# Patient Record
Sex: FEMALE | Race: WHITE | NOT HISPANIC OR LATINO | Employment: OTHER | URBAN - METROPOLITAN AREA
[De-identification: names, ages, dates, MRNs, and addresses within clinical notes are randomized per-mention and may not be internally consistent; named-entity substitution may affect disease eponyms.]

---

## 2017-07-14 ENCOUNTER — CONVERSION ENCOUNTER (OUTPATIENT)
Dept: MAMMOGRAPHY | Facility: HOSPITAL | Age: 52
End: 2017-07-14

## 2018-03-08 ENCOUNTER — OFFICE VISIT CONVERTED (OUTPATIENT)
Dept: FAMILY MEDICINE CLINIC | Facility: CLINIC | Age: 53
End: 2018-03-08
Attending: NURSE PRACTITIONER

## 2018-06-11 ENCOUNTER — OFFICE VISIT CONVERTED (OUTPATIENT)
Dept: FAMILY MEDICINE CLINIC | Facility: CLINIC | Age: 53
End: 2018-06-11
Attending: NURSE PRACTITIONER

## 2018-06-11 ENCOUNTER — CONVERSION ENCOUNTER (OUTPATIENT)
Dept: FAMILY MEDICINE CLINIC | Facility: CLINIC | Age: 53
End: 2018-06-11

## 2018-07-06 ENCOUNTER — OFFICE VISIT CONVERTED (OUTPATIENT)
Dept: ORTHOPEDIC SURGERY | Facility: CLINIC | Age: 53
End: 2018-07-06
Attending: ORTHOPAEDIC SURGERY

## 2018-07-27 ENCOUNTER — OFFICE VISIT CONVERTED (OUTPATIENT)
Dept: ORTHOPEDIC SURGERY | Facility: CLINIC | Age: 53
End: 2018-07-27
Attending: PHYSICIAN ASSISTANT

## 2018-08-24 ENCOUNTER — OFFICE VISIT CONVERTED (OUTPATIENT)
Dept: SURGERY | Facility: CLINIC | Age: 53
End: 2018-08-24
Attending: SURGERY

## 2018-08-28 ENCOUNTER — OFFICE VISIT CONVERTED (OUTPATIENT)
Dept: PULMONOLOGY | Facility: CLINIC | Age: 53
End: 2018-08-28
Attending: INTERNAL MEDICINE

## 2018-09-04 ENCOUNTER — CONVERSION ENCOUNTER (OUTPATIENT)
Dept: FAMILY MEDICINE CLINIC | Facility: CLINIC | Age: 53
End: 2018-09-04

## 2018-09-04 ENCOUNTER — OFFICE VISIT CONVERTED (OUTPATIENT)
Dept: FAMILY MEDICINE CLINIC | Facility: CLINIC | Age: 53
End: 2018-09-04
Attending: NURSE PRACTITIONER

## 2018-11-05 ENCOUNTER — OFFICE VISIT CONVERTED (OUTPATIENT)
Dept: FAMILY MEDICINE CLINIC | Facility: CLINIC | Age: 53
End: 2018-11-05
Attending: FAMILY MEDICINE

## 2018-11-05 ENCOUNTER — CONVERSION ENCOUNTER (OUTPATIENT)
Dept: FAMILY MEDICINE CLINIC | Facility: CLINIC | Age: 53
End: 2018-11-05

## 2018-11-27 ENCOUNTER — OFFICE VISIT CONVERTED (OUTPATIENT)
Dept: PULMONOLOGY | Facility: CLINIC | Age: 53
End: 2018-11-27
Attending: INTERNAL MEDICINE

## 2018-12-05 ENCOUNTER — OFFICE VISIT CONVERTED (OUTPATIENT)
Dept: FAMILY MEDICINE CLINIC | Facility: CLINIC | Age: 53
End: 2018-12-05
Attending: NURSE PRACTITIONER

## 2019-04-08 ENCOUNTER — OFFICE VISIT CONVERTED (OUTPATIENT)
Dept: PULMONOLOGY | Facility: CLINIC | Age: 54
End: 2019-04-08
Attending: PHYSICIAN ASSISTANT

## 2019-04-29 ENCOUNTER — HOSPITAL ENCOUNTER (OUTPATIENT)
Dept: ULTRASOUND IMAGING | Facility: HOSPITAL | Age: 54
Discharge: HOME | End: 2019-04-29
Attending: INTERNAL MEDICINE

## 2019-06-26 ENCOUNTER — OUTSIDE FACILITY SERVICE (OUTPATIENT)
Dept: SLEEP MEDICINE | Facility: HOSPITAL | Age: 54
End: 2019-06-26

## 2019-06-26 ENCOUNTER — HOSPITAL ENCOUNTER (OUTPATIENT)
Dept: SLEEP MEDICINE | Facility: HOSPITAL | Age: 54
Discharge: HOME | End: 2019-06-26
Attending: INTERNAL MEDICINE

## 2019-06-26 PROCEDURE — 99214 OFFICE O/P EST MOD 30 MIN: CPT | Performed by: INTERNAL MEDICINE

## 2019-08-30 ENCOUNTER — HOSPITAL ENCOUNTER (OUTPATIENT)
Dept: GENERAL RADIOLOGY | Facility: HOSPITAL | Age: 54
Discharge: HOME | End: 2019-08-30

## 2019-09-11 ENCOUNTER — OFFICE VISIT CONVERTED (OUTPATIENT)
Dept: PULMONOLOGY | Facility: CLINIC | Age: 54
End: 2019-09-11
Attending: PHYSICIAN ASSISTANT

## 2019-09-12 ENCOUNTER — HOSPITAL ENCOUNTER (OUTPATIENT)
Dept: LAB | Facility: HOSPITAL | Age: 54
Discharge: HOME | End: 2019-09-12
Attending: PHYSICIAN ASSISTANT

## 2019-09-14 LAB
BACTERIA SPEC AEROBE CULT: ABNORMAL
CIPROFLOXACIN SUSC ISLT: >=8
CLINDAMYCIN SUSC ISLT: 0.25
DOXYCYCLINE SUSC ISLT: <=0.5
ERYTHROMYCIN SUSC ISLT: >=8
GENTAMICIN SUSC ISLT: <=0.5
LEVOFLOXACIN SUSC ISLT: >=8
OXACILLIN SUSC ISLT: >=4
RIFAMPIN SUSC ISLT: <=0.5
TETRACYCLINE SUSC ISLT: <=1
TMP SMX SUSC ISLT: <=10
VANCOMYCIN SUSC ISLT: 1

## 2019-09-28 ENCOUNTER — HOSPITAL ENCOUNTER (OUTPATIENT)
Dept: URGENT CARE | Facility: CLINIC | Age: 54
Discharge: HOME | End: 2019-09-28

## 2019-11-18 ENCOUNTER — HOSPITAL ENCOUNTER (OUTPATIENT)
Dept: NUCLEAR MEDICINE | Facility: HOSPITAL | Age: 54
Discharge: HOME | End: 2019-11-18
Attending: INTERNAL MEDICINE

## 2019-12-05 ENCOUNTER — OFFICE VISIT CONVERTED (OUTPATIENT)
Dept: NEUROSURGERY | Facility: CLINIC | Age: 54
End: 2019-12-05
Attending: PHYSICIAN ASSISTANT

## 2019-12-16 ENCOUNTER — HOSPITAL ENCOUNTER (OUTPATIENT)
Dept: GENERAL RADIOLOGY | Facility: HOSPITAL | Age: 54
Discharge: HOME | End: 2019-12-16
Attending: PHYSICIAN ASSISTANT

## 2019-12-16 LAB
CREAT BLD-MCNC: 0.4 MG/DL (ref 0.6–1.4)
GFR SERPLBLD BASED ON 1.73 SQ M-ARVRAT: >60 ML/MIN/{1.73_M2}

## 2020-01-07 ENCOUNTER — OFFICE VISIT CONVERTED (OUTPATIENT)
Dept: NEUROSURGERY | Facility: CLINIC | Age: 55
End: 2020-01-07
Attending: PHYSICIAN ASSISTANT

## 2020-01-14 ENCOUNTER — HOSPITAL ENCOUNTER (OUTPATIENT)
Dept: GENERAL RADIOLOGY | Facility: HOSPITAL | Age: 55
Discharge: HOME | End: 2020-01-14
Attending: PHYSICIAN ASSISTANT

## 2020-03-05 ENCOUNTER — CONVERSION ENCOUNTER (OUTPATIENT)
Dept: NEUROLOGY | Facility: CLINIC | Age: 55
End: 2020-03-05

## 2020-03-05 ENCOUNTER — OFFICE VISIT CONVERTED (OUTPATIENT)
Dept: NEUROLOGY | Facility: CLINIC | Age: 55
End: 2020-03-05
Attending: PSYCHIATRY & NEUROLOGY

## 2020-03-13 ENCOUNTER — HOSPITAL ENCOUNTER (OUTPATIENT)
Dept: CARDIOLOGY | Facility: HOSPITAL | Age: 55
Discharge: HOME | End: 2020-03-13
Attending: PSYCHIATRY & NEUROLOGY

## 2020-03-19 ENCOUNTER — HOSPITAL ENCOUNTER (OUTPATIENT)
Dept: CARDIOLOGY | Facility: HOSPITAL | Age: 55
Discharge: HOME | End: 2020-03-19
Attending: NURSE PRACTITIONER

## 2020-03-19 LAB
25(OH)D3 SERPL-MCNC: 47 NG/ML (ref 30–100)
ALBUMIN SERPL-MCNC: 3.9 G/DL (ref 3.5–5)
ALBUMIN/GLOB SERPL: 1.1 {RATIO} (ref 1.4–2.6)
ALP SERPL-CCNC: 172 U/L (ref 53–141)
ALT SERPL-CCNC: 30 U/L (ref 10–40)
ANION GAP SERPL CALC-SCNC: 16 MMOL/L (ref 8–19)
AST SERPL-CCNC: 31 U/L (ref 15–50)
BASOPHILS # BLD AUTO: 0.03 10*3/UL (ref 0–0.2)
BASOPHILS NFR BLD AUTO: 0.6 % (ref 0–3)
BILIRUB SERPL-MCNC: 1.11 MG/DL (ref 0.2–1.3)
BUN SERPL-MCNC: 6 MG/DL (ref 5–25)
BUN/CREAT SERPL: 9 {RATIO} (ref 6–20)
CALCIUM SERPL-MCNC: 9.3 MG/DL (ref 8.7–10.4)
CHLORIDE SERPL-SCNC: 99 MMOL/L (ref 99–111)
CHOLEST SERPL-MCNC: 208 MG/DL (ref 107–200)
CHOLEST/HDLC SERPL: 2.6 {RATIO} (ref 3–6)
CONV ABS IMM GRAN: 0.02 10*3/UL (ref 0–0.2)
CONV CO2: 27 MMOL/L (ref 22–32)
CONV IMMATURE GRAN: 0.4 % (ref 0–1.8)
CONV TOTAL PROTEIN: 7.3 G/DL (ref 6.3–8.2)
CREAT UR-MCNC: 0.69 MG/DL (ref 0.5–0.9)
DEPRECATED RDW RBC AUTO: 54.4 FL (ref 36.4–46.3)
EOSINOPHIL # BLD AUTO: 0.17 10*3/UL (ref 0–0.7)
EOSINOPHIL # BLD AUTO: 3.5 % (ref 0–7)
ERYTHROCYTE [DISTWIDTH] IN BLOOD BY AUTOMATED COUNT: 14.9 % (ref 11.7–14.4)
EST. AVERAGE GLUCOSE BLD GHB EST-MCNC: 192 MG/DL
GFR SERPLBLD BASED ON 1.73 SQ M-ARVRAT: >60 ML/MIN/{1.73_M2}
GLOBULIN UR ELPH-MCNC: 3.4 G/DL (ref 2–3.5)
GLUCOSE SERPL-MCNC: 287 MG/DL (ref 65–99)
HBA1C MFR BLD: 8.3 % (ref 3.5–5.7)
HCT VFR BLD AUTO: 46 % (ref 37–47)
HDLC SERPL-MCNC: 79 MG/DL (ref 40–60)
HGB BLD-MCNC: 14.5 G/DL (ref 12–16)
LDLC SERPL CALC-MCNC: 111 MG/DL (ref 70–100)
LYMPHOCYTES # BLD AUTO: 1.69 10*3/UL (ref 1–5)
LYMPHOCYTES NFR BLD AUTO: 35.1 % (ref 20–45)
MCH RBC QN AUTO: 31.2 PG (ref 27–31)
MCHC RBC AUTO-ENTMCNC: 31.5 G/DL (ref 33–37)
MCV RBC AUTO: 98.9 FL (ref 81–99)
MONOCYTES # BLD AUTO: 0.39 10*3/UL (ref 0.2–1.2)
MONOCYTES NFR BLD AUTO: 8.1 % (ref 3–10)
NEUTROPHILS # BLD AUTO: 2.51 10*3/UL (ref 2–8)
NEUTROPHILS NFR BLD AUTO: 52.3 % (ref 30–85)
NRBC CBCN: 0 % (ref 0–0.7)
OSMOLALITY SERPL CALC.SUM OF ELEC: 294 MOSM/KG (ref 273–304)
PLATELET # BLD AUTO: 75 10*3/UL (ref 130–400)
PMV BLD AUTO: 12.1 FL (ref 9.4–12.3)
POTASSIUM SERPL-SCNC: 4.3 MMOL/L (ref 3.5–5.3)
RBC # BLD AUTO: 4.65 10*6/UL (ref 4.2–5.4)
SODIUM SERPL-SCNC: 138 MMOL/L (ref 135–147)
T4 FREE SERPL-MCNC: 0.8 NG/DL (ref 0.9–1.8)
TRIGL SERPL-MCNC: 91 MG/DL (ref 40–150)
TSH SERPL-ACNC: 0.61 M[IU]/L (ref 0.27–4.2)
VLDLC SERPL-MCNC: 18 MG/DL (ref 5–37)
WBC # BLD AUTO: 4.81 10*3/UL (ref 4.8–10.8)

## 2020-03-20 LAB — CONV ANTI MICROSOMAL AB: >600 IU/ML (ref 0–34)

## 2020-03-21 LAB — CONV THYROID STIMULATING IMMUNOGLOBULINS: 1.54 IU/L (ref 0–0.55)

## 2020-05-12 ENCOUNTER — OFFICE VISIT CONVERTED (OUTPATIENT)
Dept: PULMONOLOGY | Facility: CLINIC | Age: 55
End: 2020-05-12
Attending: INTERNAL MEDICINE

## 2020-08-21 ENCOUNTER — HOSPITAL ENCOUNTER (OUTPATIENT)
Dept: URGENT CARE | Facility: CLINIC | Age: 55
Discharge: HOME | End: 2020-08-21
Attending: NURSE PRACTITIONER

## 2020-08-23 LAB — SARS-COV-2 RNA SPEC QL NAA+PROBE: NOT DETECTED

## 2020-10-19 ENCOUNTER — HOSPITAL ENCOUNTER (OUTPATIENT)
Dept: URGENT CARE | Facility: CLINIC | Age: 55
Discharge: HOME | End: 2020-10-19

## 2020-11-02 ENCOUNTER — HOSPITAL ENCOUNTER (OUTPATIENT)
Dept: PREADMISSION TESTING | Facility: HOSPITAL | Age: 55
Discharge: HOME | End: 2020-11-02
Attending: OTOLARYNGOLOGY

## 2020-11-02 ENCOUNTER — HOSPITAL ENCOUNTER (OUTPATIENT)
Dept: OTHER | Facility: HOSPITAL | Age: 55
Discharge: HOME | End: 2020-11-02
Attending: OTOLARYNGOLOGY

## 2020-11-02 LAB
ANION GAP SERPL CALC-SCNC: 15 MMOL/L (ref 8–19)
APTT BLD: 24.1 S (ref 22.2–34.2)
BASOPHILS # BLD AUTO: 0.04 10*3/UL (ref 0–0.2)
BASOPHILS NFR BLD AUTO: 0.9 % (ref 0–3)
BUN SERPL-MCNC: 9 MG/DL (ref 5–25)
BUN/CREAT SERPL: 13 {RATIO} (ref 6–20)
CALCIUM SERPL-MCNC: 9.3 MG/DL (ref 8.7–10.4)
CHLORIDE SERPL-SCNC: 101 MMOL/L (ref 99–111)
CONV ABS IMM GRAN: 0.01 10*3/UL (ref 0–0.2)
CONV CO2: 25 MMOL/L (ref 22–32)
CONV IMMATURE GRAN: 0.2 % (ref 0–1.8)
CREAT UR-MCNC: 0.71 MG/DL (ref 0.5–0.9)
DEPRECATED RDW RBC AUTO: 47.2 FL (ref 36.4–46.3)
EOSINOPHIL # BLD AUTO: 0.14 10*3/UL (ref 0–0.7)
EOSINOPHIL # BLD AUTO: 3.2 % (ref 0–7)
ERYTHROCYTE [DISTWIDTH] IN BLOOD BY AUTOMATED COUNT: 13.9 % (ref 11.7–14.4)
GFR SERPLBLD BASED ON 1.73 SQ M-ARVRAT: >60 ML/MIN/{1.73_M2}
GLUCOSE SERPL-MCNC: 436 MG/DL (ref 65–99)
HCT VFR BLD AUTO: 41.8 % (ref 37–47)
HGB BLD-MCNC: 13.2 G/DL (ref 12–16)
INR PPP: 1.08 (ref 2–3)
LYMPHOCYTES # BLD AUTO: 1.11 10*3/UL (ref 1–5)
LYMPHOCYTES NFR BLD AUTO: 25.5 % (ref 20–45)
MCH RBC QN AUTO: 29.2 PG (ref 27–31)
MCHC RBC AUTO-ENTMCNC: 31.6 G/DL (ref 33–37)
MCV RBC AUTO: 92.5 FL (ref 81–99)
MONOCYTES # BLD AUTO: 0.28 10*3/UL (ref 0.2–1.2)
MONOCYTES NFR BLD AUTO: 6.4 % (ref 3–10)
NEUTROPHILS # BLD AUTO: 2.78 10*3/UL (ref 2–8)
NEUTROPHILS NFR BLD AUTO: 63.8 % (ref 30–85)
NRBC CBCN: 0 % (ref 0–0.7)
OSMOLALITY SERPL CALC.SUM OF ELEC: 301 MOSM/KG (ref 273–304)
PLATELET # BLD AUTO: 85 10*3/UL (ref 130–400)
PMV BLD AUTO: 13.7 FL (ref 9.4–12.3)
POTASSIUM SERPL-SCNC: 4.1 MMOL/L (ref 3.5–5.3)
PROTHROMBIN TIME: 11.5 S (ref 9.4–12)
PTH-INTACT SERPL-MCNC: 33.3 PG/ML (ref 11.1–79.5)
RBC # BLD AUTO: 4.52 10*6/UL (ref 4.2–5.4)
SODIUM SERPL-SCNC: 137 MMOL/L (ref 135–147)
T4 FREE SERPL-MCNC: 0.8 NG/DL (ref 0.9–1.8)
TSH SERPL-ACNC: 0.23 M[IU]/L (ref 0.27–4.2)
WBC # BLD AUTO: 4.36 10*3/UL (ref 4.8–10.8)

## 2020-11-04 ENCOUNTER — HOSPITAL ENCOUNTER (OUTPATIENT)
Dept: PREADMISSION TESTING | Facility: HOSPITAL | Age: 55
Discharge: HOME | End: 2020-11-04
Attending: OTOLARYNGOLOGY

## 2020-11-04 LAB — SARS-COV-2 RNA SPEC QL NAA+PROBE: NOT DETECTED

## 2020-11-05 ENCOUNTER — HOSPITAL ENCOUNTER (OUTPATIENT)
Dept: LAB | Facility: HOSPITAL | Age: 55
Discharge: HOME | End: 2020-11-05
Attending: OTOLARYNGOLOGY

## 2020-11-05 ENCOUNTER — OFFICE VISIT CONVERTED (OUTPATIENT)
Dept: PULMONOLOGY | Facility: CLINIC | Age: 55
End: 2020-11-05
Attending: INTERNAL MEDICINE

## 2020-11-05 ENCOUNTER — HOSPITAL ENCOUNTER (OUTPATIENT)
Dept: CT IMAGING | Facility: HOSPITAL | Age: 55
Discharge: HOME | End: 2020-11-05
Attending: INTERNAL MEDICINE

## 2020-11-05 LAB
BASE EXCESS BLD CALC-SCNC: -3.3 MMOL/L
COHGB MFR BLD: 5.2 % (ref 0–1.5)
CONV FHHB: 10.3 % (ref 0–5)
CONV FIO2: 21 % (ref 21–100)
CONV SITE: ABNORMAL
CREAT BLD-MCNC: 0.6 MG/DL (ref 0.6–1.4)
GFR SERPLBLD BASED ON 1.73 SQ M-ARVRAT: >60 ML/MIN/{1.73_M2}
GLUCOSE SERPL-MCNC: 156 MG/DL (ref 65–99)
HBA1C MFR BLD: 13.8 % (ref 11.7–14.6)
HCO3 BLDA-SCNC: 22.1 MMOL/L (ref 22–26)
LITERS PER MINUTE: 0 L/MIN
Lab: ABNORMAL
METHGB MFR BLD: 0.1 % (ref 0–1.5)
OXYHGB MFR BLD: 84.4 % (ref 94–98)
PCO2 BLD: 41.2 MM[HG] (ref 35–45)
PH UR: 7.35 [PH] (ref 7.35–7.45)
PO2 BLD: 263 MM[HG] (ref 0–500)
PO2 BLD: 55.3 MM[HG] (ref 80–100)
SAO2 % BLDCOA: 89.1 % (ref 95–99)
SPECIMEN SOURCE: ABNORMAL

## 2020-11-06 ENCOUNTER — HOSPITAL ENCOUNTER (OUTPATIENT)
Dept: PERIOP | Facility: HOSPITAL | Age: 55
Setting detail: HOSPITAL OUTPATIENT SURGERY
Discharge: HOME | End: 2020-11-07
Attending: OTOLARYNGOLOGY

## 2020-11-06 LAB
GLUCOSE BLD-MCNC: 118 MG/DL (ref 65–99)
GLUCOSE BLD-MCNC: 214 MG/DL (ref 65–99)
GLUCOSE BLD-MCNC: 214 MG/DL (ref 65–99)

## 2020-11-07 LAB
CALCIUM SERPL-MCNC: 9.9 MG/DL (ref 8.7–10.4)
GLUCOSE BLD-MCNC: 337 MG/DL (ref 65–99)
GLUCOSE BLD-MCNC: 431 MG/DL (ref 65–99)
PTH-INTACT SERPL-MCNC: 9.9 PG/ML (ref 11.1–79.5)

## 2020-11-17 ENCOUNTER — OFFICE VISIT CONVERTED (OUTPATIENT)
Dept: PULMONOLOGY | Facility: CLINIC | Age: 55
End: 2020-11-17
Attending: INTERNAL MEDICINE

## 2021-01-01 ENCOUNTER — OFFICE VISIT (OUTPATIENT)
Dept: FAMILY MEDICINE CLINIC | Facility: CLINIC | Age: 56
End: 2021-01-01

## 2021-01-01 ENCOUNTER — OFFICE VISIT (OUTPATIENT)
Dept: WOUND CARE | Facility: HOSPITAL | Age: 56
End: 2021-01-01

## 2021-01-01 ENCOUNTER — APPOINTMENT (OUTPATIENT)
Dept: CT IMAGING | Facility: HOSPITAL | Age: 56
End: 2021-01-01

## 2021-01-01 ENCOUNTER — TELEPHONE (OUTPATIENT)
Dept: SURGERY | Facility: CLINIC | Age: 56
End: 2021-01-01

## 2021-01-01 ENCOUNTER — HOSPITAL ENCOUNTER (EMERGENCY)
Facility: HOSPITAL | Age: 56
Discharge: HOME | End: 2021-11-02
Attending: EMERGENCY MEDICINE | Admitting: EMERGENCY MEDICINE

## 2021-01-01 ENCOUNTER — READMISSION MANAGEMENT (OUTPATIENT)
Dept: CALL CENTER | Facility: HOSPITAL | Age: 56
End: 2021-01-01

## 2021-01-01 ENCOUNTER — PATIENT OUTREACH (OUTPATIENT)
Dept: CASE MANAGEMENT | Facility: OTHER | Age: 56
End: 2021-01-01

## 2021-01-01 ENCOUNTER — APPOINTMENT (OUTPATIENT)
Dept: GENERAL RADIOLOGY | Facility: HOSPITAL | Age: 56
End: 2021-01-01

## 2021-01-01 ENCOUNTER — TELEPHONE (OUTPATIENT)
Dept: WOUND CARE | Facility: HOSPITAL | Age: 56
End: 2021-01-01

## 2021-01-01 ENCOUNTER — TELEPHONE (OUTPATIENT)
Dept: FAMILY MEDICINE CLINIC | Facility: CLINIC | Age: 56
End: 2021-01-01

## 2021-01-01 ENCOUNTER — TRANSITIONAL CARE MANAGEMENT TELEPHONE ENCOUNTER (OUTPATIENT)
Dept: CALL CENTER | Facility: HOSPITAL | Age: 56
End: 2021-01-01

## 2021-01-01 ENCOUNTER — HOSPITAL ENCOUNTER (OUTPATIENT)
Dept: LAB | Facility: HOSPITAL | Age: 56
Discharge: HOME | End: 2021-01-06
Attending: OTOLARYNGOLOGY

## 2021-01-01 ENCOUNTER — APPOINTMENT (OUTPATIENT)
Dept: MRI IMAGING | Facility: HOSPITAL | Age: 56
End: 2021-01-01

## 2021-01-01 ENCOUNTER — TRANSCRIBE ORDERS (OUTPATIENT)
Dept: LAB | Facility: HOSPITAL | Age: 56
End: 2021-01-01

## 2021-01-01 ENCOUNTER — OFFICE VISIT CONVERTED (OUTPATIENT)
Dept: PULMONOLOGY | Facility: CLINIC | Age: 56
End: 2021-01-01
Attending: NURSE PRACTITIONER

## 2021-01-01 ENCOUNTER — LAB (OUTPATIENT)
Dept: LAB | Facility: HOSPITAL | Age: 56
End: 2021-01-01

## 2021-01-01 ENCOUNTER — OFFICE VISIT (OUTPATIENT)
Dept: SURGERY | Facility: CLINIC | Age: 56
End: 2021-01-01

## 2021-01-01 ENCOUNTER — HOSPITAL ENCOUNTER (OUTPATIENT)
Dept: LAB | Facility: HOSPITAL | Age: 56
Discharge: HOME | End: 2021-02-25
Attending: OTOLARYNGOLOGY

## 2021-01-01 ENCOUNTER — HOSPITAL ENCOUNTER (INPATIENT)
Facility: HOSPITAL | Age: 56
LOS: 2 days | Discharge: HOME | End: 2021-10-30
Attending: EMERGENCY MEDICINE | Admitting: STUDENT IN AN ORGANIZED HEALTH CARE EDUCATION/TRAINING PROGRAM

## 2021-01-01 ENCOUNTER — APPOINTMENT (OUTPATIENT)
Dept: CARDIOLOGY | Facility: HOSPITAL | Age: 56
End: 2021-01-01

## 2021-01-01 ENCOUNTER — HOSPITAL ENCOUNTER (INPATIENT)
Facility: HOSPITAL | Age: 56
LOS: 6 days | Discharge: HOME HEALTH | End: 2021-07-07
Attending: EMERGENCY MEDICINE | Admitting: HOSPITALIST

## 2021-01-01 ENCOUNTER — REFERRAL TRIAGE (OUTPATIENT)
Dept: CASE MANAGEMENT | Facility: OTHER | Age: 56
End: 2021-01-01

## 2021-01-01 ENCOUNTER — ANESTHESIA (OUTPATIENT)
Dept: PERIOP | Facility: HOSPITAL | Age: 56
End: 2021-01-01

## 2021-01-01 ENCOUNTER — HOSPITAL ENCOUNTER (OUTPATIENT)
Dept: MRI IMAGING | Facility: HOSPITAL | Age: 56
Discharge: HOME | End: 2021-12-02
Admitting: SURGERY

## 2021-01-01 ENCOUNTER — ANESTHESIA EVENT (OUTPATIENT)
Dept: PERIOP | Facility: HOSPITAL | Age: 56
End: 2021-01-01

## 2021-01-01 ENCOUNTER — HOSPITAL ENCOUNTER (OUTPATIENT)
Dept: LAB | Facility: HOSPITAL | Age: 56
Discharge: HOME | End: 2021-04-09
Attending: OTOLARYNGOLOGY

## 2021-01-01 ENCOUNTER — HOSPITAL ENCOUNTER (INPATIENT)
Facility: HOSPITAL | Age: 56
LOS: 4 days | Discharge: HOME HEALTH | End: 2021-10-13
Attending: EMERGENCY MEDICINE | Admitting: FAMILY MEDICINE

## 2021-01-01 ENCOUNTER — TELEPHONE (OUTPATIENT)
Dept: CASE MANAGEMENT | Facility: OTHER | Age: 56
End: 2021-01-01

## 2021-01-01 ENCOUNTER — OFFICE VISIT CONVERTED (OUTPATIENT)
Dept: PULMONOLOGY | Facility: CLINIC | Age: 56
End: 2021-01-01
Attending: INTERNAL MEDICINE

## 2021-01-01 VITALS
RESPIRATION RATE: 16 BRPM | OXYGEN SATURATION: 83 % | HEART RATE: 82 BPM | BODY MASS INDEX: 30.22 KG/M2 | HEIGHT: 64 IN | WEIGHT: 177 LBS | DIASTOLIC BLOOD PRESSURE: 48 MMHG | TEMPERATURE: 98.2 F | SYSTOLIC BLOOD PRESSURE: 146 MMHG

## 2021-01-01 VITALS
HEART RATE: 81 BPM | RESPIRATION RATE: 16 BRPM | SYSTOLIC BLOOD PRESSURE: 138 MMHG | HEIGHT: 64 IN | BODY MASS INDEX: 37.13 KG/M2 | WEIGHT: 217.5 LBS | OXYGEN SATURATION: 96 % | DIASTOLIC BLOOD PRESSURE: 88 MMHG | TEMPERATURE: 98.3 F

## 2021-01-01 VITALS
DIASTOLIC BLOOD PRESSURE: 80 MMHG | HEIGHT: 64 IN | TEMPERATURE: 97.9 F | BODY MASS INDEX: 34.23 KG/M2 | OXYGEN SATURATION: 93 % | WEIGHT: 200.5 LBS | SYSTOLIC BLOOD PRESSURE: 150 MMHG | HEART RATE: 78 BPM

## 2021-01-01 VITALS
OXYGEN SATURATION: 98 % | BODY MASS INDEX: 33.12 KG/M2 | HEART RATE: 67 BPM | HEIGHT: 64 IN | SYSTOLIC BLOOD PRESSURE: 190 MMHG | TEMPERATURE: 97.1 F | RESPIRATION RATE: 17 BRPM | DIASTOLIC BLOOD PRESSURE: 88 MMHG | WEIGHT: 194 LBS

## 2021-01-01 VITALS
TEMPERATURE: 99 F | WEIGHT: 196 LBS | HEIGHT: 64 IN | HEART RATE: 74 BPM | DIASTOLIC BLOOD PRESSURE: 80 MMHG | BODY MASS INDEX: 33.46 KG/M2 | SYSTOLIC BLOOD PRESSURE: 150 MMHG | OXYGEN SATURATION: 95 %

## 2021-01-01 VITALS
TEMPERATURE: 97.3 F | OXYGEN SATURATION: 97 % | BODY MASS INDEX: 31.84 KG/M2 | HEIGHT: 64 IN | DIASTOLIC BLOOD PRESSURE: 68 MMHG | RESPIRATION RATE: 20 BRPM | SYSTOLIC BLOOD PRESSURE: 189 MMHG | WEIGHT: 186.51 LBS | HEART RATE: 87 BPM

## 2021-01-01 VITALS
WEIGHT: 194.8 LBS | OXYGEN SATURATION: 95 % | HEART RATE: 74 BPM | HEIGHT: 64 IN | BODY MASS INDEX: 33.26 KG/M2 | TEMPERATURE: 99.3 F | SYSTOLIC BLOOD PRESSURE: 160 MMHG | DIASTOLIC BLOOD PRESSURE: 97 MMHG

## 2021-01-01 VITALS
DIASTOLIC BLOOD PRESSURE: 86 MMHG | BODY MASS INDEX: 36.23 KG/M2 | WEIGHT: 212.25 LBS | HEIGHT: 64 IN | HEART RATE: 86 BPM | SYSTOLIC BLOOD PRESSURE: 166 MMHG | TEMPERATURE: 98.8 F | OXYGEN SATURATION: 94 %

## 2021-01-01 VITALS
BODY MASS INDEX: 32.28 KG/M2 | SYSTOLIC BLOOD PRESSURE: 153 MMHG | DIASTOLIC BLOOD PRESSURE: 73 MMHG | HEIGHT: 64 IN | WEIGHT: 189.06 LBS

## 2021-01-01 VITALS
OXYGEN SATURATION: 95 % | HEIGHT: 65 IN | WEIGHT: 184.08 LBS | TEMPERATURE: 97.7 F | RESPIRATION RATE: 20 BRPM | BODY MASS INDEX: 30.67 KG/M2 | SYSTOLIC BLOOD PRESSURE: 143 MMHG | DIASTOLIC BLOOD PRESSURE: 83 MMHG | HEART RATE: 79 BPM

## 2021-01-01 VITALS
BODY MASS INDEX: 31.86 KG/M2 | HEART RATE: 114 BPM | OXYGEN SATURATION: 87 % | HEIGHT: 64 IN | SYSTOLIC BLOOD PRESSURE: 127 MMHG | DIASTOLIC BLOOD PRESSURE: 98 MMHG | WEIGHT: 186.6 LBS

## 2021-01-01 VITALS
SYSTOLIC BLOOD PRESSURE: 150 MMHG | BODY MASS INDEX: 34.4 KG/M2 | OXYGEN SATURATION: 94 % | HEIGHT: 64 IN | HEART RATE: 96 BPM | TEMPERATURE: 97.4 F | RESPIRATION RATE: 20 BRPM | WEIGHT: 201.5 LBS | DIASTOLIC BLOOD PRESSURE: 86 MMHG

## 2021-01-01 VITALS
HEIGHT: 64 IN | TEMPERATURE: 98 F | SYSTOLIC BLOOD PRESSURE: 202 MMHG | DIASTOLIC BLOOD PRESSURE: 81 MMHG | WEIGHT: 189.56 LBS | BODY MASS INDEX: 34.68 KG/M2 | RESPIRATION RATE: 12 BRPM | HEIGHT: 64 IN | SYSTOLIC BLOOD PRESSURE: 186 MMHG | DIASTOLIC BLOOD PRESSURE: 81 MMHG | HEART RATE: 69 BPM | OXYGEN SATURATION: 93 % | OXYGEN SATURATION: 95 % | RESPIRATION RATE: 14 BRPM | WEIGHT: 203.12 LBS | TEMPERATURE: 98.1 F | HEART RATE: 82 BPM | BODY MASS INDEX: 32.36 KG/M2

## 2021-01-01 VITALS
TEMPERATURE: 98.3 F | SYSTOLIC BLOOD PRESSURE: 149 MMHG | OXYGEN SATURATION: 92 % | DIASTOLIC BLOOD PRESSURE: 65 MMHG | BODY MASS INDEX: 33.01 KG/M2 | OXYGEN SATURATION: 93 % | TEMPERATURE: 98.6 F | RESPIRATION RATE: 16 BRPM | HEART RATE: 71 BPM | BODY MASS INDEX: 33.29 KG/M2 | WEIGHT: 193.37 LBS | HEIGHT: 64 IN | SYSTOLIC BLOOD PRESSURE: 198 MMHG | WEIGHT: 195 LBS | HEIGHT: 64 IN | DIASTOLIC BLOOD PRESSURE: 84 MMHG | HEART RATE: 84 BPM | RESPIRATION RATE: 16 BRPM

## 2021-01-01 VITALS
WEIGHT: 196.21 LBS | TEMPERATURE: 98.7 F | RESPIRATION RATE: 18 BRPM | OXYGEN SATURATION: 91 % | HEIGHT: 64 IN | SYSTOLIC BLOOD PRESSURE: 156 MMHG | WEIGHT: 203.1 LBS | BODY MASS INDEX: 33.5 KG/M2 | TEMPERATURE: 97.5 F | HEIGHT: 65 IN | HEART RATE: 117 BPM | HEART RATE: 95 BPM | DIASTOLIC BLOOD PRESSURE: 149 MMHG | DIASTOLIC BLOOD PRESSURE: 62 MMHG | SYSTOLIC BLOOD PRESSURE: 188 MMHG | BODY MASS INDEX: 33.84 KG/M2 | OXYGEN SATURATION: 91 %

## 2021-01-01 VITALS
SYSTOLIC BLOOD PRESSURE: 118 MMHG | TEMPERATURE: 96.9 F | BODY MASS INDEX: 31.76 KG/M2 | HEIGHT: 64 IN | WEIGHT: 186 LBS | HEART RATE: 99 BPM | RESPIRATION RATE: 18 BRPM | DIASTOLIC BLOOD PRESSURE: 76 MMHG

## 2021-01-01 VITALS — HEART RATE: 102 BPM | TEMPERATURE: 98 F | OXYGEN SATURATION: 94 %

## 2021-01-01 VITALS
TEMPERATURE: 97.8 F | HEIGHT: 64 IN | BODY MASS INDEX: 34.93 KG/M2 | HEART RATE: 105 BPM | SYSTOLIC BLOOD PRESSURE: 135 MMHG | OXYGEN SATURATION: 91 % | RESPIRATION RATE: 22 BRPM | WEIGHT: 204.6 LBS | DIASTOLIC BLOOD PRESSURE: 76 MMHG

## 2021-01-01 VITALS
RESPIRATION RATE: 16 BRPM | HEART RATE: 71 BPM | SYSTOLIC BLOOD PRESSURE: 130 MMHG | DIASTOLIC BLOOD PRESSURE: 84 MMHG | TEMPERATURE: 96.6 F | OXYGEN SATURATION: 95 %

## 2021-01-01 VITALS
WEIGHT: 196.21 LBS | OXYGEN SATURATION: 93 % | HEART RATE: 68 BPM | RESPIRATION RATE: 16 BRPM | SYSTOLIC BLOOD PRESSURE: 138 MMHG | TEMPERATURE: 97.9 F | HEIGHT: 64 IN | BODY MASS INDEX: 33.5 KG/M2 | DIASTOLIC BLOOD PRESSURE: 82 MMHG

## 2021-01-01 VITALS
WEIGHT: 188 LBS | BODY MASS INDEX: 32.1 KG/M2 | SYSTOLIC BLOOD PRESSURE: 171 MMHG | HEART RATE: 77 BPM | HEIGHT: 64 IN | DIASTOLIC BLOOD PRESSURE: 82 MMHG

## 2021-01-01 VITALS
HEIGHT: 64 IN | OXYGEN SATURATION: 96 % | DIASTOLIC BLOOD PRESSURE: 88 MMHG | BODY MASS INDEX: 32.44 KG/M2 | SYSTOLIC BLOOD PRESSURE: 138 MMHG | WEIGHT: 190 LBS | RESPIRATION RATE: 18 BRPM | HEART RATE: 80 BPM | TEMPERATURE: 97.8 F

## 2021-01-01 VITALS
DIASTOLIC BLOOD PRESSURE: 90 MMHG | OXYGEN SATURATION: 91 % | BODY MASS INDEX: 33.61 KG/M2 | TEMPERATURE: 97.3 F | HEIGHT: 65 IN | HEART RATE: 82 BPM | SYSTOLIC BLOOD PRESSURE: 155 MMHG | WEIGHT: 201.7 LBS

## 2021-01-01 VITALS — OXYGEN SATURATION: 97 % | HEIGHT: 64 IN | HEART RATE: 80 BPM | BODY MASS INDEX: 36.02 KG/M2 | WEIGHT: 211 LBS

## 2021-01-01 VITALS
OXYGEN SATURATION: 89 % | HEIGHT: 64 IN | TEMPERATURE: 97.6 F | HEART RATE: 86 BPM | RESPIRATION RATE: 18 BRPM | SYSTOLIC BLOOD PRESSURE: 120 MMHG | DIASTOLIC BLOOD PRESSURE: 67 MMHG | BODY MASS INDEX: 32.44 KG/M2 | WEIGHT: 190 LBS

## 2021-01-01 VITALS
TEMPERATURE: 97 F | HEART RATE: 130 BPM | SYSTOLIC BLOOD PRESSURE: 134 MMHG | DIASTOLIC BLOOD PRESSURE: 88 MMHG | BODY MASS INDEX: 30.73 KG/M2 | RESPIRATION RATE: 20 BRPM | HEIGHT: 64 IN | WEIGHT: 180 LBS

## 2021-01-01 VITALS
WEIGHT: 211.8 LBS | DIASTOLIC BLOOD PRESSURE: 77 MMHG | SYSTOLIC BLOOD PRESSURE: 150 MMHG | TEMPERATURE: 96.9 F | OXYGEN SATURATION: 90 % | HEIGHT: 64 IN | BODY MASS INDEX: 36.16 KG/M2 | HEART RATE: 79 BPM

## 2021-01-01 VITALS
RESPIRATION RATE: 18 BRPM | TEMPERATURE: 97.3 F | HEART RATE: 87 BPM | DIASTOLIC BLOOD PRESSURE: 82 MMHG | OXYGEN SATURATION: 90 % | SYSTOLIC BLOOD PRESSURE: 120 MMHG | BODY MASS INDEX: 32.95 KG/M2 | WEIGHT: 193 LBS | HEIGHT: 64 IN

## 2021-01-01 VITALS
BODY MASS INDEX: 32.98 KG/M2 | OXYGEN SATURATION: 90 % | TEMPERATURE: 97.3 F | HEIGHT: 64 IN | DIASTOLIC BLOOD PRESSURE: 92 MMHG | WEIGHT: 193.2 LBS | HEART RATE: 111 BPM | SYSTOLIC BLOOD PRESSURE: 153 MMHG

## 2021-01-01 VITALS
TEMPERATURE: 97.8 F | HEART RATE: 72 BPM | OXYGEN SATURATION: 97 % | HEIGHT: 64 IN | WEIGHT: 195.2 LBS | BODY MASS INDEX: 33.32 KG/M2 | DIASTOLIC BLOOD PRESSURE: 85 MMHG | SYSTOLIC BLOOD PRESSURE: 169 MMHG

## 2021-01-01 VITALS — HEIGHT: 65 IN | RESPIRATION RATE: 20 BRPM | BODY MASS INDEX: 33.99 KG/M2 | WEIGHT: 204 LBS

## 2021-01-01 VITALS
RESPIRATION RATE: 16 BRPM | WEIGHT: 179.3 LBS | TEMPERATURE: 97.5 F | DIASTOLIC BLOOD PRESSURE: 90 MMHG | HEIGHT: 64 IN | BODY MASS INDEX: 30.61 KG/M2 | HEIGHT: 64 IN | OXYGEN SATURATION: 96 % | WEIGHT: 210 LBS | SYSTOLIC BLOOD PRESSURE: 134 MMHG | BODY MASS INDEX: 35.85 KG/M2 | HEART RATE: 101 BPM

## 2021-01-01 VITALS — WEIGHT: 180.8 LBS | HEIGHT: 64 IN | BODY MASS INDEX: 30.87 KG/M2

## 2021-01-01 VITALS — BODY MASS INDEX: 36.02 KG/M2 | HEIGHT: 64 IN | WEIGHT: 211 LBS

## 2021-01-01 VITALS
SYSTOLIC BLOOD PRESSURE: 185 MMHG | HEIGHT: 64 IN | DIASTOLIC BLOOD PRESSURE: 99 MMHG | BODY MASS INDEX: 32.31 KG/M2 | WEIGHT: 189.25 LBS

## 2021-01-01 VITALS — OXYGEN SATURATION: 96 % | HEART RATE: 76 BPM

## 2021-01-01 DIAGNOSIS — E03.9 MYXEDEMA HEART DISEASE: ICD-10-CM

## 2021-01-01 DIAGNOSIS — F43.21 GRIEF: ICD-10-CM

## 2021-01-01 DIAGNOSIS — J30.9 CHRONIC ALLERGIC RHINITIS: Chronic | ICD-10-CM

## 2021-01-01 DIAGNOSIS — K76.82 ENCEPHALOPATHY, HEPATIC (HCC): ICD-10-CM

## 2021-01-01 DIAGNOSIS — J43.9 PULMONARY EMPHYSEMA, UNSPECIFIED EMPHYSEMA TYPE (HCC): ICD-10-CM

## 2021-01-01 DIAGNOSIS — J32.1 CHRONIC FRONTAL SINUSITIS: Primary | ICD-10-CM

## 2021-01-01 DIAGNOSIS — A41.9 SEVERE SEPSIS (HCC): ICD-10-CM

## 2021-01-01 DIAGNOSIS — J43.9 PULMONARY EMPHYSEMA, UNSPECIFIED EMPHYSEMA TYPE (HCC): Chronic | ICD-10-CM

## 2021-01-01 DIAGNOSIS — E10.59 TYPE 1 DIABETES MELLITUS WITH OTHER CIRCULATORY COMPLICATION (HCC): Chronic | ICD-10-CM

## 2021-01-01 DIAGNOSIS — J18.9 PNEUMONIA OF BOTH LUNGS DUE TO INFECTIOUS ORGANISM, UNSPECIFIED PART OF LUNG: ICD-10-CM

## 2021-01-01 DIAGNOSIS — L89.153 PRESSURE INJURY OF SACRAL REGION, STAGE 3 (HCC): Primary | ICD-10-CM

## 2021-01-01 DIAGNOSIS — L89.104 DECUBITUS ULCER OF BACK, STAGE 4 (HCC): Primary | ICD-10-CM

## 2021-01-01 DIAGNOSIS — G93.40 SEPSIS WITH ENCEPHALOPATHY WITHOUT SEPTIC SHOCK, DUE TO UNSPECIFIED ORGANISM (HCC): ICD-10-CM

## 2021-01-01 DIAGNOSIS — E10.59 TYPE 1 DIABETES MELLITUS WITH OTHER CIRCULATORY COMPLICATION (HCC): ICD-10-CM

## 2021-01-01 DIAGNOSIS — A49.02 MRSA INFECTION: ICD-10-CM

## 2021-01-01 DIAGNOSIS — R11.0 NAUSEA: ICD-10-CM

## 2021-01-01 DIAGNOSIS — E10.622 TYPE 1 DIABETES MELLITUS WITH OTHER SKIN ULCER (HCC): ICD-10-CM

## 2021-01-01 DIAGNOSIS — F33.42 RECURRENT MAJOR DEPRESSIVE DISORDER, IN FULL REMISSION (HCC): ICD-10-CM

## 2021-01-01 DIAGNOSIS — F41.1 GAD (GENERALIZED ANXIETY DISORDER): ICD-10-CM

## 2021-01-01 DIAGNOSIS — I10 ESSENTIAL HYPERTENSION, BENIGN: Chronic | ICD-10-CM

## 2021-01-01 DIAGNOSIS — I10 ESSENTIAL HYPERTENSION, BENIGN: ICD-10-CM

## 2021-01-01 DIAGNOSIS — L03.90 WOUND CELLULITIS: Primary | ICD-10-CM

## 2021-01-01 DIAGNOSIS — R79.1 ELEVATED INR: ICD-10-CM

## 2021-01-01 DIAGNOSIS — R18.8 CIRRHOSIS OF LIVER WITH ASCITES, UNSPECIFIED HEPATIC CIRRHOSIS TYPE (HCC): Chronic | ICD-10-CM

## 2021-01-01 DIAGNOSIS — Z99.81 OXYGEN DEPENDENT: Chronic | ICD-10-CM

## 2021-01-01 DIAGNOSIS — E10.59 TYPE 1 DIABETES MELLITUS WITH OTHER CIRCULATORY COMPLICATION (HCC): Primary | ICD-10-CM

## 2021-01-01 DIAGNOSIS — E63.9 POOR NUTRITION: ICD-10-CM

## 2021-01-01 DIAGNOSIS — F32.5 MAJOR DEPRESSIVE DISORDER WITH SINGLE EPISODE, IN FULL REMISSION (HCC): Chronic | ICD-10-CM

## 2021-01-01 DIAGNOSIS — I10 ESSENTIAL HYPERTENSION, BENIGN: Primary | Chronic | ICD-10-CM

## 2021-01-01 DIAGNOSIS — L89.306 PRESSURE INJURY OF DEEP TISSUE OF BUTTOCK, UNSPECIFIED LATERALITY: Chronic | ICD-10-CM

## 2021-01-01 DIAGNOSIS — R18.8 CIRRHOSIS OF LIVER WITH ASCITES, UNSPECIFIED HEPATIC CIRRHOSIS TYPE (HCC): Primary | Chronic | ICD-10-CM

## 2021-01-01 DIAGNOSIS — M79.89 NECROTIZING SOFT TISSUE INFECTION: ICD-10-CM

## 2021-01-01 DIAGNOSIS — Z78.9 DECREASED ACTIVITIES OF DAILY LIVING (ADL): ICD-10-CM

## 2021-01-01 DIAGNOSIS — R18.8 CIRRHOSIS OF LIVER WITH ASCITES, UNSPECIFIED HEPATIC CIRRHOSIS TYPE (HCC): ICD-10-CM

## 2021-01-01 DIAGNOSIS — I63.9 CEREBROVASCULAR ACCIDENT (CVA), UNSPECIFIED MECHANISM (HCC): ICD-10-CM

## 2021-01-01 DIAGNOSIS — R13.12 OROPHARYNGEAL DYSPHAGIA: Primary | ICD-10-CM

## 2021-01-01 DIAGNOSIS — K74.60 CIRRHOSIS OF LIVER WITH ASCITES, UNSPECIFIED HEPATIC CIRRHOSIS TYPE (HCC): Primary | ICD-10-CM

## 2021-01-01 DIAGNOSIS — L98.499 TYPE 1 DIABETES MELLITUS WITH OTHER SKIN ULCER (HCC): ICD-10-CM

## 2021-01-01 DIAGNOSIS — K74.60 CIRRHOSIS OF LIVER WITH ASCITES, UNSPECIFIED HEPATIC CIRRHOSIS TYPE (HCC): ICD-10-CM

## 2021-01-01 DIAGNOSIS — R18.8 CIRRHOSIS OF LIVER WITH ASCITES, UNSPECIFIED HEPATIC CIRRHOSIS TYPE (HCC): Primary | ICD-10-CM

## 2021-01-01 DIAGNOSIS — K74.60 CIRRHOSIS OF LIVER WITH ASCITES, UNSPECIFIED HEPATIC CIRRHOSIS TYPE (HCC): Primary | Chronic | ICD-10-CM

## 2021-01-01 DIAGNOSIS — M79.89 NECROTIZING SOFT TISSUE INFECTION: Chronic | ICD-10-CM

## 2021-01-01 DIAGNOSIS — G89.4 CHRONIC PAIN SYNDROME: Chronic | ICD-10-CM

## 2021-01-01 DIAGNOSIS — M79.2 NEUROPATHIC PAIN OF FINGER, UNSPECIFIED LATERALITY: ICD-10-CM

## 2021-01-01 DIAGNOSIS — I51.9 MYXEDEMA HEART DISEASE: Primary | ICD-10-CM

## 2021-01-01 DIAGNOSIS — L89.212 PRESSURE INJURY OF RIGHT HIP, STAGE 2 (HCC): ICD-10-CM

## 2021-01-01 DIAGNOSIS — J01.90 ACUTE SINUSITIS, RECURRENCE NOT SPECIFIED, UNSPECIFIED LOCATION: ICD-10-CM

## 2021-01-01 DIAGNOSIS — F41.1 GAD (GENERALIZED ANXIETY DISORDER): Chronic | ICD-10-CM

## 2021-01-01 DIAGNOSIS — J96.01 ACUTE RESPIRATORY FAILURE WITH HYPOXIA (HCC): ICD-10-CM

## 2021-01-01 DIAGNOSIS — D69.6 THROMBOCYTOPENIA (HCC): Primary | ICD-10-CM

## 2021-01-01 DIAGNOSIS — M35.3 POLYMYALGIA (HCC): Primary | ICD-10-CM

## 2021-01-01 DIAGNOSIS — J41.1 MUCOPURULENT CHRONIC BRONCHITIS (HCC): Chronic | ICD-10-CM

## 2021-01-01 DIAGNOSIS — R65.20 SEVERE SEPSIS (HCC): Primary | ICD-10-CM

## 2021-01-01 DIAGNOSIS — L89.154 SACRAL DECUBITUS ULCER, STAGE IV (HCC): Primary | ICD-10-CM

## 2021-01-01 DIAGNOSIS — R53.1 WEAKNESS: ICD-10-CM

## 2021-01-01 DIAGNOSIS — J44.0 COPD WITH LOWER RESPIRATORY INFECTION (HCC): Primary | ICD-10-CM

## 2021-01-01 DIAGNOSIS — R30.0 DYSURIA: ICD-10-CM

## 2021-01-01 DIAGNOSIS — I63.9 ISCHEMIC STROKE (HCC): ICD-10-CM

## 2021-01-01 DIAGNOSIS — Z12.31 ENCOUNTER FOR SCREENING MAMMOGRAM FOR MALIGNANT NEOPLASM OF BREAST: ICD-10-CM

## 2021-01-01 DIAGNOSIS — K21.9 GASTROESOPHAGEAL REFLUX DISEASE WITHOUT ESOPHAGITIS: ICD-10-CM

## 2021-01-01 DIAGNOSIS — J32.1 CHRONIC FRONTAL SINUSITIS: ICD-10-CM
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
## 2021-01-01 DIAGNOSIS — A41.9 SEPSIS, DUE TO UNSPECIFIED ORGANISM, UNSPECIFIED WHETHER ACUTE ORGAN DYSFUNCTION PRESENT (HCC): Primary | ICD-10-CM

## 2021-01-01 DIAGNOSIS — J18.9 COMMUNITY ACQUIRED PNEUMONIA OF LEFT LOWER LOBE OF LUNG: ICD-10-CM

## 2021-01-01 DIAGNOSIS — K74.60 CIRRHOSIS OF LIVER WITH ASCITES, UNSPECIFIED HEPATIC CIRRHOSIS TYPE (HCC): Chronic | ICD-10-CM

## 2021-01-01 DIAGNOSIS — E03.2 IATROGENIC HYPOTHYROIDISM: Chronic | ICD-10-CM

## 2021-01-01 DIAGNOSIS — E03.9 MYXEDEMA HEART DISEASE: Primary | ICD-10-CM

## 2021-01-01 DIAGNOSIS — M21.371 FOOT DROP, RIGHT FOOT: ICD-10-CM

## 2021-01-01 DIAGNOSIS — L89.504: Chronic | ICD-10-CM

## 2021-01-01 DIAGNOSIS — A41.9 SEPSIS DUE TO PNEUMONIA (HCC): ICD-10-CM

## 2021-01-01 DIAGNOSIS — B37.9 YEAST INFECTION: Primary | ICD-10-CM

## 2021-01-01 DIAGNOSIS — D69.6 THROMBOCYTOPENIA (HCC): ICD-10-CM

## 2021-01-01 DIAGNOSIS — F51.01 PRIMARY INSOMNIA: ICD-10-CM

## 2021-01-01 DIAGNOSIS — K64.4 EXTERNAL HEMORRHOID, BLEEDING: Primary | ICD-10-CM

## 2021-01-01 DIAGNOSIS — R79.89 ELEVATED LFTS: ICD-10-CM

## 2021-01-01 DIAGNOSIS — R26.2 DIFFICULTY IN WALKING: ICD-10-CM

## 2021-01-01 DIAGNOSIS — F33.41 RECURRENT MAJOR DEPRESSIVE DISORDER, IN PARTIAL REMISSION (HCC): ICD-10-CM

## 2021-01-01 DIAGNOSIS — R30.0 DYSURIA: Primary | ICD-10-CM

## 2021-01-01 DIAGNOSIS — E03.2 IATROGENIC HYPOTHYROIDISM: ICD-10-CM

## 2021-01-01 DIAGNOSIS — R26.2 DIFFICULTY WALKING: ICD-10-CM

## 2021-01-01 DIAGNOSIS — L89.504: ICD-10-CM

## 2021-01-01 DIAGNOSIS — K74.60 CIRRHOSIS OF LIVER WITHOUT ASCITES, UNSPECIFIED HEPATIC CIRRHOSIS TYPE (HCC): Primary | Chronic | ICD-10-CM

## 2021-01-01 DIAGNOSIS — L89.104 DECUBITUS ULCER OF BACK, STAGE 4 (HCC): ICD-10-CM

## 2021-01-01 DIAGNOSIS — I51.9 MYXEDEMA HEART DISEASE: ICD-10-CM

## 2021-01-01 DIAGNOSIS — M21.371 FOOT DROP, RIGHT FOOT: Primary | ICD-10-CM

## 2021-01-01 DIAGNOSIS — M54.2 CERVICALGIA: Primary | ICD-10-CM

## 2021-01-01 DIAGNOSIS — L89.153 PRESSURE INJURY OF SACRAL REGION, STAGE 3 (HCC): ICD-10-CM

## 2021-01-01 DIAGNOSIS — J01.11 ACUTE RECURRENT FRONTAL SINUSITIS: Primary | ICD-10-CM

## 2021-01-01 DIAGNOSIS — J41.1 MUCOPURULENT CHRONIC BRONCHITIS (HCC): ICD-10-CM

## 2021-01-01 DIAGNOSIS — L22 DIAPER DERMATITIS: ICD-10-CM

## 2021-01-01 DIAGNOSIS — K43.9 VENTRAL HERNIA WITHOUT OBSTRUCTION OR GANGRENE: Primary | ICD-10-CM

## 2021-01-01 DIAGNOSIS — R65.20 SEPSIS WITH ENCEPHALOPATHY WITHOUT SEPTIC SHOCK, DUE TO UNSPECIFIED ORGANISM (HCC): ICD-10-CM

## 2021-01-01 DIAGNOSIS — M54.2 NECK PAIN: ICD-10-CM

## 2021-01-01 DIAGNOSIS — F39 MOOD DISORDER (HCC): ICD-10-CM

## 2021-01-01 DIAGNOSIS — B37.9 YEAST INFECTION: ICD-10-CM

## 2021-01-01 DIAGNOSIS — A41.9 SEPSIS WITH ENCEPHALOPATHY WITHOUT SEPTIC SHOCK, DUE TO UNSPECIFIED ORGANISM (HCC): ICD-10-CM

## 2021-01-01 DIAGNOSIS — K64.2 GRADE III HEMORRHOIDS: ICD-10-CM

## 2021-01-01 DIAGNOSIS — K64.3 GRADE IV HEMORRHOIDS: Primary | ICD-10-CM

## 2021-01-01 DIAGNOSIS — R65.20 SEVERE SEPSIS (HCC): ICD-10-CM

## 2021-01-01 DIAGNOSIS — K21.9 GASTROESOPHAGEAL REFLUX DISEASE WITHOUT ESOPHAGITIS: Chronic | ICD-10-CM

## 2021-01-01 DIAGNOSIS — A41.9 SEVERE SEPSIS (HCC): Primary | ICD-10-CM

## 2021-01-01 DIAGNOSIS — J18.9 SEPSIS DUE TO PNEUMONIA (HCC): ICD-10-CM

## 2021-01-01 LAB
ABO GROUP BLD: NORMAL
ABO GROUP BLD: NORMAL
ALBUMIN SERPL-MCNC: 1.9 G/DL (ref 3.5–5.2)
ALBUMIN SERPL-MCNC: 2.1 G/DL (ref 3.5–5.2)
ALBUMIN SERPL-MCNC: 2.5 G/DL (ref 3.5–5.2)
ALBUMIN SERPL-MCNC: 2.5 G/DL (ref 3.5–5.2)
ALBUMIN SERPL-MCNC: 2.7 G/DL (ref 3.5–5.2)
ALBUMIN SERPL-MCNC: 2.7 G/DL (ref 3.5–5.2)
ALBUMIN SERPL-MCNC: 2.8 G/DL (ref 3.5–5.2)
ALBUMIN SERPL-MCNC: 2.8 G/DL (ref 3.5–5.2)
ALBUMIN SERPL-MCNC: 2.9 G/DL (ref 3.5–5.2)
ALBUMIN SERPL-MCNC: 2.9 G/DL (ref 3.5–5.2)
ALBUMIN SERPL-MCNC: 3.5 G/DL (ref 3.5–5.2)
ALBUMIN SERPL-MCNC: 3.6 G/DL (ref 3.5–5.2)
ALBUMIN/GLOB SERPL: 0.8 G/DL
ALBUMIN/GLOB SERPL: 0.9 G/DL
ALBUMIN/GLOB SERPL: 0.9 G/DL
ALBUMIN/GLOB SERPL: 1 G/DL
ALBUMIN/GLOB SERPL: 1 G/DL
ALP SERPL-CCNC: 105 U/L (ref 39–117)
ALP SERPL-CCNC: 110 U/L (ref 39–117)
ALP SERPL-CCNC: 110 U/L (ref 39–117)
ALP SERPL-CCNC: 123 U/L (ref 39–117)
ALP SERPL-CCNC: 124 U/L (ref 39–117)
ALP SERPL-CCNC: 143 U/L (ref 39–117)
ALP SERPL-CCNC: 156 U/L (ref 39–117)
ALP SERPL-CCNC: 158 U/L (ref 39–117)
ALP SERPL-CCNC: 161 U/L (ref 39–117)
ALP SERPL-CCNC: 163 U/L (ref 39–117)
ALP SERPL-CCNC: 164 U/L (ref 39–117)
ALP SERPL-CCNC: 170 U/L (ref 39–117)
ALT SERPL W P-5'-P-CCNC: 115 U/L (ref 1–33)
ALT SERPL W P-5'-P-CCNC: 19 U/L (ref 1–33)
ALT SERPL W P-5'-P-CCNC: 192 U/L (ref 1–33)
ALT SERPL W P-5'-P-CCNC: 22 U/L (ref 1–33)
ALT SERPL W P-5'-P-CCNC: 23 U/L (ref 1–33)
ALT SERPL W P-5'-P-CCNC: 24 U/L (ref 1–33)
ALT SERPL W P-5'-P-CCNC: 26 U/L (ref 1–33)
ALT SERPL W P-5'-P-CCNC: 279 U/L (ref 1–33)
ALT SERPL W P-5'-P-CCNC: 325 U/L (ref 1–33)
ALT SERPL W P-5'-P-CCNC: 351 U/L (ref 1–33)
ALT SERPL W P-5'-P-CCNC: 398 U/L (ref 1–33)
ALT SERPL W P-5'-P-CCNC: 442 U/L (ref 1–33)
AMMONIA BLD-SCNC: 36 UMOL/L (ref 11–51)
AMMONIA BLD-SCNC: 37 UMOL/L (ref 11–51)
AMMONIA BLD-SCNC: 55 UMOL/L (ref 11–51)
AMMONIA BLD-SCNC: 72 UMOL/L (ref 11–51)
AMMONIA BLD-SCNC: 78 UMOL/L (ref 11–51)
AMMONIA BLD-SCNC: 79 UMOL/L (ref 11–51)
AMMONIA BLD-SCNC: 98 UMOL/L (ref 11–51)
ANION GAP SERPL CALCULATED.3IONS-SCNC: 10.6 MMOL/L (ref 5–15)
ANION GAP SERPL CALCULATED.3IONS-SCNC: 10.9 MMOL/L (ref 5–15)
ANION GAP SERPL CALCULATED.3IONS-SCNC: 11.1 MMOL/L (ref 5–15)
ANION GAP SERPL CALCULATED.3IONS-SCNC: 11.2 MMOL/L (ref 5–15)
ANION GAP SERPL CALCULATED.3IONS-SCNC: 11.3 MMOL/L (ref 5–15)
ANION GAP SERPL CALCULATED.3IONS-SCNC: 11.9 MMOL/L (ref 5–15)
ANION GAP SERPL CALCULATED.3IONS-SCNC: 13.2 MMOL/L (ref 5–15)
ANION GAP SERPL CALCULATED.3IONS-SCNC: 14.5 MMOL/L (ref 5–15)
ANION GAP SERPL CALCULATED.3IONS-SCNC: 15.1 MMOL/L (ref 5–15)
ANION GAP SERPL CALCULATED.3IONS-SCNC: 17.5 MMOL/L (ref 5–15)
ANION GAP SERPL CALCULATED.3IONS-SCNC: 6.9 MMOL/L (ref 5–15)
ANION GAP SERPL CALCULATED.3IONS-SCNC: 7.1 MMOL/L (ref 5–15)
ANION GAP SERPL CALCULATED.3IONS-SCNC: 7.4 MMOL/L (ref 5–15)
ANION GAP SERPL CALCULATED.3IONS-SCNC: 8.2 MMOL/L (ref 5–15)
ANION GAP SERPL CALCULATED.3IONS-SCNC: 8.3 MMOL/L (ref 5–15)
ANION GAP SERPL CALCULATED.3IONS-SCNC: 8.6 MMOL/L (ref 5–15)
ANION GAP SERPL CALCULATED.3IONS-SCNC: 8.7 MMOL/L (ref 5–15)
ANION GAP SERPL CALCULATED.3IONS-SCNC: 8.9 MMOL/L (ref 5–15)
ANISOCYTOSIS BLD QL: NORMAL
ANISOCYTOSIS BLD QL: NORMAL
APTT PPP: 24.1 SECONDS (ref 22.2–34.2)
ARTERIAL PATENCY WRIST A: POSITIVE
AST SERPL-CCNC: 1286 U/L (ref 1–32)
AST SERPL-CCNC: 161 U/L (ref 1–32)
AST SERPL-CCNC: 245 U/L (ref 1–32)
AST SERPL-CCNC: 27 U/L (ref 1–32)
AST SERPL-CCNC: 30 U/L (ref 1–32)
AST SERPL-CCNC: 30 U/L (ref 1–32)
AST SERPL-CCNC: 31 U/L (ref 1–32)
AST SERPL-CCNC: 370 U/L (ref 1–32)
AST SERPL-CCNC: 38 U/L (ref 1–32)
AST SERPL-CCNC: 396 U/L (ref 1–32)
AST SERPL-CCNC: 594 U/L (ref 1–32)
AST SERPL-CCNC: 595 U/L (ref 1–32)
BACTERIA SPEC AEROBE CULT: ABNORMAL
BACTERIA SPEC AEROBE CULT: NORMAL
BACTERIA SPEC ANAEROBE CULT: NORMAL
BACTERIA UR CULT: NORMAL
BACTERIA UR CULT: NORMAL
BACTERIA UR QL AUTO: ABNORMAL /HPF
BACTERIA UR QL AUTO: ABNORMAL /HPF
BASE EXCESS BLDA CALC-SCNC: -2.7 MMOL/L (ref -2–2)
BASE EXCESS BLDA CALC-SCNC: -3.1 MMOL/L (ref -2–2)
BASE EXCESS BLDA CALC-SCNC: 0.9 MMOL/L (ref -2–2)
BASOPHILS # BLD AUTO: 0.03 10*3/MM3 (ref 0–0.2)
BASOPHILS # BLD AUTO: 0.03 10*3/MM3 (ref 0–0.2)
BASOPHILS # BLD AUTO: 0.04 10*3/MM3 (ref 0–0.2)
BASOPHILS # BLD AUTO: 0.04 10*3/MM3 (ref 0–0.2)
BASOPHILS # BLD AUTO: 0.05 10*3/MM3 (ref 0–0.2)
BASOPHILS # BLD AUTO: 0.06 10*3/MM3 (ref 0–0.2)
BASOPHILS # BLD AUTO: 0.06 10*3/MM3 (ref 0–0.2)
BASOPHILS # BLD AUTO: 0.07 10*3/MM3 (ref 0–0.2)
BASOPHILS # BLD AUTO: 0.07 10*3/MM3 (ref 0–0.2)
BASOPHILS NFR BLD AUTO: 0.2 % (ref 0–1.5)
BASOPHILS NFR BLD AUTO: 0.4 % (ref 0–1.5)
BASOPHILS NFR BLD AUTO: 0.5 % (ref 0–1.5)
BASOPHILS NFR BLD AUTO: 0.7 % (ref 0–1.5)
BASOPHILS NFR BLD AUTO: 0.8 % (ref 0–1.5)
BASOPHILS NFR BLD AUTO: 0.9 % (ref 0–1.5)
BASOPHILS NFR BLD AUTO: 1.1 % (ref 0–1.5)
BASOPHILS NFR BLD AUTO: 1.2 % (ref 0–1.5)
BASOPHILS NFR BLD AUTO: 1.2 % (ref 0–1.5)
BDY SITE: ABNORMAL
BH BB BLOOD EXPIRATION DATE: NORMAL
BH BB BLOOD EXPIRATION DATE: NORMAL
BH BB BLOOD TYPE BARCODE: 6200
BH BB BLOOD TYPE BARCODE: 6200
BH BB DISPENSE STATUS: NORMAL
BH BB DISPENSE STATUS: NORMAL
BH BB PRODUCT CODE: NORMAL
BH BB PRODUCT CODE: NORMAL
BH BB UNIT NUMBER: NORMAL
BH BB UNIT NUMBER: NORMAL
BH CV ECHO MEAS - AO ROOT DIAM: 2.1 CM
BH CV ECHO MEAS - EDV(MOD-SP2): 25 ML
BH CV ECHO MEAS - EDV(MOD-SP4): 51 ML
BH CV ECHO MEAS - EF(MOD-BP): 67 %
BH CV ECHO MEAS - ESV(MOD-SP2): 8 ML
BH CV ECHO MEAS - ESV(MOD-SP4): 20 ML
BH CV ECHO MEAS - IVSD: 0.9 CM
BH CV ECHO MEAS - LA DIMENSION(2D): 3.4 CM
BH CV ECHO MEAS - LAT PEAK E' VEL: 10 CM/SEC
BH CV ECHO MEAS - LVIDD: 4.3 CM
BH CV ECHO MEAS - LVIDS: 2.6 CM
BH CV ECHO MEAS - LVPWD: 1 CM
BH CV ECHO MEAS - MED PEAK E' VEL: 7 CM/SEC
BH CV ECHO MEAS - MV A MAX VEL: 70 CM/SEC
BH CV ECHO MEAS - MV DEC TIME: 118 MSEC
BH CV ECHO MEAS - MV E MAX VEL: 36 CM/SEC
BH CV ECHO MEAS - MV E/A: 0.5
BH CV ECHO MEASUREMENTS AVERAGE E/E' RATIO: 4.24
BH CV XLRA MEAS LEFT CAROTID BULB EDV: 12.4 CM/SEC
BH CV XLRA MEAS LEFT CAROTID BULB PSV: 35.5 CM/SEC
BH CV XLRA MEAS LEFT DIST CCA EDV: 14.3 CM/SEC
BH CV XLRA MEAS LEFT DIST CCA PSV: 55.3 CM/SEC
BH CV XLRA MEAS LEFT DIST ICA EDV: 20.5 CM/SEC
BH CV XLRA MEAS LEFT DIST ICA PSV: 62.7 CM/SEC
BH CV XLRA MEAS LEFT MID ICA EDV: 16.8 CM/SEC
BH CV XLRA MEAS LEFT MID ICA PSV: 57.2 CM/SEC
BH CV XLRA MEAS LEFT PROX CCA EDV: 16.8 CM/SEC
BH CV XLRA MEAS LEFT PROX CCA PSV: 66.5 CM/SEC
BH CV XLRA MEAS LEFT PROX ECA EDV: 17.4 CM/SEC
BH CV XLRA MEAS LEFT PROX ECA PSV: 71.4 CM/SEC
BH CV XLRA MEAS LEFT PROX ICA EDV: 15.5 CM/SEC
BH CV XLRA MEAS LEFT PROX ICA PSV: 52.8 CM/SEC
BH CV XLRA MEAS LEFT VERTEBRAL A EDV: 13 CM/SEC
BH CV XLRA MEAS LEFT VERTEBRAL A PSV: 44.7 CM/SEC
BH CV XLRA MEAS RIGHT CAROTID BULB EDV: 14.9 CM/SEC
BH CV XLRA MEAS RIGHT CAROTID BULB PSV: 52.2 CM/SEC
BH CV XLRA MEAS RIGHT DIST CCA EDV: 17.4 CM/SEC
BH CV XLRA MEAS RIGHT DIST CCA PSV: 52.8 CM/SEC
BH CV XLRA MEAS RIGHT DIST ICA EDV: 18.6 CM/SEC
BH CV XLRA MEAS RIGHT DIST ICA PSV: 54 CM/SEC
BH CV XLRA MEAS RIGHT MID ICA EDV: 16.2 CM/SEC
BH CV XLRA MEAS RIGHT MID ICA PSV: 46 CM/SEC
BH CV XLRA MEAS RIGHT PROX CCA EDV: 15.5 CM/SEC
BH CV XLRA MEAS RIGHT PROX CCA PSV: 67.7 CM/SEC
BH CV XLRA MEAS RIGHT PROX ECA EDV: 21.1 CM/SEC
BH CV XLRA MEAS RIGHT PROX ECA PSV: 83.2 CM/SEC
BH CV XLRA MEAS RIGHT PROX ICA EDV: 22.4 CM/SEC
BH CV XLRA MEAS RIGHT PROX ICA PSV: 70.2 CM/SEC
BH CV XLRA MEAS RIGHT VERTEBRAL A EDV: 17.4 CM/SEC
BH CV XLRA MEAS RIGHT VERTEBRAL A PSV: 50.9 CM/SEC
BILIRUB CONJ SERPL-MCNC: 0.2 MG/DL (ref 0–0.3)
BILIRUB CONJ SERPL-MCNC: 0.2 MG/DL (ref 0–0.3)
BILIRUB INDIRECT SERPL-MCNC: 0.3 MG/DL
BILIRUB INDIRECT SERPL-MCNC: 0.4 MG/DL
BILIRUB SERPL-MCNC: 0.4 MG/DL (ref 0–1.2)
BILIRUB SERPL-MCNC: 0.5 MG/DL (ref 0–1.2)
BILIRUB SERPL-MCNC: 0.5 MG/DL (ref 0–1.2)
BILIRUB SERPL-MCNC: 0.6 MG/DL (ref 0–1.2)
BILIRUB SERPL-MCNC: 0.7 MG/DL (ref 0–1.2)
BILIRUB SERPL-MCNC: 0.8 MG/DL (ref 0–1.2)
BILIRUB SERPL-MCNC: 0.8 MG/DL (ref 0–1.2)
BILIRUB SERPL-MCNC: 0.9 MG/DL (ref 0–1.2)
BILIRUB SERPL-MCNC: 1.1 MG/DL (ref 0–1.2)
BILIRUB SERPL-MCNC: 1.6 MG/DL (ref 0–1.2)
BILIRUB UR QL STRIP: ABNORMAL
BILIRUB UR QL STRIP: ABNORMAL
BLD GP AB SCN SERPL QL: NEGATIVE
BUN SERPL-MCNC: 10 MG/DL (ref 6–20)
BUN SERPL-MCNC: 21 MG/DL (ref 6–20)
BUN SERPL-MCNC: 21 MG/DL (ref 6–20)
BUN SERPL-MCNC: 24 MG/DL (ref 6–20)
BUN SERPL-MCNC: 26 MG/DL (ref 6–20)
BUN SERPL-MCNC: 32 MG/DL (ref 6–20)
BUN SERPL-MCNC: 33 MG/DL (ref 6–20)
BUN SERPL-MCNC: 36 MG/DL (ref 6–20)
BUN SERPL-MCNC: 36 MG/DL (ref 6–20)
BUN SERPL-MCNC: 37 MG/DL (ref 6–20)
BUN SERPL-MCNC: 38 MG/DL (ref 6–20)
BUN SERPL-MCNC: 38 MG/DL (ref 6–20)
BUN SERPL-MCNC: 45 MG/DL (ref 6–20)
BUN SERPL-MCNC: 48 MG/DL (ref 6–20)
BUN SERPL-MCNC: 7 MG/DL (ref 6–20)
BUN SERPL-MCNC: 7 MG/DL (ref 6–20)
BUN SERPL-MCNC: 9 MG/DL (ref 6–20)
BUN SERPL-MCNC: 9 MG/DL (ref 6–20)
BUN/CREAT SERPL: 10.3 (ref 7–25)
BUN/CREAT SERPL: 11 (ref 7–25)
BUN/CREAT SERPL: 11.7 (ref 7–25)
BUN/CREAT SERPL: 13 (ref 7–25)
BUN/CREAT SERPL: 13.5 (ref 7–25)
BUN/CREAT SERPL: 17.9 (ref 7–25)
BUN/CREAT SERPL: 20.9 (ref 7–25)
BUN/CREAT SERPL: 23.9 (ref 7–25)
BUN/CREAT SERPL: 24.4 (ref 7–25)
BUN/CREAT SERPL: 24.7 (ref 7–25)
BUN/CREAT SERPL: 28.9 (ref 7–25)
BUN/CREAT SERPL: 32.5 (ref 7–25)
BUN/CREAT SERPL: 34.7 (ref 7–25)
BUN/CREAT SERPL: 37.9 (ref 7–25)
BUN/CREAT SERPL: 38.7 (ref 7–25)
BUN/CREAT SERPL: 40.5 (ref 7–25)
BUN/CREAT SERPL: 42.5 (ref 7–25)
BUN/CREAT SERPL: 9.3 (ref 7–25)
CA-I BLDA-SCNC: 1.11 MMOL/L (ref 1.13–1.32)
CALCIUM SERPL-MCNC: 9.1 MG/DL (ref 8.7–10.4)
CALCIUM SPEC-SCNC: 6.5 MG/DL (ref 8.6–10.5)
CALCIUM SPEC-SCNC: 6.8 MG/DL (ref 8.6–10.5)
CALCIUM SPEC-SCNC: 7.3 MG/DL (ref 8.6–10.5)
CALCIUM SPEC-SCNC: 7.7 MG/DL (ref 8.6–10.5)
CALCIUM SPEC-SCNC: 7.8 MG/DL (ref 8.6–10.5)
CALCIUM SPEC-SCNC: 7.9 MG/DL (ref 8.6–10.5)
CALCIUM SPEC-SCNC: 8.1 MG/DL (ref 8.6–10.5)
CALCIUM SPEC-SCNC: 8.2 MG/DL (ref 8.6–10.5)
CALCIUM SPEC-SCNC: 8.2 MG/DL (ref 8.6–10.5)
CALCIUM SPEC-SCNC: 8.4 MG/DL (ref 8.6–10.5)
CALCIUM SPEC-SCNC: 8.5 MG/DL (ref 8.6–10.5)
CALCIUM SPEC-SCNC: 8.5 MG/DL (ref 8.6–10.5)
CALCIUM SPEC-SCNC: 8.6 MG/DL (ref 8.6–10.5)
CALCIUM SPEC-SCNC: 8.8 MG/DL (ref 8.6–10.5)
CALCIUM SPEC-SCNC: 8.9 MG/DL (ref 8.6–10.5)
CALCIUM SPEC-SCNC: 8.9 MG/DL (ref 8.6–10.5)
CHLAMYDIA IGG SER-ACNC: 2.71 RATIO (ref 0–0.9)
CHLORIDE BLDA-SCNC: 108 MMOL/L (ref 98–106)
CHLORIDE SERPL-SCNC: 101 MMOL/L (ref 98–107)
CHLORIDE SERPL-SCNC: 102 MMOL/L (ref 98–107)
CHLORIDE SERPL-SCNC: 102 MMOL/L (ref 98–107)
CHLORIDE SERPL-SCNC: 104 MMOL/L (ref 98–107)
CHLORIDE SERPL-SCNC: 105 MMOL/L (ref 98–107)
CHLORIDE SERPL-SCNC: 105 MMOL/L (ref 98–107)
CHLORIDE SERPL-SCNC: 106 MMOL/L (ref 98–107)
CHLORIDE SERPL-SCNC: 107 MMOL/L (ref 98–107)
CHLORIDE SERPL-SCNC: 108 MMOL/L (ref 98–107)
CHLORIDE SERPL-SCNC: 108 MMOL/L (ref 98–107)
CHLORIDE SERPL-SCNC: 109 MMOL/L (ref 98–107)
CHLORIDE SERPL-SCNC: 111 MMOL/L (ref 98–107)
CHOLEST SERPL-MCNC: 161 MG/DL (ref 0–200)
CHOLEST SERPL-MCNC: 172 MG/DL (ref 0–200)
CLARITY UR: ABNORMAL
CLARITY UR: CLEAR
CO2 SERPL-SCNC: 16.5 MMOL/L (ref 22–29)
CO2 SERPL-SCNC: 17.5 MMOL/L (ref 22–29)
CO2 SERPL-SCNC: 18.8 MMOL/L (ref 22–29)
CO2 SERPL-SCNC: 19.8 MMOL/L (ref 22–29)
CO2 SERPL-SCNC: 20.9 MMOL/L (ref 22–29)
CO2 SERPL-SCNC: 21.4 MMOL/L (ref 22–29)
CO2 SERPL-SCNC: 21.8 MMOL/L (ref 22–29)
CO2 SERPL-SCNC: 24.9 MMOL/L (ref 22–29)
CO2 SERPL-SCNC: 24.9 MMOL/L (ref 22–29)
CO2 SERPL-SCNC: 25.1 MMOL/L (ref 22–29)
CO2 SERPL-SCNC: 25.7 MMOL/L (ref 22–29)
CO2 SERPL-SCNC: 25.7 MMOL/L (ref 22–29)
CO2 SERPL-SCNC: 26.3 MMOL/L (ref 22–29)
CO2 SERPL-SCNC: 26.6 MMOL/L (ref 22–29)
CO2 SERPL-SCNC: 27.4 MMOL/L (ref 22–29)
COHGB MFR BLD: 0.3 % (ref 0–1.5)
COHGB MFR BLD: 0.5 % (ref 0–1.5)
COHGB MFR BLD: 1.4 % (ref 0–1.5)
COLOR UR: ABNORMAL
COLOR UR: YELLOW
CREAT BLDA-MCNC: 0.8 MG/DL
CREAT SERPL-MCNC: 0.6 MG/DL (ref 0.57–1)
CREAT SERPL-MCNC: 0.69 MG/DL (ref 0.57–1)
CREAT SERPL-MCNC: 0.74 MG/DL (ref 0.57–1)
CREAT SERPL-MCNC: 0.75 MG/DL (ref 0.57–1)
CREAT SERPL-MCNC: 0.75 MG/DL (ref 0.57–1)
CREAT SERPL-MCNC: 0.82 MG/DL (ref 0.57–1)
CREAT SERPL-MCNC: 0.83 MG/DL (ref 0.57–1)
CREAT SERPL-MCNC: 0.86 MG/DL (ref 0.57–1)
CREAT SERPL-MCNC: 0.87 MG/DL (ref 0.57–1)
CREAT SERPL-MCNC: 0.88 MG/DL (ref 0.57–1)
CREAT SERPL-MCNC: 0.93 MG/DL (ref 0.57–1)
CREAT SERPL-MCNC: 1.11 MG/DL (ref 0.57–1)
CREAT SERPL-MCNC: 1.13 MG/DL (ref 0.57–1)
CREAT SERPL-MCNC: 1.17 MG/DL (ref 0.57–1)
CREAT SERPL-MCNC: 1.54 MG/DL (ref 0.57–1)
CREAT SERPL-MCNC: 1.72 MG/DL (ref 0.57–1)
CREAT SERPL-MCNC: 2.07 MG/DL (ref 0.57–1)
CREAT SERPL-MCNC: 3.1 MG/DL (ref 0.57–1)
CROSSMATCH INTERPRETATION: NORMAL
CROSSMATCH INTERPRETATION: NORMAL
CRP SERPL-MCNC: 6.37 MG/DL (ref 0–0.5)
CYTO UR: NORMAL
D-LACTATE SERPL-SCNC: 1.9 MMOL/L (ref 0.5–2)
D-LACTATE SERPL-SCNC: 2 MMOL/L (ref 0.5–2)
D-LACTATE SERPL-SCNC: 2 MMOL/L (ref 0.5–2)
D-LACTATE SERPL-SCNC: 2.4 MMOL/L (ref 0.5–2)
D-LACTATE SERPL-SCNC: 3 MMOL/L (ref 0.5–2)
D-LACTATE SERPL-SCNC: 3.6 MMOL/L (ref 0.5–2)
D-LACTATE SERPL-SCNC: 4.6 MMOL/L (ref 0.5–2)
D-LACTATE SERPL-SCNC: 4.7 MMOL/L (ref 0.5–2)
DEPRECATED RDW RBC AUTO: 51.4 FL (ref 37–54)
DEPRECATED RDW RBC AUTO: 51.8 FL (ref 37–54)
DEPRECATED RDW RBC AUTO: 51.8 FL (ref 37–54)
DEPRECATED RDW RBC AUTO: 52.3 FL (ref 37–54)
DEPRECATED RDW RBC AUTO: 54.9 FL (ref 37–54)
DEPRECATED RDW RBC AUTO: 56 FL (ref 37–54)
DEPRECATED RDW RBC AUTO: 56.3 FL (ref 37–54)
DEPRECATED RDW RBC AUTO: 56.4 FL (ref 37–54)
DEPRECATED RDW RBC AUTO: 57.6 FL (ref 37–54)
DEPRECATED RDW RBC AUTO: 58.1 FL (ref 37–54)
DEPRECATED RDW RBC AUTO: 58.4 FL (ref 37–54)
DEPRECATED RDW RBC AUTO: 58.5 FL (ref 37–54)
DEPRECATED RDW RBC AUTO: 58.7 FL (ref 37–54)
DEPRECATED RDW RBC AUTO: 59.2 FL (ref 37–54)
DEPRECATED RDW RBC AUTO: 61.1 FL (ref 37–54)
EOSINOPHIL # BLD AUTO: 0.03 10*3/MM3 (ref 0–0.4)
EOSINOPHIL # BLD AUTO: 0.04 10*3/MM3 (ref 0–0.4)
EOSINOPHIL # BLD AUTO: 0.13 10*3/MM3 (ref 0–0.4)
EOSINOPHIL # BLD AUTO: 0.16 10*3/MM3 (ref 0–0.4)
EOSINOPHIL # BLD AUTO: 0.16 10*3/MM3 (ref 0–0.4)
EOSINOPHIL # BLD AUTO: 0.18 10*3/MM3 (ref 0–0.4)
EOSINOPHIL # BLD AUTO: 0.19 10*3/MM3 (ref 0–0.4)
EOSINOPHIL # BLD AUTO: 0.19 10*3/MM3 (ref 0–0.4)
EOSINOPHIL # BLD AUTO: 0.22 10*3/MM3 (ref 0–0.4)
EOSINOPHIL # BLD AUTO: 0.24 10*3/MM3 (ref 0–0.4)
EOSINOPHIL # BLD AUTO: 0.36 10*3/MM3 (ref 0–0.4)
EOSINOPHIL NFR BLD AUTO: 0.2 % (ref 0.3–6.2)
EOSINOPHIL NFR BLD AUTO: 0.3 % (ref 0.3–6.2)
EOSINOPHIL NFR BLD AUTO: 1.8 % (ref 0.3–6.2)
EOSINOPHIL NFR BLD AUTO: 2 % (ref 0.3–6.2)
EOSINOPHIL NFR BLD AUTO: 2.9 % (ref 0.3–6.2)
EOSINOPHIL NFR BLD AUTO: 3.4 % (ref 0.3–6.2)
EOSINOPHIL NFR BLD AUTO: 3.5 % (ref 0.3–6.2)
EOSINOPHIL NFR BLD AUTO: 3.6 % (ref 0.3–6.2)
EOSINOPHIL NFR BLD AUTO: 3.8 % (ref 0.3–6.2)
EOSINOPHIL NFR BLD AUTO: 5.7 % (ref 0.3–6.2)
EOSINOPHIL NFR BLD AUTO: 8.4 % (ref 0.3–6.2)
ERYTHROCYTE [DISTWIDTH] IN BLOOD BY AUTOMATED COUNT: 14.9 % (ref 12.3–15.4)
ERYTHROCYTE [DISTWIDTH] IN BLOOD BY AUTOMATED COUNT: 16 % (ref 12.3–15.4)
ERYTHROCYTE [DISTWIDTH] IN BLOOD BY AUTOMATED COUNT: 16.1 % (ref 12.3–15.4)
ERYTHROCYTE [DISTWIDTH] IN BLOOD BY AUTOMATED COUNT: 16.1 % (ref 12.3–15.4)
ERYTHROCYTE [DISTWIDTH] IN BLOOD BY AUTOMATED COUNT: 16.2 % (ref 12.3–15.4)
ERYTHROCYTE [DISTWIDTH] IN BLOOD BY AUTOMATED COUNT: 16.5 % (ref 12.3–15.4)
ERYTHROCYTE [DISTWIDTH] IN BLOOD BY AUTOMATED COUNT: 16.5 % (ref 12.3–15.4)
ERYTHROCYTE [DISTWIDTH] IN BLOOD BY AUTOMATED COUNT: 17 % (ref 12.3–15.4)
ERYTHROCYTE [DISTWIDTH] IN BLOOD BY AUTOMATED COUNT: 17.1 % (ref 12.3–15.4)
ERYTHROCYTE [DISTWIDTH] IN BLOOD BY AUTOMATED COUNT: 17.2 % (ref 12.3–15.4)
ERYTHROCYTE [DISTWIDTH] IN BLOOD BY AUTOMATED COUNT: 17.3 % (ref 12.3–15.4)
ERYTHROCYTE [DISTWIDTH] IN BLOOD BY AUTOMATED COUNT: 17.4 % (ref 12.3–15.4)
ERYTHROCYTE [DISTWIDTH] IN BLOOD BY AUTOMATED COUNT: 17.7 % (ref 12.3–15.4)
FHHB: 4.5 % (ref 0–5)
FHHB: 4.9 % (ref 0–5)
FHHB: 7.8 % (ref 0–5)
GAS FLOW AIRWAY: 2 LPM
GAS FLOW AIRWAY: 3 LPM
GAS FLOW AIRWAY: 5 LPM
GFR SERPL CREATININE-BSD FRML MDRD: 103 ML/MIN/1.73
GFR SERPL CREATININE-BSD FRML MDRD: 16 ML/MIN/1.73
GFR SERPL CREATININE-BSD FRML MDRD: 25 ML/MIN/1.73
GFR SERPL CREATININE-BSD FRML MDRD: 31 ML/MIN/1.73
GFR SERPL CREATININE-BSD FRML MDRD: 35 ML/MIN/1.73
GFR SERPL CREATININE-BSD FRML MDRD: 48 ML/MIN/1.73
GFR SERPL CREATININE-BSD FRML MDRD: 50 ML/MIN/1.73
GFR SERPL CREATININE-BSD FRML MDRD: 51 ML/MIN/1.73
GFR SERPL CREATININE-BSD FRML MDRD: 62 ML/MIN/1.73
GFR SERPL CREATININE-BSD FRML MDRD: 66 ML/MIN/1.73
GFR SERPL CREATININE-BSD FRML MDRD: 67 ML/MIN/1.73
GFR SERPL CREATININE-BSD FRML MDRD: 68 ML/MIN/1.73
GFR SERPL CREATININE-BSD FRML MDRD: 71 ML/MIN/1.73
GFR SERPL CREATININE-BSD FRML MDRD: 72 ML/MIN/1.73
GFR SERPL CREATININE-BSD FRML MDRD: 80 ML/MIN/1.73
GFR SERPL CREATININE-BSD FRML MDRD: 80 ML/MIN/1.73
GFR SERPL CREATININE-BSD FRML MDRD: 81 ML/MIN/1.73
GFR SERPL CREATININE-BSD FRML MDRD: 88 ML/MIN/1.73
GGT SERPL-CCNC: 93 U/L (ref 5–36)
GIANT PLATELETS: NORMAL
GLOBULIN UR ELPH-MCNC: 3.1 GM/DL
GLOBULIN UR ELPH-MCNC: 3.2 GM/DL
GLOBULIN UR ELPH-MCNC: 3.2 GM/DL
GLOBULIN UR ELPH-MCNC: 3.3 GM/DL
GLOBULIN UR ELPH-MCNC: 3.3 GM/DL
GLOBULIN UR ELPH-MCNC: 3.4 GM/DL
GLOBULIN UR ELPH-MCNC: 3.5 GM/DL
GLOBULIN UR ELPH-MCNC: 3.6 GM/DL
GLOBULIN UR ELPH-MCNC: 3.6 GM/DL
GLOBULIN UR ELPH-MCNC: 3.7 GM/DL
GLUCOSE BLDA-MCNC: 343 MMOL/L (ref 65–99)
GLUCOSE BLDC GLUCOMTR-MCNC: 105 MG/DL (ref 70–130)
GLUCOSE BLDC GLUCOMTR-MCNC: 114 MG/DL (ref 70–130)
GLUCOSE BLDC GLUCOMTR-MCNC: 119 MG/DL (ref 70–130)
GLUCOSE BLDC GLUCOMTR-MCNC: 126 MG/DL (ref 70–130)
GLUCOSE BLDC GLUCOMTR-MCNC: 130 MG/DL (ref 70–130)
GLUCOSE BLDC GLUCOMTR-MCNC: 134 MG/DL (ref 70–99)
GLUCOSE BLDC GLUCOMTR-MCNC: 139 MG/DL (ref 70–130)
GLUCOSE BLDC GLUCOMTR-MCNC: 140 MG/DL (ref 70–130)
GLUCOSE BLDC GLUCOMTR-MCNC: 148 MG/DL (ref 70–130)
GLUCOSE BLDC GLUCOMTR-MCNC: 148 MG/DL (ref 70–99)
GLUCOSE BLDC GLUCOMTR-MCNC: 149 MG/DL (ref 70–130)
GLUCOSE BLDC GLUCOMTR-MCNC: 151 MG/DL (ref 70–130)
GLUCOSE BLDC GLUCOMTR-MCNC: 152 MG/DL (ref 70–99)
GLUCOSE BLDC GLUCOMTR-MCNC: 153 MG/DL (ref 70–130)
GLUCOSE BLDC GLUCOMTR-MCNC: 154 MG/DL (ref 70–99)
GLUCOSE BLDC GLUCOMTR-MCNC: 155 MG/DL (ref 70–130)
GLUCOSE BLDC GLUCOMTR-MCNC: 156 MG/DL (ref 70–130)
GLUCOSE BLDC GLUCOMTR-MCNC: 156 MG/DL (ref 70–130)
GLUCOSE BLDC GLUCOMTR-MCNC: 161 MG/DL (ref 70–99)
GLUCOSE BLDC GLUCOMTR-MCNC: 164 MG/DL (ref 70–130)
GLUCOSE BLDC GLUCOMTR-MCNC: 167 MG/DL (ref 70–99)
GLUCOSE BLDC GLUCOMTR-MCNC: 170 MG/DL (ref 70–130)
GLUCOSE BLDC GLUCOMTR-MCNC: 170 MG/DL (ref 70–130)
GLUCOSE BLDC GLUCOMTR-MCNC: 175 MG/DL (ref 70–130)
GLUCOSE BLDC GLUCOMTR-MCNC: 176 MG/DL (ref 70–99)
GLUCOSE BLDC GLUCOMTR-MCNC: 177 MG/DL (ref 70–130)
GLUCOSE BLDC GLUCOMTR-MCNC: 178 MG/DL (ref 70–130)
GLUCOSE BLDC GLUCOMTR-MCNC: 181 MG/DL (ref 70–99)
GLUCOSE BLDC GLUCOMTR-MCNC: 182 MG/DL (ref 70–99)
GLUCOSE BLDC GLUCOMTR-MCNC: 183 MG/DL (ref 70–130)
GLUCOSE BLDC GLUCOMTR-MCNC: 185 MG/DL (ref 70–99)
GLUCOSE BLDC GLUCOMTR-MCNC: 186 MG/DL (ref 70–130)
GLUCOSE BLDC GLUCOMTR-MCNC: 190 MG/DL (ref 70–130)
GLUCOSE BLDC GLUCOMTR-MCNC: 190 MG/DL (ref 70–99)
GLUCOSE BLDC GLUCOMTR-MCNC: 193 MG/DL (ref 70–130)
GLUCOSE BLDC GLUCOMTR-MCNC: 194 MG/DL (ref 70–99)
GLUCOSE BLDC GLUCOMTR-MCNC: 195 MG/DL (ref 70–130)
GLUCOSE BLDC GLUCOMTR-MCNC: 195 MG/DL (ref 70–99)
GLUCOSE BLDC GLUCOMTR-MCNC: 196 MG/DL (ref 70–130)
GLUCOSE BLDC GLUCOMTR-MCNC: 197 MG/DL (ref 70–99)
GLUCOSE BLDC GLUCOMTR-MCNC: 205 MG/DL (ref 70–99)
GLUCOSE BLDC GLUCOMTR-MCNC: 206 MG/DL (ref 70–130)
GLUCOSE BLDC GLUCOMTR-MCNC: 209 MG/DL (ref 70–99)
GLUCOSE BLDC GLUCOMTR-MCNC: 210 MG/DL (ref 70–130)
GLUCOSE BLDC GLUCOMTR-MCNC: 210 MG/DL (ref 70–130)
GLUCOSE BLDC GLUCOMTR-MCNC: 214 MG/DL (ref 70–99)
GLUCOSE BLDC GLUCOMTR-MCNC: 227 MG/DL (ref 70–130)
GLUCOSE BLDC GLUCOMTR-MCNC: 233 MG/DL (ref 70–130)
GLUCOSE BLDC GLUCOMTR-MCNC: 233 MG/DL (ref 70–99)
GLUCOSE BLDC GLUCOMTR-MCNC: 234 MG/DL (ref 70–130)
GLUCOSE BLDC GLUCOMTR-MCNC: 234 MG/DL (ref 70–99)
GLUCOSE BLDC GLUCOMTR-MCNC: 243 MG/DL (ref 70–130)
GLUCOSE BLDC GLUCOMTR-MCNC: 245 MG/DL (ref 70–130)
GLUCOSE BLDC GLUCOMTR-MCNC: 245 MG/DL (ref 70–99)
GLUCOSE BLDC GLUCOMTR-MCNC: 257 MG/DL (ref 70–130)
GLUCOSE BLDC GLUCOMTR-MCNC: 258 MG/DL (ref 70–130)
GLUCOSE BLDC GLUCOMTR-MCNC: 272 MG/DL (ref 70–130)
GLUCOSE BLDC GLUCOMTR-MCNC: 273 MG/DL (ref 70–99)
GLUCOSE BLDC GLUCOMTR-MCNC: 285 MG/DL (ref 70–130)
GLUCOSE BLDC GLUCOMTR-MCNC: 291 MG/DL (ref 70–130)
GLUCOSE BLDC GLUCOMTR-MCNC: 293 MG/DL (ref 70–130)
GLUCOSE BLDC GLUCOMTR-MCNC: 294 MG/DL (ref 70–130)
GLUCOSE BLDC GLUCOMTR-MCNC: 295 MG/DL (ref 70–130)
GLUCOSE BLDC GLUCOMTR-MCNC: 321 MG/DL (ref 70–130)
GLUCOSE BLDC GLUCOMTR-MCNC: 332 MG/DL (ref 70–130)
GLUCOSE BLDC GLUCOMTR-MCNC: 335 MG/DL (ref 70–130)
GLUCOSE BLDC GLUCOMTR-MCNC: 354 MG/DL (ref 70–130)
GLUCOSE BLDC GLUCOMTR-MCNC: 80 MG/DL (ref 70–130)
GLUCOSE BLDC GLUCOMTR-MCNC: 92 MG/DL (ref 70–130)
GLUCOSE SERPL-MCNC: 137 MG/DL (ref 65–99)
GLUCOSE SERPL-MCNC: 139 MG/DL (ref 65–99)
GLUCOSE SERPL-MCNC: 152 MG/DL (ref 65–99)
GLUCOSE SERPL-MCNC: 166 MG/DL (ref 65–99)
GLUCOSE SERPL-MCNC: 166 MG/DL (ref 65–99)
GLUCOSE SERPL-MCNC: 190 MG/DL (ref 65–99)
GLUCOSE SERPL-MCNC: 195 MG/DL (ref 65–99)
GLUCOSE SERPL-MCNC: 197 MG/DL (ref 65–99)
GLUCOSE SERPL-MCNC: 209 MG/DL (ref 65–99)
GLUCOSE SERPL-MCNC: 210 MG/DL (ref 65–99)
GLUCOSE SERPL-MCNC: 220 MG/DL (ref 65–99)
GLUCOSE SERPL-MCNC: 222 MG/DL (ref 65–99)
GLUCOSE SERPL-MCNC: 222 MG/DL (ref 65–99)
GLUCOSE SERPL-MCNC: 266 MG/DL (ref 65–99)
GLUCOSE SERPL-MCNC: 338 MG/DL (ref 65–99)
GLUCOSE SERPL-MCNC: 351 MG/DL (ref 65–99)
GLUCOSE SERPL-MCNC: 357 MG/DL (ref 65–99)
GLUCOSE SERPL-MCNC: 91 MG/DL (ref 65–99)
GLUCOSE UR STRIP-MCNC: ABNORMAL MG/DL
GLUCOSE UR STRIP-MCNC: ABNORMAL MG/DL
GRAM STN SPEC: ABNORMAL
GRAM STN SPEC: ABNORMAL
GRAM STN SPEC: NORMAL
GRAM STN SPEC: NORMAL
HADV AB TITR SER CF: ABNORMAL {TITER}
HAV IGM SERPL QL IA: NORMAL
HBA1C MFR BLD: 10.92 % (ref 4.8–5.6)
HBA1C MFR BLD: 9 % (ref 4.8–5.6)
HBV CORE IGM SERPL QL IA: NORMAL
HBV SURFACE AG SERPL QL IA: NORMAL
HCO3 BLDA-SCNC: 19.1 MMOL/L (ref 22–26)
HCO3 BLDA-SCNC: 20.7 MMOL/L (ref 22–26)
HCO3 BLDA-SCNC: 24.8 MMOL/L (ref 22–26)
HCT VFR BLD AUTO: 21.8 % (ref 34–46.6)
HCT VFR BLD AUTO: 23.7 % (ref 34–46.6)
HCT VFR BLD AUTO: 26.9 % (ref 34–46.6)
HCT VFR BLD AUTO: 28.5 % (ref 34–46.6)
HCT VFR BLD AUTO: 29.4 % (ref 34–46.6)
HCT VFR BLD AUTO: 30 % (ref 34–46.6)
HCT VFR BLD AUTO: 30.7 % (ref 34–46.6)
HCT VFR BLD AUTO: 31.1 % (ref 34–46.6)
HCT VFR BLD AUTO: 33.1 % (ref 34–46.6)
HCT VFR BLD AUTO: 34.7 % (ref 34–46.6)
HCT VFR BLD AUTO: 35.1 % (ref 34–46.6)
HCT VFR BLD AUTO: 36.3 % (ref 34–46.6)
HCT VFR BLD AUTO: 37.3 % (ref 34–46.6)
HCT VFR BLD AUTO: 37.4 % (ref 34–46.6)
HCT VFR BLD AUTO: 39.7 % (ref 34–46.6)
HCT VFR BLD AUTO: 46.4 % (ref 34–46.6)
HCV AB SER DONR QL: NORMAL
HDLC SERPL-MCNC: 32 MG/DL (ref 40–60)
HDLC SERPL-MCNC: 36 MG/DL (ref 40–60)
HGB BLD-MCNC: 10.3 G/DL (ref 12–15.9)
HGB BLD-MCNC: 10.5 G/DL (ref 12–15.9)
HGB BLD-MCNC: 10.6 G/DL (ref 12–15.9)
HGB BLD-MCNC: 11.1 G/DL (ref 12–15.9)
HGB BLD-MCNC: 11.4 G/DL (ref 12–15.9)
HGB BLD-MCNC: 11.4 G/DL (ref 12–15.9)
HGB BLD-MCNC: 11.5 G/DL (ref 12–15.9)
HGB BLD-MCNC: 15.3 G/DL (ref 12–15.9)
HGB BLD-MCNC: 6.5 G/DL (ref 12–15.9)
HGB BLD-MCNC: 7.3 G/DL (ref 12–15.9)
HGB BLD-MCNC: 8.3 G/DL (ref 12–15.9)
HGB BLD-MCNC: 8.8 G/DL (ref 12–15.9)
HGB BLD-MCNC: 8.9 G/DL (ref 12–15.9)
HGB BLD-MCNC: 9.4 G/DL (ref 12–15.9)
HGB BLD-MCNC: 9.4 G/DL (ref 12–15.9)
HGB BLD-MCNC: 9.9 G/DL (ref 12–15.9)
HGB BLDA-MCNC: 15.6 G/DL (ref 11.7–14.6)
HGB BLDA-MCNC: 7.1 G/DL (ref 11.7–14.6)
HGB BLDA-MCNC: 8.9 G/DL (ref 11.7–14.6)
HGB UR QL STRIP.AUTO: ABNORMAL
HGB UR QL STRIP.AUTO: ABNORMAL
HOLD SPECIMEN: NORMAL
HYALINE CASTS UR QL AUTO: ABNORMAL /LPF
HYALINE CASTS UR QL AUTO: ABNORMAL /LPF
HYPOCHROMIA BLD QL: NORMAL
IMM GRANULOCYTES # BLD AUTO: 0 10*3/MM3 (ref 0–0.05)
IMM GRANULOCYTES # BLD AUTO: 0.01 10*3/MM3 (ref 0–0.05)
IMM GRANULOCYTES # BLD AUTO: 0.02 10*3/MM3 (ref 0–0.05)
IMM GRANULOCYTES # BLD AUTO: 0.04 10*3/MM3 (ref 0–0.05)
IMM GRANULOCYTES # BLD AUTO: 0.06 10*3/MM3 (ref 0–0.05)
IMM GRANULOCYTES # BLD AUTO: 0.09 10*3/MM3 (ref 0–0.05)
IMM GRANULOCYTES # BLD AUTO: 0.13 10*3/MM3 (ref 0–0.05)
IMM GRANULOCYTES # BLD AUTO: 0.14 10*3/MM3 (ref 0–0.05)
IMM GRANULOCYTES NFR BLD AUTO: 0 % (ref 0–0.5)
IMM GRANULOCYTES NFR BLD AUTO: 0.2 % (ref 0–0.5)
IMM GRANULOCYTES NFR BLD AUTO: 0.3 % (ref 0–0.5)
IMM GRANULOCYTES NFR BLD AUTO: 0.3 % (ref 0–0.5)
IMM GRANULOCYTES NFR BLD AUTO: 0.4 % (ref 0–0.5)
IMM GRANULOCYTES NFR BLD AUTO: 0.5 % (ref 0–0.5)
IMM GRANULOCYTES NFR BLD AUTO: 0.8 % (ref 0–0.5)
IMM GRANULOCYTES NFR BLD AUTO: 1.1 % (ref 0–0.5)
IMM GRANULOCYTES NFR BLD AUTO: 1.3 % (ref 0–0.5)
IMM GRANULOCYTES NFR BLD AUTO: 2 % (ref 0–0.5)
INHALED O2 CONCENTRATION: 28 %
INHALED O2 CONCENTRATION: 32 %
INR PPP: 1.07 (ref 2–3)
INR PPP: 1.18 (ref 2–3)
INR PPP: 1.2 (ref 2–3)
INR PPP: 1.35 (ref 2–3)
INR PPP: 1.76 (ref 2–3)
INR PPP: 2.5 (ref 2–3)
IVRT: 69 MSEC
KETONES UR QL STRIP: ABNORMAL
KETONES UR QL STRIP: NEGATIVE
L PNEUMO AB SER IA-ACNC: <0.91 OD RATIO (ref 0–0.9)
LAB AP CASE REPORT: NORMAL
LAB AP CLINICAL INFORMATION: NORMAL
LACTATE BLDA-SCNC: 3.32 MMOL/L (ref 0.5–2)
LARGE PLATELETS: NORMAL
LARGE PLATELETS: NORMAL
LDLC SERPL CALC-MCNC: 101 MG/DL (ref 0–100)
LDLC SERPL CALC-MCNC: 109 MG/DL (ref 0–100)
LDLC/HDLC SERPL: 2.94 {RATIO}
LDLC/HDLC SERPL: 3.04 {RATIO}
LEFT ATRIUM VOLUME INDEX: 28 ML/M2
LEUKOCYTE ESTERASE UR QL STRIP.AUTO: ABNORMAL
LEUKOCYTE ESTERASE UR QL STRIP.AUTO: NEGATIVE
LYMPHOCYTES # BLD AUTO: 0.69 10*3/MM3 (ref 0.7–3.1)
LYMPHOCYTES # BLD AUTO: 0.86 10*3/MM3 (ref 0.7–3.1)
LYMPHOCYTES # BLD AUTO: 0.89 10*3/MM3 (ref 0.7–3.1)
LYMPHOCYTES # BLD AUTO: 1.1 10*3/MM3 (ref 0.7–3.1)
LYMPHOCYTES # BLD AUTO: 1.12 10*3/MM3 (ref 0.7–3.1)
LYMPHOCYTES # BLD AUTO: 1.19 10*3/MM3 (ref 0.7–3.1)
LYMPHOCYTES # BLD AUTO: 1.22 10*3/MM3 (ref 0.7–3.1)
LYMPHOCYTES # BLD AUTO: 1.24 10*3/MM3 (ref 0.7–3.1)
LYMPHOCYTES # BLD AUTO: 1.3 10*3/MM3 (ref 0.7–3.1)
LYMPHOCYTES # BLD AUTO: 1.37 10*3/MM3 (ref 0.7–3.1)
LYMPHOCYTES # BLD AUTO: 1.96 10*3/MM3 (ref 0.7–3.1)
LYMPHOCYTES # BLD MANUAL: 1.37 10*3/MM3 (ref 0.7–3.1)
LYMPHOCYTES NFR BLD AUTO: 11 % (ref 19.6–45.3)
LYMPHOCYTES NFR BLD AUTO: 13.5 % (ref 19.6–45.3)
LYMPHOCYTES NFR BLD AUTO: 15.1 % (ref 19.6–45.3)
LYMPHOCYTES NFR BLD AUTO: 15.5 % (ref 19.6–45.3)
LYMPHOCYTES NFR BLD AUTO: 15.7 % (ref 19.6–45.3)
LYMPHOCYTES NFR BLD AUTO: 18.4 % (ref 19.6–45.3)
LYMPHOCYTES NFR BLD AUTO: 20 % (ref 19.6–45.3)
LYMPHOCYTES NFR BLD AUTO: 20.9 % (ref 19.6–45.3)
LYMPHOCYTES NFR BLD AUTO: 21.3 % (ref 19.6–45.3)
LYMPHOCYTES NFR BLD AUTO: 23.4 % (ref 19.6–45.3)
LYMPHOCYTES NFR BLD AUTO: 26.4 % (ref 19.6–45.3)
LYMPHOCYTES NFR BLD MANUAL: 6 % (ref 5–12)
LYMPHOCYTES NFR BLD MANUAL: 8 % (ref 19.6–45.3)
M PNEUMO IGG SER IA-ACNC: 2853 U/ML (ref 0–99)
M PNEUMO IGM SER IA-ACNC: <770 U/ML (ref 0–769)
MACROCYTES BLD QL SMEAR: ABNORMAL
MAGNESIUM SERPL-MCNC: 1.6 MG/DL (ref 1.6–2.6)
MAGNESIUM SERPL-MCNC: 1.6 MG/DL (ref 1.6–2.6)
MAGNESIUM SERPL-MCNC: 1.7 MG/DL (ref 1.6–2.6)
MAGNESIUM SERPL-MCNC: 1.7 MG/DL (ref 1.6–2.6)
MAGNESIUM SERPL-MCNC: 1.8 MG/DL (ref 1.6–2.6)
MAGNESIUM SERPL-MCNC: 1.9 MG/DL (ref 1.6–2.6)
MAGNESIUM SERPL-MCNC: 2 MG/DL (ref 1.6–2.6)
MAGNESIUM SERPL-MCNC: 2.3 MG/DL (ref 1.6–2.6)
MAXIMAL PREDICTED HEART RATE: 164 BPM
MAXIMAL PREDICTED HEART RATE: 164 BPM
MCH RBC QN AUTO: 26.6 PG (ref 26.6–33)
MCH RBC QN AUTO: 27.1 PG (ref 26.6–33)
MCH RBC QN AUTO: 27.4 PG (ref 26.6–33)
MCH RBC QN AUTO: 28 PG (ref 26.6–33)
MCH RBC QN AUTO: 28.3 PG (ref 26.6–33)
MCH RBC QN AUTO: 28.3 PG (ref 26.6–33)
MCH RBC QN AUTO: 28.4 PG (ref 26.6–33)
MCH RBC QN AUTO: 29.1 PG (ref 26.6–33)
MCH RBC QN AUTO: 29.2 PG (ref 26.6–33)
MCH RBC QN AUTO: 29.2 PG (ref 26.6–33)
MCH RBC QN AUTO: 29.4 PG (ref 26.6–33)
MCH RBC QN AUTO: 29.4 PG (ref 26.6–33)
MCH RBC QN AUTO: 29.7 PG (ref 26.6–33)
MCHC RBC AUTO-ENTMCNC: 28.7 G/DL (ref 31.5–35.7)
MCHC RBC AUTO-ENTMCNC: 29.7 G/DL (ref 31.5–35.7)
MCHC RBC AUTO-ENTMCNC: 29.8 G/DL (ref 31.5–35.7)
MCHC RBC AUTO-ENTMCNC: 30.2 G/DL (ref 31.5–35.7)
MCHC RBC AUTO-ENTMCNC: 30.3 G/DL (ref 31.5–35.7)
MCHC RBC AUTO-ENTMCNC: 30.6 G/DL (ref 31.5–35.7)
MCHC RBC AUTO-ENTMCNC: 30.7 G/DL (ref 31.5–35.7)
MCHC RBC AUTO-ENTMCNC: 30.8 G/DL (ref 31.5–35.7)
MCHC RBC AUTO-ENTMCNC: 30.9 G/DL (ref 31.5–35.7)
MCHC RBC AUTO-ENTMCNC: 30.9 G/DL (ref 31.5–35.7)
MCHC RBC AUTO-ENTMCNC: 31.1 G/DL (ref 31.5–35.7)
MCHC RBC AUTO-ENTMCNC: 32 G/DL (ref 31.5–35.7)
MCHC RBC AUTO-ENTMCNC: 33 G/DL (ref 31.5–35.7)
MCV RBC AUTO: 87.9 FL (ref 79–97)
MCV RBC AUTO: 89.1 FL (ref 79–97)
MCV RBC AUTO: 90.1 FL (ref 79–97)
MCV RBC AUTO: 90.7 FL (ref 79–97)
MCV RBC AUTO: 90.9 FL (ref 79–97)
MCV RBC AUTO: 91 FL (ref 79–97)
MCV RBC AUTO: 91.9 FL (ref 79–97)
MCV RBC AUTO: 92 FL (ref 79–97)
MCV RBC AUTO: 92.5 FL (ref 79–97)
MCV RBC AUTO: 92.8 FL (ref 79–97)
MCV RBC AUTO: 94.3 FL (ref 79–97)
MCV RBC AUTO: 94.9 FL (ref 79–97)
MCV RBC AUTO: 96.1 FL (ref 79–97)
MCV RBC AUTO: 96.3 FL (ref 79–97)
MCV RBC AUTO: 96.4 FL (ref 79–97)
METHGB BLD QL: 0.2 % (ref 0–1.5)
METHGB BLD QL: 0.3 % (ref 0–1.5)
METHGB BLD QL: 0.4 % (ref 0–1.5)
MODALITY: ABNORMAL
MONOCYTES # BLD AUTO: 0.36 10*3/MM3 (ref 0.1–0.9)
MONOCYTES # BLD AUTO: 0.45 10*3/MM3 (ref 0.1–0.9)
MONOCYTES # BLD AUTO: 0.48 10*3/MM3 (ref 0.1–0.9)
MONOCYTES # BLD AUTO: 0.49 10*3/MM3 (ref 0.1–0.9)
MONOCYTES # BLD AUTO: 0.61 10*3/MM3 (ref 0.1–0.9)
MONOCYTES # BLD AUTO: 0.63 10*3/MM3 (ref 0.1–0.9)
MONOCYTES # BLD AUTO: 0.83 10*3/MM3 (ref 0.1–0.9)
MONOCYTES # BLD AUTO: 1.03 10*3/MM3 (ref 0.1–0.9)
MONOCYTES # BLD AUTO: 1.04 10*3/MM3 (ref 0.1–0.9)
MONOCYTES # BLD AUTO: 1.47 10*3/MM3 (ref 0.1–0.9)
MONOCYTES NFR BLD AUTO: 10.5 % (ref 5–12)
MONOCYTES NFR BLD AUTO: 10.5 % (ref 5–12)
MONOCYTES NFR BLD AUTO: 10.7 % (ref 5–12)
MONOCYTES NFR BLD AUTO: 11.6 % (ref 5–12)
MONOCYTES NFR BLD AUTO: 11.6 % (ref 5–12)
MONOCYTES NFR BLD AUTO: 11.9 % (ref 5–12)
MONOCYTES NFR BLD AUTO: 6.8 % (ref 5–12)
MONOCYTES NFR BLD AUTO: 8.8 % (ref 5–12)
MONOCYTES NFR BLD AUTO: 9.5 % (ref 5–12)
MONOCYTES NFR BLD AUTO: 9.6 % (ref 5–12)
MONOCYTES NFR BLD AUTO: 9.8 % (ref 5–12)
MRSA DNA SPEC QL NAA+PROBE: ABNORMAL
MRSA DNA SPEC QL NAA+PROBE: ABNORMAL
MYCOBACTERIUM SPEC CULT: NORMAL
NEUTROPHILS # BLD AUTO: 14.72 10*3/MM3 (ref 1.7–7)
NEUTROPHILS NFR BLD AUTO: 2.35 10*3/MM3 (ref 1.7–7)
NEUTROPHILS NFR BLD AUTO: 2.57 10*3/MM3 (ref 1.7–7)
NEUTROPHILS NFR BLD AUTO: 3.16 10*3/MM3 (ref 1.7–7)
NEUTROPHILS NFR BLD AUTO: 3.18 10*3/MM3 (ref 1.7–7)
NEUTROPHILS NFR BLD AUTO: 3.55 10*3/MM3 (ref 1.7–7)
NEUTROPHILS NFR BLD AUTO: 3.58 10*3/MM3 (ref 1.7–7)
NEUTROPHILS NFR BLD AUTO: 4.41 10*3/MM3 (ref 1.7–7)
NEUTROPHILS NFR BLD AUTO: 4.7 10*3/MM3 (ref 1.7–7)
NEUTROPHILS NFR BLD AUTO: 55.4 % (ref 42.7–76)
NEUTROPHILS NFR BLD AUTO: 59.7 % (ref 42.7–76)
NEUTROPHILS NFR BLD AUTO: 59.8 % (ref 42.7–76)
NEUTROPHILS NFR BLD AUTO: 6.3 10*3/MM3 (ref 1.7–7)
NEUTROPHILS NFR BLD AUTO: 62.7 % (ref 42.7–76)
NEUTROPHILS NFR BLD AUTO: 67.4 % (ref 42.7–76)
NEUTROPHILS NFR BLD AUTO: 68.4 % (ref 42.7–76)
NEUTROPHILS NFR BLD AUTO: 68.9 % (ref 42.7–76)
NEUTROPHILS NFR BLD AUTO: 71.2 % (ref 42.7–76)
NEUTROPHILS NFR BLD AUTO: 71.4 % (ref 42.7–76)
NEUTROPHILS NFR BLD AUTO: 72 % (ref 42.7–76)
NEUTROPHILS NFR BLD AUTO: 78.7 % (ref 42.7–76)
NEUTROPHILS NFR BLD AUTO: 9.02 10*3/MM3 (ref 1.7–7)
NEUTROPHILS NFR BLD AUTO: 9.25 10*3/MM3 (ref 1.7–7)
NEUTROPHILS NFR BLD MANUAL: 72 % (ref 42.7–76)
NEUTS BAND NFR BLD MANUAL: 14 % (ref 0–5)
NIGHT BLUE STAIN TISS: NORMAL
NITRITE UR QL STRIP: NEGATIVE
NITRITE UR QL STRIP: NEGATIVE
NOTE: ABNORMAL
NRBC BLD AUTO-RTO: 0 /100 WBC (ref 0–0.2)
NRBC BLD AUTO-RTO: 0.4 /100 WBC (ref 0–0.2)
NRBC BLD AUTO-RTO: 0.5 /100 WBC (ref 0–0.2)
NT-PROBNP SERPL-MCNC: 298.2 PG/ML (ref 0–900)
NT-PROBNP SERPL-MCNC: 354.1 PG/ML (ref 0–900)
OXYHGB MFR BLDV: 91.5 % (ref 94–99)
OXYHGB MFR BLDV: 93.7 % (ref 94–99)
OXYHGB MFR BLDV: 94.5 % (ref 94–99)
PATH REPORT.FINAL DX SPEC: NORMAL
PATH REPORT.GROSS SPEC: NORMAL
PCO2 BLDA: 26.6 MM HG (ref 35–45)
PCO2 BLDA: 32.3 MM HG (ref 35–45)
PCO2 BLDA: 35.8 MM HG (ref 35–45)
PH BLDA: 7.42 PH UNITS (ref 7.35–7.45)
PH BLDA: 7.46 PH UNITS (ref 7.35–7.45)
PH BLDA: 7.47 PH UNITS (ref 7.35–7.45)
PH UR STRIP.AUTO: <=5 [PH] (ref 5–8)
PH UR STRIP.AUTO: <=5 [PH] (ref 5–8)
PHOSPHATE SERPL-MCNC: 2.3 MG/DL (ref 2.5–4.5)
PHOSPHATE SERPL-MCNC: 2.3 MG/DL (ref 2.5–4.5)
PHOSPHATE SERPL-MCNC: 2.4 MG/DL (ref 2.5–4.5)
PHOSPHATE SERPL-MCNC: 2.6 MG/DL (ref 2.5–4.5)
PHOSPHATE SERPL-MCNC: 3 MG/DL (ref 2.5–4.5)
PHOSPHATE SERPL-MCNC: 3.9 MG/DL (ref 2.5–4.5)
PHOSPHATE SERPL-MCNC: 7.3 MG/DL (ref 2.5–4.5)
PLATELET # BLD AUTO: 104 10*3/MM3 (ref 140–450)
PLATELET # BLD AUTO: 108 10*3/MM3 (ref 140–450)
PLATELET # BLD AUTO: 108 10*3/MM3 (ref 140–450)
PLATELET # BLD AUTO: 117 10*3/MM3 (ref 140–450)
PLATELET # BLD AUTO: 119 10*3/MM3 (ref 140–450)
PLATELET # BLD AUTO: 135 10*3/MM3 (ref 140–450)
PLATELET # BLD AUTO: 189 10*3/MM3 (ref 140–450)
PLATELET # BLD AUTO: 61 10*3/MM3 (ref 140–450)
PLATELET # BLD AUTO: 75 10*3/MM3 (ref 140–450)
PLATELET # BLD AUTO: 77 10*3/MM3 (ref 140–450)
PLATELET # BLD AUTO: 79 10*3/MM3 (ref 140–450)
PLATELET # BLD AUTO: 88 10*3/MM3 (ref 140–450)
PLATELET # BLD AUTO: 94 10*3/MM3 (ref 140–450)
PLATELET # BLD AUTO: 96 10*3/MM3 (ref 140–450)
PLATELET # BLD AUTO: 96 10*3/MM3 (ref 140–450)
PMV BLD AUTO: 12.1 FL (ref 6–12)
PMV BLD AUTO: 12.3 FL (ref 6–12)
PMV BLD AUTO: 12.3 FL (ref 6–12)
PMV BLD AUTO: 12.4 FL (ref 6–12)
PMV BLD AUTO: 12.6 FL (ref 6–12)
PMV BLD AUTO: 12.7 FL (ref 6–12)
PMV BLD AUTO: 12.8 FL (ref 6–12)
PMV BLD AUTO: 12.8 FL (ref 6–12)
PMV BLD AUTO: 13.4 FL (ref 6–12)
PMV BLD AUTO: 13.4 FL (ref 6–12)
PMV BLD AUTO: 14.3 FL (ref 6–12)
PMV BLD AUTO: ABNORMAL FL
PO2 BLD: 246 MM[HG] (ref 0–500)
PO2 BLD: 254 MM[HG] (ref 0–500)
PO2 BLDA: 71 MM HG (ref 80–100)
PO2 BLDA: 78.8 MM HG (ref 80–100)
PO2 BLDA: 87.4 MM HG (ref 80–100)
POTASSIUM BLDA-SCNC: 3.43 MMOL/L (ref 3.5–5)
POTASSIUM SERPL-SCNC: 3.1 MMOL/L (ref 3.5–5.2)
POTASSIUM SERPL-SCNC: 3.2 MMOL/L (ref 3.5–5.2)
POTASSIUM SERPL-SCNC: 3.2 MMOL/L (ref 3.5–5.2)
POTASSIUM SERPL-SCNC: 3.3 MMOL/L (ref 3.5–5.2)
POTASSIUM SERPL-SCNC: 3.3 MMOL/L (ref 3.5–5.2)
POTASSIUM SERPL-SCNC: 3.4 MMOL/L (ref 3.5–5.2)
POTASSIUM SERPL-SCNC: 3.5 MMOL/L (ref 3.5–5.2)
POTASSIUM SERPL-SCNC: 3.6 MMOL/L (ref 3.5–5.2)
POTASSIUM SERPL-SCNC: 3.7 MMOL/L (ref 3.5–5.2)
POTASSIUM SERPL-SCNC: 3.7 MMOL/L (ref 3.5–5.2)
POTASSIUM SERPL-SCNC: 3.8 MMOL/L (ref 3.5–5.2)
POTASSIUM SERPL-SCNC: 3.9 MMOL/L (ref 3.5–5.2)
POTASSIUM SERPL-SCNC: 4 MMOL/L (ref 3.5–5.2)
POTASSIUM SERPL-SCNC: 4.1 MMOL/L (ref 3.5–5.2)
POTASSIUM SERPL-SCNC: 4.2 MMOL/L (ref 3.5–5.2)
POTASSIUM SERPL-SCNC: 4.8 MMOL/L (ref 3.5–5.2)
PROCALCITONIN SERPL-MCNC: 0.36 NG/ML (ref 0–0.25)
PROCALCITONIN SERPL-MCNC: 2.48 NG/ML (ref 0–0.25)
PROT SERPL-MCNC: 4.8 G/DL (ref 6–8.5)
PROT SERPL-MCNC: 5 G/DL (ref 6–8.5)
PROT SERPL-MCNC: 5.7 G/DL (ref 6–8.5)
PROT SERPL-MCNC: 5.8 G/DL (ref 6–8.5)
PROT SERPL-MCNC: 5.9 G/DL (ref 6–8.5)
PROT SERPL-MCNC: 5.9 G/DL (ref 6–8.5)
PROT SERPL-MCNC: 6 G/DL (ref 6–8.5)
PROT SERPL-MCNC: 6.3 G/DL (ref 6–8.5)
PROT SERPL-MCNC: 6.4 G/DL (ref 6–8.5)
PROT SERPL-MCNC: 6.6 G/DL (ref 6–8.5)
PROT SERPL-MCNC: 7.1 G/DL (ref 6–8.5)
PROT SERPL-MCNC: 7.1 G/DL (ref 6–8.5)
PROT UR QL STRIP: ABNORMAL
PROT UR QL STRIP: ABNORMAL
PROTHROMBIN TIME: 11.6 SECONDS (ref 9.4–12)
PROTHROMBIN TIME: 12.4 S (ref 9.4–12)
PROTHROMBIN TIME: 12.8 SECONDS (ref 9.4–12)
PROTHROMBIN TIME: 14.3 SECONDS (ref 9.4–12)
PROTHROMBIN TIME: 18.2 SECONDS (ref 9.4–12)
PROTHROMBIN TIME: 25.1 SECONDS (ref 9.4–12)
PTH-INTACT SERPL-MCNC: 39.9 PG/ML (ref 11.1–79.5)
QT INTERVAL: 300 MS
QT INTERVAL: 325 MS
QT INTERVAL: 328 MS
QT INTERVAL: 385 MS
RBC # BLD AUTO: 2.37 10*6/MM3 (ref 3.77–5.28)
RBC # BLD AUTO: 2.66 10*6/MM3 (ref 3.77–5.28)
RBC # BLD AUTO: 3.06 10*6/MM3 (ref 3.77–5.28)
RBC # BLD AUTO: 3.1 10*6/MM3 (ref 3.77–5.28)
RBC # BLD AUTO: 3.18 10*6/MM3 (ref 3.77–5.28)
RBC # BLD AUTO: 3.23 10*6/MM3 (ref 3.77–5.28)
RBC # BLD AUTO: 3.32 10*6/MM3 (ref 3.77–5.28)
RBC # BLD AUTO: 3.6 10*6/MM3 (ref 3.77–5.28)
RBC # BLD AUTO: 3.64 10*6/MM3 (ref 3.77–5.28)
RBC # BLD AUTO: 3.77 10*6/MM3 (ref 3.77–5.28)
RBC # BLD AUTO: 3.87 10*6/MM3 (ref 3.77–5.28)
RBC # BLD AUTO: 3.88 10*6/MM3 (ref 3.77–5.28)
RBC # BLD AUTO: 3.94 10*6/MM3 (ref 3.77–5.28)
RBC # BLD AUTO: 4.28 10*6/MM3 (ref 3.77–5.28)
RBC # BLD AUTO: 5.15 10*6/MM3 (ref 3.77–5.28)
RBC # UR: ABNORMAL /HPF
RBC # UR: ABNORMAL /HPF
RBC MORPH BLD: NORMAL
REF LAB TEST METHOD: ABNORMAL
REF LAB TEST METHOD: ABNORMAL
RH BLD: POSITIVE
RH BLD: POSITIVE
SAO2 % BLDCOA: 92.1 % (ref 95–99)
SAO2 % BLDCOA: 95.1 % (ref 95–99)
SAO2 % BLDCOA: 95.4 % (ref 95–99)
SARS-COV-2 N GENE RESP QL NAA+PROBE: NOT DETECTED
SARS-COV-2 RNA RESP QL NAA+PROBE: NOT DETECTED
SCAN SLIDE: NORMAL
SMALL PLATELETS BLD QL SMEAR: ABNORMAL
SMALL PLATELETS BLD QL SMEAR: ADEQUATE
SMALL PLATELETS BLD QL SMEAR: ADEQUATE
SMALL PLATELETS BLD QL SMEAR: NORMAL
SMUDGE CELLS BLD QL SMEAR: ABNORMAL
SODIUM BLDA-SCNC: 136.8 MMOL/L (ref 136–146)
SODIUM SERPL-SCNC: 137 MMOL/L (ref 136–145)
SODIUM SERPL-SCNC: 138 MMOL/L (ref 136–145)
SODIUM SERPL-SCNC: 138 MMOL/L (ref 136–145)
SODIUM SERPL-SCNC: 139 MMOL/L (ref 136–145)
SODIUM SERPL-SCNC: 140 MMOL/L (ref 136–145)
SODIUM SERPL-SCNC: 141 MMOL/L (ref 136–145)
SODIUM SERPL-SCNC: 142 MMOL/L (ref 136–145)
SODIUM SERPL-SCNC: 142 MMOL/L (ref 136–145)
SODIUM SERPL-SCNC: 143 MMOL/L (ref 136–145)
SP GR UR STRIP: 1.02 (ref 1–1.03)
SP GR UR STRIP: >=1.03 (ref 1–1.03)
SQUAMOUS #/AREA URNS HPF: ABNORMAL /HPF
SQUAMOUS #/AREA URNS HPF: ABNORMAL /HPF
STOMATOCYTES BLD QL SMEAR: NORMAL
STRESS TARGET HR: 139 BPM
STRESS TARGET HR: 139 BPM
T&S EXPIRATION DATE: NORMAL
T4 FREE SERPL-MCNC: 0.1 NG/DL (ref 0.9–1.8)
T4 FREE SERPL-MCNC: 0.67 NG/DL (ref 0.93–1.7)
T4 FREE SERPL-MCNC: 0.9 NG/DL (ref 0.9–1.8)
T4 FREE SERPL-MCNC: 1.13 NG/DL (ref 0.93–1.7)
T4 FREE SERPL-MCNC: 1.4 NG/DL (ref 0.9–1.8)
TRIGL SERPL-MCNC: 151 MG/DL (ref 0–150)
TRIGL SERPL-MCNC: 158 MG/DL (ref 0–150)
TROPONIN T SERPL-MCNC: 0.05 NG/ML (ref 0–0.03)
TROPONIN T SERPL-MCNC: 0.06 NG/ML (ref 0–0.03)
TROPONIN T SERPL-MCNC: 0.07 NG/ML (ref 0–0.03)
TROPONIN T SERPL-MCNC: 0.19 NG/ML (ref 0–0.03)
TSH SERPL DL<=0.05 MIU/L-ACNC: 12.5 UIU/ML (ref 0.27–4.2)
TSH SERPL DL<=0.05 MIU/L-ACNC: 5.7 UIU/ML (ref 0.27–4.2)
TSH SERPL-ACNC: 149.2 M[IU]/L (ref 0.27–4.2)
TSH SERPL-ACNC: 38.2 M[IU]/L (ref 0.27–4.2)
TSH SERPL-ACNC: 9.62 M[IU]/L (ref 0.27–4.2)
UNIT  ABO: NORMAL
UNIT  ABO: NORMAL
UNIT  RH: NORMAL
UNIT  RH: NORMAL
UROBILINOGEN UR QL STRIP: ABNORMAL
UROBILINOGEN UR QL STRIP: ABNORMAL
VANCOMYCIN SERPL-MCNC: 12.5 MCG/ML (ref 5–40)
VANCOMYCIN SERPL-MCNC: 19 MCG/ML (ref 5–40)
VANCOMYCIN SERPL-MCNC: 22.1 MCG/ML (ref 5–40)
VANCOMYCIN SERPL-MCNC: 24.3 MCG/ML (ref 5–40)
VANCOMYCIN SERPL-MCNC: 25.95 MCG/ML (ref 5–40)
VANCOMYCIN TROUGH SERPL-MCNC: 7.73 MCG/ML (ref 5–20)
VLDLC SERPL-MCNC: 27 MG/DL (ref 5–40)
VLDLC SERPL-MCNC: 28 MG/DL (ref 5–40)
WBC # BLD AUTO: 11.77 10*3/MM3 (ref 3.4–10.8)
WBC # BLD AUTO: 12.65 10*3/MM3 (ref 3.4–10.8)
WBC # BLD AUTO: 17.12 10*3/MM3 (ref 3.4–10.8)
WBC # BLD AUTO: 4.24 10*3/MM3 (ref 3.4–10.8)
WBC # BLD AUTO: 4.3 10*3/MM3 (ref 3.4–10.8)
WBC # BLD AUTO: 4.58 10*3/MM3 (ref 3.4–10.8)
WBC # BLD AUTO: 5.27 10*3/MM3 (ref 3.4–10.8)
WBC # BLD AUTO: 5.31 10*3/MM3 (ref 3.4–10.8)
WBC # BLD AUTO: 5.56 10*3/MM3 (ref 3.4–10.8)
WBC # BLD AUTO: 5.72 10*3/MM3 (ref 3.4–10.8)
WBC # BLD AUTO: 6.45 10*3/MM3 (ref 3.4–10.8)
WBC # BLD AUTO: 6.59 10*3/MM3 (ref 3.4–10.8)
WBC # BLD AUTO: 7.5 10*3/MM3 (ref 3.4–10.8)
WBC # BLD AUTO: 8.27 10*3/MM3 (ref 3.4–10.8)
WBC # BLD AUTO: 8.74 10*3/MM3 (ref 3.4–10.8)
WBC MORPH BLD: NORMAL
WBC UR QL AUTO: ABNORMAL /HPF
WBC UR QL AUTO: ABNORMAL /HPF
WHOLE BLOOD HOLD SPECIMEN: NORMAL
YEAST URNS QL MICRO: ABNORMAL /HPF

## 2021-01-01 PROCEDURE — 74018 RADEX ABDOMEN 1 VIEW: CPT

## 2021-01-01 PROCEDURE — 25010000002 VANCOMYCIN 5 G RECONSTITUTED SOLUTION: Performed by: INTERNAL MEDICINE

## 2021-01-01 PROCEDURE — 63710000001 INSULIN LISPRO (HUMAN) PER 5 UNITS: Performed by: STUDENT IN AN ORGANIZED HEALTH CARE EDUCATION/TRAINING PROGRAM

## 2021-01-01 PROCEDURE — 25010000002 MEROPENEM PER 100 MG: Performed by: EMERGENCY MEDICINE

## 2021-01-01 PROCEDURE — 99233 SBSQ HOSP IP/OBS HIGH 50: CPT | Performed by: INTERNAL MEDICINE

## 2021-01-01 PROCEDURE — 36600 WITHDRAWAL OF ARTERIAL BLOOD: CPT | Performed by: FAMILY MEDICINE

## 2021-01-01 PROCEDURE — 85025 COMPLETE CBC W/AUTO DIFF WBC: CPT | Performed by: PHYSICIAN ASSISTANT

## 2021-01-01 PROCEDURE — 25010000002 MEROPENEM PER 100 MG

## 2021-01-01 PROCEDURE — 86713 LEGIONELLA ANTIBODY: CPT | Performed by: HOSPITALIST

## 2021-01-01 PROCEDURE — 70551 MRI BRAIN STEM W/O DYE: CPT

## 2021-01-01 PROCEDURE — 82962 GLUCOSE BLOOD TEST: CPT

## 2021-01-01 PROCEDURE — 93306 TTE W/DOPPLER COMPLETE: CPT

## 2021-01-01 PROCEDURE — 63710000001 INSULIN DETEMIR PER 5 UNITS: Performed by: INTERNAL MEDICINE

## 2021-01-01 PROCEDURE — 85025 COMPLETE CBC W/AUTO DIFF WBC: CPT | Performed by: EMERGENCY MEDICINE

## 2021-01-01 PROCEDURE — 25010000002 MEROPENEM PER 100 MG: Performed by: PHYSICIAN ASSISTANT

## 2021-01-01 PROCEDURE — 0JB90ZZ EXCISION OF BUTTOCK SUBCUTANEOUS TISSUE AND FASCIA, OPEN APPROACH: ICD-10-PCS | Performed by: SURGERY

## 2021-01-01 PROCEDURE — 80048 BASIC METABOLIC PNL TOTAL CA: CPT | Performed by: INTERNAL MEDICINE

## 2021-01-01 PROCEDURE — 83605 ASSAY OF LACTIC ACID: CPT | Performed by: EMERGENCY MEDICINE

## 2021-01-01 PROCEDURE — 25010000002 HEPARIN (PORCINE) PER 1000 UNITS: Performed by: INTERNAL MEDICINE

## 2021-01-01 PROCEDURE — 97605 NEG PRS WND THER DME<=50SQCM: CPT | Performed by: EMERGENCY MEDICINE

## 2021-01-01 PROCEDURE — 25010000002 THIAMINE PER 100 MG: Performed by: HOSPITALIST

## 2021-01-01 PROCEDURE — 86901 BLOOD TYPING SEROLOGIC RH(D): CPT | Performed by: FAMILY MEDICINE

## 2021-01-01 PROCEDURE — 84484 ASSAY OF TROPONIN QUANT: CPT | Performed by: EMERGENCY MEDICINE

## 2021-01-01 PROCEDURE — 99214 OFFICE O/P EST MOD 30 MIN: CPT | Performed by: FAMILY MEDICINE

## 2021-01-01 PROCEDURE — 83036 HEMOGLOBIN GLYCOSYLATED A1C: CPT | Performed by: INTERNAL MEDICINE

## 2021-01-01 PROCEDURE — 82140 ASSAY OF AMMONIA: CPT | Performed by: PHYSICIAN ASSISTANT

## 2021-01-01 PROCEDURE — 25010000002 MORPHINE PER 10 MG: Performed by: EMERGENCY MEDICINE

## 2021-01-01 PROCEDURE — 80053 COMPREHEN METABOLIC PANEL: CPT | Performed by: INTERNAL MEDICINE

## 2021-01-01 PROCEDURE — 99204 OFFICE O/P NEW MOD 45 MIN: CPT | Performed by: FAMILY MEDICINE

## 2021-01-01 PROCEDURE — 87205 SMEAR GRAM STAIN: CPT | Performed by: EMERGENCY MEDICINE

## 2021-01-01 PROCEDURE — 63710000001 INSULIN REGULAR HUMAN PER 5 UNITS: Performed by: INTERNAL MEDICINE

## 2021-01-01 PROCEDURE — 87186 SC STD MICRODIL/AGAR DIL: CPT | Performed by: SURGERY

## 2021-01-01 PROCEDURE — 99223 1ST HOSP IP/OBS HIGH 75: CPT | Performed by: FAMILY MEDICINE

## 2021-01-01 PROCEDURE — 85025 COMPLETE CBC W/AUTO DIFF WBC: CPT | Performed by: FAMILY MEDICINE

## 2021-01-01 PROCEDURE — 85610 PROTHROMBIN TIME: CPT | Performed by: FAMILY MEDICINE

## 2021-01-01 PROCEDURE — 86900 BLOOD TYPING SEROLOGIC ABO: CPT

## 2021-01-01 PROCEDURE — 93005 ELECTROCARDIOGRAM TRACING: CPT | Performed by: EMERGENCY MEDICINE

## 2021-01-01 PROCEDURE — 84439 ASSAY OF FREE THYROXINE: CPT

## 2021-01-01 PROCEDURE — 85014 HEMATOCRIT: CPT | Performed by: FAMILY MEDICINE

## 2021-01-01 PROCEDURE — 80048 BASIC METABOLIC PNL TOTAL CA: CPT | Performed by: PHYSICIAN ASSISTANT

## 2021-01-01 PROCEDURE — 82565 ASSAY OF CREATININE: CPT

## 2021-01-01 PROCEDURE — 80053 COMPREHEN METABOLIC PANEL: CPT

## 2021-01-01 PROCEDURE — 99223 1ST HOSP IP/OBS HIGH 75: CPT | Performed by: INTERNAL MEDICINE

## 2021-01-01 PROCEDURE — 83605 ASSAY OF LACTIC ACID: CPT | Performed by: PHYSICIAN ASSISTANT

## 2021-01-01 PROCEDURE — 87116 MYCOBACTERIA CULTURE: CPT | Performed by: SURGERY

## 2021-01-01 PROCEDURE — 86922 COMPATIBILITY TEST ANTIGLOB: CPT

## 2021-01-01 PROCEDURE — 83605 ASSAY OF LACTIC ACID: CPT | Performed by: INTERNAL MEDICINE

## 2021-01-01 PROCEDURE — 25010000002 PROPOFOL 10 MG/ML EMULSION: Performed by: NURSE ANESTHETIST, CERTIFIED REGISTERED

## 2021-01-01 PROCEDURE — 63710000001 PROMETHAZINE PER 25 MG: Performed by: FAMILY MEDICINE

## 2021-01-01 PROCEDURE — 84145 PROCALCITONIN (PCT): CPT | Performed by: PHYSICIAN ASSISTANT

## 2021-01-01 PROCEDURE — 85610 PROTHROMBIN TIME: CPT | Performed by: EMERGENCY MEDICINE

## 2021-01-01 PROCEDURE — 87070 CULTURE OTHR SPECIMN AEROBIC: CPT | Performed by: EMERGENCY MEDICINE

## 2021-01-01 PROCEDURE — 25010000002 CEFTRIAXONE PER 250 MG: Performed by: INTERNAL MEDICINE

## 2021-01-01 PROCEDURE — 25010000002 MEROPENEM PER 100 MG: Performed by: FAMILY MEDICINE

## 2021-01-01 PROCEDURE — 99285 EMERGENCY DEPT VISIT HI MDM: CPT

## 2021-01-01 PROCEDURE — 85025 COMPLETE CBC W/AUTO DIFF WBC: CPT | Performed by: STUDENT IN AN ORGANIZED HEALTH CARE EDUCATION/TRAINING PROGRAM

## 2021-01-01 PROCEDURE — 83735 ASSAY OF MAGNESIUM: CPT | Performed by: INTERNAL MEDICINE

## 2021-01-01 PROCEDURE — 84100 ASSAY OF PHOSPHORUS: CPT | Performed by: FAMILY MEDICINE

## 2021-01-01 PROCEDURE — 96376 TX/PRO/DX INJ SAME DRUG ADON: CPT

## 2021-01-01 PROCEDURE — 99291 CRITICAL CARE FIRST HOUR: CPT | Performed by: INTERNAL MEDICINE

## 2021-01-01 PROCEDURE — 63710000001 INSULIN LISPRO (HUMAN) PER 5 UNITS: Performed by: FAMILY MEDICINE

## 2021-01-01 PROCEDURE — 25010000002 ZIPRASIDONE MESYLATE PER 10 MG: Performed by: EMERGENCY MEDICINE

## 2021-01-01 PROCEDURE — 87077 CULTURE AEROBIC IDENTIFY: CPT | Performed by: SURGERY

## 2021-01-01 PROCEDURE — 87075 CULTR BACTERIA EXCEPT BLOOD: CPT | Performed by: SURGERY

## 2021-01-01 PROCEDURE — 81001 URINALYSIS AUTO W/SCOPE: CPT | Performed by: EMERGENCY MEDICINE

## 2021-01-01 PROCEDURE — 36415 COLL VENOUS BLD VENIPUNCTURE: CPT | Performed by: EMERGENCY MEDICINE

## 2021-01-01 PROCEDURE — 80202 ASSAY OF VANCOMYCIN: CPT | Performed by: PHYSICIAN ASSISTANT

## 2021-01-01 PROCEDURE — 80048 BASIC METABOLIC PNL TOTAL CA: CPT | Performed by: SURGERY

## 2021-01-01 PROCEDURE — 93005 ELECTROCARDIOGRAM TRACING: CPT

## 2021-01-01 PROCEDURE — 36600 WITHDRAWAL OF ARTERIAL BLOOD: CPT | Performed by: EMERGENCY MEDICINE

## 2021-01-01 PROCEDURE — 86140 C-REACTIVE PROTEIN: CPT | Performed by: EMERGENCY MEDICINE

## 2021-01-01 PROCEDURE — 71045 X-RAY EXAM CHEST 1 VIEW: CPT

## 2021-01-01 PROCEDURE — 87086 URINE CULTURE/COLONY COUNT: CPT

## 2021-01-01 PROCEDURE — 92526 ORAL FUNCTION THERAPY: CPT

## 2021-01-01 PROCEDURE — 84439 ASSAY OF FREE THYROXINE: CPT | Performed by: EMERGENCY MEDICINE

## 2021-01-01 PROCEDURE — 25010000002 ONDANSETRON PER 1 MG: Performed by: PHYSICIAN ASSISTANT

## 2021-01-01 PROCEDURE — 99233 SBSQ HOSP IP/OBS HIGH 50: CPT | Performed by: HOSPITALIST

## 2021-01-01 PROCEDURE — 87205 SMEAR GRAM STAIN: CPT | Performed by: SURGERY

## 2021-01-01 PROCEDURE — 94618 PULMONARY STRESS TESTING: CPT

## 2021-01-01 PROCEDURE — 94799 UNLISTED PULMONARY SVC/PX: CPT

## 2021-01-01 PROCEDURE — 84145 PROCALCITONIN (PCT): CPT | Performed by: INTERNAL MEDICINE

## 2021-01-01 PROCEDURE — 84443 ASSAY THYROID STIM HORMONE: CPT

## 2021-01-01 PROCEDURE — 25010000002 VANCOMYCIN: Performed by: SURGERY

## 2021-01-01 PROCEDURE — 97116 GAIT TRAINING THERAPY: CPT

## 2021-01-01 PROCEDURE — U0003 INFECTIOUS AGENT DETECTION BY NUCLEIC ACID (DNA OR RNA); SEVERE ACUTE RESPIRATORY SYNDROME CORONAVIRUS 2 (SARS-COV-2) (CORONAVIRUS DISEASE [COVID-19]), AMPLIFIED PROBE TECHNIQUE, MAKING USE OF HIGH THROUGHPUT TECHNOLOGIES AS DESCRIBED BY CMS-2020-01-R: HCPCS | Performed by: EMERGENCY MEDICINE

## 2021-01-01 PROCEDURE — 10061 I&D ABSCESS COMP/MULTIPLE: CPT | Performed by: SURGERY

## 2021-01-01 PROCEDURE — 82375 ASSAY CARBOXYHB QUANT: CPT | Performed by: EMERGENCY MEDICINE

## 2021-01-01 PROCEDURE — 25010000002 VANCOMYCIN 5 G RECONSTITUTED SOLUTION: Performed by: EMERGENCY MEDICINE

## 2021-01-01 PROCEDURE — 99239 HOSP IP/OBS DSCHRG MGMT >30: CPT | Performed by: INTERNAL MEDICINE

## 2021-01-01 PROCEDURE — 74176 CT ABD & PELVIS W/O CONTRAST: CPT

## 2021-01-01 PROCEDURE — 25010000002 THIAMINE PER 100 MG: Performed by: INTERNAL MEDICINE

## 2021-01-01 PROCEDURE — 83880 ASSAY OF NATRIURETIC PEPTIDE: CPT | Performed by: EMERGENCY MEDICINE

## 2021-01-01 PROCEDURE — 99221 1ST HOSP IP/OBS SF/LOW 40: CPT | Performed by: SURGERY

## 2021-01-01 PROCEDURE — 97597 DBRDMT OPN WND 1ST 20 CM/<: CPT | Performed by: SURGERY

## 2021-01-01 PROCEDURE — 82140 ASSAY OF AMMONIA: CPT | Performed by: INTERNAL MEDICINE

## 2021-01-01 PROCEDURE — 80061 LIPID PANEL: CPT | Performed by: INTERNAL MEDICINE

## 2021-01-01 PROCEDURE — U0005 INFEC AGEN DETEC AMPLI PROBE: HCPCS | Performed by: EMERGENCY MEDICINE

## 2021-01-01 PROCEDURE — 80053 COMPREHEN METABOLIC PANEL: CPT | Performed by: EMERGENCY MEDICINE

## 2021-01-01 PROCEDURE — 72197 MRI PELVIS W/O & W/DYE: CPT

## 2021-01-01 PROCEDURE — 99213 OFFICE O/P EST LOW 20 MIN: CPT | Performed by: SURGERY

## 2021-01-01 PROCEDURE — 97530 THERAPEUTIC ACTIVITIES: CPT

## 2021-01-01 PROCEDURE — 94618 PULMONARY STRESS TESTING: CPT | Performed by: FAMILY MEDICINE

## 2021-01-01 PROCEDURE — 25010000002 MEROPENEM PER 100 MG: Performed by: INTERNAL MEDICINE

## 2021-01-01 PROCEDURE — 82375 ASSAY CARBOXYHB QUANT: CPT | Performed by: FAMILY MEDICINE

## 2021-01-01 PROCEDURE — 85007 BL SMEAR W/DIFF WBC COUNT: CPT | Performed by: EMERGENCY MEDICINE

## 2021-01-01 PROCEDURE — 80053 COMPREHEN METABOLIC PANEL: CPT | Performed by: STUDENT IN AN ORGANIZED HEALTH CARE EDUCATION/TRAINING PROGRAM

## 2021-01-01 PROCEDURE — 80202 ASSAY OF VANCOMYCIN: CPT | Performed by: INTERNAL MEDICINE

## 2021-01-01 PROCEDURE — 25010000002 MEROPENEM PER 100 MG: Performed by: HOSPITALIST

## 2021-01-01 PROCEDURE — 93880 EXTRACRANIAL BILAT STUDY: CPT

## 2021-01-01 PROCEDURE — 25010000002 KETOROLAC TROMETHAMINE PER 15 MG: Performed by: INTERNAL MEDICINE

## 2021-01-01 PROCEDURE — 99232 SBSQ HOSP IP/OBS MODERATE 35: CPT | Performed by: SURGERY

## 2021-01-01 PROCEDURE — 85610 PROTHROMBIN TIME: CPT

## 2021-01-01 PROCEDURE — 99215 OFFICE O/P EST HI 40 MIN: CPT | Performed by: FAMILY MEDICINE

## 2021-01-01 PROCEDURE — 82977 ASSAY OF GGT: CPT | Performed by: INTERNAL MEDICINE

## 2021-01-01 PROCEDURE — 87040 BLOOD CULTURE FOR BACTERIA: CPT | Performed by: PHYSICIAN ASSISTANT

## 2021-01-01 PROCEDURE — 25010000002 ENOXAPARIN PER 10 MG: Performed by: HOSPITALIST

## 2021-01-01 PROCEDURE — 71250 CT THORAX DX C-: CPT

## 2021-01-01 PROCEDURE — P9016 RBC LEUKOCYTES REDUCED: HCPCS

## 2021-01-01 PROCEDURE — 97161 PT EVAL LOW COMPLEX 20 MIN: CPT

## 2021-01-01 PROCEDURE — 80202 ASSAY OF VANCOMYCIN: CPT | Performed by: SURGERY

## 2021-01-01 PROCEDURE — 87040 BLOOD CULTURE FOR BACTERIA: CPT | Performed by: EMERGENCY MEDICINE

## 2021-01-01 PROCEDURE — 99204 OFFICE O/P NEW MOD 45 MIN: CPT | Performed by: SURGERY

## 2021-01-01 PROCEDURE — 94760 N-INVAS EAR/PLS OXIMETRY 1: CPT

## 2021-01-01 PROCEDURE — 84443 ASSAY THYROID STIM HORMONE: CPT | Performed by: EMERGENCY MEDICINE

## 2021-01-01 PROCEDURE — 85025 COMPLETE CBC W/AUTO DIFF WBC: CPT | Performed by: HOSPITALIST

## 2021-01-01 PROCEDURE — 99232 SBSQ HOSP IP/OBS MODERATE 35: CPT | Performed by: INTERNAL MEDICINE

## 2021-01-01 PROCEDURE — 97165 OT EVAL LOW COMPLEX 30 MIN: CPT

## 2021-01-01 PROCEDURE — 99223 1ST HOSP IP/OBS HIGH 75: CPT | Performed by: HOSPITALIST

## 2021-01-01 PROCEDURE — 87147 CULTURE TYPE IMMUNOLOGIC: CPT | Performed by: SURGERY

## 2021-01-01 PROCEDURE — 25010000002 LORAZEPAM PER 2 MG: Performed by: EMERGENCY MEDICINE

## 2021-01-01 PROCEDURE — 87635 SARS-COV-2 COVID-19 AMP PRB: CPT | Performed by: PHYSICIAN ASSISTANT

## 2021-01-01 PROCEDURE — 85025 COMPLETE CBC W/AUTO DIFF WBC: CPT | Performed by: INTERNAL MEDICINE

## 2021-01-01 PROCEDURE — 99024 POSTOP FOLLOW-UP VISIT: CPT | Performed by: SURGERY

## 2021-01-01 PROCEDURE — 84100 ASSAY OF PHOSPHORUS: CPT | Performed by: HOSPITALIST

## 2021-01-01 PROCEDURE — 92610 EVALUATE SWALLOWING FUNCTION: CPT

## 2021-01-01 PROCEDURE — 25010000002 MEROPENEM: Performed by: EMERGENCY MEDICINE

## 2021-01-01 PROCEDURE — 83735 ASSAY OF MAGNESIUM: CPT | Performed by: FAMILY MEDICINE

## 2021-01-01 PROCEDURE — 85027 COMPLETE CBC AUTOMATED: CPT | Performed by: INTERNAL MEDICINE

## 2021-01-01 PROCEDURE — 86900 BLOOD TYPING SEROLOGIC ABO: CPT | Performed by: FAMILY MEDICINE

## 2021-01-01 PROCEDURE — 36410 VNPNXR 3YR/> PHY/QHP DX/THER: CPT

## 2021-01-01 PROCEDURE — 93880 EXTRACRANIAL BILAT STUDY: CPT | Performed by: SURGERY

## 2021-01-01 PROCEDURE — 83050 HGB METHEMOGLOBIN QUAN: CPT | Performed by: EMERGENCY MEDICINE

## 2021-01-01 PROCEDURE — 83735 ASSAY OF MAGNESIUM: CPT | Performed by: EMERGENCY MEDICINE

## 2021-01-01 PROCEDURE — 70450 CT HEAD/BRAIN W/O DYE: CPT

## 2021-01-01 PROCEDURE — 25010000002 MORPHINE PER 10 MG: Performed by: HOSPITALIST

## 2021-01-01 PROCEDURE — 97166 OT EVAL MOD COMPLEX 45 MIN: CPT

## 2021-01-01 PROCEDURE — 25010000002 MORPHINE PER 10 MG: Performed by: FAMILY MEDICINE

## 2021-01-01 PROCEDURE — 86603 ADENOVIRUS ANTIBODY: CPT | Performed by: HOSPITALIST

## 2021-01-01 PROCEDURE — G0463 HOSPITAL OUTPT CLINIC VISIT: HCPCS | Performed by: SURGERY

## 2021-01-01 PROCEDURE — 25010000003 MAGNESIUM SULFATE 4 GM/100ML SOLUTION: Performed by: INTERNAL MEDICINE

## 2021-01-01 PROCEDURE — 99239 HOSP IP/OBS DSCHRG MGMT >30: CPT | Performed by: STUDENT IN AN ORGANIZED HEALTH CARE EDUCATION/TRAINING PROGRAM

## 2021-01-01 PROCEDURE — 96374 THER/PROPH/DIAG INJ IV PUSH: CPT

## 2021-01-01 PROCEDURE — 82805 BLOOD GASES W/O2 SATURATION: CPT | Performed by: EMERGENCY MEDICINE

## 2021-01-01 PROCEDURE — 87206 SMEAR FLUORESCENT/ACID STAI: CPT | Performed by: SURGERY

## 2021-01-01 PROCEDURE — 85730 THROMBOPLASTIN TIME PARTIAL: CPT | Performed by: EMERGENCY MEDICINE

## 2021-01-01 PROCEDURE — 83036 HEMOGLOBIN GLYCOSYLATED A1C: CPT

## 2021-01-01 PROCEDURE — 85027 COMPLETE CBC AUTOMATED: CPT | Performed by: HOSPITALIST

## 2021-01-01 PROCEDURE — 87635 SARS-COV-2 COVID-19 AMP PRB: CPT | Performed by: FAMILY MEDICINE

## 2021-01-01 PROCEDURE — 80076 HEPATIC FUNCTION PANEL: CPT | Performed by: FAMILY MEDICINE

## 2021-01-01 PROCEDURE — 05HD33Z INSERTION OF INFUSION DEVICE INTO RIGHT CEPHALIC VEIN, PERCUTANEOUS APPROACH: ICD-10-PCS | Performed by: INTERNAL MEDICINE

## 2021-01-01 PROCEDURE — 80048 BASIC METABOLIC PNL TOTAL CA: CPT | Performed by: FAMILY MEDICINE

## 2021-01-01 PROCEDURE — 25010000002 VANCOMYCIN 5 G RECONSTITUTED SOLUTION: Performed by: SURGERY

## 2021-01-01 PROCEDURE — 85018 HEMOGLOBIN: CPT | Performed by: FAMILY MEDICINE

## 2021-01-01 PROCEDURE — 25010000002 MEROPENEM: Performed by: PHYSICIAN ASSISTANT

## 2021-01-01 PROCEDURE — 82140 ASSAY OF AMMONIA: CPT | Performed by: FAMILY MEDICINE

## 2021-01-01 PROCEDURE — 84100 ASSAY OF PHOSPHORUS: CPT | Performed by: EMERGENCY MEDICINE

## 2021-01-01 PROCEDURE — 86850 RBC ANTIBODY SCREEN: CPT | Performed by: FAMILY MEDICINE

## 2021-01-01 PROCEDURE — 25010000002 MEROPENEM: Performed by: SURGERY

## 2021-01-01 PROCEDURE — 25010000002 HYDROMORPHONE 1 MG/ML SOLUTION: Performed by: EMERGENCY MEDICINE

## 2021-01-01 PROCEDURE — 86901 BLOOD TYPING SEROLOGIC RH(D): CPT

## 2021-01-01 PROCEDURE — 87635 SARS-COV-2 COVID-19 AMP PRB: CPT | Performed by: EMERGENCY MEDICINE

## 2021-01-01 PROCEDURE — 83735 ASSAY OF MAGNESIUM: CPT | Performed by: HOSPITALIST

## 2021-01-01 PROCEDURE — 25010000002 THIAMINE PER 100 MG: Performed by: PSYCHIATRY & NEUROLOGY

## 2021-01-01 PROCEDURE — 84100 ASSAY OF PHOSPHORUS: CPT | Performed by: STUDENT IN AN ORGANIZED HEALTH CARE EDUCATION/TRAINING PROGRAM

## 2021-01-01 PROCEDURE — 36415 COLL VENOUS BLD VENIPUNCTURE: CPT | Performed by: PHYSICIAN ASSISTANT

## 2021-01-01 PROCEDURE — 83880 ASSAY OF NATRIURETIC PEPTIDE: CPT

## 2021-01-01 PROCEDURE — 94640 AIRWAY INHALATION TREATMENT: CPT

## 2021-01-01 PROCEDURE — 82140 ASSAY OF AMMONIA: CPT | Performed by: EMERGENCY MEDICINE

## 2021-01-01 PROCEDURE — P9612 CATHETERIZE FOR URINE SPEC: HCPCS

## 2021-01-01 PROCEDURE — 80053 COMPREHEN METABOLIC PANEL: CPT | Performed by: HOSPITALIST

## 2021-01-01 PROCEDURE — 84484 ASSAY OF TROPONIN QUANT: CPT | Performed by: HOSPITALIST

## 2021-01-01 PROCEDURE — 93010 ELECTROCARDIOGRAM REPORT: CPT | Performed by: INTERNAL MEDICINE

## 2021-01-01 PROCEDURE — G2212 PROLONG OUTPT/OFFICE VIS: HCPCS | Performed by: EMERGENCY MEDICINE

## 2021-01-01 PROCEDURE — 25010000002 VANCOMYCIN 5 G RECONSTITUTED SOLUTION: Performed by: HOSPITALIST

## 2021-01-01 PROCEDURE — 99233 SBSQ HOSP IP/OBS HIGH 50: CPT | Performed by: STUDENT IN AN ORGANIZED HEALTH CARE EDUCATION/TRAINING PROGRAM

## 2021-01-01 PROCEDURE — 99284 EMERGENCY DEPT VISIT MOD MDM: CPT

## 2021-01-01 PROCEDURE — 25010000002 ONDANSETRON PER 1 MG: Performed by: SURGERY

## 2021-01-01 PROCEDURE — 82140 ASSAY OF AMMONIA: CPT | Performed by: HOSPITALIST

## 2021-01-01 PROCEDURE — 87070 CULTURE OTHR SPECIMN AEROBIC: CPT | Performed by: SURGERY

## 2021-01-01 PROCEDURE — 99215 OFFICE O/P EST HI 40 MIN: CPT | Performed by: EMERGENCY MEDICINE

## 2021-01-01 PROCEDURE — 25010000002 FUROSEMIDE PER 20 MG: Performed by: FAMILY MEDICINE

## 2021-01-01 PROCEDURE — 85025 COMPLETE CBC W/AUTO DIFF WBC: CPT

## 2021-01-01 PROCEDURE — 86920 COMPATIBILITY TEST SPIN: CPT

## 2021-01-01 PROCEDURE — 83735 ASSAY OF MAGNESIUM: CPT | Performed by: STUDENT IN AN ORGANIZED HEALTH CARE EDUCATION/TRAINING PROGRAM

## 2021-01-01 PROCEDURE — 36415 COLL VENOUS BLD VENIPUNCTURE: CPT | Performed by: STUDENT IN AN ORGANIZED HEALTH CARE EDUCATION/TRAINING PROGRAM

## 2021-01-01 PROCEDURE — 85007 BL SMEAR W/DIFF WBC COUNT: CPT | Performed by: FAMILY MEDICINE

## 2021-01-01 PROCEDURE — C1751 CATH, INF, PER/CENT/MIDLINE: HCPCS

## 2021-01-01 PROCEDURE — 25010000002 MEROPENEM PER 100 MG: Performed by: SURGERY

## 2021-01-01 PROCEDURE — 0 GADOBENATE DIMEGLUMINE 529 MG/ML SOLUTION: Performed by: SURGERY

## 2021-01-01 PROCEDURE — 80074 ACUTE HEPATITIS PANEL: CPT | Performed by: PHYSICIAN ASSISTANT

## 2021-01-01 PROCEDURE — 82140 ASSAY OF AMMONIA: CPT | Performed by: STUDENT IN AN ORGANIZED HEALTH CARE EDUCATION/TRAINING PROGRAM

## 2021-01-01 PROCEDURE — 87641 MR-STAPH DNA AMP PROBE: CPT | Performed by: PHYSICIAN ASSISTANT

## 2021-01-01 PROCEDURE — 86631 CHLAMYDIA ANTIBODY: CPT | Performed by: HOSPITALIST

## 2021-01-01 PROCEDURE — 83735 ASSAY OF MAGNESIUM: CPT | Performed by: PHYSICIAN ASSISTANT

## 2021-01-01 PROCEDURE — 99212 OFFICE O/P EST SF 10 MIN: CPT | Performed by: SURGERY

## 2021-01-01 PROCEDURE — 93306 TTE W/DOPPLER COMPLETE: CPT | Performed by: SPECIALIST

## 2021-01-01 PROCEDURE — 11042 DBRDMT SUBQ TIS 1ST 20SQCM/<: CPT | Performed by: SURGERY

## 2021-01-01 PROCEDURE — 88304 TISSUE EXAM BY PATHOLOGIST: CPT | Performed by: SURGERY

## 2021-01-01 PROCEDURE — A9577 INJ MULTIHANCE: HCPCS | Performed by: SURGERY

## 2021-01-01 PROCEDURE — 36415 COLL VENOUS BLD VENIPUNCTURE: CPT

## 2021-01-01 PROCEDURE — 99239 HOSP IP/OBS DSCHRG MGMT >30: CPT | Performed by: HOSPITALIST

## 2021-01-01 PROCEDURE — 83050 HGB METHEMOGLOBIN QUAN: CPT | Performed by: FAMILY MEDICINE

## 2021-01-01 PROCEDURE — 11043 DBRDMT MUSC&/FSCA 1ST 20/<: CPT | Performed by: SURGERY

## 2021-01-01 PROCEDURE — 85610 PROTHROMBIN TIME: CPT | Performed by: STUDENT IN AN ORGANIZED HEALTH CARE EDUCATION/TRAINING PROGRAM

## 2021-01-01 PROCEDURE — 82805 BLOOD GASES W/O2 SATURATION: CPT | Performed by: FAMILY MEDICINE

## 2021-01-01 PROCEDURE — 99233 SBSQ HOSP IP/OBS HIGH 50: CPT | Performed by: FAMILY MEDICINE

## 2021-01-01 PROCEDURE — 85007 BL SMEAR W/DIFF WBC COUNT: CPT | Performed by: INTERNAL MEDICINE

## 2021-01-01 PROCEDURE — 84484 ASSAY OF TROPONIN QUANT: CPT

## 2021-01-01 PROCEDURE — 87040 BLOOD CULTURE FOR BACTERIA: CPT | Performed by: INTERNAL MEDICINE

## 2021-01-01 PROCEDURE — 25010000002 CALCIUM GLUCONATE-NACL 1-0.675 GM/50ML-% SOLUTION: Performed by: FAMILY MEDICINE

## 2021-01-01 PROCEDURE — 25010000002 FUROSEMIDE PER 20 MG: Performed by: INTERNAL MEDICINE

## 2021-01-01 PROCEDURE — 25010000002 VANCOMYCIN 5 G RECONSTITUTED SOLUTION: Performed by: PHYSICIAN ASSISTANT

## 2021-01-01 PROCEDURE — 84100 ASSAY OF PHOSPHORUS: CPT | Performed by: INTERNAL MEDICINE

## 2021-01-01 PROCEDURE — 86738 MYCOPLASMA ANTIBODY: CPT | Performed by: HOSPITALIST

## 2021-01-01 PROCEDURE — 87641 MR-STAPH DNA AMP PROBE: CPT | Performed by: STUDENT IN AN ORGANIZED HEALTH CARE EDUCATION/TRAINING PROGRAM

## 2021-01-01 PROCEDURE — 25010000002 MAGNESIUM SULFATE 2 GM/50ML SOLUTION: Performed by: HOSPITALIST

## 2021-01-01 PROCEDURE — 11046 DBRDMT MUSC&/FSCA EA ADDL: CPT | Performed by: SURGERY

## 2021-01-01 PROCEDURE — 36430 TRANSFUSION BLD/BLD COMPNT: CPT

## 2021-01-01 RX ORDER — CALCIUM ACETATE MONOHYDRATE AND ALUMINUM SULFATE TETRADECAHYDRATE 952; 1347 MG/2299MG; MG/2299MG
1 POWDER, FOR SOLUTION TOPICAL EVERY 8 HOURS SCHEDULED
Qty: 1 EACH | Refills: 12 | Status: SHIPPED | OUTPATIENT
Start: 2021-01-01

## 2021-01-01 RX ORDER — BISACODYL 10 MG
10 SUPPOSITORY, RECTAL RECTAL DAILY PRN
Status: DISCONTINUED | OUTPATIENT
Start: 2021-01-01 | End: 2021-01-01 | Stop reason: HOSPADM

## 2021-01-01 RX ORDER — LEVOTHYROXINE SODIUM 0.2 MG/1
200 TABLET ORAL DAILY
COMMUNITY
Start: 2021-01-01

## 2021-01-01 RX ORDER — LEVOTHYROXINE SODIUM 175 UG/1
175 TABLET ORAL
Status: DISCONTINUED | OUTPATIENT
Start: 2021-01-01 | End: 2021-01-01

## 2021-01-01 RX ORDER — NITROGLYCERIN 0.4 MG/1
0.4 TABLET SUBLINGUAL
Status: DISCONTINUED | OUTPATIENT
Start: 2021-01-01 | End: 2021-01-01 | Stop reason: HOSPADM

## 2021-01-01 RX ORDER — SPIRONOLACTONE 25 MG/1
50 TABLET ORAL DAILY
Status: DISCONTINUED | OUTPATIENT
Start: 2021-01-01 | End: 2021-01-01 | Stop reason: HOSPADM

## 2021-01-01 RX ORDER — CETIRIZINE HYDROCHLORIDE 10 MG/1
10 TABLET ORAL DAILY
Status: DISCONTINUED | OUTPATIENT
Start: 2021-01-01 | End: 2021-01-01 | Stop reason: HOSPADM

## 2021-01-01 RX ORDER — ACETAMINOPHEN 325 MG/1
650 TABLET ORAL EVERY 12 HOURS PRN
Status: ACTIVE | OUTPATIENT
Start: 2021-01-01 | End: 2021-01-01

## 2021-01-01 RX ORDER — LISINOPRIL 20 MG/1
20 TABLET ORAL DAILY
Status: ON HOLD | COMMUNITY
End: 2021-01-01

## 2021-01-01 RX ORDER — CETIRIZINE HYDROCHLORIDE 10 MG/1
10 TABLET ORAL DAILY
Qty: 90 TABLET | Refills: 3 | Status: SHIPPED | OUTPATIENT
Start: 2021-01-01

## 2021-01-01 RX ORDER — LACTULOSE 10 G/15ML
30 SOLUTION ORAL 3 TIMES DAILY
Status: DISCONTINUED | OUTPATIENT
Start: 2021-01-01 | End: 2021-01-01

## 2021-01-01 RX ORDER — PROMETHAZINE HYDROCHLORIDE 25 MG/1
25 SUPPOSITORY RECTAL ONCE AS NEEDED
Status: DISCONTINUED | OUTPATIENT
Start: 2021-01-01 | End: 2021-01-01

## 2021-01-01 RX ORDER — CYCLOBENZAPRINE HCL 10 MG
10 TABLET ORAL 3 TIMES DAILY
COMMUNITY
End: 2021-01-01 | Stop reason: HOSPADM

## 2021-01-01 RX ORDER — SODIUM CHLORIDE 0.9 % (FLUSH) 0.9 %
10 SYRINGE (ML) INJECTION AS NEEDED
Status: DISCONTINUED | OUTPATIENT
Start: 2021-01-01 | End: 2021-01-01

## 2021-01-01 RX ORDER — METOPROLOL SUCCINATE 50 MG/1
100 TABLET, EXTENDED RELEASE ORAL DAILY
Status: DISCONTINUED | OUTPATIENT
Start: 2021-01-01 | End: 2021-01-01 | Stop reason: HOSPADM

## 2021-01-01 RX ORDER — METOPROLOL SUCCINATE 100 MG/1
100 TABLET, EXTENDED RELEASE ORAL 2 TIMES DAILY
Status: ON HOLD | COMMUNITY
End: 2021-01-01

## 2021-01-01 RX ORDER — MONTELUKAST SODIUM 10 MG/1
10 TABLET ORAL NIGHTLY
Qty: 90 TABLET | Refills: 1 | Status: SHIPPED | OUTPATIENT
Start: 2021-01-01

## 2021-01-01 RX ORDER — ASPIRIN 300 MG/1
300 SUPPOSITORY RECTAL DAILY
Status: DISCONTINUED | OUTPATIENT
Start: 2021-01-01 | End: 2021-01-01

## 2021-01-01 RX ORDER — DIAZEPAM 5 MG/1
5 TABLET ORAL 3 TIMES DAILY PRN
Qty: 30 TABLET | Refills: 1 | Status: SHIPPED | OUTPATIENT
Start: 2021-01-01 | End: 2021-01-01 | Stop reason: SDUPTHER

## 2021-01-01 RX ORDER — NICOTINE POLACRILEX 4 MG
15 LOZENGE BUCCAL
Status: DISCONTINUED | OUTPATIENT
Start: 2021-01-01 | End: 2021-01-01 | Stop reason: HOSPADM

## 2021-01-01 RX ORDER — SODIUM CHLORIDE, SODIUM LACTATE, POTASSIUM CHLORIDE, CALCIUM CHLORIDE 600; 310; 30; 20 MG/100ML; MG/100ML; MG/100ML; MG/100ML
125 INJECTION, SOLUTION INTRAVENOUS CONTINUOUS
Status: DISCONTINUED | OUTPATIENT
Start: 2021-01-01 | End: 2021-01-01

## 2021-01-01 RX ORDER — BISACODYL 5 MG/1
5 TABLET, DELAYED RELEASE ORAL DAILY PRN
Status: DISCONTINUED | OUTPATIENT
Start: 2021-01-01 | End: 2021-01-01 | Stop reason: HOSPADM

## 2021-01-01 RX ORDER — FUROSEMIDE 20 MG/1
20 TABLET ORAL DAILY
Qty: 90 TABLET | Refills: 3 | Status: SHIPPED | OUTPATIENT
Start: 2021-01-01 | End: 2021-01-01 | Stop reason: DRUGHIGH

## 2021-01-01 RX ORDER — FAMOTIDINE 20 MG/1
20 TABLET, FILM COATED ORAL
Status: DISCONTINUED | OUTPATIENT
Start: 2021-01-01 | End: 2021-01-01

## 2021-01-01 RX ORDER — ONDANSETRON 2 MG/ML
4 INJECTION INTRAMUSCULAR; INTRAVENOUS EVERY 6 HOURS PRN
Status: DISCONTINUED | OUTPATIENT
Start: 2021-01-01 | End: 2021-01-01 | Stop reason: HOSPADM

## 2021-01-01 RX ORDER — ISOPROPYL ALCOHOL 0.7 ML/ML
SWAB TOPICAL
COMMUNITY
Start: 2021-01-01

## 2021-01-01 RX ORDER — SODIUM CHLORIDE, SODIUM LACTATE, POTASSIUM CHLORIDE, CALCIUM CHLORIDE 600; 310; 30; 20 MG/100ML; MG/100ML; MG/100ML; MG/100ML
9 INJECTION, SOLUTION INTRAVENOUS CONTINUOUS PRN
Status: DISCONTINUED | OUTPATIENT
Start: 2021-01-01 | End: 2021-01-01 | Stop reason: HOSPADM

## 2021-01-01 RX ORDER — HYDROCODONE BITARTRATE AND ACETAMINOPHEN 10; 325 MG/1; MG/1
1 TABLET ORAL EVERY 6 HOURS PRN
Status: DISCONTINUED | OUTPATIENT
Start: 2021-01-01 | End: 2021-01-01

## 2021-01-01 RX ORDER — ASPIRIN 81 MG/1
81 TABLET ORAL DAILY
Qty: 30 TABLET | Refills: 0 | Status: SHIPPED | OUTPATIENT
Start: 2021-01-01

## 2021-01-01 RX ORDER — FUROSEMIDE 20 MG/1
20 TABLET ORAL DAILY PRN
COMMUNITY
End: 2021-01-01 | Stop reason: SDUPTHER

## 2021-01-01 RX ORDER — MULTIPLE VITAMINS W/ MINERALS TAB 9MG-400MCG
1 TAB ORAL DAILY
Status: DISCONTINUED | OUTPATIENT
Start: 2021-01-01 | End: 2021-01-01 | Stop reason: HOSPADM

## 2021-01-01 RX ORDER — DIAZEPAM 5 MG/1
5 TABLET ORAL EVERY 8 HOURS PRN
Qty: 9 TABLET | Refills: 2 | Status: SHIPPED | OUTPATIENT
Start: 2021-01-01 | End: 2021-01-01 | Stop reason: SDUPTHER

## 2021-01-01 RX ORDER — LACTULOSE 10 G/15ML
SOLUTION ORAL; RECTAL
COMMUNITY
Start: 2021-01-01

## 2021-01-01 RX ORDER — LEVOFLOXACIN 500 MG/1
500 TABLET, FILM COATED ORAL DAILY
Qty: 7 TABLET | Refills: 0 | Status: SHIPPED | OUTPATIENT
Start: 2021-01-01 | End: 2021-01-01 | Stop reason: HOSPADM

## 2021-01-01 RX ORDER — FUROSEMIDE 40 MG/1
40 TABLET ORAL 2 TIMES DAILY PRN
Qty: 60 TABLET | Refills: 5 | Status: SHIPPED | OUTPATIENT
Start: 2021-01-01

## 2021-01-01 RX ORDER — SODIUM CHLORIDE, SODIUM LACTATE, POTASSIUM CHLORIDE, CALCIUM CHLORIDE 600; 310; 30; 20 MG/100ML; MG/100ML; MG/100ML; MG/100ML
1000 INJECTION, SOLUTION INTRAVENOUS ONCE
Status: COMPLETED | OUTPATIENT
Start: 2021-01-01 | End: 2021-01-01

## 2021-01-01 RX ORDER — MAGNESIUM SULFATE HEPTAHYDRATE 40 MG/ML
4 INJECTION, SOLUTION INTRAVENOUS ONCE
Status: COMPLETED | OUTPATIENT
Start: 2021-01-01 | End: 2021-01-01

## 2021-01-01 RX ORDER — LISINOPRIL 20 MG/1
20 TABLET ORAL DAILY
Qty: 90 TABLET | Refills: 3 | Status: SHIPPED | OUTPATIENT
Start: 2021-01-01 | End: 2022-01-01

## 2021-01-01 RX ORDER — IPRATROPIUM BROMIDE AND ALBUTEROL SULFATE 2.5; .5 MG/3ML; MG/3ML
3 SOLUTION RESPIRATORY (INHALATION)
Status: DISCONTINUED | OUTPATIENT
Start: 2021-01-01 | End: 2021-01-01

## 2021-01-01 RX ORDER — DIAZEPAM 5 MG/1
5 TABLET ORAL NIGHTLY PRN
Qty: 15 TABLET | Refills: 1 | Status: CANCELLED | OUTPATIENT
Start: 2021-01-01

## 2021-01-01 RX ORDER — SPIRONOLACTONE 25 MG/1
25 TABLET ORAL DAILY
Status: ON HOLD | COMMUNITY
End: 2021-01-01

## 2021-01-01 RX ORDER — FLUCONAZOLE 150 MG/1
150 TABLET ORAL ONCE
Qty: 1 TABLET | Refills: 0 | Status: SHIPPED | OUTPATIENT
Start: 2021-01-01 | End: 2021-01-01

## 2021-01-01 RX ORDER — DEXMEDETOMIDINE HYDROCHLORIDE 4 UG/ML
.2-1.5 INJECTION, SOLUTION INTRAVENOUS
Status: DISCONTINUED | OUTPATIENT
Start: 2021-01-01 | End: 2021-01-01

## 2021-01-01 RX ORDER — ATORVASTATIN CALCIUM 80 MG/1
80 TABLET, FILM COATED ORAL NIGHTLY
Qty: 30 TABLET | Refills: 0 | Status: ON HOLD | OUTPATIENT
Start: 2021-01-01 | End: 2021-01-01

## 2021-01-01 RX ORDER — POLYETHYLENE GLYCOL 3350 17 G/17G
17 POWDER, FOR SOLUTION ORAL DAILY PRN
Status: DISCONTINUED | OUTPATIENT
Start: 2021-01-01 | End: 2021-01-01 | Stop reason: HOSPADM

## 2021-01-01 RX ORDER — CETIRIZINE HYDROCHLORIDE 10 MG/1
10 TABLET ORAL DAILY
Status: ON HOLD | COMMUNITY
End: 2021-01-01

## 2021-01-01 RX ORDER — SODIUM CHLORIDE 0.9 % (FLUSH) 0.9 %
10 SYRINGE (ML) INJECTION AS NEEDED
Status: DISCONTINUED | OUTPATIENT
Start: 2021-01-01 | End: 2021-01-01 | Stop reason: SDUPTHER

## 2021-01-01 RX ORDER — DIAZEPAM 5 MG/1
5 TABLET ORAL 3 TIMES DAILY PRN
Qty: 9 TABLET | Refills: 0 | Status: SHIPPED | OUTPATIENT
Start: 2021-01-01 | End: 2021-01-01

## 2021-01-01 RX ORDER — AZITHROMYCIN 250 MG/1
TABLET, FILM COATED ORAL
Qty: 6 TABLET | Refills: 1 | Status: SHIPPED | OUTPATIENT
Start: 2021-01-01 | End: 2021-01-01

## 2021-01-01 RX ORDER — SODIUM CHLORIDE 0.9 % (FLUSH) 0.9 %
10 SYRINGE (ML) INJECTION EVERY 12 HOURS SCHEDULED
Status: DISCONTINUED | OUTPATIENT
Start: 2021-01-01 | End: 2021-01-01 | Stop reason: HOSPADM

## 2021-01-01 RX ORDER — FENTANYL/ROPIVACAINE/NS/PF 2-625MCG/1
15 PLASTIC BAG, INJECTION (ML) EPIDURAL ONCE
Status: COMPLETED | OUTPATIENT
Start: 2021-01-01 | End: 2021-01-01

## 2021-01-01 RX ORDER — LACTULOSE 10 G/15ML
10 SOLUTION ORAL DAILY
Status: DISCONTINUED | OUTPATIENT
Start: 2021-01-01 | End: 2021-01-01 | Stop reason: HOSPADM

## 2021-01-01 RX ORDER — GABAPENTIN 300 MG/1
600 CAPSULE ORAL EVERY 8 HOURS SCHEDULED
Status: DISCONTINUED | OUTPATIENT
Start: 2021-01-01 | End: 2021-01-01

## 2021-01-01 RX ORDER — LIDOCAINE HYDROCHLORIDE 20 MG/ML
INJECTION, SOLUTION INFILTRATION; PERINEURAL AS NEEDED
Status: DISCONTINUED | OUTPATIENT
Start: 2021-01-01 | End: 2021-01-01 | Stop reason: SURG

## 2021-01-01 RX ORDER — LEVOFLOXACIN 500 MG/1
500 TABLET, FILM COATED ORAL DAILY
Qty: 7 TABLET | Refills: 0 | Status: SHIPPED | OUTPATIENT
Start: 2021-01-01 | End: 2021-01-01

## 2021-01-01 RX ORDER — ONDANSETRON 4 MG/1
4 TABLET, FILM COATED ORAL EVERY 6 HOURS PRN
Status: DISCONTINUED | OUTPATIENT
Start: 2021-01-01 | End: 2021-01-01 | Stop reason: HOSPADM

## 2021-01-01 RX ORDER — CHOLECALCIFEROL (VITAMIN D3) 125 MCG
5 CAPSULE ORAL NIGHTLY PRN
Status: DISCONTINUED | OUTPATIENT
Start: 2021-01-01 | End: 2021-01-01 | Stop reason: HOSPADM

## 2021-01-01 RX ORDER — SPIRONOLACTONE 25 MG/1
25 TABLET ORAL DAILY
Status: DISCONTINUED | OUTPATIENT
Start: 2021-01-01 | End: 2021-01-01

## 2021-01-01 RX ORDER — CEFTRIAXONE SODIUM 1 G/50ML
1 INJECTION, SOLUTION INTRAVENOUS EVERY 24 HOURS
Qty: 650 ML | Refills: 0
Start: 2021-01-01 | End: 2021-01-01

## 2021-01-01 RX ORDER — LEVOTHYROXINE SODIUM 175 UG/1
175 TABLET ORAL DAILY
Qty: 30 TABLET | Refills: 0 | Status: ON HOLD | OUTPATIENT
Start: 2021-01-01 | End: 2021-01-01

## 2021-01-01 RX ORDER — MIDODRINE HYDROCHLORIDE 10 MG/1
10 TABLET ORAL
Status: DISCONTINUED | OUTPATIENT
Start: 2021-01-01 | End: 2021-01-01

## 2021-01-01 RX ORDER — BUDESONIDE 0.5 MG/2ML
0.5 INHALANT ORAL
Status: DISCONTINUED | OUTPATIENT
Start: 2021-01-01 | End: 2021-01-01 | Stop reason: HOSPADM

## 2021-01-01 RX ORDER — SODIUM CHLORIDE, SODIUM LACTATE, POTASSIUM CHLORIDE, CALCIUM CHLORIDE 600; 310; 30; 20 MG/100ML; MG/100ML; MG/100ML; MG/100ML
100 INJECTION, SOLUTION INTRAVENOUS CONTINUOUS
Status: DISCONTINUED | OUTPATIENT
Start: 2021-01-01 | End: 2021-01-01

## 2021-01-01 RX ORDER — ASPIRIN 81 MG/1
81 TABLET, CHEWABLE ORAL DAILY
Status: DISCONTINUED | OUTPATIENT
Start: 2021-01-01 | End: 2021-01-01

## 2021-01-01 RX ORDER — BLOOD SUGAR DIAGNOSTIC
STRIP MISCELLANEOUS 4 TIMES DAILY
COMMUNITY
Start: 2021-01-01

## 2021-01-01 RX ORDER — BUSPIRONE HYDROCHLORIDE 10 MG/1
10 TABLET ORAL DAILY
Status: DISCONTINUED | OUTPATIENT
Start: 2021-01-01 | End: 2021-01-01

## 2021-01-01 RX ORDER — NITROFURANTOIN 25; 75 MG/1; MG/1
100 CAPSULE ORAL 2 TIMES DAILY
Qty: 10 CAPSULE | Refills: 0 | Status: SHIPPED | OUTPATIENT
Start: 2021-01-01

## 2021-01-01 RX ORDER — ACETAMINOPHEN 325 MG/1
650 TABLET ORAL EVERY 4 HOURS PRN
Status: DISCONTINUED | OUTPATIENT
Start: 2021-01-01 | End: 2021-01-01 | Stop reason: HOSPADM

## 2021-01-01 RX ORDER — MAGNESIUM HYDROXIDE 1200 MG/15ML
LIQUID ORAL AS NEEDED
Status: DISCONTINUED | OUTPATIENT
Start: 2021-01-01 | End: 2021-01-01 | Stop reason: HOSPADM

## 2021-01-01 RX ORDER — SULFAMETHOXAZOLE AND TRIMETHOPRIM 800; 160 MG/1; MG/1
1 TABLET ORAL 2 TIMES DAILY
Qty: 20 TABLET | Refills: 0 | Status: SHIPPED | OUTPATIENT
Start: 2021-01-01 | End: 2021-01-01

## 2021-01-01 RX ORDER — FLUCONAZOLE 150 MG/1
150 TABLET ORAL DAILY
Qty: 3 TABLET | Refills: 2 | Status: SHIPPED | OUTPATIENT
Start: 2021-01-01

## 2021-01-01 RX ORDER — ALBUTEROL SULFATE 1.25 MG/3ML
1 SOLUTION RESPIRATORY (INHALATION) EVERY 6 HOURS PRN
Qty: 360 ML | Refills: 11 | Status: SHIPPED | OUTPATIENT
Start: 2021-01-01 | End: 2022-07-28

## 2021-01-01 RX ORDER — SULFAMETHOXAZOLE AND TRIMETHOPRIM 800; 160 MG/1; MG/1
1 TABLET ORAL 2 TIMES DAILY
Qty: 20 TABLET | Refills: 0 | Status: SHIPPED | OUTPATIENT
Start: 2021-01-01 | End: 2021-01-01 | Stop reason: SDUPTHER

## 2021-01-01 RX ORDER — SODIUM CHLORIDE 9 MG/ML
100 INJECTION, SOLUTION INTRAVENOUS CONTINUOUS
Status: DISCONTINUED | OUTPATIENT
Start: 2021-01-01 | End: 2021-01-01

## 2021-01-01 RX ORDER — HEPARIN SODIUM 5000 [USP'U]/ML
5000 INJECTION, SOLUTION INTRAVENOUS; SUBCUTANEOUS EVERY 12 HOURS SCHEDULED
Status: DISCONTINUED | OUTPATIENT
Start: 2021-01-01 | End: 2021-01-01

## 2021-01-01 RX ORDER — OXYCODONE HYDROCHLORIDE 5 MG/1
15 TABLET ORAL EVERY 8 HOURS PRN
Status: DISCONTINUED | OUTPATIENT
Start: 2021-01-01 | End: 2021-01-01 | Stop reason: HOSPADM

## 2021-01-01 RX ORDER — DIAPER,BRIEF,INFANT-TODD,DISP
EACH MISCELLANEOUS 2 TIMES DAILY PRN
Qty: 120 G | Refills: 5 | Status: SHIPPED | OUTPATIENT
Start: 2021-01-01 | End: 2021-01-01

## 2021-01-01 RX ORDER — POTASSIUM CHLORIDE 750 MG/1
40 CAPSULE, EXTENDED RELEASE ORAL 2 TIMES DAILY WITH MEALS
Status: COMPLETED | OUTPATIENT
Start: 2021-01-01 | End: 2021-01-01

## 2021-01-01 RX ORDER — ATORVASTATIN CALCIUM 40 MG/1
80 TABLET, FILM COATED ORAL NIGHTLY
Status: DISCONTINUED | OUTPATIENT
Start: 2021-01-01 | End: 2021-01-01 | Stop reason: HOSPADM

## 2021-01-01 RX ORDER — NALOXONE HYDROCHLORIDE 4 MG/.1ML
1 SPRAY NASAL AS NEEDED
COMMUNITY
Start: 2021-01-01

## 2021-01-01 RX ORDER — CALCIUM ACETATE MONOHYDRATE AND ALUMINUM SULFATE TETRADECAHYDRATE 952; 1347 MG/2299MG; MG/2299MG
1 POWDER, FOR SOLUTION TOPICAL EVERY 8 HOURS SCHEDULED
Status: DISCONTINUED | OUTPATIENT
Start: 2021-01-01 | End: 2021-01-01 | Stop reason: HOSPADM

## 2021-01-01 RX ORDER — FUROSEMIDE 20 MG/1
20 TABLET ORAL DAILY
Status: DISCONTINUED | OUTPATIENT
Start: 2021-01-01 | End: 2021-01-01 | Stop reason: HOSPADM

## 2021-01-01 RX ORDER — NOREPINEPHRINE BIT/0.9 % NACL 8 MG/250ML
.02-.3 INFUSION BOTTLE (ML) INTRAVENOUS
Status: DISCONTINUED | OUTPATIENT
Start: 2021-01-01 | End: 2021-01-01

## 2021-01-01 RX ORDER — DIPHENHYDRAMINE HCL 25 MG
25 TABLET ORAL 2 TIMES DAILY
Qty: 30 TABLET | Refills: 0 | Status: SHIPPED | OUTPATIENT
Start: 2021-01-01

## 2021-01-01 RX ORDER — HYDROCODONE BITARTRATE AND ACETAMINOPHEN 10; 325 MG/1; MG/1
1 TABLET ORAL EVERY 6 HOURS PRN
Status: DISCONTINUED | OUTPATIENT
Start: 2021-01-01 | End: 2021-01-01 | Stop reason: SDUPTHER

## 2021-01-01 RX ORDER — AZITHROMYCIN 250 MG/1
TABLET, FILM COATED ORAL
Qty: 6 TABLET | Refills: 0 | Status: SHIPPED | OUTPATIENT
Start: 2021-01-01 | End: 2021-01-01

## 2021-01-01 RX ORDER — ATORVASTATIN CALCIUM 40 MG/1
80 TABLET, FILM COATED ORAL NIGHTLY
Status: DISCONTINUED | OUTPATIENT
Start: 2021-01-01 | End: 2021-01-01

## 2021-01-01 RX ORDER — CYCLOBENZAPRINE HCL 10 MG
10 TABLET ORAL 3 TIMES DAILY PRN
Status: DISCONTINUED | OUTPATIENT
Start: 2021-01-01 | End: 2021-01-01 | Stop reason: HOSPADM

## 2021-01-01 RX ORDER — SODIUM CHLORIDE 0.9 % (FLUSH) 0.9 %
10 SYRINGE (ML) INJECTION AS NEEDED
Status: DISCONTINUED | OUTPATIENT
Start: 2021-01-01 | End: 2021-01-01 | Stop reason: HOSPADM

## 2021-01-01 RX ORDER — ASPIRIN 81 MG/1
81 TABLET ORAL DAILY
Status: DISCONTINUED | OUTPATIENT
Start: 2021-01-01 | End: 2021-01-01 | Stop reason: HOSPADM

## 2021-01-01 RX ORDER — TRAMADOL HYDROCHLORIDE 50 MG/1
25 TABLET ORAL EVERY 6 HOURS PRN
Status: DISCONTINUED | OUTPATIENT
Start: 2021-01-01 | End: 2021-01-01

## 2021-01-01 RX ORDER — LANCETS
1 EACH MISCELLANEOUS 4 TIMES DAILY
Status: ON HOLD | COMMUNITY
Start: 2021-01-01 | End: 2021-01-01

## 2021-01-01 RX ORDER — OXYCODONE HYDROCHLORIDE 5 MG/1
5 TABLET ORAL EVERY 6 HOURS PRN
Status: DISCONTINUED | OUTPATIENT
Start: 2021-01-01 | End: 2021-01-01 | Stop reason: HOSPADM

## 2021-01-01 RX ORDER — OXYCODONE HCL 5 MG/5 ML
5 SOLUTION, ORAL ORAL EVERY 8 HOURS
Status: DISCONTINUED | OUTPATIENT
Start: 2021-01-01 | End: 2021-01-01

## 2021-01-01 RX ORDER — OXYCODONE HYDROCHLORIDE 5 MG/1
5 TABLET ORAL 2 TIMES DAILY PRN
Qty: 30 TABLET | Refills: 0 | Status: SHIPPED | OUTPATIENT
Start: 2021-01-01 | End: 2021-01-01 | Stop reason: DRUGHIGH

## 2021-01-01 RX ORDER — OXYCODONE HYDROCHLORIDE 15 MG/1
15 TABLET ORAL EVERY 8 HOURS PRN
Qty: 3 TABLET | Refills: 0
Start: 2021-01-01

## 2021-01-01 RX ORDER — FUROSEMIDE 10 MG/ML
80 INJECTION INTRAMUSCULAR; INTRAVENOUS ONCE
Status: COMPLETED | OUTPATIENT
Start: 2021-01-01 | End: 2021-01-01

## 2021-01-01 RX ORDER — NICOTINE POLACRILEX 4 MG
15 LOZENGE BUCCAL
Status: DISCONTINUED | OUTPATIENT
Start: 2021-01-01 | End: 2021-01-01

## 2021-01-01 RX ORDER — LISINOPRIL 20 MG/1
20 TABLET ORAL DAILY
Status: DISCONTINUED | OUTPATIENT
Start: 2021-01-01 | End: 2021-01-01 | Stop reason: HOSPADM

## 2021-01-01 RX ORDER — PROMETHAZINE HYDROCHLORIDE 25 MG/1
25 TABLET ORAL DAILY
Qty: 30 TABLET | Refills: 5 | Status: SHIPPED | OUTPATIENT
Start: 2021-01-01

## 2021-01-01 RX ORDER — DEXTROSE MONOHYDRATE 25 G/50ML
10-50 INJECTION, SOLUTION INTRAVENOUS
Status: DISCONTINUED | OUTPATIENT
Start: 2021-01-01 | End: 2021-01-01 | Stop reason: HOSPADM

## 2021-01-01 RX ORDER — LACTULOSE 10 G/15ML
15 SOLUTION ORAL 2 TIMES DAILY
Qty: 236 ML | Refills: 2 | Status: SHIPPED | OUTPATIENT
Start: 2021-01-01

## 2021-01-01 RX ORDER — OXYCODONE AND ACETAMINOPHEN 7.5; 325 MG/1; MG/1
1 TABLET ORAL EVERY 8 HOURS PRN
Qty: 45 TABLET | Refills: 0 | Status: SHIPPED | OUTPATIENT
Start: 2021-01-01

## 2021-01-01 RX ORDER — METHION/INOS/CHOL BT/B COM/LIV 110MG-86MG
100 CAPSULE ORAL DAILY
Qty: 30 TABLET | Refills: 0 | Status: SHIPPED | OUTPATIENT
Start: 2021-01-01

## 2021-01-01 RX ORDER — FAMOTIDINE 20 MG/1
20 TABLET, FILM COATED ORAL
Status: DISCONTINUED | OUTPATIENT
Start: 2021-01-01 | End: 2021-01-01 | Stop reason: HOSPADM

## 2021-01-01 RX ORDER — FUROSEMIDE 10 MG/ML
20 INJECTION INTRAMUSCULAR; INTRAVENOUS ONCE
Status: COMPLETED | OUTPATIENT
Start: 2021-01-01 | End: 2021-01-01

## 2021-01-01 RX ORDER — HYDROCODONE BITARTRATE AND ACETAMINOPHEN 10; 325 MG/1; MG/1
1 TABLET ORAL 2 TIMES DAILY PRN
Qty: 30 TABLET | Refills: 0 | Status: SHIPPED | OUTPATIENT
Start: 2021-01-01 | End: 2021-01-01

## 2021-01-01 RX ORDER — SODIUM CHLORIDE 9 MG/ML
INJECTION, SOLUTION INTRAVENOUS
COMMUNITY
Start: 2021-01-01

## 2021-01-01 RX ORDER — SPIRONOLACTONE 50 MG/1
50 TABLET, FILM COATED ORAL DAILY
Qty: 30 TABLET | Refills: 0 | Status: SHIPPED | OUTPATIENT
Start: 2021-01-01

## 2021-01-01 RX ORDER — DEXTROSE MONOHYDRATE 100 MG/ML
50-250 INJECTION, SOLUTION INTRAVENOUS
Status: DISCONTINUED | OUTPATIENT
Start: 2021-01-01 | End: 2021-01-01 | Stop reason: HOSPADM

## 2021-01-01 RX ORDER — CHOLECALCIFEROL (VITAMIN D3) 50 MCG
2000 TABLET ORAL DAILY
COMMUNITY

## 2021-01-01 RX ORDER — INSULIN PUMP CONTROLLER
EACH MISCELLANEOUS
Status: ON HOLD | COMMUNITY
Start: 2021-01-01 | End: 2021-01-01

## 2021-01-01 RX ORDER — PROMETHAZINE HYDROCHLORIDE 12.5 MG/1
25 TABLET ORAL ONCE AS NEEDED
Status: DISCONTINUED | OUTPATIENT
Start: 2021-01-01 | End: 2021-01-01

## 2021-01-01 RX ORDER — DIAZEPAM 5 MG/1
5 TABLET ORAL NIGHTLY PRN
Qty: 15 TABLET | Refills: 1 | Status: SHIPPED | OUTPATIENT
Start: 2021-01-01

## 2021-01-01 RX ORDER — METOPROLOL TARTRATE 50 MG/1
50 TABLET, FILM COATED ORAL EVERY 12 HOURS SCHEDULED
Status: DISCONTINUED | OUTPATIENT
Start: 2021-01-01 | End: 2021-01-01

## 2021-01-01 RX ORDER — LEVOFLOXACIN 750 MG/1
750 TABLET ORAL DAILY
Qty: 10 TABLET | Refills: 0 | Status: SHIPPED | OUTPATIENT
Start: 2021-01-01

## 2021-01-01 RX ORDER — POTASSIUM CHLORIDE 750 MG/1
40 CAPSULE, EXTENDED RELEASE ORAL ONCE
Status: COMPLETED | OUTPATIENT
Start: 2021-01-01 | End: 2021-01-01

## 2021-01-01 RX ORDER — NALOXONE HCL 0.4 MG/ML
0.4 VIAL (ML) INJECTION
Status: DISCONTINUED | OUTPATIENT
Start: 2021-01-01 | End: 2021-01-01 | Stop reason: HOSPADM

## 2021-01-01 RX ORDER — FUROSEMIDE 20 MG/1
20 TABLET ORAL DAILY
Status: CANCELLED | OUTPATIENT
Start: 2021-01-01

## 2021-01-01 RX ORDER — ZINC SULFATE 50(220)MG
220 CAPSULE ORAL DAILY
Status: DISCONTINUED | OUTPATIENT
Start: 2021-01-01 | End: 2021-01-01 | Stop reason: HOSPADM

## 2021-01-01 RX ORDER — LACTULOSE 10 G/15ML
20 SOLUTION ORAL 2 TIMES DAILY PRN
COMMUNITY
End: 2021-01-01 | Stop reason: HOSPADM

## 2021-01-01 RX ORDER — CLINDAMYCIN HYDROCHLORIDE 300 MG/1
300 CAPSULE ORAL 3 TIMES DAILY
Qty: 3 CAPSULE | Refills: 0 | Status: SHIPPED | OUTPATIENT
Start: 2021-01-01 | End: 2021-01-01

## 2021-01-01 RX ORDER — THIAMINE HYDROCHLORIDE 100 MG/ML
100 INJECTION, SOLUTION INTRAMUSCULAR; INTRAVENOUS EVERY 8 HOURS SCHEDULED
Status: DISCONTINUED | OUTPATIENT
Start: 2021-01-01 | End: 2021-01-01 | Stop reason: HOSPADM

## 2021-01-01 RX ORDER — CYCLOBENZAPRINE HCL 10 MG
10 TABLET ORAL 3 TIMES DAILY PRN
COMMUNITY
Start: 2021-01-01

## 2021-01-01 RX ORDER — OXYCODONE HYDROCHLORIDE 5 MG/1
15 TABLET ORAL 3 TIMES DAILY
Status: DISCONTINUED | OUTPATIENT
Start: 2021-01-01 | End: 2021-01-01

## 2021-01-01 RX ORDER — ZIPRASIDONE MESYLATE 20 MG/ML
10 INJECTION, POWDER, LYOPHILIZED, FOR SOLUTION INTRAMUSCULAR ONCE
Status: COMPLETED | OUTPATIENT
Start: 2021-01-01 | End: 2021-01-01

## 2021-01-01 RX ORDER — OXYCODONE HYDROCHLORIDE 5 MG/1
5 TABLET ORAL
Status: DISCONTINUED | OUTPATIENT
Start: 2021-01-01 | End: 2021-01-01

## 2021-01-01 RX ORDER — LORAZEPAM 2 MG/ML
1 INJECTION INTRAMUSCULAR ONCE
Status: COMPLETED | OUTPATIENT
Start: 2021-01-01 | End: 2021-01-01

## 2021-01-01 RX ORDER — LEVOTHYROXINE SODIUM 175 UG/1
175 TABLET ORAL
Status: DISCONTINUED | OUTPATIENT
Start: 2021-01-01 | End: 2021-01-01 | Stop reason: HOSPADM

## 2021-01-01 RX ORDER — SODIUM CHLORIDE 9 MG/ML
INJECTION, SOLUTION INTRAVENOUS
Status: COMPLETED
Start: 2021-01-01 | End: 2021-01-01

## 2021-01-01 RX ORDER — ONDANSETRON 2 MG/ML
4 INJECTION INTRAMUSCULAR; INTRAVENOUS ONCE AS NEEDED
Status: DISCONTINUED | OUTPATIENT
Start: 2021-01-01 | End: 2021-01-01

## 2021-01-01 RX ORDER — IPRATROPIUM BROMIDE AND ALBUTEROL SULFATE 2.5; .5 MG/3ML; MG/3ML
3 SOLUTION RESPIRATORY (INHALATION) EVERY 4 HOURS PRN
Status: DISCONTINUED | OUTPATIENT
Start: 2021-01-01 | End: 2021-01-01 | Stop reason: HOSPADM

## 2021-01-01 RX ORDER — MONTELUKAST SODIUM 10 MG/1
10 TABLET ORAL NIGHTLY
Status: DISCONTINUED | OUTPATIENT
Start: 2021-01-01 | End: 2021-01-01

## 2021-01-01 RX ORDER — CLINDAMYCIN PHOSPHATE 900 MG/50ML
900 INJECTION INTRAVENOUS EVERY 8 HOURS
Status: DISCONTINUED | OUTPATIENT
Start: 2021-01-01 | End: 2021-01-01

## 2021-01-01 RX ORDER — CALCIUM GLUCONATE 20 MG/ML
1 INJECTION, SOLUTION INTRAVENOUS ONCE
Status: COMPLETED | OUTPATIENT
Start: 2021-01-01 | End: 2021-01-01

## 2021-01-01 RX ORDER — DIAZEPAM 5 MG/1
5 TABLET ORAL 3 TIMES DAILY PRN
Status: DISCONTINUED | OUTPATIENT
Start: 2021-01-01 | End: 2021-01-01

## 2021-01-01 RX ORDER — EPINEPHRINE 0.3 MG/.3ML
0.3 INJECTION SUBCUTANEOUS ONCE
COMMUNITY

## 2021-01-01 RX ORDER — KETOROLAC TROMETHAMINE 30 MG/ML
30 INJECTION, SOLUTION INTRAMUSCULAR; INTRAVENOUS EVERY 12 HOURS PRN
Status: DISPENSED | OUTPATIENT
Start: 2021-01-01 | End: 2021-01-01

## 2021-01-01 RX ORDER — OXYCODONE HYDROCHLORIDE 15 MG/1
15 TABLET ORAL 3 TIMES DAILY
Status: ON HOLD | COMMUNITY
End: 2021-01-01 | Stop reason: SDUPTHER

## 2021-01-01 RX ORDER — MEPERIDINE HYDROCHLORIDE 25 MG/ML
12.5 INJECTION INTRAMUSCULAR; INTRAVENOUS; SUBCUTANEOUS
Status: DISCONTINUED | OUTPATIENT
Start: 2021-01-01 | End: 2021-01-01

## 2021-01-01 RX ORDER — DEXTROSE MONOHYDRATE 100 MG/ML
25 INJECTION, SOLUTION INTRAVENOUS
Status: DISCONTINUED | OUTPATIENT
Start: 2021-01-01 | End: 2021-01-01 | Stop reason: HOSPADM

## 2021-01-01 RX ORDER — MONTELUKAST SODIUM 10 MG/1
10 TABLET ORAL NIGHTLY
Status: DISCONTINUED | OUTPATIENT
Start: 2021-01-01 | End: 2021-01-01 | Stop reason: HOSPADM

## 2021-01-01 RX ORDER — CHOLECALCIFEROL (VITAMIN D3) 125 MCG
5 CAPSULE ORAL NIGHTLY PRN
Status: DISCONTINUED | OUTPATIENT
Start: 2021-01-01 | End: 2021-01-01

## 2021-01-01 RX ORDER — METOPROLOL SUCCINATE 50 MG/1
100 TABLET, EXTENDED RELEASE ORAL DAILY
Status: CANCELLED | OUTPATIENT
Start: 2021-01-01

## 2021-01-01 RX ORDER — BUSPIRONE HYDROCHLORIDE 10 MG/1
10 TABLET ORAL 2 TIMES DAILY
Status: DISCONTINUED | OUTPATIENT
Start: 2021-01-01 | End: 2021-01-01 | Stop reason: HOSPADM

## 2021-01-01 RX ORDER — CLINDAMYCIN PHOSPHATE 900 MG/50ML
900 INJECTION INTRAVENOUS ONCE
Status: COMPLETED | OUTPATIENT
Start: 2021-01-01 | End: 2021-01-01

## 2021-01-01 RX ORDER — METOPROLOL SUCCINATE 100 MG/1
100 TABLET, EXTENDED RELEASE ORAL
Status: DISCONTINUED | OUTPATIENT
Start: 2021-01-01 | End: 2021-01-01 | Stop reason: HOSPADM

## 2021-01-01 RX ORDER — LACTULOSE 10 G/15ML
10 SOLUTION ORAL 3 TIMES DAILY
Status: DISCONTINUED | OUTPATIENT
Start: 2021-01-01 | End: 2021-01-01 | Stop reason: HOSPADM

## 2021-01-01 RX ORDER — ACETAMINOPHEN 325 MG/1
650 TABLET ORAL EVERY 12 HOURS PRN
Status: DISCONTINUED | OUTPATIENT
Start: 2021-01-01 | End: 2021-01-01

## 2021-01-01 RX ORDER — MORPHINE SULFATE 2 MG/ML
2 INJECTION, SOLUTION INTRAMUSCULAR; INTRAVENOUS ONCE
Status: COMPLETED | OUTPATIENT
Start: 2021-01-01 | End: 2021-01-01

## 2021-01-01 RX ORDER — METOPROLOL TARTRATE 50 MG/1
50 TABLET, FILM COATED ORAL EVERY 12 HOURS SCHEDULED
Status: DISCONTINUED | OUTPATIENT
Start: 2021-01-01 | End: 2021-01-01 | Stop reason: HOSPADM

## 2021-01-01 RX ORDER — LISINOPRIL 20 MG/1
20 TABLET ORAL DAILY
Qty: 90 TABLET | Refills: 1 | OUTPATIENT
Start: 2021-01-01 | End: 2021-01-01 | Stop reason: HOSPADM

## 2021-01-01 RX ORDER — BUSPIRONE HYDROCHLORIDE 10 MG/1
10 TABLET ORAL DAILY
Status: ON HOLD | COMMUNITY
End: 2021-01-01

## 2021-01-01 RX ORDER — AMOXICILLIN 250 MG
2 CAPSULE ORAL 2 TIMES DAILY
Status: DISCONTINUED | OUTPATIENT
Start: 2021-01-01 | End: 2021-01-01 | Stop reason: HOSPADM

## 2021-01-01 RX ORDER — DIAZEPAM 5 MG/1
5 TABLET ORAL EVERY 8 HOURS PRN
Status: DISCONTINUED | OUTPATIENT
Start: 2021-01-01 | End: 2021-01-01 | Stop reason: HOSPADM

## 2021-01-01 RX ORDER — HYDROCODONE BITARTRATE AND ACETAMINOPHEN 5; 325 MG/1; MG/1
1 TABLET ORAL EVERY 6 HOURS PRN
Status: DISCONTINUED | OUTPATIENT
Start: 2021-01-01 | End: 2021-01-01

## 2021-01-01 RX ORDER — LACTULOSE 10 G/15ML
10 SOLUTION ORAL 3 TIMES DAILY
Qty: 1892 ML | Refills: 0 | Status: SHIPPED | OUTPATIENT
Start: 2021-01-01 | End: 2021-01-01

## 2021-01-01 RX ORDER — GABAPENTIN 300 MG/1
300 CAPSULE ORAL EVERY 8 HOURS SCHEDULED
Status: DISCONTINUED | OUTPATIENT
Start: 2021-01-01 | End: 2021-01-01

## 2021-01-01 RX ORDER — FAMOTIDINE 20 MG/1
20 TABLET, FILM COATED ORAL
Qty: 30 TABLET | Refills: 0 | Status: ON HOLD | OUTPATIENT
Start: 2021-01-01 | End: 2021-01-01

## 2021-01-01 RX ORDER — OXYCODONE HYDROCHLORIDE 5 MG/1
5 TABLET ORAL ONCE
Status: COMPLETED | OUTPATIENT
Start: 2021-01-01 | End: 2021-01-01

## 2021-01-01 RX ORDER — DIAZEPAM 5 MG/1
5 TABLET ORAL EVERY 8 HOURS PRN
Qty: 9 TABLET | Refills: 1 | Status: SHIPPED | OUTPATIENT
Start: 2021-01-01 | End: 2021-01-01

## 2021-01-01 RX ORDER — DEXTROSE MONOHYDRATE 100 MG/ML
25 INJECTION, SOLUTION INTRAVENOUS
Status: DISCONTINUED | OUTPATIENT
Start: 2021-01-01 | End: 2021-01-01

## 2021-01-01 RX ORDER — FLUCONAZOLE 150 MG/1
150 TABLET ORAL DAILY
Qty: 3 TABLET | Refills: 2 | Status: SHIPPED | OUTPATIENT
Start: 2021-01-01 | End: 2021-01-01 | Stop reason: SDUPTHER

## 2021-01-01 RX ORDER — CEFTRIAXONE SODIUM 1 G/50ML
1 INJECTION, SOLUTION INTRAVENOUS EVERY 24 HOURS
Status: DISCONTINUED | OUTPATIENT
Start: 2021-01-01 | End: 2021-01-01 | Stop reason: HOSPADM

## 2021-01-01 RX ORDER — INSULIN PUMP CONTROLLER
EACH MISCELLANEOUS
COMMUNITY
Start: 2021-01-01

## 2021-01-01 RX ORDER — MORPHINE SULFATE 2 MG/ML
2 INJECTION, SOLUTION INTRAMUSCULAR; INTRAVENOUS EVERY 4 HOURS PRN
Status: DISCONTINUED | OUTPATIENT
Start: 2021-01-01 | End: 2021-01-01

## 2021-01-01 RX ORDER — PROPOFOL 10 MG/ML
VIAL (ML) INTRAVENOUS AS NEEDED
Status: DISCONTINUED | OUTPATIENT
Start: 2021-01-01 | End: 2021-01-01 | Stop reason: SURG

## 2021-01-01 RX ORDER — ZINC SULFATE 50(220)MG
220 CAPSULE ORAL DAILY
Qty: 30 CAPSULE | Refills: 0 | Status: ON HOLD | OUTPATIENT
Start: 2021-01-01 | End: 2021-01-01

## 2021-01-01 RX ORDER — MORPHINE SULFATE 2 MG/ML
2 INJECTION, SOLUTION INTRAMUSCULAR; INTRAVENOUS EVERY 4 HOURS PRN
Status: DISCONTINUED | OUTPATIENT
Start: 2021-01-01 | End: 2021-01-01 | Stop reason: HOSPADM

## 2021-01-01 RX ORDER — HYDROCODONE BITARTRATE AND ACETAMINOPHEN 5; 325 MG/1; MG/1
1 TABLET ORAL EVERY 6 HOURS PRN
Status: DISCONTINUED | OUTPATIENT
Start: 2021-01-01 | End: 2021-01-01 | Stop reason: SDUPTHER

## 2021-01-01 RX ORDER — ACETAMINOPHEN 160 MG/5ML
650 SOLUTION ORAL EVERY 4 HOURS PRN
Status: DISCONTINUED | OUTPATIENT
Start: 2021-01-01 | End: 2021-01-01 | Stop reason: HOSPADM

## 2021-01-01 RX ORDER — DIAZEPAM 5 MG/1
5 TABLET ORAL 3 TIMES DAILY PRN
Qty: 30 TABLET | Refills: 1 | Status: SHIPPED | OUTPATIENT
Start: 2021-01-01 | End: 2021-01-01 | Stop reason: HOSPADM

## 2021-01-01 RX ORDER — GABAPENTIN 400 MG/1
800 CAPSULE ORAL EVERY 8 HOURS SCHEDULED
Status: DISCONTINUED | OUTPATIENT
Start: 2021-01-01 | End: 2021-01-01

## 2021-01-01 RX ORDER — METOPROLOL TARTRATE 50 MG/1
50 TABLET, FILM COATED ORAL EVERY 12 HOURS SCHEDULED
Qty: 30 TABLET | Refills: 0 | Status: SHIPPED | OUTPATIENT
Start: 2021-01-01 | End: 2021-01-01

## 2021-01-01 RX ORDER — LISINOPRIL 10 MG/1
20 TABLET ORAL
Status: DISCONTINUED | OUTPATIENT
Start: 2021-01-01 | End: 2021-01-01 | Stop reason: HOSPADM

## 2021-01-01 RX ORDER — ROCURONIUM BROMIDE 10 MG/ML
INJECTION, SOLUTION INTRAVENOUS AS NEEDED
Status: DISCONTINUED | OUTPATIENT
Start: 2021-01-01 | End: 2021-01-01 | Stop reason: SURG

## 2021-01-01 RX ORDER — HYDROCORTISONE 25 MG/ML
LOTION TOPICAL
Qty: 60 ML | Refills: 3 | Status: SHIPPED | OUTPATIENT
Start: 2021-01-01 | End: 2022-10-18

## 2021-01-01 RX ORDER — HYDROCODONE BITARTRATE AND ACETAMINOPHEN 10; 325 MG/1; MG/1
TABLET ORAL
Qty: 30 TABLET | Refills: 0 | OUTPATIENT
Start: 2021-01-01

## 2021-01-01 RX ORDER — OXYCODONE AND ACETAMINOPHEN 7.5; 325 MG/1; MG/1
TABLET ORAL
Qty: 90 TABLET | Refills: 0 | OUTPATIENT
Start: 2021-01-01

## 2021-01-01 RX ORDER — MAGNESIUM SULFATE HEPTAHYDRATE 40 MG/ML
2 INJECTION, SOLUTION INTRAVENOUS ONCE
Status: COMPLETED | OUTPATIENT
Start: 2021-01-01 | End: 2021-01-01

## 2021-01-01 RX ORDER — PHENYLEPHRINE HCL IN 0.9% NACL 1 MG/10 ML
SYRINGE (ML) INTRAVENOUS AS NEEDED
Status: DISCONTINUED | OUTPATIENT
Start: 2021-01-01 | End: 2021-01-01 | Stop reason: SURG

## 2021-01-01 RX ORDER — GABAPENTIN 800 MG/1
800 TABLET ORAL 4 TIMES DAILY
COMMUNITY
End: 2021-01-01 | Stop reason: HOSPADM

## 2021-01-01 RX ORDER — METOPROLOL SUCCINATE 100 MG/1
100 TABLET, EXTENDED RELEASE ORAL DAILY
Qty: 90 TABLET | Refills: 3 | Status: SHIPPED | OUTPATIENT
Start: 2021-01-01

## 2021-01-01 RX ORDER — OXYCODONE HYDROCHLORIDE 5 MG/1
15 TABLET ORAL EVERY 8 HOURS PRN
Status: DISCONTINUED | OUTPATIENT
Start: 2021-01-01 | End: 2021-01-01

## 2021-01-01 RX ORDER — LACTULOSE 10 G/15ML
60 SOLUTION ORAL 3 TIMES DAILY
Status: DISCONTINUED | OUTPATIENT
Start: 2021-01-01 | End: 2021-01-01

## 2021-01-01 RX ORDER — METOPROLOL TARTRATE 100 MG/1
100 TABLET ORAL 2 TIMES DAILY
Qty: 90 TABLET | Refills: 1 | OUTPATIENT
Start: 2021-01-01 | End: 2021-01-01 | Stop reason: HOSPADM

## 2021-01-01 RX ORDER — POTASSIUM CHLORIDE 750 MG/1
40 CAPSULE, EXTENDED RELEASE ORAL 2 TIMES DAILY WITH MEALS
Status: DISCONTINUED | OUTPATIENT
Start: 2021-01-01 | End: 2021-01-01 | Stop reason: HOSPADM

## 2021-01-01 RX ORDER — LEVOTHYROXINE SODIUM 0.2 MG/1
200 TABLET ORAL
Status: DISCONTINUED | OUTPATIENT
Start: 2021-01-01 | End: 2021-01-01 | Stop reason: HOSPADM

## 2021-01-01 RX ORDER — ACETAMINOPHEN 650 MG/1
650 SUPPOSITORY RECTAL EVERY 4 HOURS PRN
Status: DISCONTINUED | OUTPATIENT
Start: 2021-01-01 | End: 2021-01-01 | Stop reason: HOSPADM

## 2021-01-01 RX ORDER — ARFORMOTEROL TARTRATE 15 UG/2ML
15 SOLUTION RESPIRATORY (INHALATION)
Status: DISCONTINUED | OUTPATIENT
Start: 2021-01-01 | End: 2021-01-01 | Stop reason: HOSPADM

## 2021-01-01 RX ORDER — ALUMINA, MAGNESIA, AND SIMETHICONE 2400; 2400; 240 MG/30ML; MG/30ML; MG/30ML
15 SUSPENSION ORAL EVERY 6 HOURS PRN
Status: DISCONTINUED | OUTPATIENT
Start: 2021-01-01 | End: 2021-01-01 | Stop reason: HOSPADM

## 2021-01-01 RX ORDER — COLLAGENASE SANTYL 250 [ARB'U]/G
1 OINTMENT TOPICAL DAILY
Qty: 1 EACH | Refills: 2 | Status: SHIPPED | OUTPATIENT
Start: 2021-01-01

## 2021-01-01 RX ORDER — OXYCODONE HYDROCHLORIDE 5 MG/1
10 TABLET ORAL EVERY 8 HOURS PRN
Status: DISCONTINUED | OUTPATIENT
Start: 2021-01-01 | End: 2021-01-01 | Stop reason: HOSPADM

## 2021-01-01 RX ORDER — LEVOTHYROXINE SODIUM 175 UG/1
175 TABLET ORAL DAILY
COMMUNITY
End: 2021-01-01 | Stop reason: HOSPADM

## 2021-01-01 RX ORDER — DOXYCYCLINE HYCLATE 100 MG/1
100 CAPSULE ORAL 2 TIMES DAILY
Qty: 28 CAPSULE | Refills: 0 | Status: SHIPPED | OUTPATIENT
Start: 2021-01-01 | End: 2022-01-10

## 2021-01-01 RX ORDER — GABAPENTIN 800 MG/1
800 TABLET ORAL 4 TIMES DAILY
COMMUNITY
Start: 2021-01-01

## 2021-01-01 RX ORDER — PROMETHAZINE HYDROCHLORIDE 25 MG/1
25 TABLET ORAL EVERY 6 HOURS PRN
Status: DISCONTINUED | OUTPATIENT
Start: 2021-01-01 | End: 2021-01-01 | Stop reason: HOSPADM

## 2021-01-01 RX ORDER — FUROSEMIDE 40 MG/1
40 TABLET ORAL 2 TIMES DAILY PRN
Status: DISCONTINUED | OUTPATIENT
Start: 2021-01-01 | End: 2021-01-01 | Stop reason: HOSPADM

## 2021-01-01 RX ORDER — LEVOTHYROXINE SODIUM 175 UG/1
175 TABLET ORAL EVERY MORNING
Status: DISCONTINUED | OUTPATIENT
Start: 2021-01-01 | End: 2021-01-01

## 2021-01-01 RX ORDER — SULFAMETHOXAZOLE AND TRIMETHOPRIM 800; 160 MG/1; MG/1
1 TABLET ORAL 2 TIMES DAILY
Qty: 20 TABLET | Refills: 0 | Status: CANCELLED | OUTPATIENT
Start: 2021-01-01

## 2021-01-01 RX ORDER — GABAPENTIN 300 MG/1
300 CAPSULE ORAL EVERY 8 HOURS SCHEDULED
Status: DISCONTINUED | OUTPATIENT
Start: 2021-01-01 | End: 2021-01-01 | Stop reason: HOSPADM

## 2021-01-01 RX ORDER — LACTULOSE 10 G/15ML
30 SOLUTION ORAL DAILY
Status: DISCONTINUED | OUTPATIENT
Start: 2021-01-01 | End: 2021-01-01 | Stop reason: HOSPADM

## 2021-01-01 RX ORDER — SODIUM HYPOCHLORITE 2.5 MG/ML
SOLUTION TOPICAL ONCE
Qty: 473 ML | Refills: 2 | Status: SHIPPED | OUTPATIENT
Start: 2021-01-01 | End: 2021-01-01

## 2021-01-01 RX ORDER — MONTELUKAST SODIUM 10 MG/1
10 TABLET ORAL NIGHTLY
Status: ON HOLD | COMMUNITY
End: 2021-01-01

## 2021-01-01 RX ORDER — LACTULOSE 10 G/15ML
10 SOLUTION ORAL 3 TIMES DAILY
Status: DISCONTINUED | OUTPATIENT
Start: 2021-01-01 | End: 2021-01-01

## 2021-01-01 RX ORDER — PROMETHAZINE HYDROCHLORIDE 25 MG/1
25 TABLET ORAL DAILY
Status: DISCONTINUED | OUTPATIENT
Start: 2021-01-01 | End: 2021-01-01

## 2021-01-01 RX ORDER — LEVOTHYROXINE SODIUM 0.03 MG/1
TABLET ORAL
COMMUNITY
Start: 2021-01-01 | End: 2021-01-01

## 2021-01-01 RX ORDER — SODIUM CHLORIDE 0.9 % (FLUSH) 0.9 %
10 SYRINGE (ML) INJECTION EVERY 12 HOURS SCHEDULED
Status: DISCONTINUED | OUTPATIENT
Start: 2021-01-01 | End: 2021-01-01 | Stop reason: SDUPTHER

## 2021-01-01 RX ORDER — METOPROLOL TARTRATE 100 MG/1
TABLET ORAL
COMMUNITY
Start: 2021-01-01

## 2021-01-01 RX ORDER — CYCLOBENZAPRINE HCL 10 MG
10 TABLET ORAL 3 TIMES DAILY PRN
Status: DISCONTINUED | OUTPATIENT
Start: 2021-01-01 | End: 2021-01-01

## 2021-01-01 RX ORDER — SODIUM CHLORIDE, SODIUM LACTATE, POTASSIUM CHLORIDE, CALCIUM CHLORIDE 600; 310; 30; 20 MG/100ML; MG/100ML; MG/100ML; MG/100ML
500 INJECTION, SOLUTION INTRAVENOUS CONTINUOUS
Status: DISCONTINUED | OUTPATIENT
Start: 2021-01-01 | End: 2021-01-01

## 2021-01-01 RX ORDER — LEVOTHYROXINE SODIUM 0.2 MG/1
200 TABLET ORAL DAILY
Status: DISCONTINUED | OUTPATIENT
Start: 2021-01-01 | End: 2021-01-01 | Stop reason: HOSPADM

## 2021-01-01 RX ADMIN — METOPROLOL TARTRATE 50 MG: 50 TABLET, FILM COATED ORAL at 21:23

## 2021-01-01 RX ADMIN — SODIUM CHLORIDE 1000 ML: 9 INJECTION, SOLUTION INTRAVENOUS at 14:05

## 2021-01-01 RX ADMIN — METOPROLOL TARTRATE 50 MG: 50 TABLET, FILM COATED ORAL at 21:41

## 2021-01-01 RX ADMIN — METOPROLOL SUCCINATE 100 MG: 50 TABLET, EXTENDED RELEASE ORAL at 10:02

## 2021-01-01 RX ADMIN — ASPIRIN 81 MG: 81 TABLET, COATED ORAL at 09:02

## 2021-01-01 RX ADMIN — GABAPENTIN 800 MG: 400 CAPSULE ORAL at 05:43

## 2021-01-01 RX ADMIN — SODIUM CHLORIDE 1000 ML: 9 INJECTION, SOLUTION INTRAVENOUS at 19:40

## 2021-01-01 RX ADMIN — ASPIRIN 81 MG: 81 TABLET, COATED ORAL at 08:16

## 2021-01-01 RX ADMIN — Medication 200 MCG: at 08:34

## 2021-01-01 RX ADMIN — ASPIRIN 81 MG: 81 TABLET, CHEWABLE ORAL at 08:44

## 2021-01-01 RX ADMIN — INSULIN HUMAN 3 UNITS: 100 INJECTION, SOLUTION PARENTERAL at 12:57

## 2021-01-01 RX ADMIN — GABAPENTIN 300 MG: 300 CAPSULE ORAL at 13:32

## 2021-01-01 RX ADMIN — RIFAXIMIN 550 MG: 550 TABLET ORAL at 21:30

## 2021-01-01 RX ADMIN — MEROPENEM 1 G: 1 INJECTION, POWDER, FOR SOLUTION INTRAVENOUS at 18:19

## 2021-01-01 RX ADMIN — LEVOTHYROXINE SODIUM 200 MCG: 200 TABLET ORAL at 05:42

## 2021-01-01 RX ADMIN — INSULIN LISPRO 4 UNITS: 100 INJECTION, SOLUTION INTRAVENOUS; SUBCUTANEOUS at 09:02

## 2021-01-01 RX ADMIN — INSULIN LISPRO 4 UNITS: 100 INJECTION, SOLUTION INTRAVENOUS; SUBCUTANEOUS at 17:06

## 2021-01-01 RX ADMIN — INSULIN HUMAN 3 UNITS: 100 INJECTION, SOLUTION PARENTERAL at 01:01

## 2021-01-01 RX ADMIN — MIDODRINE HYDROCHLORIDE 10 MG: 10 TABLET ORAL at 11:26

## 2021-01-01 RX ADMIN — MEROPENEM 1 G: 1 INJECTION, POWDER, FOR SOLUTION INTRAVENOUS at 10:03

## 2021-01-01 RX ADMIN — INSULIN DETEMIR 30 UNITS: 100 INJECTION, SOLUTION SUBCUTANEOUS at 23:52

## 2021-01-01 RX ADMIN — VANCOMYCIN HYDROCHLORIDE 1250 MG: 5 INJECTION, POWDER, LYOPHILIZED, FOR SOLUTION INTRAVENOUS at 17:54

## 2021-01-01 RX ADMIN — BUSPIRONE HYDROCHLORIDE 10 MG: 10 TABLET ORAL at 08:39

## 2021-01-01 RX ADMIN — INSULIN HUMAN 3 UNITS: 100 INJECTION, SOLUTION PARENTERAL at 05:50

## 2021-01-01 RX ADMIN — MONTELUKAST 10 MG: 10 TABLET, FILM COATED ORAL at 21:30

## 2021-01-01 RX ADMIN — Medication 100 MG: at 09:06

## 2021-01-01 RX ADMIN — CALCIUM GLUCONATE 1 G: 20 INJECTION, SOLUTION INTRAVENOUS at 17:24

## 2021-01-01 RX ADMIN — GABAPENTIN 600 MG: 300 CAPSULE ORAL at 13:31

## 2021-01-01 RX ADMIN — MEROPENEM 1 G: 1 INJECTION, POWDER, FOR SOLUTION INTRAVENOUS at 01:11

## 2021-01-01 RX ADMIN — Medication 100 MG: at 08:16

## 2021-01-01 RX ADMIN — INSULIN HUMAN 3 UNITS: 100 INJECTION, SOLUTION PARENTERAL at 09:05

## 2021-01-01 RX ADMIN — OXYCODONE HYDROCHLORIDE 10 MG: 5 TABLET ORAL at 18:43

## 2021-01-01 RX ADMIN — LEVOTHYROXINE SODIUM 175 MCG: 175 TABLET ORAL at 05:51

## 2021-01-01 RX ADMIN — BUSPIRONE HYDROCHLORIDE 10 MG: 10 TABLET ORAL at 09:01

## 2021-01-01 RX ADMIN — MONTELUKAST 10 MG: 10 TABLET, FILM COATED ORAL at 20:24

## 2021-01-01 RX ADMIN — MULTIPLE VITAMINS W/ MINERALS TAB 1 TABLET: TAB at 08:39

## 2021-01-01 RX ADMIN — RIFAXIMIN 550 MG: 550 TABLET ORAL at 21:23

## 2021-01-01 RX ADMIN — Medication 100 MG: at 08:39

## 2021-01-01 RX ADMIN — INSULIN LISPRO 4 UNITS: 100 INJECTION, SOLUTION INTRAVENOUS; SUBCUTANEOUS at 11:57

## 2021-01-01 RX ADMIN — VANCOMYCIN HYDROCHLORIDE 1750 MG: 5 INJECTION, POWDER, LYOPHILIZED, FOR SOLUTION INTRAVENOUS at 20:06

## 2021-01-01 RX ADMIN — FUROSEMIDE 20 MG: 20 TABLET ORAL at 09:06

## 2021-01-01 RX ADMIN — BUDESONIDE 0.5 MG: 0.5 INHALANT RESPIRATORY (INHALATION) at 07:51

## 2021-01-01 RX ADMIN — MONTELUKAST 10 MG: 10 TABLET, FILM COATED ORAL at 21:03

## 2021-01-01 RX ADMIN — VANCOMYCIN HYDROCHLORIDE 1750 MG: 5 INJECTION, POWDER, LYOPHILIZED, FOR SOLUTION INTRAVENOUS at 15:20

## 2021-01-01 RX ADMIN — MULTIPLE VITAMINS W/ MINERALS TAB 1 TABLET: TAB at 08:16

## 2021-01-01 RX ADMIN — MEROPENEM 1 G: 1 INJECTION, POWDER, FOR SOLUTION INTRAVENOUS at 09:06

## 2021-01-01 RX ADMIN — ATORVASTATIN CALCIUM 80 MG: 40 TABLET, FILM COATED ORAL at 01:38

## 2021-01-01 RX ADMIN — CLINDAMYCIN PHOSPHATE 900 MG: 900 INJECTION, SOLUTION INTRAVENOUS at 02:26

## 2021-01-01 RX ADMIN — GABAPENTIN 300 MG: 300 CAPSULE ORAL at 21:14

## 2021-01-01 RX ADMIN — INSULIN LISPRO 4 UNITS: 100 INJECTION, SOLUTION INTRAVENOUS; SUBCUTANEOUS at 08:17

## 2021-01-01 RX ADMIN — ENOXAPARIN SODIUM 40 MG: 40 INJECTION SUBCUTANEOUS at 20:48

## 2021-01-01 RX ADMIN — CLINDAMYCIN PHOSPHATE 900 MG: 900 INJECTION, SOLUTION INTRAVENOUS at 17:48

## 2021-01-01 RX ADMIN — OXYCODONE HYDROCHLORIDE 10 MG: 5 TABLET ORAL at 02:47

## 2021-01-01 RX ADMIN — INSULIN LISPRO 4 UNITS: 100 INJECTION, SOLUTION INTRAVENOUS; SUBCUTANEOUS at 17:48

## 2021-01-01 RX ADMIN — MEROPENEM 500 MG: 500 INJECTION INTRAVENOUS at 09:05

## 2021-01-01 RX ADMIN — MORPHINE SULFATE 2 MG: 2 INJECTION, SOLUTION INTRAMUSCULAR; INTRAVENOUS at 17:57

## 2021-01-01 RX ADMIN — THIAMINE HYDROCHLORIDE 100 MG: 100 INJECTION, SOLUTION INTRAMUSCULAR; INTRAVENOUS at 15:16

## 2021-01-01 RX ADMIN — CLINDAMYCIN IN 5 PERCENT DEXTROSE 900 MG: 18 INJECTION, SOLUTION INTRAVENOUS at 04:23

## 2021-01-01 RX ADMIN — INSULIN HUMAN 3 UNITS: 100 INJECTION, SOLUTION PARENTERAL at 17:40

## 2021-01-01 RX ADMIN — POTASSIUM CHLORIDE 40 MEQ: 10 CAPSULE, COATED, EXTENDED RELEASE ORAL at 17:10

## 2021-01-01 RX ADMIN — SODIUM CHLORIDE, POTASSIUM CHLORIDE, SODIUM LACTATE AND CALCIUM CHLORIDE 150 ML/HR: 600; 310; 30; 20 INJECTION, SOLUTION INTRAVENOUS at 20:55

## 2021-01-01 RX ADMIN — MIDODRINE HYDROCHLORIDE 10 MG: 10 TABLET ORAL at 07:54

## 2021-01-01 RX ADMIN — LISINOPRIL 20 MG: 20 TABLET ORAL at 10:02

## 2021-01-01 RX ADMIN — SODIUM CHLORIDE, POTASSIUM CHLORIDE, SODIUM LACTATE AND CALCIUM CHLORIDE: 600; 310; 30; 20 INJECTION, SOLUTION INTRAVENOUS at 09:20

## 2021-01-01 RX ADMIN — THIAMINE HYDROCHLORIDE 100 MG: 100 INJECTION, SOLUTION INTRAMUSCULAR; INTRAVENOUS at 21:24

## 2021-01-01 RX ADMIN — VANCOMYCIN HYDROCHLORIDE 1000 MG: 5 INJECTION, POWDER, LYOPHILIZED, FOR SOLUTION INTRAVENOUS at 14:24

## 2021-01-01 RX ADMIN — GABAPENTIN 300 MG: 300 CAPSULE ORAL at 06:15

## 2021-01-01 RX ADMIN — SODIUM CHLORIDE, PRESERVATIVE FREE 10 ML: 5 INJECTION INTRAVENOUS at 11:37

## 2021-01-01 RX ADMIN — SODIUM CHLORIDE, PRESERVATIVE FREE 10 ML: 5 INJECTION INTRAVENOUS at 09:07

## 2021-01-01 RX ADMIN — LISINOPRIL 20 MG: 10 TABLET ORAL at 16:08

## 2021-01-01 RX ADMIN — RIFAXIMIN 550 MG: 550 TABLET ORAL at 20:40

## 2021-01-01 RX ADMIN — GABAPENTIN 300 MG: 300 CAPSULE ORAL at 20:40

## 2021-01-01 RX ADMIN — CEFTRIAXONE SODIUM 1 G: 1 INJECTION, SOLUTION INTRAVENOUS at 15:12

## 2021-01-01 RX ADMIN — SODIUM CHLORIDE, PRESERVATIVE FREE 10 ML: 5 INJECTION INTRAVENOUS at 09:29

## 2021-01-01 RX ADMIN — POTASSIUM PHOSPHATE, MONOBASIC AND POTASSIUM PHOSPHATE, DIBASIC 15 MMOL: 224; 236 INJECTION, SOLUTION, CONCENTRATE INTRAVENOUS at 05:33

## 2021-01-01 RX ADMIN — MONTELUKAST 10 MG: 10 TABLET, FILM COATED ORAL at 01:39

## 2021-01-01 RX ADMIN — LEVOTHYROXINE SODIUM 175 MCG: 175 TABLET ORAL at 06:00

## 2021-01-01 RX ADMIN — FAMOTIDINE 20 MG: 20 TABLET ORAL at 18:19

## 2021-01-01 RX ADMIN — MEROPENEM 1 G: 1 INJECTION, POWDER, FOR SOLUTION INTRAVENOUS at 02:24

## 2021-01-01 RX ADMIN — LACTULOSE 10 G: 20 SOLUTION ORAL at 08:16

## 2021-01-01 RX ADMIN — FAMOTIDINE 20 MG: 20 TABLET ORAL at 08:43

## 2021-01-01 RX ADMIN — ASPIRIN 81 MG CHEWABLE TABLET 81 MG: 81 TABLET CHEWABLE at 08:33

## 2021-01-01 RX ADMIN — SODIUM CHLORIDE, PRESERVATIVE FREE 10 ML: 5 INJECTION INTRAVENOUS at 08:34

## 2021-01-01 RX ADMIN — VANCOMYCIN HYDROCHLORIDE 1500 MG: 5 INJECTION, POWDER, LYOPHILIZED, FOR SOLUTION INTRAVENOUS at 13:31

## 2021-01-01 RX ADMIN — INSULIN HUMAN 3 UNITS: 100 INJECTION, SOLUTION PARENTERAL at 06:00

## 2021-01-01 RX ADMIN — SODIUM HYPOCHLORITE: 1.25 SOLUTION TOPICAL at 12:11

## 2021-01-01 RX ADMIN — OXYCODONE HYDROCHLORIDE 15 MG: 5 TABLET ORAL at 15:12

## 2021-01-01 RX ADMIN — VANCOMYCIN HYDROCHLORIDE 1250 MG: 5 INJECTION, POWDER, LYOPHILIZED, FOR SOLUTION INTRAVENOUS at 05:37

## 2021-01-01 RX ADMIN — DOCUSATE SODIUM 50MG AND SENNOSIDES 8.6MG 2 TABLET: 8.6; 5 TABLET, FILM COATED ORAL at 09:05

## 2021-01-01 RX ADMIN — RIFAXIMIN 550 MG: 550 TABLET ORAL at 21:15

## 2021-01-01 RX ADMIN — OXYCODONE HYDROCHLORIDE 5 MG: 5 SOLUTION ORAL at 06:01

## 2021-01-01 RX ADMIN — ZINC SULFATE 220 MG (50 MG) CAPSULE 220 MG: CAPSULE at 09:50

## 2021-01-01 RX ADMIN — INSULIN HUMAN 4.9 UNITS/HR: 1 INJECTION, SOLUTION INTRAVENOUS at 01:52

## 2021-01-01 RX ADMIN — SPIRONOLACTONE 50 MG: 25 TABLET ORAL at 09:01

## 2021-01-01 RX ADMIN — FAMOTIDINE 20 MG: 20 TABLET ORAL at 17:04

## 2021-01-01 RX ADMIN — SODIUM CHLORIDE, PRESERVATIVE FREE 10 ML: 5 INJECTION INTRAVENOUS at 21:13

## 2021-01-01 RX ADMIN — SODIUM CHLORIDE, PRESERVATIVE FREE 10 ML: 5 INJECTION INTRAVENOUS at 20:38

## 2021-01-01 RX ADMIN — INSULIN DETEMIR 30 UNITS: 100 INJECTION, SOLUTION SUBCUTANEOUS at 21:42

## 2021-01-01 RX ADMIN — POTASSIUM & SODIUM PHOSPHATES POWDER PACK 280-160-250 MG 1 PACKET: 280-160-250 PACK at 09:01

## 2021-01-01 RX ADMIN — LEVOTHYROXINE SODIUM 175 MCG: 175 TABLET ORAL at 06:38

## 2021-01-01 RX ADMIN — ARFORMOTEROL TARTRATE 15 MCG: 15 SOLUTION RESPIRATORY (INHALATION) at 20:30

## 2021-01-01 RX ADMIN — GABAPENTIN 600 MG: 300 CAPSULE ORAL at 04:14

## 2021-01-01 RX ADMIN — GABAPENTIN 300 MG: 300 CAPSULE ORAL at 05:51

## 2021-01-01 RX ADMIN — THIAMINE HYDROCHLORIDE 100 MG: 100 INJECTION, SOLUTION INTRAMUSCULAR; INTRAVENOUS at 21:16

## 2021-01-01 RX ADMIN — FAMOTIDINE 20 MG: 20 TABLET ORAL at 07:35

## 2021-01-01 RX ADMIN — DEXMEDETOMIDINE HYDROCHLORIDE 0.6 MCG/KG/HR: 400 INJECTION, SOLUTION INTRAVENOUS at 00:49

## 2021-01-01 RX ADMIN — THIAMINE HYDROCHLORIDE 100 MG: 100 INJECTION, SOLUTION INTRAMUSCULAR; INTRAVENOUS at 13:07

## 2021-01-01 RX ADMIN — INSULIN HUMAN 3 UNITS: 100 INJECTION, SOLUTION PARENTERAL at 18:24

## 2021-01-01 RX ADMIN — SPIRONOLACTONE 25 MG: 25 TABLET ORAL at 09:26

## 2021-01-01 RX ADMIN — GABAPENTIN 300 MG: 300 CAPSULE ORAL at 05:37

## 2021-01-01 RX ADMIN — OXYCODONE HYDROCHLORIDE 15 MG: 5 TABLET ORAL at 10:54

## 2021-01-01 RX ADMIN — METOPROLOL TARTRATE 50 MG: 50 TABLET, FILM COATED ORAL at 08:11

## 2021-01-01 RX ADMIN — COLLAGENASE SANTYL 1 APPLICATION: 250 OINTMENT TOPICAL at 12:17

## 2021-01-01 RX ADMIN — Medication 100 MG: at 10:02

## 2021-01-01 RX ADMIN — GABAPENTIN 300 MG: 300 CAPSULE ORAL at 06:00

## 2021-01-01 RX ADMIN — INSULIN HUMAN 5 UNITS: 100 INJECTION, SOLUTION PARENTERAL at 12:39

## 2021-01-01 RX ADMIN — LACTULOSE 30 G: 20 SOLUTION ORAL at 21:15

## 2021-01-01 RX ADMIN — ZINC SULFATE 220 MG (50 MG) CAPSULE 220 MG: CAPSULE at 12:39

## 2021-01-01 RX ADMIN — OXYCODONE HYDROCHLORIDE 10 MG: 5 TABLET ORAL at 12:57

## 2021-01-01 RX ADMIN — SODIUM CHLORIDE, POTASSIUM CHLORIDE, SODIUM LACTATE AND CALCIUM CHLORIDE 1000 ML: 600; 310; 30; 20 INJECTION, SOLUTION INTRAVENOUS at 04:11

## 2021-01-01 RX ADMIN — THIAMINE HYDROCHLORIDE 100 MG: 100 INJECTION, SOLUTION INTRAMUSCULAR; INTRAVENOUS at 14:38

## 2021-01-01 RX ADMIN — INSULIN HUMAN 13 UNITS/HR: 1 INJECTION, SOLUTION INTRAVENOUS at 11:26

## 2021-01-01 RX ADMIN — HEPARIN SODIUM 5000 UNITS: 5000 INJECTION, SOLUTION INTRAVENOUS; SUBCUTANEOUS at 20:35

## 2021-01-01 RX ADMIN — SODIUM CHLORIDE, POTASSIUM CHLORIDE, SODIUM LACTATE AND CALCIUM CHLORIDE 100 ML/HR: 600; 310; 30; 20 INJECTION, SOLUTION INTRAVENOUS at 14:53

## 2021-01-01 RX ADMIN — HEPARIN SODIUM 5000 UNITS: 5000 INJECTION, SOLUTION INTRAVENOUS; SUBCUTANEOUS at 08:43

## 2021-01-01 RX ADMIN — PROMETHAZINE HYDROCHLORIDE 25 MG: 25 TABLET ORAL at 08:39

## 2021-01-01 RX ADMIN — ACETAMINOPHEN 650 MG: 325 TABLET ORAL at 01:43

## 2021-01-01 RX ADMIN — HEPARIN SODIUM 5000 UNITS: 5000 INJECTION, SOLUTION INTRAVENOUS; SUBCUTANEOUS at 09:25

## 2021-01-01 RX ADMIN — Medication 0.02 MCG/KG/MIN: at 01:42

## 2021-01-01 RX ADMIN — THIAMINE HYDROCHLORIDE 100 MG: 100 INJECTION, SOLUTION INTRAMUSCULAR; INTRAVENOUS at 05:37

## 2021-01-01 RX ADMIN — LACTULOSE 30 G: 20 SOLUTION ORAL at 08:11

## 2021-01-01 RX ADMIN — ATORVASTATIN CALCIUM 80 MG: 40 TABLET, FILM COATED ORAL at 20:40

## 2021-01-01 RX ADMIN — INSULIN HUMAN 3 UNITS: 100 INJECTION, SOLUTION PARENTERAL at 00:54

## 2021-01-01 RX ADMIN — INSULIN LISPRO 2 UNITS: 100 INJECTION, SOLUTION INTRAVENOUS; SUBCUTANEOUS at 17:18

## 2021-01-01 RX ADMIN — Medication 5 MG: at 23:34

## 2021-01-01 RX ADMIN — SODIUM CHLORIDE, PRESERVATIVE FREE 10 ML: 5 INJECTION INTRAVENOUS at 08:12

## 2021-01-01 RX ADMIN — LORAZEPAM 1 MG: 2 INJECTION INTRAMUSCULAR; INTRAVENOUS at 14:52

## 2021-01-01 RX ADMIN — RIFAXIMIN 550 MG: 550 TABLET ORAL at 21:41

## 2021-01-01 RX ADMIN — Medication 5 MG: at 23:40

## 2021-01-01 RX ADMIN — BUDESONIDE 0.5 MG: 0.5 INHALANT RESPIRATORY (INHALATION) at 07:31

## 2021-01-01 RX ADMIN — SPIRONOLACTONE 25 MG: 25 TABLET ORAL at 08:11

## 2021-01-01 RX ADMIN — SPIRONOLACTONE 50 MG: 25 TABLET ORAL at 16:05

## 2021-01-01 RX ADMIN — SPIRONOLACTONE 25 MG: 25 TABLET ORAL at 08:34

## 2021-01-01 RX ADMIN — MONTELUKAST 10 MG: 10 TABLET, FILM COATED ORAL at 20:48

## 2021-01-01 RX ADMIN — THIAMINE HYDROCHLORIDE 100 MG: 100 INJECTION, SOLUTION INTRAMUSCULAR; INTRAVENOUS at 01:27

## 2021-01-01 RX ADMIN — SODIUM CHLORIDE, PRESERVATIVE FREE 10 ML: 5 INJECTION INTRAVENOUS at 21:08

## 2021-01-01 RX ADMIN — FAMOTIDINE 20 MG: 20 TABLET, FILM COATED ORAL at 17:40

## 2021-01-01 RX ADMIN — POTASSIUM CHLORIDE 40 MEQ: 10 CAPSULE, COATED, EXTENDED RELEASE ORAL at 07:50

## 2021-01-01 RX ADMIN — ENOXAPARIN SODIUM 40 MG: 40 INJECTION SUBCUTANEOUS at 01:42

## 2021-01-01 RX ADMIN — THIAMINE HYDROCHLORIDE 100 MG: 100 INJECTION, SOLUTION INTRAMUSCULAR; INTRAVENOUS at 05:25

## 2021-01-01 RX ADMIN — THIAMINE HYDROCHLORIDE 100 MG: 100 INJECTION, SOLUTION INTRAMUSCULAR; INTRAVENOUS at 06:17

## 2021-01-01 RX ADMIN — BUSPIRONE HYDROCHLORIDE 10 MG: 10 TABLET ORAL at 09:05

## 2021-01-01 RX ADMIN — MORPHINE SULFATE 2 MG: 2 INJECTION, SOLUTION INTRAMUSCULAR; INTRAVENOUS at 02:25

## 2021-01-01 RX ADMIN — LACTULOSE 60 G: 20 SOLUTION ORAL at 15:06

## 2021-01-01 RX ADMIN — LEVOTHYROXINE SODIUM 175 MCG: 175 TABLET ORAL at 06:15

## 2021-01-01 RX ADMIN — SPIRONOLACTONE 50 MG: 25 TABLET ORAL at 09:05

## 2021-01-01 RX ADMIN — HEPARIN SODIUM 5000 UNITS: 5000 INJECTION, SOLUTION INTRAVENOUS; SUBCUTANEOUS at 08:34

## 2021-01-01 RX ADMIN — POTASSIUM CHLORIDE 40 MEQ: 10 CAPSULE, COATED, EXTENDED RELEASE ORAL at 09:49

## 2021-01-01 RX ADMIN — HYDROMORPHONE HYDROCHLORIDE 0.5 MG: 1 INJECTION, SOLUTION INTRAMUSCULAR; INTRAVENOUS; SUBCUTANEOUS at 15:30

## 2021-01-01 RX ADMIN — MORPHINE SULFATE 4 MG: 4 INJECTION INTRAVENOUS at 22:06

## 2021-01-01 RX ADMIN — MAGNESIUM SULFATE 2 G: 2 INJECTION INTRAVENOUS at 05:33

## 2021-01-01 RX ADMIN — THIAMINE HYDROCHLORIDE 100 MG: 100 INJECTION, SOLUTION INTRAMUSCULAR; INTRAVENOUS at 15:09

## 2021-01-01 RX ADMIN — METOPROLOL TARTRATE 50 MG: 50 TABLET, FILM COATED ORAL at 09:50

## 2021-01-01 RX ADMIN — SODIUM CHLORIDE, PRESERVATIVE FREE 10 ML: 5 INJECTION INTRAVENOUS at 00:54

## 2021-01-01 RX ADMIN — ATORVASTATIN CALCIUM 80 MG: 40 TABLET, FILM COATED ORAL at 21:23

## 2021-01-01 RX ADMIN — SPIRONOLACTONE 50 MG: 25 TABLET ORAL at 09:06

## 2021-01-01 RX ADMIN — LEVOTHYROXINE SODIUM 200 MCG: 200 TABLET ORAL at 05:57

## 2021-01-01 RX ADMIN — THIAMINE HYDROCHLORIDE 100 MG: 100 INJECTION, SOLUTION INTRAMUSCULAR; INTRAVENOUS at 20:37

## 2021-01-01 RX ADMIN — LISINOPRIL 20 MG: 20 TABLET ORAL at 09:02

## 2021-01-01 RX ADMIN — INSULIN DETEMIR 30 UNITS: 100 INJECTION, SOLUTION SUBCUTANEOUS at 21:24

## 2021-01-01 RX ADMIN — BUDESONIDE 0.5 MG: 0.5 INHALANT RESPIRATORY (INHALATION) at 20:30

## 2021-01-01 RX ADMIN — SPIRONOLACTONE 25 MG: 25 TABLET ORAL at 08:45

## 2021-01-01 RX ADMIN — LACTULOSE 30 G: 20 SOLUTION ORAL at 17:17

## 2021-01-01 RX ADMIN — OXYCODONE HYDROCHLORIDE 5 MG: 5 TABLET ORAL at 16:05

## 2021-01-01 RX ADMIN — INSULIN HUMAN 10 UNITS: 100 INJECTION, SOLUTION PARENTERAL at 01:37

## 2021-01-01 RX ADMIN — ASPIRIN 81 MG CHEWABLE TABLET 81 MG: 81 TABLET CHEWABLE at 08:42

## 2021-01-01 RX ADMIN — ATORVASTATIN CALCIUM 80 MG: 40 TABLET, FILM COATED ORAL at 21:40

## 2021-01-01 RX ADMIN — LACTULOSE 300 ML: 10 SOLUTION ORAL at 21:14

## 2021-01-01 RX ADMIN — LIDOCAINE HYDROCHLORIDE 80 MG: 20 INJECTION, SOLUTION INFILTRATION; PERINEURAL at 08:31

## 2021-01-01 RX ADMIN — INSULIN HUMAN 3 UNITS: 100 INJECTION, SOLUTION PARENTERAL at 13:07

## 2021-01-01 RX ADMIN — MONTELUKAST 10 MG: 10 TABLET, FILM COATED ORAL at 21:41

## 2021-01-01 RX ADMIN — VANCOMYCIN HYDROCHLORIDE 1250 MG: 5 INJECTION, POWDER, LYOPHILIZED, FOR SOLUTION INTRAVENOUS at 05:25

## 2021-01-01 RX ADMIN — RIFAXIMIN 550 MG: 550 TABLET ORAL at 08:12

## 2021-01-01 RX ADMIN — MORPHINE SULFATE 4 MG: 4 INJECTION, SOLUTION INTRAMUSCULAR; INTRAVENOUS at 01:42

## 2021-01-01 RX ADMIN — Medication 200 MCG: at 08:43

## 2021-01-01 RX ADMIN — RIFAXIMIN 550 MG: 550 TABLET ORAL at 09:26

## 2021-01-01 RX ADMIN — HYDROMORPHONE HYDROCHLORIDE 0.5 MG: 1 INJECTION, SOLUTION INTRAMUSCULAR; INTRAVENOUS; SUBCUTANEOUS at 14:03

## 2021-01-01 RX ADMIN — OXYCODONE HYDROCHLORIDE 5 MG: 5 SOLUTION ORAL at 23:43

## 2021-01-01 RX ADMIN — MEROPENEM 500 MG: 500 INJECTION INTRAVENOUS at 02:26

## 2021-01-01 RX ADMIN — OXYCODONE HYDROCHLORIDE 5 MG: 5 SOLUTION ORAL at 22:43

## 2021-01-01 RX ADMIN — OXYCODONE HYDROCHLORIDE 5 MG: 5 SOLUTION ORAL at 08:01

## 2021-01-01 RX ADMIN — MONTELUKAST 10 MG: 10 TABLET, FILM COATED ORAL at 21:15

## 2021-01-01 RX ADMIN — ARFORMOTEROL TARTRATE 15 MCG: 15 SOLUTION RESPIRATORY (INHALATION) at 07:31

## 2021-01-01 RX ADMIN — SODIUM CHLORIDE, PRESERVATIVE FREE 10 ML: 5 INJECTION INTRAVENOUS at 21:32

## 2021-01-01 RX ADMIN — SODIUM CHLORIDE, PRESERVATIVE FREE 10 ML: 5 INJECTION INTRAVENOUS at 09:50

## 2021-01-01 RX ADMIN — MEROPENEM 1 G: 1 INJECTION, POWDER, FOR SOLUTION INTRAVENOUS at 00:46

## 2021-01-01 RX ADMIN — IPRATROPIUM BROMIDE AND ALBUTEROL SULFATE 3 ML: 2.5; .5 SOLUTION RESPIRATORY (INHALATION) at 07:31

## 2021-01-01 RX ADMIN — INSULIN HUMAN 5 UNITS: 100 INJECTION, SOLUTION PARENTERAL at 12:38

## 2021-01-01 RX ADMIN — FAMOTIDINE 20 MG: 20 TABLET ORAL at 08:33

## 2021-01-01 RX ADMIN — RIFAXIMIN 550 MG: 550 TABLET ORAL at 21:42

## 2021-01-01 RX ADMIN — CLINDAMYCIN PHOSPHATE 900 MG: 900 INJECTION, SOLUTION INTRAVENOUS at 12:03

## 2021-01-01 RX ADMIN — PROPOFOL 50 MG: 10 INJECTION, EMULSION INTRAVENOUS at 08:31

## 2021-01-01 RX ADMIN — INSULIN LISPRO 4 UNITS: 100 INJECTION, SOLUTION INTRAVENOUS; SUBCUTANEOUS at 12:57

## 2021-01-01 RX ADMIN — MEROPENEM 1 G: 1 INJECTION, POWDER, FOR SOLUTION INTRAVENOUS at 10:05

## 2021-01-01 RX ADMIN — OXYCODONE HYDROCHLORIDE 5 MG: 5 SOLUTION ORAL at 15:20

## 2021-01-01 RX ADMIN — CETIRIZINE HYDROCHLORIDE 10 MG: 10 TABLET, FILM COATED ORAL at 09:05

## 2021-01-01 RX ADMIN — INSULIN DETEMIR 30 UNITS: 100 INJECTION, SOLUTION SUBCUTANEOUS at 08:44

## 2021-01-01 RX ADMIN — METOPROLOL TARTRATE 50 MG: 50 TABLET, FILM COATED ORAL at 21:30

## 2021-01-01 RX ADMIN — FUROSEMIDE 20 MG: 20 TABLET ORAL at 09:50

## 2021-01-01 RX ADMIN — POTASSIUM CHLORIDE 40 MEQ: 10 CAPSULE, COATED, EXTENDED RELEASE ORAL at 11:02

## 2021-01-01 RX ADMIN — SODIUM CHLORIDE 1000 ML: 9 INJECTION, SOLUTION INTRAVENOUS at 13:03

## 2021-01-01 RX ADMIN — LACTULOSE 30 G: 20 SOLUTION ORAL at 08:44

## 2021-01-01 RX ADMIN — INSULIN HUMAN 0 UNITS/HR: 1 INJECTION, SOLUTION INTRAVENOUS at 00:47

## 2021-01-01 RX ADMIN — BUSPIRONE HYDROCHLORIDE 10 MG: 10 TABLET ORAL at 10:02

## 2021-01-01 RX ADMIN — ASPIRIN 81 MG CHEWABLE TABLET 81 MG: 81 TABLET CHEWABLE at 08:11

## 2021-01-01 RX ADMIN — FAMOTIDINE 20 MG: 20 TABLET, FILM COATED ORAL at 08:44

## 2021-01-01 RX ADMIN — OXYCODONE HYDROCHLORIDE 5 MG: 5 SOLUTION ORAL at 06:15

## 2021-01-01 RX ADMIN — BUSPIRONE HYDROCHLORIDE 10 MG: 10 TABLET ORAL at 20:23

## 2021-01-01 RX ADMIN — HEPARIN SODIUM 5000 UNITS: 5000 INJECTION, SOLUTION INTRAVENOUS; SUBCUTANEOUS at 21:31

## 2021-01-01 RX ADMIN — THIAMINE HYDROCHLORIDE 100 MG: 100 INJECTION, SOLUTION INTRAMUSCULAR; INTRAVENOUS at 05:51

## 2021-01-01 RX ADMIN — FAMOTIDINE 20 MG: 20 TABLET, FILM COATED ORAL at 17:11

## 2021-01-01 RX ADMIN — LACTULOSE 30 G: 20 SOLUTION ORAL at 08:43

## 2021-01-01 RX ADMIN — MEROPENEM 1 G: 1 INJECTION, POWDER, FOR SOLUTION INTRAVENOUS at 17:07

## 2021-01-01 RX ADMIN — LACTULOSE 10 G: 20 SOLUTION ORAL at 09:49

## 2021-01-01 RX ADMIN — OXYCODONE HYDROCHLORIDE 5 MG: 5 SOLUTION ORAL at 23:13

## 2021-01-01 RX ADMIN — INSULIN DETEMIR 30 UNITS: 100 INJECTION, SOLUTION SUBCUTANEOUS at 09:05

## 2021-01-01 RX ADMIN — LISINOPRIL 20 MG: 20 TABLET ORAL at 08:16

## 2021-01-01 RX ADMIN — KETOROLAC TROMETHAMINE 30 MG: 30 INJECTION, SOLUTION INTRAMUSCULAR; INTRAVENOUS at 20:41

## 2021-01-01 RX ADMIN — TRAMADOL HYDROCHLORIDE 25 MG: 50 TABLET, FILM COATED ORAL at 01:43

## 2021-01-01 RX ADMIN — LACTULOSE 30 G: 20 SOLUTION ORAL at 09:05

## 2021-01-01 RX ADMIN — ASPIRIN 81 MG: 81 TABLET, COATED ORAL at 09:50

## 2021-01-01 RX ADMIN — METOPROLOL SUCCINATE 100 MG: 100 TABLET, EXTENDED RELEASE ORAL at 16:07

## 2021-01-01 RX ADMIN — GADOBENATE DIMEGLUMINE 18 ML: 529 INJECTION, SOLUTION INTRAVENOUS at 16:43

## 2021-01-01 RX ADMIN — GABAPENTIN 600 MG: 300 CAPSULE ORAL at 08:38

## 2021-01-01 RX ADMIN — GABAPENTIN 300 MG: 300 CAPSULE ORAL at 14:38

## 2021-01-01 RX ADMIN — MORPHINE SULFATE 2 MG: 2 INJECTION, SOLUTION INTRAMUSCULAR; INTRAVENOUS at 09:47

## 2021-01-01 RX ADMIN — VANCOMYCIN HYDROCHLORIDE 1250 MG: 5 INJECTION, POWDER, LYOPHILIZED, FOR SOLUTION INTRAVENOUS at 17:11

## 2021-01-01 RX ADMIN — HEPARIN SODIUM 5000 UNITS: 5000 INJECTION, SOLUTION INTRAVENOUS; SUBCUTANEOUS at 21:41

## 2021-01-01 RX ADMIN — GABAPENTIN 600 MG: 300 CAPSULE ORAL at 15:12

## 2021-01-01 RX ADMIN — GABAPENTIN 300 MG: 300 CAPSULE ORAL at 06:38

## 2021-01-01 RX ADMIN — SPIRONOLACTONE 25 MG: 25 TABLET ORAL at 08:43

## 2021-01-01 RX ADMIN — FUROSEMIDE 80 MG: 10 INJECTION, SOLUTION INTRAMUSCULAR; INTRAVENOUS at 09:05

## 2021-01-01 RX ADMIN — SODIUM CHLORIDE, POTASSIUM CHLORIDE, SODIUM LACTATE AND CALCIUM CHLORIDE 1641 ML: 600; 310; 30; 20 INJECTION, SOLUTION INTRAVENOUS at 22:03

## 2021-01-01 RX ADMIN — MEROPENEM 1 G: 1 INJECTION, POWDER, FOR SOLUTION INTRAVENOUS at 14:41

## 2021-01-01 RX ADMIN — INSULIN LISPRO 2 UNITS: 100 INJECTION, SOLUTION INTRAVENOUS; SUBCUTANEOUS at 09:05

## 2021-01-01 RX ADMIN — SODIUM CHLORIDE, PRESERVATIVE FREE 10 ML: 5 INJECTION INTRAVENOUS at 21:42

## 2021-01-01 RX ADMIN — ZIPRASIDONE MESYLATE 10 MG: 20 INJECTION, POWDER, LYOPHILIZED, FOR SOLUTION INTRAMUSCULAR at 14:54

## 2021-01-01 RX ADMIN — ASPIRIN 81 MG: 81 TABLET, COATED ORAL at 09:05

## 2021-01-01 RX ADMIN — BUSPIRONE HYDROCHLORIDE 10 MG: 10 TABLET ORAL at 20:48

## 2021-01-01 RX ADMIN — LEVOTHYROXINE SODIUM 175 MCG: 175 TABLET ORAL at 07:54

## 2021-01-01 RX ADMIN — LEVOTHYROXINE SODIUM 200 MCG: 200 TABLET ORAL at 05:43

## 2021-01-01 RX ADMIN — INSULIN LISPRO 4 UNITS: 100 INJECTION, SOLUTION INTRAVENOUS; SUBCUTANEOUS at 12:13

## 2021-01-01 RX ADMIN — PROMETHAZINE HYDROCHLORIDE 25 MG: 25 TABLET ORAL at 10:02

## 2021-01-01 RX ADMIN — LACTULOSE 10 G: 20 SOLUTION ORAL at 16:01

## 2021-01-01 RX ADMIN — OXYCODONE HYDROCHLORIDE 5 MG: 5 SOLUTION ORAL at 15:06

## 2021-01-01 RX ADMIN — ONDANSETRON 4 MG: 2 INJECTION INTRAMUSCULAR; INTRAVENOUS at 02:25

## 2021-01-01 RX ADMIN — CEFTRIAXONE SODIUM 1 G: 1 INJECTION, SOLUTION INTRAVENOUS at 13:55

## 2021-01-01 RX ADMIN — LACTULOSE 30 G: 20 SOLUTION ORAL at 20:38

## 2021-01-01 RX ADMIN — Medication 500 MG: at 11:09

## 2021-01-01 RX ADMIN — INSULIN HUMAN 4.9 UNITS/HR: 1 INJECTION, SOLUTION INTRAVENOUS at 03:18

## 2021-01-01 RX ADMIN — LACTULOSE 30 G: 20 SOLUTION ORAL at 15:11

## 2021-01-01 RX ADMIN — KETOROLAC TROMETHAMINE 30 MG: 30 INJECTION, SOLUTION INTRAMUSCULAR; INTRAVENOUS at 13:11

## 2021-01-01 RX ADMIN — SODIUM CHLORIDE, PRESERVATIVE FREE 10 ML: 5 INJECTION INTRAVENOUS at 21:16

## 2021-01-01 RX ADMIN — LACTULOSE 10 G: 20 SOLUTION ORAL at 21:03

## 2021-01-01 RX ADMIN — LEVOTHYROXINE SODIUM 200 MCG: 200 TABLET ORAL at 05:13

## 2021-01-01 RX ADMIN — ONDANSETRON 4 MG: 2 INJECTION INTRAMUSCULAR; INTRAVENOUS at 12:16

## 2021-01-01 RX ADMIN — ASPIRIN 81 MG: 81 TABLET, COATED ORAL at 08:38

## 2021-01-01 RX ADMIN — SPIRONOLACTONE 50 MG: 25 TABLET ORAL at 08:16

## 2021-01-01 RX ADMIN — FUROSEMIDE 20 MG: 20 TABLET ORAL at 10:02

## 2021-01-01 RX ADMIN — BUSPIRONE HYDROCHLORIDE 10 MG: 10 TABLET ORAL at 08:23

## 2021-01-01 RX ADMIN — SODIUM CHLORIDE, POTASSIUM CHLORIDE, SODIUM LACTATE AND CALCIUM CHLORIDE 150 ML/HR: 600; 310; 30; 20 INJECTION, SOLUTION INTRAVENOUS at 03:20

## 2021-01-01 RX ADMIN — HEPARIN SODIUM 5000 UNITS: 5000 INJECTION, SOLUTION INTRAVENOUS; SUBCUTANEOUS at 21:14

## 2021-01-01 RX ADMIN — CALCIUM ACETATE MONOHYDRATE AND ALUMINUM SULFATE TETRADECAHYDRATE 1 PACKET: 952; 1347 POWDER, FOR SOLUTION TOPICAL at 14:38

## 2021-01-01 RX ADMIN — IPRATROPIUM BROMIDE AND ALBUTEROL SULFATE 3 ML: 2.5; .5 SOLUTION RESPIRATORY (INHALATION) at 07:51

## 2021-01-01 RX ADMIN — MEROPENEM 500 MG: 500 INJECTION INTRAVENOUS at 18:35

## 2021-01-01 RX ADMIN — BUSPIRONE HYDROCHLORIDE 10 MG: 10 TABLET ORAL at 19:40

## 2021-01-01 RX ADMIN — MONTELUKAST 10 MG: 10 TABLET, FILM COATED ORAL at 19:40

## 2021-01-01 RX ADMIN — INSULIN LISPRO 6 UNITS: 100 INJECTION, SOLUTION INTRAVENOUS; SUBCUTANEOUS at 12:16

## 2021-01-01 RX ADMIN — GABAPENTIN 300 MG: 300 CAPSULE ORAL at 13:07

## 2021-01-01 RX ADMIN — INSULIN HUMAN 5 UNITS: 100 INJECTION, SOLUTION PARENTERAL at 06:15

## 2021-01-01 RX ADMIN — ROCURONIUM BROMIDE 40 MG: 10 INJECTION INTRAVENOUS at 08:31

## 2021-01-01 RX ADMIN — BUSPIRONE HYDROCHLORIDE 10 MG: 10 TABLET ORAL at 08:45

## 2021-01-01 RX ADMIN — THIAMINE HYDROCHLORIDE 100 MG: 100 INJECTION, SOLUTION INTRAMUSCULAR; INTRAVENOUS at 21:40

## 2021-01-01 RX ADMIN — MONTELUKAST 10 MG: 10 TABLET, FILM COATED ORAL at 21:23

## 2021-01-01 RX ADMIN — OXYCODONE HYDROCHLORIDE 5 MG: 5 SOLUTION ORAL at 02:18

## 2021-01-01 RX ADMIN — INSULIN LISPRO 2 UNITS: 100 INJECTION, SOLUTION INTRAVENOUS; SUBCUTANEOUS at 12:25

## 2021-01-01 RX ADMIN — IPRATROPIUM BROMIDE AND ALBUTEROL SULFATE 3 ML: 2.5; .5 SOLUTION RESPIRATORY (INHALATION) at 12:13

## 2021-01-01 RX ADMIN — ATORVASTATIN CALCIUM 80 MG: 40 TABLET, FILM COATED ORAL at 21:30

## 2021-01-01 RX ADMIN — OXYCODONE HYDROCHLORIDE 5 MG: 5 SOLUTION ORAL at 14:25

## 2021-01-01 RX ADMIN — SODIUM CHLORIDE, PRESERVATIVE FREE 10 ML: 5 INJECTION INTRAVENOUS at 20:24

## 2021-01-01 RX ADMIN — METOPROLOL SUCCINATE 100 MG: 50 TABLET, EXTENDED RELEASE ORAL at 09:02

## 2021-01-01 RX ADMIN — COLLAGENASE SANTYL 1 APPLICATION: 250 OINTMENT TOPICAL at 09:48

## 2021-01-01 RX ADMIN — SODIUM CHLORIDE, POTASSIUM CHLORIDE, SODIUM LACTATE AND CALCIUM CHLORIDE 150 ML/HR: 600; 310; 30; 20 INJECTION, SOLUTION INTRAVENOUS at 12:56

## 2021-01-01 RX ADMIN — LISINOPRIL 20 MG: 20 TABLET ORAL at 09:06

## 2021-01-01 RX ADMIN — ASPIRIN 81 MG: 81 TABLET, COATED ORAL at 15:17

## 2021-01-01 RX ADMIN — METOPROLOL TARTRATE 50 MG: 50 TABLET, FILM COATED ORAL at 08:33

## 2021-01-01 RX ADMIN — SODIUM CHLORIDE, POTASSIUM CHLORIDE, SODIUM LACTATE AND CALCIUM CHLORIDE 100 ML/HR: 600; 310; 30; 20 INJECTION, SOLUTION INTRAVENOUS at 01:05

## 2021-01-01 RX ADMIN — VANCOMYCIN HYDROCHLORIDE 1750 MG: 5 INJECTION, POWDER, LYOPHILIZED, FOR SOLUTION INTRAVENOUS at 02:11

## 2021-01-01 RX ADMIN — LACTULOSE 10 G: 20 SOLUTION ORAL at 21:23

## 2021-01-01 RX ADMIN — PROMETHAZINE HYDROCHLORIDE 25 MG: 25 TABLET ORAL at 09:06

## 2021-01-01 RX ADMIN — RIFAXIMIN 550 MG: 550 TABLET ORAL at 08:45

## 2021-01-01 RX ADMIN — INSULIN HUMAN 5.6 UNITS/HR: 1 INJECTION, SOLUTION INTRAVENOUS at 06:37

## 2021-01-01 RX ADMIN — VANCOMYCIN HYDROCHLORIDE 1000 MG: 5 INJECTION, POWDER, LYOPHILIZED, FOR SOLUTION INTRAVENOUS at 15:12

## 2021-01-01 RX ADMIN — FAMOTIDINE 20 MG: 20 TABLET ORAL at 06:38

## 2021-01-01 RX ADMIN — METOPROLOL SUCCINATE 100 MG: 50 TABLET, EXTENDED RELEASE ORAL at 09:05

## 2021-01-01 RX ADMIN — LACTULOSE 30 G: 20 SOLUTION ORAL at 23:38

## 2021-01-01 RX ADMIN — METOPROLOL TARTRATE 50 MG: 25 TABLET, FILM COATED ORAL at 08:44

## 2021-01-01 RX ADMIN — SODIUM CHLORIDE, PRESERVATIVE FREE 10 ML: 5 INJECTION INTRAVENOUS at 09:06

## 2021-01-01 RX ADMIN — LACTULOSE 30 G: 20 SOLUTION ORAL at 09:26

## 2021-01-01 RX ADMIN — SODIUM CHLORIDE, PRESERVATIVE FREE 10 ML: 5 INJECTION INTRAVENOUS at 20:56

## 2021-01-01 RX ADMIN — ASPIRIN 81 MG: 81 TABLET, COATED ORAL at 09:09

## 2021-01-01 RX ADMIN — OXYCODONE HYDROCHLORIDE 5 MG: 5 SOLUTION ORAL at 15:14

## 2021-01-01 RX ADMIN — FUROSEMIDE 20 MG: 20 TABLET ORAL at 15:17

## 2021-01-01 RX ADMIN — POTASSIUM CHLORIDE 40 MEQ: 10 CAPSULE, COATED, EXTENDED RELEASE ORAL at 17:40

## 2021-01-01 RX ADMIN — SODIUM CHLORIDE, PRESERVATIVE FREE 10 ML: 5 INJECTION INTRAVENOUS at 21:24

## 2021-01-01 RX ADMIN — MEROPENEM 1 G: 1 INJECTION, POWDER, FOR SOLUTION INTRAVENOUS at 22:10

## 2021-01-01 RX ADMIN — LACTULOSE 10 G: 20 SOLUTION ORAL at 15:22

## 2021-01-01 RX ADMIN — RIFAXIMIN 550 MG: 550 TABLET ORAL at 08:44

## 2021-01-01 RX ADMIN — OXYCODONE HYDROCHLORIDE 15 MG: 5 TABLET ORAL at 10:38

## 2021-01-01 RX ADMIN — ATORVASTATIN CALCIUM 80 MG: 40 TABLET, FILM COATED ORAL at 21:14

## 2021-01-01 RX ADMIN — HEPARIN SODIUM 5000 UNITS: 5000 INJECTION, SOLUTION INTRAVENOUS; SUBCUTANEOUS at 08:11

## 2021-01-01 RX ADMIN — INSULIN DETEMIR 30 UNITS: 100 INJECTION, SOLUTION SUBCUTANEOUS at 09:49

## 2021-01-01 RX ADMIN — METOPROLOL TARTRATE 50 MG: 50 TABLET, FILM COATED ORAL at 09:26

## 2021-01-01 RX ADMIN — INSULIN HUMAN 8 UNITS: 100 INJECTION, SOLUTION PARENTERAL at 17:37

## 2021-01-01 RX ADMIN — FAMOTIDINE 20 MG: 20 TABLET ORAL at 17:34

## 2021-01-01 RX ADMIN — VANCOMYCIN HYDROCHLORIDE 1000 MG: 5 INJECTION, POWDER, LYOPHILIZED, FOR SOLUTION INTRAVENOUS at 01:09

## 2021-01-01 RX ADMIN — LACTULOSE 30 G: 20 SOLUTION ORAL at 21:42

## 2021-01-01 RX ADMIN — OXYCODONE HYDROCHLORIDE 15 MG: 5 TABLET ORAL at 10:02

## 2021-01-01 RX ADMIN — METOPROLOL TARTRATE 50 MG: 50 TABLET, FILM COATED ORAL at 20:39

## 2021-01-01 RX ADMIN — MULTIPLE VITAMINS W/ MINERALS TAB 1 TABLET: TAB at 10:02

## 2021-01-01 RX ADMIN — SODIUM CHLORIDE, PRESERVATIVE FREE 10 ML: 5 INJECTION INTRAVENOUS at 20:48

## 2021-01-01 RX ADMIN — MULTIPLE VITAMINS W/ MINERALS TAB 1 TABLET: TAB at 09:06

## 2021-01-01 RX ADMIN — SODIUM CHLORIDE, PRESERVATIVE FREE 10 ML: 5 INJECTION INTRAVENOUS at 09:05

## 2021-01-01 RX ADMIN — CLINDAMYCIN PHOSPHATE 900 MG: 900 INJECTION, SOLUTION INTRAVENOUS at 11:53

## 2021-01-01 RX ADMIN — Medication 100 MG: at 09:01

## 2021-01-01 RX ADMIN — SPIRONOLACTONE 50 MG: 25 TABLET ORAL at 10:02

## 2021-01-01 RX ADMIN — MEROPENEM 1 G: 1 INJECTION, POWDER, FOR SOLUTION INTRAVENOUS at 13:31

## 2021-01-01 RX ADMIN — SODIUM CHLORIDE, POTASSIUM CHLORIDE, SODIUM LACTATE AND CALCIUM CHLORIDE 100 ML/HR: 600; 310; 30; 20 INJECTION, SOLUTION INTRAVENOUS at 10:13

## 2021-01-01 RX ADMIN — OXYCODONE HYDROCHLORIDE 5 MG: 5 SOLUTION ORAL at 14:07

## 2021-01-01 RX ADMIN — THIAMINE HYDROCHLORIDE 100 MG: 100 INJECTION, SOLUTION INTRAMUSCULAR; INTRAVENOUS at 06:38

## 2021-01-01 RX ADMIN — ASPIRIN 81 MG CHEWABLE TABLET 81 MG: 81 TABLET CHEWABLE at 09:26

## 2021-01-01 RX ADMIN — OXYCODONE HYDROCHLORIDE 5 MG: 5 SOLUTION ORAL at 23:38

## 2021-01-01 RX ADMIN — VANCOMYCIN HYDROCHLORIDE 1250 MG: 5 INJECTION, POWDER, LYOPHILIZED, FOR SOLUTION INTRAVENOUS at 08:35

## 2021-01-01 RX ADMIN — DEXMEDETOMIDINE HYDROCHLORIDE 0.4 MCG/KG/HR: 400 INJECTION, SOLUTION INTRAVENOUS at 19:50

## 2021-01-01 RX ADMIN — SODIUM CHLORIDE, PRESERVATIVE FREE 10 ML: 5 INJECTION INTRAVENOUS at 08:46

## 2021-01-01 RX ADMIN — GABAPENTIN 300 MG: 300 CAPSULE ORAL at 21:23

## 2021-01-01 RX ADMIN — INSULIN LISPRO 4 UNITS: 100 INJECTION, SOLUTION INTRAVENOUS; SUBCUTANEOUS at 09:05

## 2021-01-01 RX ADMIN — MONTELUKAST 10 MG: 10 TABLET, FILM COATED ORAL at 20:40

## 2021-01-01 RX ADMIN — POTASSIUM CHLORIDE 40 MEQ: 750 CAPSULE, EXTENDED RELEASE ORAL at 05:42

## 2021-01-01 RX ADMIN — GABAPENTIN 600 MG: 300 CAPSULE ORAL at 19:40

## 2021-01-01 RX ADMIN — ASPIRIN 81 MG: 81 TABLET, COATED ORAL at 10:02

## 2021-01-01 RX ADMIN — FUROSEMIDE 20 MG: 10 INJECTION, SOLUTION INTRAMUSCULAR; INTRAVENOUS at 16:24

## 2021-01-01 RX ADMIN — METOPROLOL TARTRATE 50 MG: 50 TABLET, FILM COATED ORAL at 08:43

## 2021-01-01 RX ADMIN — SODIUM CHLORIDE, PRESERVATIVE FREE 10 ML: 5 INJECTION INTRAVENOUS at 08:17

## 2021-01-01 RX ADMIN — RIFAXIMIN 550 MG: 550 TABLET ORAL at 09:50

## 2021-01-01 RX ADMIN — MEROPENEM 1 G: 1 INJECTION INTRAVENOUS at 20:06

## 2021-01-01 RX ADMIN — LEVOTHYROXINE SODIUM 175 MCG: 175 TABLET ORAL at 21:42

## 2021-01-01 RX ADMIN — FUROSEMIDE 20 MG: 20 TABLET ORAL at 08:16

## 2021-01-01 RX ADMIN — OXYCODONE HYDROCHLORIDE 5 MG: 5 TABLET ORAL at 23:04

## 2021-01-01 RX ADMIN — MEROPENEM 1 G: 1 INJECTION, POWDER, FOR SOLUTION INTRAVENOUS at 03:05

## 2021-01-01 RX ADMIN — MORPHINE SULFATE 2 MG: 2 INJECTION, SOLUTION INTRAMUSCULAR; INTRAVENOUS at 00:18

## 2021-01-01 RX ADMIN — COLLAGENASE SANTYL 1 APPLICATION: 250 OINTMENT TOPICAL at 09:13

## 2021-01-01 RX ADMIN — FAMOTIDINE 20 MG: 20 TABLET, FILM COATED ORAL at 07:50

## 2021-01-01 RX ADMIN — VANCOMYCIN HYDROCHLORIDE 1250 MG: 5 INJECTION, POWDER, LYOPHILIZED, FOR SOLUTION INTRAVENOUS at 12:07

## 2021-01-01 RX ADMIN — VANCOMYCIN HYDROCHLORIDE 1250 MG: 5 INJECTION, POWDER, LYOPHILIZED, FOR SOLUTION INTRAVENOUS at 08:12

## 2021-01-01 RX ADMIN — THIAMINE HYDROCHLORIDE 100 MG: 100 INJECTION, SOLUTION INTRAMUSCULAR; INTRAVENOUS at 14:25

## 2021-01-01 RX ADMIN — METOPROLOL SUCCINATE 100 MG: 50 TABLET, EXTENDED RELEASE ORAL at 09:06

## 2021-01-01 RX ADMIN — OXYCODONE HYDROCHLORIDE 15 MG: 5 TABLET ORAL at 07:06

## 2021-01-01 RX ADMIN — INSULIN HUMAN 4 UNITS/HR: 1 INJECTION, SOLUTION INTRAVENOUS at 21:39

## 2021-01-01 RX ADMIN — METOPROLOL SUCCINATE 100 MG: 50 TABLET, EXTENDED RELEASE ORAL at 08:16

## 2021-01-01 RX ADMIN — SODIUM CHLORIDE, PRESERVATIVE FREE 10 ML: 5 INJECTION INTRAVENOUS at 01:43

## 2021-01-01 RX ADMIN — LEVOTHYROXINE SODIUM 175 MCG: 175 TABLET ORAL at 05:37

## 2021-01-01 RX ADMIN — INSULIN DETEMIR 30 UNITS: 100 INJECTION, SOLUTION SUBCUTANEOUS at 22:43

## 2021-01-01 RX ADMIN — GABAPENTIN 300 MG: 300 CAPSULE ORAL at 21:41

## 2021-01-01 RX ADMIN — ARFORMOTEROL TARTRATE 15 MCG: 15 SOLUTION RESPIRATORY (INHALATION) at 07:51

## 2021-01-01 RX ADMIN — ONDANSETRON 4 MG: 2 INJECTION INTRAMUSCULAR; INTRAVENOUS at 08:31

## 2021-01-01 RX ADMIN — OXYCODONE HYDROCHLORIDE 15 MG: 5 TABLET ORAL at 15:06

## 2021-01-01 RX ADMIN — CETIRIZINE HYDROCHLORIDE 10 MG: 10 TABLET, FILM COATED ORAL at 09:01

## 2021-01-01 RX ADMIN — MAGNESIUM SULFATE 4 G: 4 INJECTION INTRAVENOUS at 10:03

## 2021-01-01 RX ADMIN — SODIUM CHLORIDE, PRESERVATIVE FREE 10 ML: 5 INJECTION INTRAVENOUS at 12:16

## 2021-01-01 RX ADMIN — GABAPENTIN 300 MG: 300 CAPSULE ORAL at 07:54

## 2021-01-01 RX ADMIN — MIDODRINE HYDROCHLORIDE 10 MG: 10 TABLET ORAL at 01:52

## 2021-01-01 RX ADMIN — METOPROLOL TARTRATE 50 MG: 50 TABLET, FILM COATED ORAL at 21:14

## 2021-01-01 RX ADMIN — IPRATROPIUM BROMIDE AND ALBUTEROL SULFATE 3 ML: 2.5; .5 SOLUTION RESPIRATORY (INHALATION) at 20:30

## 2021-01-01 RX ADMIN — BUSPIRONE HYDROCHLORIDE 10 MG: 10 TABLET ORAL at 21:03

## 2021-01-01 RX ADMIN — KETOROLAC TROMETHAMINE 30 MG: 30 INJECTION, SOLUTION INTRAMUSCULAR; INTRAVENOUS at 09:44

## 2021-01-01 RX ADMIN — SODIUM CHLORIDE, POTASSIUM CHLORIDE, SODIUM LACTATE AND CALCIUM CHLORIDE 125 ML/HR: 600; 310; 30; 20 INJECTION, SOLUTION INTRAVENOUS at 00:49

## 2021-01-01 RX ADMIN — OXYCODONE HYDROCHLORIDE 5 MG: 5 SOLUTION ORAL at 06:00

## 2021-01-01 RX ADMIN — VANCOMYCIN HYDROCHLORIDE 1250 MG: 5 INJECTION, POWDER, LYOPHILIZED, FOR SOLUTION INTRAVENOUS at 10:45

## 2021-01-01 RX ADMIN — Medication 200 MCG: at 08:52

## 2021-01-01 RX ADMIN — GABAPENTIN 300 MG: 300 CAPSULE ORAL at 21:31

## 2021-01-01 RX ADMIN — HYDROCODONE BITARTRATE AND ACETAMINOPHEN 1 TABLET: 5; 325 TABLET ORAL at 17:01

## 2021-01-01 RX ADMIN — SODIUM CHLORIDE 500 ML: 9 INJECTION, SOLUTION INTRAVENOUS at 00:16

## 2021-01-01 RX ADMIN — INSULIN DETEMIR 30 UNITS: 100 INJECTION, SOLUTION SUBCUTANEOUS at 09:24

## 2021-01-01 RX ADMIN — GABAPENTIN 300 MG: 300 CAPSULE ORAL at 14:08

## 2021-01-01 RX ADMIN — HEPARIN SODIUM 5000 UNITS: 5000 INJECTION, SOLUTION INTRAVENOUS; SUBCUTANEOUS at 08:44

## 2021-01-01 RX ADMIN — DEXMEDETOMIDINE HYDROCHLORIDE 0.2 MCG/KG/HR: 400 INJECTION, SOLUTION INTRAVENOUS at 09:59

## 2021-01-01 RX ADMIN — GABAPENTIN 300 MG: 300 CAPSULE ORAL at 13:05

## 2021-01-01 RX ADMIN — RIFAXIMIN 550 MG: 550 TABLET ORAL at 08:42

## 2021-01-01 RX ADMIN — OXYCODONE HYDROCHLORIDE 5 MG: 5 SOLUTION ORAL at 06:38

## 2021-01-01 RX ADMIN — LACTULOSE 30 G: 20 SOLUTION ORAL at 08:32

## 2021-01-01 RX ADMIN — BUSPIRONE HYDROCHLORIDE 10 MG: 10 TABLET ORAL at 09:06

## 2021-01-01 RX ADMIN — SODIUM CHLORIDE, POTASSIUM CHLORIDE, SODIUM LACTATE AND CALCIUM CHLORIDE: 600; 310; 30; 20 INJECTION, SOLUTION INTRAVENOUS at 08:23

## 2021-01-01 RX ADMIN — GABAPENTIN 300 MG: 300 CAPSULE ORAL at 14:25

## 2021-01-01 RX ADMIN — HYDROCODONE BITARTRATE AND ACETAMINOPHEN 1 TABLET: 10; 325 TABLET ORAL at 06:26

## 2021-01-01 RX ADMIN — FAMOTIDINE 20 MG: 20 TABLET ORAL at 17:17

## 2021-01-01 RX ADMIN — LEVOTHYROXINE SODIUM 200 MCG: 200 TABLET ORAL at 04:55

## 2021-01-01 RX ADMIN — LACTULOSE 30 G: 20 SOLUTION ORAL at 21:31

## 2021-01-01 RX ADMIN — HYDROCODONE BITARTRATE AND ACETAMINOPHEN 1 TABLET: 5; 325 TABLET ORAL at 23:40

## 2021-01-01 RX ADMIN — COLLAGENASE SANTYL 1 APPLICATION: 250 OINTMENT TOPICAL at 08:23

## 2021-01-01 RX ADMIN — VANCOMYCIN HYDROCHLORIDE 1750 MG: 5 INJECTION, POWDER, LYOPHILIZED, FOR SOLUTION INTRAVENOUS at 23:01

## 2021-01-01 RX ADMIN — SUGAMMADEX 200 MG: 100 INJECTION, SOLUTION INTRAVENOUS at 09:20

## 2021-01-01 RX ADMIN — GABAPENTIN 600 MG: 300 CAPSULE ORAL at 05:13

## 2021-01-01 RX ADMIN — INSULIN HUMAN 5.8 UNITS/HR: 1 INJECTION, SOLUTION INTRAVENOUS at 23:42

## 2021-05-28 NOTE — PROGRESS NOTES
Patient: HUSAM SHEIKH     Acct: [id number]     Report: #[id number]  UNIT #: [id number]     : 1965    Encounter Date:2019  PRIMARY CARE: GERMANIA CORADO  ***Signed***  --------------------------------------------------------------------------------------------------------------------  Chief Complaint      Encounter Date      Sep 11, 2019            Primary Care Provider      GERMANIA CORADO            Referring Provider      GERMANIA CORADO            Patient Complaint      Patient is complaining of      Pt here for SOB/COPD            VITALS      Height 5 ft 4 in / 162.56 cm      Weight 195 lbs 0 oz / 88.160973 kg      BSA 1.94 m2      BMI 33.5 kg/m2      Temperature 98.3 F / 36.83 C - Oral      Pulse 71      Respirations 16      Blood Pressure 149/65 Sitting, Right Arm      Pulse Oximetry 93%, room air      Initial Exhaled Nitrous Oxide      Exhaled Nitrous Oxide Results:  5            HPI      The patient is a very pleasant 53 year old white female patient of Dr. Dailey's    also known to me from a previous office visit. I last saw the patient in 2019 and at this time she was having uncontrolled hypertension. She had     adjustments to her blood pressure medication since then and is doing much better    from that standpoint. For the past week or so, she has been complaining of      increased dyspnea, coughing or wheezing and is having a hard time mobilizing     sputum.  When she does cough things up it is thick, yellow and green sputum. She    is complaining of chest tightness and feels like it is hard to take a deep     breath. She is overall feeling badly.  She feels like she felt in December when     she had MRSA infection.  She continues to smoke cigarettes, half a pack per day     or less and is trying to quit. She is still on Symbicort 160 and uses albuterol     nebulizer as needed. She is complaining of some mild lower extremity edema and     recently started on Lasix  20 mg daily by her primary care provider. She feels     like it is helping a little bit.            I reviewed her Review of Systems, medical, surgical and family history and agree    with those as entered.      Copies To:   CHARAN ABREU      Constitutional:  Complains of: Fatigue; Denies: Fever, Weight gain, Weight loss,    Chills, Insomnia, Other      Respiratory/Breathing:  Complains of: Shortness of air, Cough; Denies: Wheezing,    Hemoptysis, Pleuritic pain, Other      Endocrine:  Complains of: Diabetes; Denies: Polydipsia, Polyuria, Heat/cold     intolerance, Abnorml menstrual pattern, Other      Eyes:  Complains of: Other (Blood behind left eye); Denies: Blurred vision,     Vision Changes      Ears, nose, mouth, throat:  Denies: Congestion, Dysphagia, Hearing Changes, Nose    Bleeding, Nasal Discharge, Throat pain, Tinnitus, Other      Cardiovascular:  Denies: Chest Pain, Exertional dyspnea, Peripheral Edema,     Palpitations, Syncope, Wake up Gasping for air, Orthopnea, Tachycardia, Other      Gastrointestinal:  Denies: Abdominal pain/cramping, Bloody stools, Constipation,    Diarrhea, Melena, Nausea, Vomiting, Other      Genitourinary:  Denies: Dysuria, Urinary frequency, Incontinence, Hematuria,     Urgency, Other      Musculoskeletal:  Denies: Joint Pain, Joint Stiffness, Joint Swelling, Myalgias,    Other      Hematologic/lymphatic:  COMPLAINS OF: Bruising (low platelets); DENIES:     Lymphadenopathy, Bleeding tendencies, Other      Neurologic:  Denies: Headache, Numbness, Weakness, Seizures, Other      Psychiatric:  Complains of: Anxiety; Denies: Appropriate Effect, Depression,     Other      Sleep:  Yes: Excessive daytime sleep, Snoring, Other (Pt has sleep study     scheduled); No: Morning Headache?, Insomnia?, Stop breathing at sleep?      Integumentary:  Denies: Rash, Dry skin, Skin Warm to Touch, Other            FAMILY/SOCIAL/MEDICAL HX      Surgical History:  Yes: Abdominal  Surgery (HIATAL HERNIA REPAIR, ING HERNIA     REPAIR), Bowel Surgery (HEMORROID SURGERY INTERNA AND EXTERNAL),     Cholecystectomy, Hernia Surgery, Oral Surgery (TONSILLECTOMY), Orthopedic     Surgery ([facility]; LEFT ARM ORIF), Tonsils      Stroke - Family Hx:  Grandparent      Heart - Family Hx:  Mother, Grandparent      Diabetes - Family Hx:  Brother, Sister, Cousin      Is Father Still Living?:  Yes      Is Mother Still Living?:  No       Family History:  Yes      Social History:  Tobacco Use; No Alcohol Use, No Recreational Drug use      Smoking status:  Current every day smoker (.5 ppd x 20 y)       Section:  Yes (2 years ago)      Tubal Ligation:  Yes (2 years ago)      Hysterectomy:  Yes      Anticoagulation Therapy:  No      Antibiotic Prophylaxis:  No      Medical History:  Yes: Arthritis (HANDS, KNEES), Blood Disease (CIRRHOSIS STAGE     4, ANEMIA), Chronic Bronchitis/COPD (NO INHALERS), Anxiety (HYPERVENTILATES), Di    abetes (IDDM BS -130), Hemorrhoids/Rectal Prob (HIATAL HERNIA;     CONSTIPATION, HX HEMORRHOIDS), Hiatal Hernia (LARGE), High Blood Pressure (ON     MEDS), Reflux Disease, Shortness Of Breath, Thyroid Problem, Miscellaneous     Medical/oth (CIRRHOSIS STAGE 4); No: Asthma, Chemotherapy/Cancer, Colon Trouble,    Congestive Heart Failu, Deafness or Ringing Ears, Seizures, Heart Attack (PANIC     ATTACK FROM NOT TAKING ANTIANXIETY MEDS), Sinus Trouble      Psychiatric History      Anxiety            PREVENTION      Hx Influenza Vaccination:  No      Influenza Vaccine Declined:  Yes      2 or More Falls Past Year?:  No      Fall Past Year with Injury?:  No      Hx Pneumococcal Vaccination:  Yes      Encouraged to follow-up with:  PCP regarding preventative exams.      Chart initiated by      Cydney Lucio MA            ALLERGIES/MEDICATIONS      Allergies:        Coded Allergies:             DOXYCYCLINE (Verified  Allergy, Unknown, NAUSEA, ITCHING, 19)           INFLUENZA VIRUS  VACCINES (Verified  Allergy, Unknown, 9/11/19)           FLUTICASONE (Verified  Adverse Reaction, Unknown, NOSE BLEEDS, 9/11/19)           NSAIDS (NON-STEROIDAL ANTI-INFLAMMA (Verified  Adverse Reaction, Unknown,     DOES NOT TAKE, 9/11/19)           PENICILLINS (Verified  Adverse Reaction, Unknown, NAUSEA, 9/11/19)      Medications    Last Reconciled on 9/11/19 10:24 by FELA DUPREE      Levothyroxine (Levothyroxine) 0.025 Mg Tablet      0.025 MG PO QDAY@07, #30 TAB 0 Refills         Reported         9/11/19       Budesonide/Formoterol Fumarate (Symbicort 160/4.5 Mcg) 10.2 Gm Inh      2 PUFF INH BID, #1 INH 6 Refills         Prov: Yuridia Miguel PA-C         4/9/19       MDI-Albuterol (Proair HFA) 8.5 Gm Hfa.aer.ad      2 PUFFS INH Q4-6H PRN for SHORTNESS OF BREATH, #1 MDI 6 Refills         Prov: Yuridia Miguel PA-C         4/8/19       Cetirizine Hcl (CETIRIZINE HCL) 10 Mg Tablet      10 MG PO QDAY, #30 TAB 11 Refills         Prov: Yuridia Miguel PA-C         4/8/19       Montelukast Sodium (Montelukast*) 10 Mg Tablet      10 MG PO HS, #10 TAB 11 Refills         Prov: Yuridia Miguel PA-C         4/8/19       Linezoid (Zyvox) 600 Mg Tab      600 MG PO BID for 14 Days, #28 TAB         Prov: CHARAN ABREU         12/3/18       Ondansetron Hcl (ONDANSETRON HCL) 4 Mg Tablet      4 MG PO Q8HR, #20 TAB 0 Refills         Prov: CHARAN ABREU         11/29/18       Furosemide* (Lasix*) 20 Mg Tablet      20 MG PO QDAY, #30 TAB 0 Refills         Reported         10/29/18       Promethazine Hcl (Phenergan*) 25 Mg Tablet      25 MG PO Q6H PRN for NAUSEA, TAB 0 Refills         Reported         10/29/18       Spironolactone (Spironolactone*) 25 Mg Tablet      50 MG PO QDAY, #60 TAB 0 Refills         Reported         8/28/18       Metoprolol Tartrate (Metoprolol Tartrate) 100 Mg Tablet      100 MG PO QDAY, TAB         Reported         8/18/18       Insulin Glargine (Lantus SOLOSTAR) Unknown Strength Insuln.pen       MDD PT ON SLIDING SCALE PRN, #1 BOX 0 Refills         Reported         8/18/18       Lisinopril* (Lisinopril*) 10 Mg Tablet      10 MG PO QDAY, #30 TAB 0 Refills         Reported         8/18/18       Meclizine Hcl (Meclizine*) 25 Mg Tablet      25 MG PO TID, #90 TAB 0 Refills         Reported         8/18/18       Lactobacillus Rhamnosus (Culturelle*) 1 Each Capsule      1 CAP PO QDAY, CAP         Reported         8/18/18       Cholecalciferol (Vitamin D3*) 2,000 U Tablet      2000 UNITS PO QDAY, #30 TAB 0 Refills         Reported         8/18/18       oxyCODONE HCl (Roxicodone*) 15 Mg Tablet      15 MG PO Q6H PRN for PAIN, TAB         Reported         8/18/18       morphine Sulfate (Morphine Sulfate ER*) 15 Mg Tablet.er      15 MG PO Q12HR, #60 TAB         Reported         8/18/18       Cyclobenzaprine Hcl (Cyclobenzaprine*) 10 Mg Tablet      10 MG PO TID PRN for MUSCLE SPASMS, TAB 0 Refills         Reported         8/18/18       Gabapentin (Gabapentin) 300 Mg Capsule      300 MG PO TID, #90 CAP 0 Refills         Reported         8/18/18       Vitamin E (Vitamin E*) 400 Units Capsule      400 UNITS PO QDAY, CAP         Reported         8/18/18       metFORMIN HCl/sitaGLIPtin Phos (Janumet 50/1000 MG) 1 Each Tablet      1 TAB PO BID, TAB         Reported         8/18/18      Current Medications      Current Medications Reviewed 9/11/19            EXAM      Vital Signs Reviewed      Gen: WDWN, Alert, NAD.        HEENT:  PERRL, EOMI.  OP, nares clear, no sinus tenderness.      Neck:  Supple, no JVD, no thyromegaly.      Lymph: No axillary, cervical, supraclavicular lymphadenopathy noted bilaterally.      Chest: Mildly decreased breath sounds throughout, no wheezes, rhonchi or     crackles, normal work of breathing noted.        CV:  RRR, no MGR, pulses 2+, equal.      Abd:  Soft, NT, ND, + BS, no HSM.      EXT:  No clubbing, no cyanosis, no edema, no joint tenderness.       Neuro:  A  Skin: No rashes or lesions.       Vitals      Vitals:             Height 5 ft 4 in / 162.56 cm           Weight 195 lbs 0 oz / 88.923565 kg           BSA 1.94 m2           BMI 33.5 kg/m2           Temperature 98.3 F / 36.83 C - Oral           Pulse 71           Respirations 16           Blood Pressure 149/65 Sitting, Right Arm           Pulse Oximetry 93%, room air            REVIEW      Results Reviewed      PCCS Results Reviewed?:  Yes Prev Lab Results, Yes Prev Radiology Results, Yes     Previous [facility] Records            Assessment      COPD exacerbation - J44.1            Notes      New Medications      * predniSONE* 20 MG TABLET: 40 MG PO QDAY 7 Days #14      * Linezoid (Zyvox) 600 MG TAB: 600 MG PO BID 14 Days #28      * NEB-Albuterol Sulf (Albuterol) 2.5 MG/3 ML VIAL.NEB: 2.5 MG INH Q6H PRN       SHORTNESS OF BREATH #120         Instructions: DIAGNOSIS CODE REQUIRED PRIOR TO PRESCRIBING.      * busPIRone HCl (Buspar) 5 MG TABLET: 10 MG PO BID #120      * CYCLOBENZAPRINE HCL (Cyclobenzaprine*) 10 MG TABLET: 10 MG PO TID #90      Changed Medications      * Levothyroxine 0.025 MG TABLET: 0.025 MG PO QDAY@07 #30         Replaced 0.112 MG TABLET: 0.112 MG PO QDAY@07 #30      Discontinued Medications      * Linezoid (Zyvox) 600 MG TAB: 600 MG PO BID 14 Days #28      * predniSONE* 20 MG TABLET: 40 MG PO QDAY 7 Days #14      New Diagnostics      * Sputum Culture W/Gram Stain, As Soon As Possible         Dx: COPD exacerbation - J44.1      New Office Procedures      * Solu-Medrol 62.5 MG, As Soon As Possible         METHYLPRED SOD SUCC (Solu-Medrol) 125 MG VIAL: 62.5 MILLIGRAM INTRAMUSC Qty 1       VIAL         Dx: COPD exacerbation - J44.1      ASSESSMENT:       1. Chronic obstructive pulmonary disease with acute exacerbation.       2. Dyspnea.       3. Cough.       4. Wheezing.      5.  Mild lower extremity edema.        6. Ongoing tobacco abuse of cigarettes trying to quit.      7. Seasonal allergies.      8. Obesity with BMI 33.5              PLAN:      1. I will treat the patient's Chronic Obstructive Pulmonary Disease exacerbation    with a short prednisone burst and Zyvox given her previous MRSA growth in her     sputum from earlier this year. Unfortunately she is unable to take doxycycline     secondary to rash.       2. Continue Symbicort 160 2 puffs twice daily and use albuterol as needed. I     have sent in albuterol nebulizer today.       3.  I will try to induce a sputum culture so antibiotic therapy can be tailored     as appropriate. I will give her an intramuscular shot of Solu Medrol 62.5 mg in     the office today at her request. She feels like that has helped her a lot in the    past.       4. Continue current diuretic dose. Continue 2 gram sodium restriction and 2     liter fluid restriction and follow up with her primary care provider if her     lower extremity edema is not improving on current dose of Lasix.        5. Continue Singulair and Zyrtec.        6. Smoking cessation was discussed for 3 minutes today and offered nicotine     replacement therapy or medication therapy however she declines.       7. Follow up in 1-2 months with Dr. Dailey, sooner if needed.            Patient Education      Patient Education Provided:  COPD, How to use an Inhaler, How to use a Nebulizer      Time Spent:  > 50% /[facility]                 Disclaimer: Converted document may not contain table formatting or lab diagrams. Please see [facility] for the authenticated document.

## 2021-05-28 NOTE — PROGRESS NOTES
Patient: HUSAM SHEIKH     Acct: [id number]     Report: #[id number]  UNIT #: [id number]     : 1965    Encounter Date:2020  PRIMARY CARE: GERMANIA CORADO  ***Signed***  --------------------------------------------------------------------------------------------------------------------  TELEHEALTH NOTE      History of Present Illness            Chief Complaint: f/u copd            Husam Sheikh is presenting for evaluation via Telehealth visit. Verbal consent     obtained before beginning visit.            Provider spent 22 minutes with the patient during telehealth visit.            The following staff were present during the visit: louise/ benedicto llamas md            The patient is a 54 year old  female with a history of MRSA pneumonia     and chronic bronchitis, active cigarette smoker here for follow up.  Last week     she reports increasing fluid building up in her ears, ear pressure and nasal     congestion. She does not take a nasal spray, but takes allergy medicine. She     denies any recent itchy-scratchy throat, watery eyes or nasal congestion. She     reports only increasing shortness of breath, cough with increasing sputum     production.  Her cough has increased and is productive of thick yellow sputum.      She denies any wheezing, chest pain or hemoptysis. She is still smoking about 5-    6 cigarettes a day. She is using nebulizers four times a day.  Denies any     nausea, vomiting, fevers, chills, headaches, sick contacts, chest pain or     hemoptysis.  She is able to perform her ADLs without difficulty.  Denies any     swollen glands or lymph nodes of the head and neck.            I have personally reviewed the review of systems, past family, social, surgical     and medical histories and I agree with the findings.              There is no exam performed as this is a TeleHealth telephone visit.              I reviewed my last office note and Nae Miguel  "PAs last office.  I reviewed     labs from 03/2020 showing no lymphopenia and no leukocytosis as well as 170     peripheral eosinophils.  Renal panel showed no evidence of chronic hypercapnic     respiratory failure.  I personally reviewed a chest x-ray from 03/2020.  This     showed mild patchy infiltrates in the lung bases.                           Overview of Symptoms      pt states \" I have mrsa so I am soa coughing and wheezing\"            Allergies/Medications      Allergies:        Coded Allergies:             DOXYCYCLINE (Verified  Allergy, Unknown, NAUSEA, ITCHING, 5/12/20)           INFLUENZA VIRUS VACCINES (Verified  Allergy, Unknown, 5/12/20)           FLUTICASONE (Verified  Adverse Reaction, Unknown, NOSE BLEEDS, 5/12/20)           NSAIDS (NON-STEROIDAL ANTI-INFLAMMA (Verified  Adverse Reaction, Unknown,     DOES NOT TAKE, 5/12/20)           PENICILLINS (Verified  Adverse Reaction, Unknown, NAUSEA, 5/12/20)      Medications    Last Reconciled on 5/12/20 14:00 by CHARAN ABREU MD      predniSONE (Deltasone) 10 Mg Tablet      10 MG PO ASDIR, #45 TAB 0 Refills         Prov: CHARAN ABREU         5/12/20       Valdemar-Fluticasone (Fluticasone 50 mcg) 16 Gm Spray.susp      1 PUFFS NARE EACH BID, #1 BOTTLE 3 Refills         Prov: CHARAN ABREU         5/12/20       Azithromycin Z-Philippe (Zithromax Z-Philippe) 250 Mg Tablet      250 MG PO ASDIR, #1 PACKET         Prov: CHARAN ABREU         5/12/20       Cetirizine Hcl (CETIRIZINE HCL) 10 Mg Tablet      10 MG PO QDAY, #30 TAB 11 Refills         Prov: CHARAN ABREU         4/28/20       Montelukast Sodium (Montelukast*) 10 Mg Tablet      10 MG PO HS for 90 Days, #90 TAB 1 Refill         Prov: CHARAN ABREU         2/19/20       Phenazopyridine HCl (Pyridium*) 100 Mg Tablet      100 MG PO TID, #6 TAB         Prov: Satish Howard cfr         9/28/19       Ciprofloxacin HCl (Cipro) 250 Mg Tablet      250 MG PO BID, #10 TAB 0 Refills         Prov: Satish Howard cfr         " 9/28/19       Cyclobenzaprine Hcl (Cyclobenzaprine*) 10 Mg Tablet      10 MG PO TID, #90 TAB 0 Refills         Reported         9/11/19       NEB-Albuterol Sulf (Albuterol) 2.5 Mg/3 Ml Vial.neb      2.5 MG INH Q6H PRN for SHORTNESS OF BREATH, #120 NEB 5 Refills         Prov: Yuridia Miguel PA-C         9/11/19       Budesonide/Formoterol Fumarate (Symbicort 160/4.5 Mcg) 10.2 Gm Inh      2 PUFF INH BID, #1 INH 6 Refills         Prov: Yuridia Miguel PA-C         4/9/19       MDI-Albuterol (Proair HFA) 8.5 Gm Hfa.aer.ad      2 PUFFS INH Q4-6H PRN for SHORTNESS OF BREATH, #1 MDI 6 Refills         Prov: Yuridia Miguel PA-C         4/8/19       Furosemide* (Lasix*) 20 Mg Tablet      20 MG PO QDAY, #30 TAB 0 Refills         Reported         10/29/18       Spironolactone (Spironolactone*) 25 Mg Tablet      50 MG PO QDAY, #60 TAB 0 Refills         Reported         8/28/18       Metoprolol Tartrate (Metoprolol Tartrate) 100 Mg Tablet      100 MG PO QDAY, TAB         Reported         8/18/18       Insulin Glargine (Lantus SOLOSTAR) Unknown Strength Insuln.pen      MDD PT ON SLIDING SCALE PRN, #1 BOX 0 Refills         Reported         8/18/18       Lisinopril* (Lisinopril*) 10 Mg Tablet      10 MG PO QDAY, #30 TAB 0 Refills         Reported         8/18/18       Lactobacillus Rhamnosus (Culturelle*) 1 Each Capsule      1 CAP PO QDAY, CAP         Reported         8/18/18       Cholecalciferol (Vitamin D3*) 2,000 U Tablet      2000 UNITS PO QDAY, #30 TAB 0 Refills         Reported         8/18/18       oxyCODONE HCl (Roxicodone) 15 Mg Tablet      15 MG PO Q6H PRN for PAIN, TAB         Reported         8/18/18       Morphine Sulfate ER (Morphine Sulfate ER) 15 Mg Tablet.er      15 MG PO Q12HR, #60 TAB         Reported         8/18/18       Gabapentin (Gabapentin) 300 Mg Capsule      300 MG PO TID, #90 CAP 0 Refills         Reported         8/18/18       Vitamin E (Vitamin E*) 400 Units Capsule      400 UNITS PO QDAY, CAP          Reported         8/18/18       metFORMIN HCl/SITagliptin Phos  (Janumet  MG) 1 Each Tablet      1 TAB PO BID, TAB         Reported         8/18/18            Plan/Instructions      Ambulatory Assessment/Plan:        Notes      New Medications      * Azithromycin Z-Philippe (Zithromax Z-Philippe) 250 MG TABLET: 250 MG PO ASDIR #1         Instructions: Take 500 mg (two tablets) by mouth the first day, then 250 mg        (one tablet) daily until all taken.      * Valdemar-Fluticasone (Fluticasone 50 mcg) 16 GM SPRAY.SUSP: 1 PUFFS NARE EACH BID       #1      * predniSONE (Deltasone) 10 MG TABLET: 10 MG PO ASDIR #45         Instructions: 24noa7m,35nne5p,81csv8b,37mjh0o,23bgr0g      Plan/Instructions            * Plan Of Care: ()            * Chronic conditions reviewed and taken into consideration for today's treatment       plan.      * Patient instructed to seek medical attention urgently for new or worsening       symptoms.      * Patient was educated/instructed on their diagnosis, treatment and medications       prior to discharge from the clinic today.            IMPRESSION:      1.  Acute exacerbation of COPD.      2. Community acquired pneumonia from unspecified organism.      3. Acute on chronic dyspnea.      4. Acute on chronic cough.      5.  Wheezing.      6.  Tobacco abuse with cigarettes.               PLAN:      1.  Given multiple drug allergies, I will give her a Z-Philippe and a course of     prednisone.      2.  Continue Singulair, Zyrtec and add Flonase.      3. I offered patient COVID19 test, but she declined.      4. Continue current inhaler nebulizer regimen.      5.  Smoking cessation counseling provided.  I spent 4 minutes today counseling     the patient on risks of smoking, including throat cancer, lung cancer, COPD,     heart disease and death. I also discussed the benefits of quitting.        6.  Up-to-date with flu, Prevnar and Pneumovax.      7. Since patient declines COVID19 testing, at this  time I recommend that she at     least stay at home and quarantine for the next 14 days. She states that should     be a problem as she has not left the house in months.      8. We will have patient follow up in a couple of weeks and if she is not     improved, might need to give a trial of Zyvox as she has previously grown MRSA     in the past.  The patient will probably also need a noncontrast chest CT at that     time, COVID19 testing if she is amendable to having that done prior to as well     as a sputum culture.      9.  I spent 22 minutes of time today in a TeleHealth visit with this patient,     discussing the case with the patient over the phone and personally reviewing all     pertinent labs, imaging and provider notes.      Codes:  Phone Eval [id number] mi [id number]            Electronically signed by CHARAN ABREU  05/29/2020 15:29       Disclaimer: Converted document may not contain table formatting or lab diagrams. Please see [facility] for the authenticated document.

## 2021-05-28 NOTE — PROGRESS NOTES
Patient: HUSAM SHEIKH     Acct: [id number]     Report: #[id number]  UNIT #: [id number]     : 1965    Encounter Date:2020  PRIMARY CARE: GERMANIA CORADO  ***Signed***  --------------------------------------------------------------------------------------------------------------------  Chief Complaint      Encounter Date      2020            Primary Care Provider      GERMANIA CORADO            Referring Provider      GERMANIA CORADO            Patient Complaint      Patient is complaining of      soa            VITALS      Height 5 ft 4 in / 162.56 cm      Weight 177 lbs 0 oz / 80.302439 kg      BSA 1.86 m2      BMI 30.4 kg/m2      Temperature 98.2 F / 36.78 C - Temporal      Pulse 82      Respirations 16      Blood Pressure 146/48 Sitting, Right Arm      Pulse Oximetry 83%, room air      Comment: o2 went up to 90% with 2 liters of o2            HPI      The patient is a 54 year old female who sees Dr. Dailey on a regular basis. She    has a history of COPD, chronic smoking and obesity. She recentliy had a thyroid     nodule workup and it was planned for surgery tomorrow.  As a part of     preoperative workup she went to the hospital yesterday and was found to be     hypoxic in the 80s and was very drowsy. Because of that, the patient was     referred to us. The patient does take Oxycodone 15 mg three times a day and     gabapentin 600 mg every six hours for pain because of surgery in the past.  She     said she is using Tudorza and does not have significant problems.  She continues    to smoke, smoking one half to one pack per day.  Once she came to our clinic she    was hypoxic when she walked on the hallway down to the low 80s, but her oxygen     recovered as she rested. She gets short of breath with activities that is at her    baseline. She does not have any chest pain, chest tightness, headache,     dizziness, leg swelling, nausea, vomiting and no significant changes in her      symptoms.  She does want to go for surgery tomorrow even if she is at high risk     for the procedure at this time. She does have wheezing and cough at times and     short of breath with activities, but she says this is her baseline.            ROS      Constitutional:  Complains of: Fatigue; Denies: Fever, Weight gain, Weight loss,    Chills, Insomnia, Other      Respiratory/Breathing:  Complains of: Shortness of air, Wheezing; Denies: Cough,    Hemoptysis, Pleuritic pain, Other      Endocrine:  Denies: Polydipsia, Polyuria, Heat/cold intolerance, Abnorml     menstrual pattern, Diabetes, Other      Eyes:  Denies: Blurred vision, Vision Changes, Other      Ears, nose, mouth, throat:  Denies: Mouth lesions, Thrush, Throat pain,     Hoarseness, Allergies/Hay Fever, Post Nasal Drip, Headaches, Recent Head Injury,    Nose Bleeding, Neck Stiffness, Thyroid Mass, Hearing Loss, Ear Fullness, Dry     Mouth, Nasal or Sinus Pain, Dry Lips, Nasal discharge, Nasal congestion, Other      Cardiovascular:  Denies: Palpitations, Syncope, Claudication, Chest Pain, Wake     up Gasping for air, Leg Swelling, Irregular Heart Rate, Cyanosis, Dyspnea on     Exertion, Other      Gastrointestinal:  Denies: Nausea, Constipation, Diarrhea, Abdominal pain,     Vomiting, Difficulty Swallowing, Reflux/Heartburn, Dysphagia, Jaundice,     Bloating, Melena, Bloody stools, Other      Genitourinary:  Denies: Urinary frequency, Incontinence, Hematuria, Urgency,     Nocturia, Dysuria, Testicular problems, Other      Musculoskeletal:  Denies: Joint Pain, Joint Stiffness, Joint Swelling, Myalgias,    Other      Hematologic/lymphatic:  DENIES: Lymphadenopathy, Bruising, Bleeding tendencies,     Other      Neurological:  Denies: Headache, Numbness, Weakness, Seizures, Other      Psychiatric:  Denies: Anxiety, Appropriate Effect, Depression, Other      Sleep:  No: Excessive daytime sleep, Morning Headache?, Snoring, Insomnia?, Stop    breathing at  sleep?, Other      Integumentary:  Denies: Rash, Dry skin, Skin Warm to Touch, Other      Immunologic/Allergic:  Denies: Latex allergy, Seasonal allergies, Asthma,     Urticaria, Eczema, Other      Immunization status:  No: Up to date            FAMILY/SOCIAL/MEDICAL HX      Surgical History:  Yes: Abdominal Surgery (HIATAL HERNIA REPAIR, ING HERNIA     REPAIR), Bowel Surgery (HEMORROID SURGERY INTERNA AND EXTERNAL),     Cholecystectomy, Hernia Surgery, Oral Surgery (TONSILLECTOMY), Orthopedic     Surgery (LT CTR; LEFT ARM ORIF), Tonsils; No: AAA Repair, Adenoids, Angioplasty,    Appendectomy, Back Surgery, Bladder Surgery, Breast Surgery, CABG, Carotid     Stenosis, Ear Surgery, Eye Surgery, Head Surgery, Kidney Surgery, Nose Surgery,     Prostatectomy, Rectal Surgery, Spinal Surgery, Testicular Surgery, Throat     Surgery, Valve Replacement, Vascular Surgery, Other Surgeries      Stroke - Family Hx:  Grandparent      Heart - Family Hx:  Mother, Grandparent      Diabetes - Family Hx:  Brother, Sister, Cousin      Is Father Still Living?:  No      Is Mother Still Living?:  No       Family History:  Yes      Social History:  Tobacco Use; No Alcohol Use, No Recreational Drug use      Smoking status:  Current every day smoker (started at age 19 about 1/2 ppd)       Section:  Yes (2 years ago)      Tubal Ligation:  Yes (2 years ago)      Hysterectomy:  Yes      Anticoagulation Therapy:  No      Medical History:  Yes: Arthritis (HANDS, KNEES), Blood Disease (CIRRHOSIS STAGE     4, ANEMIA), Chronic Bronchitis/COPD (NO INHALERS), Anxiety (HYPERVENTILATES),     Diabetes (IDDM BS -130), Hemorrhoids/Rectal Prob (HIATAL HERNIA;     CONSTIPATION, HX HEMORRHOIDS), Hiatal Hernia (LARGE), High Blood Pressure (ON     MEDS), Reflux Disease, Shortness Of Breath, Thyroid Problem, Miscellaneous     Medical/oth (CIRRHOSIS STAGE 4); No: Alcoholism, Allergies, Anemia, Asthma,     Atrial Fibrillation, Broken Bones, Cataracts,  Chemical Dependency,     Chemotherapy/Cancer, Emphysema, Chronic Liver Disease, Colon Trouble, Colitis,     Diverticulitis, Congestive Heart Failu, Deafness or Ringing Ears, Convulsions,     Depression, Bipolar Disorder, PTSD, Epilepsy, Seizures, Forgetfullness,     Glaucoma, Gall Stones, Gout, Head Injury, Heart Attack (PANIC ATTACK FROM NOT     TAKING ANTIANXIETY MEDS), Heart Murmur, GERD, Hepatitis, High Cholesterol, HIV     (Do not ask - volu, Jaundice, Kidney or Bladder Disease, Kidney Stones, Migrane     Headaches, Mitral Valve Prolapse, Night sweats, Phlebitis, Psychiatric Care,     Rheumatic Fever, Sexually Transmitted Dis, Sinus Trouble, Skin     Disease/Psoriais/Ecz, Stroke, Tuberculosis or Pos TB Te      Psychiatric History      none            PREVENTION      Hx Influenza Vaccination:  No      Influenza Vaccine Declined:  Yes      2 or More Falls in Past Year?:  No      Fall Past Year with Injury?:  No      Hx Pneumococcal Vaccination:  Yes      Encouraged to follow-up with:  PCP regarding preventative exams.      Chart initiated by      gilbert le ma            ALLERGIES/MEDICATIONS      Allergies:        Coded Allergies:             AMOXICILLIN (Verified  Allergy, Unknown, RASH. ITCHING, 11/5/20)           DOXYCYCLINE (Verified  Allergy, Unknown, NAUSEA, ITCHING, 11/5/20)           INFLUENZA VIRUS VACCINES (Verified  Allergy, Unknown, SOB, ITCHING, 11/5/2    0)           PAPER TAPE (Verified  Allergy, Unknown, RASH, ITCHING, 11/5/20)           FLUTICASONE (Verified  Adverse Reaction, Unknown, NOSE BLEEDS, 11/5/20)           NSAIDS (NON-STEROIDAL ANTI-INFLAMMA (Verified  Adverse Reaction, Unknown,     DOES NOT TAKE, 11/5/20)           PENICILLINS (Verified  Adverse Reaction, Unknown, NAUSEA, 11/5/20)      Medications    Last Reconciled on 11/5/20 09:56 by LAURYN QUINONES MD      Lactulose (Enulose*) 10 Gm/15 Ml Solution      30 ML PO QID PRN for CONSTIPATION, ML         Reported         11/5/20        Lactulose (Lactulose*) 10 Gm/15 Ml Solution      30 ML PO QID, #1800 ML 0 Refills         Reported         11/4/20       Albuterol (Proair HFA) 8.5 Gm Inh      2 PUFFS INH RTQ4H PRN for SHORTNESS OF BREATH, #1 INH 0 Refills         Reported         11/4/20       Insulin Human Aspart (novoLOG VIAL) 100 Unit/1 Ml Vial      SUBQ, #1 VIAL 0 Refills         Reported         11/4/20       Cetirizine Hcl (CETIRIZINE HCL) 10 Mg Tablet      10 MG PO QDAY, #30 TAB 0 Refills         Reported         11/4/20       Spironolactone (Spironolactone*) 25 Mg Tablet      25 MG PO QDAY, #30 TAB 0 Refills         Reported         11/4/20       busPIRone HCl (busPIRone HCl) 10 Mg Tablet      10 MG PO BID, #60 TAB         Reported         11/4/20       Lisinopril* (Lisinopril*) 20 Mg Tablet      20 MG PO QDAY, #30 TAB 0 Refills         Reported         11/4/20       Gabapentin (Gabapentin) 600 Mg Tablet      600 MG PO QID, #90 TAB 0 Refills         Reported         11/4/20       Montelukast Sodium (Montelukast*) 10 Mg Tablet      10 MG PO HS for 90 Days, #90 TAB 1 Refill         Prov: CHARAN ABREU         8/5/20       Cyclobenzaprine Hcl (Cyclobenzaprine*) 10 Mg Tablet      10 MG PO TID, #90 TAB 0 Refills         Reported         9/11/19       NEB-Albuterol Sulf (Albuterol) 2.5 Mg/3 Ml Vial.neb      2.5 MG INH Q6H PRN for SHORTNESS OF BREATH, #120 NEB 5 Refills         Prov: KIRK MCMILLAN PA-C         9/11/19       Metoprolol Tartrate (Metoprolol Tartrate) 100 Mg Tablet      100 MG PO BID, TAB         Reported         8/18/18       Cholecalciferol (Vitamin D3) (Vitamin D3) 2,000 U Tablet      2000 UNITS PO QDAY, #30 TAB 0 Refills         Reported         8/18/18       oxyCODONE HCl (Roxicodone) 15 Mg Tablet      15 MG PO TID, TAB         Reported         8/18/18      Current Medications      Current Medications Reviewed 11/5/20            EXAM      CONSTITUTIONAL: Anxious, somewhat drowsy female in no acute distress.        EYES :  Pink conjunctive, no ptosis, PERRL.       ENMT : Nose and ears appear normal, normal dentition, mild posterior pharyngeal     wall erythema, no sinus tenderness. Mallampati classification 2.  She had some     redness around the back of her throat. She said she was recently on antibiotics.           Neck: Nontender, no masses, no thyromegaly, no nodules.      Resp : Bilateral diminished breath sounds, no crackles or rhonchi.  Had     scattered wheezing and expiratory wheezing, resonant to percussion bilaterally.           CVS  : No carotid bruits, s1s2 nl, RRR, no murmur, rubs or gallop, no pedal     edema, nontender to palpation.        Chest wall: Normal rise with inspiration, nontender on palpation.      GI   : Abdomen soft, with no masses, no hepatosplenomegaly, no hernias, BS+      MSK  : Normal gait and station, no digital cyanosis or clubbing       Skin : No rashes, ulcerations or lesions, normal turgor and temperature      Neuro: CN II - XII intact, no sensory deficits, DTRs intact and symmetrical, no     motor weakness      Psych: Appropriate affect, A   Vtials      Vitals:             Height 5 ft 4 in / 162.56 cm           Weight 177 lbs 0 oz / 80.503492 kg           BSA 1.86 m2           BMI 30.4 kg/m2           Temperature 98.2 F / 36.78 C - Temporal           Pulse 82           Respirations 16           Blood Pressure 146/48 Sitting, Right Arm           Pulse Oximetry 83%, room air           Comment: o2 went up to 90% with 2 liters of o2            REVIEW      Results Reviewed      PCCS Results Reviewed?:  Yes Prev Lab Results, Yes Prev Radiology Results, Yes P    revious Mecial Records      Radiographic Results               [facility]                PACS RADIOLOGY REPORT            Patient: HUSAM SHEIKH   Acct: #[id number]   Report: #[id number]            UNIT #: [id number]    DOS: 11/05/20 1042      INSURANCE:MEDICARE PART A   LOCATION:CT      : 1965            PROVIDERS      ADMITTING:     ATTENDING: Juan Barbour      FAMILY:  GERMANIA CORADO   ORDERING:  Juan Barbour         OTHER:    DICTATING:  Matt Vargas MD            REQ #:[id number]   EXAM:CHW - CT CHEST with CONTRAST      REASON FOR EXAM:  DYSPNEA/COUGH      REASON FOR VISIT:  DYSPNEA/COUGH            *******Signed******         PROCEDURE:   CT CHEST W/ CONTRAST             COMPARISON:   [facility], CR, CHEST PA/AP   13:01.  [facility], CT, CHEST W/O CONTRAST, 2018, 14:42.             INDICATIONS:   DYSPNEA/COUGH PRE-OP THYROID             TECHNIQUE:   CT images were obtained with non-ionic intravenous contrast     material.               PROTOCOL:     Standard imaging protocol performed                RADIATION:     DLP: 809 mGy*cm          Automated exposure control was utilized to minimize radiation dose.       CONTRAST:   100cc Isovue 370 I.V.      LABS:     eGFR: >60 ml/min/1.73m2             FINDINGS:         Lung window images reveal no infiltrates, consolidations, or nodules.             Mediastinal windows reveal no mediastinal hilar, or axillary adenopathy.             Extensive coronary artery calcifications are evident.             The liver has a cirrhotic morphology.  Mild splenomegaly is unchanged.  No     peritoneal fluid is       evident.             CONCLUSION:         CT scan of the chest with IV contrast demonstrating no pathologic mass or     adenopathy.             Coronary artery calcifications.             Cirrhosis with mild splenomegaly.              MATT VARGAS MD             Electronically Signed and Approved By: MATT VARGAS MD on 2020 at 14:54                                  Until signed, this is an unconfirmed preliminary report that may contain      errors and is subject to change.                                              COUST:      D:20 1454      PFT Results      Patient: HUSAM SHEIKH    Acct: #[id number]   Report: #[id number]            UNIT #: [id number]    DOS:       LOCATION:[facility]     : 1965            PROVIDERS      ADMITTING:     FAMILY:  GERMANIA CORADO         OTHER:       DICTATING:  CHARAN ABREU MD            REASON FOR VISIT:  COUGH            *******Signed******                                    [facility]                          Health Information Management Services                            [facility]  [phone]               __________________________________________________________________________             Patient Name:                   Attending Physician:      Amy Miles M.D.             Patient Visit # MR #            Admit Date  Disch Date     Location      [id number]    [id number]      2018                 [facility]- -             Date of Birth      1965      __________________________________________________________________________      0821 - DIAGNOSTIC REPORT             PULMONARY FUNCTION TEST             DATE OF STUDY:      2018.             SPIROMETRY:      1.  Mild airflow obstruction present.      2.  FEV1/FVC ratio is normal; however, forced vital capacity is reduced and        total lung capacity is normal suggesting obstruction.  This is mild as post        bronchodilator FEV1 is 2.18 L, 79% predicted.      3.  No bronchodilator response present.      4.  Flow volume loop suggests obstruction.             LUNG VOLUMES:      No evidence of hyperinflation or air trapping.             DIFFUSION CAPACITY:      Diffusion capacity is moderately reduced at 59% predicted.             OVERALL IMPRESSION:      1. Mild airflow obstruction without bronchodilator response present.      2. No evidence of hyperinflation or air trapping present.      3. Diffusion capacity is moderately reduced.      4. These findings can be seen in underlying chronic obstructive pulmonary         disease (COPD).             To be electronically signed in [facility]      [id number] CHARAN ABREU M.D.             AM:rt      D:  11/20/2018 13:30      T:  11/20/2018 14:03      #[id number]             Until signed, this is an unconfirmed preliminary report that may contain      errors and is subject to change.                   Until signed, this is an unconfirmed preliminary report that may contain      errors and is subject to change.                     <Electronically signed by CHARAN ABREU MD>                11/20/18 1557               CHARAN ABREU MD                                                                  MULAA:RJT      D:11/20/18 1330            Assessment      Dyspnea         Dyspnea on exertion - R06.00         Dyspnea type: dyspnea on exertion            Cough - R05            Tobacco abuse - Z72.0            Notes      New Medications      * Lactulose (Enulose*) 10 GM/15 ML SOLUTION: 30 ML PO QID PRN CONSTIPATION      * Fluconazole (Diflucan) 150 MG TABLET: 150 MG PO ONCE #1      New Diagnostics      * Chest W/ Cont CT, 1 DAY         Dx: Dyspnea - R06.00      * Overnight Oximetry, Routine         Dx: Cough - R05      * ABGS, Month         Dx: Cough - R05      PLAN:  The patient is a 54 year old female with chronic heavy smoking, has COPD     and likely has chronic hypoxic respiratory failure now. This is a new finding     where she is found to be hypoxic, but she does not have worsening symptoms per     her report. She is planned for thyroid surgery for a thyroid nodule tomorrow.      1. COPD with worsening hypoxic respiratory failure.  She is currently on     Symbicort. I advised her to use it two puffs twice a day.  I advised her that     she uses nebulizers at least four times a day.  Part of her hypoxia might be     related to her being on heavy narcotics too. I will check arterial blood gas.       2.  I will check a CT scan of the chest without contrast.  She will need oxygen     with  activities.  I will check overnight oximetry, she might need oxygen with     sleep.  Given her COPD, chronic smoking and now hypoxic respiratory failure, she    is at high risk for perioperative pulmonary complications. The patient wants to     go for surgery. Given that this is not acute findings, this likely will be     baseline no matter whether we treat her right now.  As she does not seem to be     in acute exacerbation, I will hold off on cancelling surgery if the CT scan or     blood gas looks okay.  She will likely need to be on oxygen at least with     activities and sleep from now on.  She will likely need sleep study in the     future.        3. For now I have ordered arterial blood gas, CT scan of the chest with contrast    and overnight oximetry. We will try to arrange for oxygen when she goes home     today.  If the arterial blood gas and CT scan are at least in acceptable range,     I will clear her for surgery, even with high risks or perioperative complication    s. If tests are concerning, we might need to cancel the surgery.        4. Follow up in three months with Dr. Dailey, earlier if needed.            Patient Education      Education resources provided:  Yes      Patient Education Provided:  Acute Bronchitis, COPD            Electronically signed by Juan Barbour  11/25/2020 20:38       Disclaimer: Converted document may not contain table formatting or lab diagrams. Please see [employer] for the authenticated document.

## 2021-05-28 NOTE — PROGRESS NOTES
Patient: HUSAM SHEIKH     Acct: [id number]     Report: #[id number]  UNIT #: [id number]     : 1965    Encounter Date:2019  PRIMARY CARE: GERMANIA CORADO   ***Signed***  --------------------------------------------------------------------------------------------------------------------  Chief Complaint      Encounter Date      2019            Primary Care Provider      Germania Corado cfr            Referring Provider      Germania Corado fccrad            Patient Complaint      Patient is complaining of      Pt here for 1 month follow /COPD            VITALS      Height 5 ft 4.00 in / 162.56 cm      Weight 193 lbs 6.000 oz / 87.35212 kg      BSA 1.93 m2      BMI 33.2 kg/m2      Temperature 98.6 F / 37 C - Oral      Pulse 84      Respirations 16      Blood Pressure 198/84 Sitting, Right Arm      Pulse Oximetry 92%, room air      Comment: Rechecked B/P 180/92      Initial Exhaled Nitrous Oxide      Exhaled Nitrous Oxide Results:  5            HPI      The patient is a 53 year old white female patient of Dr. Dailey's last seen by     him in late 2018. She has a history of mild Chronic Obstructive     Pulmonary Disease, chronic bronchitis, ongoing tobacco abuse of cigarettes     trying to quit. She has recently had issues with uncontrolled hypertension and     hypertension urgency requiring an ER visit about a week ago on 19. At that    time her Lisinopril was increased from 10 mg to 40 mg daily. She presented with     a blood pressure of 219/100. She was also having chest pain. Today her blood     pressure is again uncontrolled and the patient states that her PCP had her     decrease the Lisinopril to 30 mg daily. Her initial blood pressure in the office    was 198/84, came down to 180/82 on recheck. The patient denies any chest pain or    dizziness. She is complaining of some increased dyspnea and dry cough. She     denies purulent sputum production. Denies hemoptysis, fever  or chills. She     states she has been wheezing at home. She has seasonal allergies and states that    she has been more short of breath and wheezing more over the past month since     the weather and seasons started changing.             I reviewed her Review of Systems, medical, surgical and family history and agree    with those as entered.      Copies To:   CHARAN ABREU      Constitutional:  Denies: Fatigue, Fever, Weight gain, Weight loss, Chills,     Insomnia, Other      Respiratory/Breathing:  Complains of: Shortness of air, Wheezing, Cough; Denies:    Hemoptysis, Pleuritic pain, Other      Endocrine:  Denies: Polydipsia, Polyuria, Heat/cold intolerance, Abnorml     menstrual pattern, Diabetes, Other      Eyes:  Denies: Blurred vision, Vision Changes, Other      Ears, nose, mouth, throat:  Denies: Congestion, Dysphagia, Hearing Changes, Nose    Bleeding, Nasal Discharge, Throat pain, Tinnitus, Other      Cardiovascular:  Denies: Chest Pain, Exertional dyspnea, Peripheral Edema, Pal    pitations, Syncope, Wake up Gasping for air, Orthopnea, Tachycardia, Other      Gastrointestinal:  Denies: Abdominal pain/cramping, Bloody stools, Constipation,    Diarrhea, Melena, Nausea, Vomiting, Other      Genitourinary:  Denies: Dysuria, Urinary frequency, Incontinence, Hematuria,     Urgency, Other      Musculoskeletal:  Denies: Joint Pain, Joint Stiffness, Joint Swelling, Myalgias,    Other      Hematologic/lymphatic:  DENIES: Lymphadenopathy, Bruising, Bleeding tendencies,     Other      Neurologic:  Denies: Headache, Numbness, Weakness, Seizures, Other      Psychiatric:  Denies: Anxiety, Appropriate Effect, Depression, Other      Sleep:  No: Excessive daytime sleep, Morning Headache?, Snoring, Insomnia?, Stop    breathing at sleep?, Other      Integumentary:  Denies: Rash, Dry skin, Skin Warm to Touch, Other            FAMILY/SOCIAL/MEDICAL HX      Surgical History:  Yes: Abdominal Surgery (HIATAL  HERNIA REPAIR, ING HERNIA     REPAIR), Bowel Surgery (HEMORROID SURGERY INTERNA AND EXTERNAL), Cholecystec    patrica, Hernia Surgery, Oral Surgery (TONSILLECTOMY), Orthopedic Surgery (LT CTR;     LEFT ARM ORIF), Tonsils      Stroke - Family Hx:  Grandparent      Heart - Family Hx:  Mother, Grandparent      Diabetes - Family Hx:  Brother, Sister, Cousin      Is Father Still Living?:  Yes      Is Mother Still Living?:  No       Family History:  Yes      Social History:  Tobacco Use; No Alcohol Use, No Recreational Drug use      Smoking status:  Current every day smoker (.5 ppd x 20 y)       Section:  Yes (2 years ago)      Tubal Ligation:  Yes (2 years ago)      Hysterectomy:  Yes      Anticoagulation Therapy:  No      Antibiotic Prophylaxis:  No      Medical History:  Yes: Arthritis (HANDS, KNEES), Blood Disease (CIRRHOSIS STAGE     4, ANEMIA), Chronic Bronchitis/COPD (NO INHALERS), Anxiety (HYPERVENTILATES),     Diabetes (IDDM BS -130), Hemorrhoids/Rectal Prob (HIATAL HERNIA;     CONSTIPATION, HX HEMORRHOIDS), Hiatal Hernia (LARGE), High Blood Pressure (ON     MEDS), Reflux Disease, Shortness Of Breath, Thyroid Problem, Miscellaneous     Medical/oth (CIRRHOSIS STAGE 4); No: Asthma, Chemotherapy/Cancer, Colon Trouble,    Congestive Heart Failu, Deafness or Ringing Ears, Seizures, Heart Attack (PANIC     ATTACK FROM NOT TAKING ANTIANXIETY MEDS), Sinus Trouble      Psychiatric History      Anxiety            PREVENTION      Hx Influenza Vaccination:  No      Influenza Vaccine Declined:  Yes      2 or More Falls Past Year?:  No      Fall Past Year with Injury?:  No      Hx Pneumococcal Vaccination:  Yes      Encouraged to follow-up with:  PCP regarding preventative exams.      Chart initiated by      Cyndey Lucio MA            ALLERGIES/MEDICATIONS      Allergies:        Coded Allergies:             DOXYCYCLINE (Verified  Allergy, Unknown, NAUSEA, ITCHING, 19)           INFLUENZA VIRUS VACCINES (Verified   Allergy, Unknown, 4/8/19)           FLUTICASONE (Verified  Adverse Reaction, Unknown, NOSE BLEEDS, 4/8/19)           NSAIDS (NON-STEROIDAL ANTI-INFLAMMA (Verified  Adverse Reaction, Unknown,     DOES NOT TAKE, 4/8/19)           PENICILLINS (Verified  Adverse Reaction, Unknown, NAUSEA, 4/8/19)      Medications    Last Reconciled on 4/8/19 10:46 by FELA DUPREE-Albuterol (Proair HFA) 8.5 Gm Hfa.aer.ad      2 PUFFS INH Q4-6H PRN for SHORTNESS OF BREATH, #1 MDI 6 Refills         Prov: Yuridia Miguel PA-C         4/8/19       Cetirizine Hcl (CETIRIZINE HCL) 10 Mg Tablet      10 MG PO QDAY, #30 TAB 11 Refills         Prov: Yuridia Miguel PA-C         4/8/19       predniSONE* (predniSONE*) 20 Mg Tablet      40 MG PO QDAY for 7 Days, #14 TAB 0 Refills         Prov: Yuridia Miguel PA-C         4/8/19       Montelukast Sodium (Montelukast*) 10 Mg Tablet      10 MG PO HS, #10 TAB 11 Refills         Prov: Yuridia Miguel PA-C         4/8/19       Linezoid (Zyvox) 600 Mg Tab      600 MG PO BID for 14 Days, #28 TAB         Prov: CHARAN ABREU         12/3/18       Ondansetron Hcl (ONDANSETRON HCL) 4 Mg Tablet      4 MG PO Q8HR, #20 TAB 0 Refills         Prov: CHARAN ABREU         11/29/18       Furosemide* (Lasix*) 20 Mg Tablet      20 MG PO QDAY, #30 TAB 0 Refills         Reported         10/29/18       Promethazine Hcl (Phenergan*) 25 Mg Tablet      25 MG PO Q6H PRN for NAUSEA, TAB 0 Refills         Reported         10/29/18       Budesonide/Formoterol Fumarate (Symbicort 160/4.5 Mcg) 10.2 Gm Inh      2 PUFF INH BID, #1 INH 5 Refills         Prov: CHARAN ABREU         8/28/18       Spironolactone (Spironolactone*) 25 Mg Tablet      50 MG PO QDAY, #60 TAB 0 Refills         Reported         8/28/18       Metoprolol Tartrate (Metoprolol Tartrate*) 100 Mg Tablet      100 MG PO QDAY, TAB         Reported         8/18/18       Insulin Glargine (Lantus SOLOSTAR) Unknown Strength Insuln.pen      MDD PT ON  SLIDING SCALE PRN, #1 BOX 0 Refills         Reported         8/18/18       Levothyroxine (Levothyroxine) 0.112 Mg Tablet      0.112 MG PO QDAY@07, #30 TAB 0 Refills         Reported         8/18/18       Lisinopril* (Lisinopril*) 10 Mg Tablet      10 MG PO QDAY, #30 TAB 0 Refills         Reported         8/18/18       Meclizine Hcl (Meclizine*) 25 Mg Tablet      25 MG PO TID, #90 TAB 0 Refills         Reported         8/18/18       Lactobacillus Rhamnosus (Culturelle*) 1 Each Capsule      1 CAP PO QDAY, CAP         Reported         8/18/18       Cholecalciferol (Vitamin D3*) 2,000 U Tablet      2000 UNITS PO QDAY, #30 TAB 0 Refills         Reported         8/18/18       oxyCODONE HCl (Roxicodone*) 15 Mg Tablet      15 MG PO Q6H PRN for PAIN, TAB         Reported         8/18/18       morphine Sulfate (Morphine Sulfate ER*) 15 Mg Tablet.er      15 MG PO Q12HR, #60 TAB         Reported         8/18/18       Cyclobenzaprine Hcl (Cyclobenzaprine*) 10 Mg Tablet      10 MG PO TID PRN for MUSCLE SPASMS, TAB 0 Refills         Reported         8/18/18       Gabapentin (Gabapentin) 300 Mg Capsule      300 MG PO TID, #90 CAP 0 Refills         Reported         8/18/18       Vitamin E (Vitamin E*) 400 Units Capsule      400 UNITS PO QDAY, CAP         Reported         8/18/18       metFORMIN HCl/sitaGLIPtin Phos (Janumet 50/1000 MG) 1 Each Tablet      1 TAB PO BID, TAB         Reported         8/18/18      Current Medications      Current Medications Reviewed 4/8/19            EXAM      Vital Signs Reviewed      Gen: WDWN, Alert, NAD.        HEENT:  PERRL, EOMI.  OP, nares clear, no sinus tenderness.      Neck:  Supple, no JVD, no thyromegaly.      Lymph: No axillary, cervical, supraclavicular lymphadenopathy noted bilaterally.      Chest:  Mildly decreased breath sounds throughout but no wheezes, rhonchi or     crackles, normal work of breathing noted.       CV:  RRR, no MGR, pulses 2+, equal.      Abd:  Soft, NT, ND, + BS, no  HSM.      EXT:  No clubbing, no cyanosis, no edema, no joint tenderness.       Neuro:  A  Skin: No rashes or lesions.      Vitals      Vitals:             Height 5 ft 4.00 in / 162.56 cm           Weight 193 lbs 6.000 oz / 87.27315 kg           BSA 1.93 m2           BMI 33.2 kg/m2           Temperature 98.6 F / 37 C - Oral           Pulse 84           Respirations 16           Blood Pressure 198/84 Sitting, Right Arm           Pulse Oximetry 92%, room air           Comment: Rechecked B/P 180/92            REVIEW      Results Reviewed      [facility] Results Reviewed?:  Yes Prev Lab Results, Yes Prev Radiology Results, Yes     Previous [facility] Records            Assessment      COPD exacerbation - J44.1            Notes      New Medications      * predniSONE* 20 MG TABLET: 40 MG PO QDAY 7 Days #14      * CETIRIZINE HCL 10 MG TABLET: 10 MG PO QDAY #30      * MDI-Albuterol (Proair HFA) 8.5 GM HFA.AER.AD: 2 PUFFS INH Q4-6H PRN SHORTNESS       OF BREATH #1         Dx: COPD exacerbation - J44.1      Renewed Medications      * MONTELUKAST SODIUM (Montelukast*) 10 MG TABLET:         From: 10 MG PO HS         To: 10 MG PO HS #10      * Budesonide/Formoterol Fumarate (Symbicort 160/4.5 Mcg) 10.2 GM INH: 2 PUFF INH      BID #1         Dx: SKINNER (dyspnea on exertion) - R06.09      Discontinued Medications      * Dextromethorphan Polistirix (Delsym*) 30 MG/5 ML GENE.ER.12H: 10 ML PO BID PRN       COUGH #120      * CETIRIZINE HCL (ZyrTEC) 10 MG TABLET: 10 MG PO QDAY 30 Days #30         Dx: SKINNER (dyspnea on exertion) - R06.09      New Office Procedures      * Solu-Medrol 62.5 MG, As Soon As Possible         METHYLPRED SOD SUCC (Solu-Medrol) 125 MG VIAL: 62.5 MILLIGRAM INTRAMUSC Qty 1       VIAL         Dx: COPD exacerbation - J44.1      ASSESSMENT:       1. Chronic obstructive pulmonary disease with mild acute exacerbation.       2. Dyspnea.       3. Ongoing tobacco abuse of cigarettes trying to quit.      4. Obesity with BMI 32.5.      5.  Seasonal allergies well controlled.       6. Uncontrolled hypertension with recent hypertensive urgency.             PLAN:      1. I will treat the patient's mild Chronic Obstructive Pulmonary Disease     exacerbation with an intramuscular shot of Solu Medrol 62.5 mg and a short     prednisone burst.  Continue Symbicort 160/4.5 two puffs twice daily.       2. I am very concerned about the patient's blood pressure today and have offered    to call the ER to let them know she will be coming in however she declines to do    that and would rather continue being treated as an outpatient and follow up with    her PCP. I will increase her Lisinopril to 40 mg daily and she is already on Lo    pressor 100 mg Twice daily. I think she will ultimately need a third blood     pressure agent and would recommend working her up for secondary causes of     hypertension.  I have asked her to check her blood pressure twice daily at home     and keep a log. She is to call her PCP in the next 2-3 days if her blood     pressures remains uncontrolled and be seen there no later than the end of the     week or return to the ER. She has verbalized understanding.       3. Continue Singulair and Zyrtec and I have refilled those today. She denies any    postnasal drip symptoms.       4. Smoking cessation was discussed for 4 minutes today and offered nicotine     replacement therapy or medication therapy however she declines.       5. She will be due for Prevnar when she is not sick or having an active Chronic     Obstructive Pulmonary Disease exacerbation.      6. Follow up in 3-4 months with Dr. Dailey, sooner if needed.            Patient Education      Tobacco Cessation Counseling:  for 3 - 10 minutes      Patient Education Provided:  COPD, How to use an Inhaler, How to use a     Nebulizer, Smoking Cessation      Time Spent:  > 50% /[facility]                 Disclaimer: Converted document may not contain table formatting or lab  diagrams. Please see [employer] [facility] for the authenticated document.

## 2021-05-28 NOTE — PROGRESS NOTES
Patient: HUSAM SHEIKH     Acct: [id number]     Report: #[id number]  UNIT #: [id number]     : 1965    Encounter Date:2018  PRIMARY CARE: GERMANIA CORADO   ***Signed***  --------------------------------------------------------------------------------------------------------------------  Chief Complaint      Encounter Date      Aug 28, 2018            Primary Care Provider      GERMANIA CORADO cf            Referring Provider      [facility]            Patient Complaint      Patient is complaining of      COPD            VITALS      Height 5 ft 4 in / 162.56 cm      Weight 203 lbs 2 oz / 92.833400 kg      BSA 2.08 m2      BMI 34.9 kg/m2      Temperature 98.1 F / 36.72 C - Oral      Pulse 82      Respirations 14      Blood Pressure 202/81 Sitting, Left Arm      Pulse Oximetry 95%, roomair      Exhaled Nitrous Oxide Testin            HPI      The patient is a very pleasant 52 year old  female cigarette smoker     with recurrent bouts of bronchitis here for evaluation.  She has had 4-5     episodes of bronchitis yearly for the last 8-9 years. She gets better with s    teroids and antibiotics and then does well for a few months. She reports having     chronic symptoms at baseline including dyspnea on exertion with walking about 8-    900 feet, worse with exertion and going up a flight of steps, moderate in     severity, and relieved with rest.  She also has a chronic cough productive of     clear thin sputum that is white.  She denies any hemoptysis or chest pain.  She     diffusely wheezes all of the time. She is on Singulair, but still has occasional    itchy-scratchy throat, watery eyes or nasal congestion during the day with clear    rhinorrhea.  She has been on Dulera before and Advair which somewhat helped her     symptoms, but currently is only on albuterol as needed four times a day with     transient improvement in her symptoms. She has a nebulizer machine she uses at      home four times a week with some improvement in her symptoms as well.  She does     smoke about 5-6 cigarettes a day and has cut back over time. She smoked one half    pack per day x20 years and is currently down to one quarter pack.  Denies any     nausea, vomiting, fevers, chills, headaches, chest pain or diaphoresis.  She is     able to perform her ADLs without difficulty.  Denies any swollen glands or lymph    nodes of the head and neck.  She denies any recent acid reflux or heartburn.  Of    note, she does have stage 4 cirrhosis secondary to what appears to be FRIED and     follows up with a hepatologist in [place].             I have personally reviewed the review of systems, past family, social, surgical     and medical histories and I agree with the findings.            ROS      Constitutional:  Denies: Fatigue, Fever, Weight gain, Weight loss, Chills,     Insomnia, Other      Respiratory/Breathing:  Complains of: Shortness of air, Wheezing, Cough; Denies:    Hemoptysis, Pleuritic pain, Other      Endocrine:  Denies: Polydipsia, Polyuria, Heat/cold intolerance, Abnorml     menstrual pattern, Diabetes, Other      Eyes:  Denies: Blurred vision, Vision Changes, Other      Ears, nose, mouth, throat:  Denies: Congestion, Dysphagia, Hearing Changes, Nose    Bleeding, Nasal Discharge, Throat pain, Tinnitus, Other      Cardiovascular:  Denies: Chest Pain, Exertional dyspnea, Peripheral Edema,     Palpitations, Syncope, Wake up Gasping for air, Orthopnea, Tachycardia, Other      Gastrointestinal:  Denies: Abdominal pain/cramping, Bloody stools, Constipation,    Diarrhea, Melena, Nausea, Vomiting, Other      Genitourinary:  Denies: Dysuria, Urinary frequency, Incontinence, Hematuria,     Urgency, Other      Musculoskeletal:  Denies: Joint Pain, Joint Stiffness, Joint Swelling, Myalgias,    Other      Hematologic/lymphatic:  DENIES: Lymphadenopathy, Bruising, Bleeding tendencies,     Other      Neurologic:   Denies: Headache, Numbness, Weakness, Seizures, Other      Psychiatric:  Denies: Anxiety, Appropriate Effect, Depression, Other      Sleep:  No: Excessive daytime sleep, Morning Headache?, Snoring, Insomnia?, Stop    breathing at sleep?, Other      Integumentary:  Denies: Rash, Dry skin, Skin Warm to Touch, Other            FAMILY/SOCIAL/MEDICAL HX      Surgical History:  Yes: Abdominal Surgery (HIATAL HERNIA REPAIR, ING HERNIA     REPAIR), Bowel Surgery (HEMORROID SURGERY INTERNA AND EXTERNAL),     Cholecystectomy, Hernia Surgery, Oral Surgery (TONSILLECTOMY), Orthopedic     Surgery (LT CTR; LEFT ARM ORIF), Tonsils      Stroke - Family Hx:  Grandparent      Heart - Family Hx:  Mother, Grandparent      Diabetes - Family Hx:  Brother, Sister, Cousin      Is Father Still Living?:  Yes      Is Mother Still Living?:  No       Family History:  Yes      Social History:  Tobacco Use; No Alcohol Use, No Recreational Drug use      Smoking status:  Current every day smoker (.5 ppd x 20 y)       Section:  Yes (2 years ago)      Tubal Ligation:  Yes (2 years ago)      Hysterectomy:  Yes      Medical History:  Yes: Arthritis (HANDS, KNEES), Blood Disease (CIRRHOSIS STAGE     4, ANEMIA), Chronic Bronchitis/COPD (NO INHALERS), Anxiety (HYPERVENTILATES),     Diabetes (IDDM BS -130), Hemorrhoids/Rectal Prob (HIATAL HERNIA;     CONSTIPATION, HX HEMORRHOIDS), Hiatal Hernia (LARGE), High Blood Pressure (ON     MEDS), Reflux Disease, Shortness Of Breath, Thyroid Problem, Miscellaneous     Medical/oth (CIRRHOSIS STAGE 4); No: Asthma, Chemotherapy/Cancer, Colon Trouble,    Congestive Heart Failu, Deafness or Ringing Ears, Seizures, Heart Attack (PANIC     ATTACK FROM NOT TAKING ANTIANXIETY MEDS)      Psychiatric History      anxiety            PREVENTION      Hx Influenza Vaccination:  No      Influenza Vaccine Declined:  Yes      2 or More Falls Past Year?:  No      Fall Past Year with Injury?:  No      Hx Pneumococcal  Vaccination:  No      Encouraged to follow-up with:  PCP regarding preventative exams.      Chart initiated by      eleanor diane            ALLERGIES/MEDICATIONS      Allergies:        Coded Allergies:             DOXYCYCLINE (Verified  Allergy, Unknown, NAUSEA, ITCHING, 8/28/18)           FLUTICASONE (Verified  Adverse Reaction, Unknown, NOSE BLEEDS, 8/28/18)           NSAIDS (NON-STEROIDAL ANTI-INFLAMMA (Verified  Adverse Reaction, Unknown,     DOES NOT TAKE, 8/28/18)           PENICILLINS (Verified  Adverse Reaction, Unknown, NAUSEA, 8/28/18)      Medications    Last Reconciled on 8/28/18 15:58 by CHARAN ABREU MD      Cetirizine Hcl (ZyrTEC*) 10 Mg Tablet      10 MG PO QDAY, #30 TAB 6 Refills         Prov: CHARAN ABREU         8/28/18       Budesonide/Formoterol Fumarate (Symbicort 160/4.5 Mcg) 10.2 Gm Inh      2 PUFF INH BID, #1 INH 5 Refills         Prov: CHARAN ABREU         8/28/18       Spironolactone (Spironolactone*) 25 Mg Tablet      50 MG PO QDAY, #60 TAB 0 Refills         Reported         8/28/18       Diazepam (Diazepam) 5 Mg Tablet      5 MG PO HS PRN for ANXIETY, TAB 0 Refills         Reported         8/18/18       Montelukast Sodium (Montelukast*) 10 Mg Tablet      10 MG PO HS, TAB         Reported         8/18/18       Metoprolol Tartrate (Metoprolol Tartrate*) 100 Mg Tablet      100 MG PO QDAY, TAB         Reported         8/18/18       Insulin Glargine (Lantus SOLOSTAR) Unknown Strength Insuln.pen      MDD PT ON SLIDING SCALE PRN, #1 BOX 0 Refills         Reported         8/18/18       Levothyroxine (Levothyroxine) 0.112 Mg Tablet      0.112 MG PO QDAY@07, #30 TAB 0 Refills         Reported         8/18/18       Lisinopril* (Lisinopril*) 10 Mg Tablet      10 MG PO QDAY, #30 TAB 0 Refills         Reported         8/18/18       Meclizine Hcl (Meclizine*) 25 Mg Tablet      25 MG PO TID, #90 TAB 0 Refills         Reported         8/18/18       Lactobacillus Rhamnosus (Culturelle*) 1 Each  Capsule      1 CAP PO QDAY, CAP         Reported         8/18/18       Cholecalciferol (Vitamin D3*) 2,000 U Tablet      2000 UNITS PO QDAY, #30 TAB 0 Refills         Reported         8/18/18       morphine Sulfate (Morphine Sulfate ER*) 15 Mg Tablet.er      15 MG PO Q12HR, #60 TAB         Reported         8/18/18       oxyCODONE HCl (Roxicodone*) 15 Mg Tablet      15 MG PO Q6H PRN for PAIN, TAB         Reported         8/18/18       morphine Sulfate (Morphine Sulfate ER*) 15 Mg Tablet.er      15 MG PO Q12HR, #60 TAB         Reported         8/18/18       Cyclobenzaprine Hcl (Cyclobenzaprine*) 10 Mg Tablet      10 MG PO TID PRN for MUSCLE SPASMS, TAB 0 Refills         Reported         8/18/18       Gabapentin (Gabapentin) 300 Mg Capsule      300 MG PO TID, #90 CAP 0 Refills         Reported         8/18/18       Vitamin E (Vitamin E*) 400 Units Capsule      400 UNITS PO QDAY, CAP         Reported         8/18/18       metFORMIN HCl/sitaGLIPtin Phos (Janumet 50/1000 MG) 1 Each Tablet      1 TAB PO BID, TAB         Reported         8/18/18      Current Medications      Current Medications Reviewed 8/28/18            EXAM      Vital Signs Reviewed.      General:  WDWN, Alert, NAD.      HEENT: PERRL, EOMI.  OP, nares clear, no sinus tenderness.      Neck: Supple, no JVD, no thyromegaly.      Lymph: No axillary, cervical, supraclavicular lymphadenopathy noted bilaterally.      Chest: Good aeration, clear to auscultation bilaterally, tympanic to percussion     bilaterally, no work of breathing noted.      CV: RRR, no MGR, pulses 2+, equal.        Abd: Soft, NT, ND, +BS, no HSM.      EXT: No clubbing, no cyanosis, trace BLE edema, no joint tenderness.        Neuro:  A  Skin: Dusky skin changes of the lower extremities consistent with her cirrhosis.      Vitals      Vitals:             Height 5 ft 4 in / 162.56 cm           Weight 203 lbs 2 oz / 92.789261 kg           BSA 2.08 m2           BMI 34.9 kg/m2            Temperature 98.1 F / 36.72 C - Oral           Pulse 82           Respirations 14           Blood Pressure 202/81 Sitting, Left Arm           Pulse Oximetry 95%, roomair            REVIEW      Results Reviewed      PCCS Results Reviewed?:  Yes Prev Lab Results, Yes Prev Radiology Results, Yes     Previous Mecial Records      Lab Results      I reviewed office notes from referring provider. I reviewed labs showing     peripheral eosinophilia and no evidence of chronic hypercapnic respiratory     failure.              [id number]  [id number]                                 [facility] [employer]                            [place] Page  [phone]               __________________________________________________________________________             Patient Name:                   Attending Physician:      Amy Miles APRN             Patient Visit # MR #            Admit Date  Disch Date     Location      [id number]    [id number]      06/15/2018                 [facility] -             Date of Birth      1965      __________________________________________________________________________      0835 - DIAGNOSTIC REPORT             2-D AND M-MODE ECHOCARDIOGRAM REPORT             DATE:      06/15/2018             INDICATION:                                    NORMAL RANGE                TEST RESULTS      ______________________________________________________________________                                                   Systolic       Diastolic      RVID                    0.7-2.4      LVID                    3.7-5.4      POST. WALL THICKNESS    0.8-1.1      SEPTAL WALL THICKNESS   0.7-1.2      LAID                    1.9-3.8      AORTIC ROOT DIMENSION   2.0-3.7      MTRL. VLV. DARCIE. D.D.R. 80mm/sec-150mm/sec      ______________________________________________________________________              COMMENTS:  This is a good echocardiographic study and was obtained while the      patient had sinus rhythm.  The following adequate data was obtained.             1.  The left ventricle was normal in size.  The systolic function was normal.          The estimated left ventricular ejection fraction was 65%.  No apparent          segmental wall motion abnormalities. Mild left ventricular hypertrophy          was noted.      2.  The left atrium was mildly dilated, (4.7 cm).      3.  The aortic root was normal in motion and dimension.      4.  The right sided chambers were normal in size.      5.  Mitral valve excursion was normal. There was no mitral regurgitation.      6.  Pulmonic valve was not well seen.      7.  The aortic valve cusps were not well seen, but color and Doppler study          demonstrated no regurgitation or stenosis.      8.  There was nonsignificant tricuspid regurgitation.      9.  There was no pericardial effusion.      10. There were no apparent intracardiac masses, vegetations, or thrombi.      11. Mitral inflow studies by Doppler demonstrated E/A ratio 1.1, deceleration          time was 190 msec, IVRT was 85 msec, and E/E' was 13.1.             CONCLUSIONS:      1.  Normal left ventricular systolic and diastolic function.      2.  Mild left ventricular hypertrophy.      3.  Mild left atrial dilatation.      4.  Nonsignificant tricuspid regurgitation with no evidence of pulmonary          hypertension.             To be electronically signed in [employer]      [id number] RITA MORRIS M.D.             RE:mervin      D:  06/17/2018 15:29      T:  06/17/2018 16:55      #[id number]             Until signed, this is an unconfirmed preliminary report that may contain      errors and is subject to change.                   06/18/18 1042  <Electronically signed by Rita Morris MD>      Radiographic Results               [facility]                PACS  RADIOLOGY REPORT            Patient: HUSAM SHEIKH   Acct: #[id number]   Report: #[id number]            UNIT #: [id number]    DOS: 18 1017      INSURANCE:MEDICARE PART A   LOCATION:ER     : 1965            PROVIDERS      ADMITTING:     ATTENDING:       :  GERMANIA CORADO   ORDERING:  Michaela Molina         OTHER:    DICTATING:  Gio Gallardo MD            REQ #:[id number]   EXAM:CXR2 - CHEST 2V AP PA LAT      REASON FOR EXAM:  Shortness of Breath      REASON FOR VISIT:  SOA-MANOHAR            *******Signed******         PROCEDURE:   CHEST AP/PA AND LATERAL             COMPARISON:   [facility], , CHEST AP/PA 1 VIEW, 9/15/2017,     19:09.             INDICATIONS:   Shortness of Breath             FINDINGS:         There is prominence of pulmonary vascular markings.  Some of this appearance     could relate to       technique.  Is there any concern for some vascular congestion?  There are no     large pleural       effusions.             CONCLUSION:         1. Prominence of pulmonary vascular markings that may be reflective of some     interstitial edema.        Correlation with clinical findings suggested.  It is possible the appearance of     the chest might       also relate to an inflammatory or infectious process.              GIO GALLARDO MD             Electronically Signed and Approved By: GIO GALLARDO MD on 2018 at 11:42                        Until signed, this is an unconfirmed preliminary report that may contain      errors and is subject to change.                                              KAMCR:      D:18 1142               [facility]                PACS RADIOLOGY REPORT            Patient: HUSAM SHEIKH   Acct: #[id number]   Report: #[id number]            UNIT #: [id number]    DOS: 18 1900      INSURANCE:MEDICARE PART A   LOCATION:ER     : 1965             PROVIDERS      ADMITTING:     ATTENDING:       FAMILY:  GERMANIA CORADO [facility]rad   ORDERING:  YANIRA SEPULVEDA         OTHER:    DICTATING:  Nilay Hamilton MD, JR            REQ #:[id number]   EXAM:ABDPELW - CT ABDOMEN  PELVIS w CONTRAST      REASON FOR EXAM:  Abdominal Pain      REASON FOR VISIT:  HERNIA REMOVED/ABD PAIN            *******Signed******         PROCEDURE:   CT ABDOMEN PELVIS WITH CONTRAST             COMPARISON:   [facility], CT, ABDOMEN W/O CONTRAST,     7/19/2017, 7:56.             INDICATIONS:   MIDLINE ABDOMINAL PAIN/ HERNIA REPAIR CAUSING INFECTION 1 YEAR     AGO.             TECHNIQUE:   After obtaining the patient's consent, multiplanar CT images were     created with non-ionic       intravenous contrast material.               PROTOCOL:     Standard imaging protocol performed                RADIATION:     DLP: 854.2 mGy*cm          Automated exposure control was utilized to minimize radiation dose.       CONTRAST:   100 cc Isovue 370 I.V.             FINDINGS:      Diffuse hepatic steatosis is seen.  Cirrhosis is suspected.      There is splenomegaly.  The       main portal vein is patent.  There may be portosystemic venous shunting with     portal hypertension.        The patient has undergone cholecystectomy.    No acute pancreatitis is     appreciated.  Please       correlate with pertinent lab values.  There is been ventral hernia repair.  The     appendix is without       acute abnormality.  There is mild diffuse prominence of the wall of the colon,     which is thought       more likely to be related to under-distention artifact than to represent an     acute       infectious/inflammatory colitis or protein-losing enteropathy.  There is     extensive atherosclerosis.          No aneurysmal enlargement of the abdominal aorta is identified.  No     pneumoperitoneum or       pneumatosis is seen.  No acute diverticulitis is suggested.  The patient has     undergone        hysterectomy.  There are pelvic phleboliths.  No renal stones or hydronephrosis     or obstructive       uropathy is seen.  No acute pyelonephritis.  No acute infiltrate is seen in the     imaged lung bases.        No focal fluid collection is seen in the anterior abdominal wall.  There is a     supraumbilical       ventral hernia, which does not contain bowel.  It may represent a recurrent     ventral hernia.  It was       seen previously and is unchanged.  It measures approximately 7.6 x 2.9 x 3.1 cm     in transverse, AP,       and craniocaudal extent, respectively.  It is at the expected inferior margin of    the prior       supraumbilical ventral hernia repair.  There are scattered mesenteric and     retroperitoneal lymph       nodes, which were seen previously.  There are degenerative changes of the     sacroiliac joints and the       imaged spine.  The inflammatory stranding in the right lower quadrant is     unchanged since the prior       exam. No change is seen in the small right parapelvic renal cysts.             CONCLUSION:        No definite acute finding is appreciated.               ISATU BROWN JR, MD             Electronically Signed and Approved By: ISATU BROWN JR, MD on 8/18/2018 at     19:38                        Until signed, this is an unconfirmed preliminary report that may contain      errors and is subject to change.                                              CARJI:      D:08/18/18 1938            Assessment      SKINNER (dyspnea on exertion) - R06.09            Cough - R05            Wheeze - R06.2            Seasonal allergies - J30.2            Obesity (BMI 30-39.9) - E66.9            Tobacco abuse - Z72.0            Notes      New Medications      * Spironolactone (Spironolactone*) 25 MG TABLET: 50 MG PO QDAY #60      * Budesonide/Formoterol Fumarate (Symbicort 160/4.5 Mcg) 10.2 GM INH: 2 PUFF INH      BID #1         Dx: SKINNER (dyspnea on exertion) - R06.09      * Cetirizine Hcl  (ZyrTEC*) 10 MG TABLET: 10 MG PO QDAY #30         Dx: SKINNER (dyspnea on exertion) - R06.09      New Diagnostics      * 6 Min Walk With Pulse Ox, Routine         Dx: SKINNER (dyspnea on exertion) - R06.09      * Chest W/O Cont CT, SCHEDULED PROCEDURE         Dx: SKINNER (dyspnea on exertion) - R06.09      * PFT-Comp, PrePost,DLCO,BodyBox, Week         Dx: SKINNER (dyspnea on exertion) - R06.09      * Alpha 1 Antitrypsin , Month         Dx: SKINNER (dyspnea on exertion) - R06.09      IMPRESSION:      1. Dyspnea on exertion.      2. Cough.      3.  Wheeze.      4.  Tobacco abuse with cigarettes.      5. Compensated cirrhosis secondary to FRIED.        6. Obesity BMI 34.9.      7. Seasonal allergies, poorly controlled.      8.  Allergic rhinitis, poorly controlled.      9.  Recurrent bronchitis.               PLAN:      1.  The patient likely has underlying obstructive lung disease, favor COPD     versus asthma or a combination of asthma COPD overlap syndrome.  This does not     appear to be grossly volume overloaded, so I do not think her cirrhosis is     playing any role in her dyspnea at this time.      2.  I performed exhale nitric oxide testing in the office today.  Level was 5     indicative of no eosinophilic airway inflammation.       3. We will do trial of LABA/ICS with Symbicort 160/4.5 two puffs twice a day.      4. Inhaler education provided today.      5. Continue Singulair nightly and add Zyrtec 10 mg morning.      6. Check noncontrast chest CT.      7. Check PFTs and six minute walk test to assess for airflow obstruction.      8.  Check alpha 1 antitrypsin level and genotype.      9. Diet and exercise counseling provided today.  I spent four minutes counseling    the patient on a 1.5  liter fluid restriction and 2 gram sodium restriction in     the setting of her cirrhosis.  She verbalized understanding. I also discuss     daily exercise of 30 minutes which she will try to do.      10. Continue current diuretic regimen for  cirrhosis.      11.  Smoking cessation counseling provided.  I spent 4 minutes today counseling     the patient on risks of smoking, including throat cancer, lung cancer, COPD,     heart disease and death. I also discussed the benefits of quitting.  The patient    is trying to cut back on her own with some assistance and nicotine replacement     therapy. I recommend continuing this regimen.  [phone] number provided.        12. She is up-to-date with her flu vaccine. She likely with need Prevnar and     Pneumovax which I will discuss next visit.      13. I will have patient follow up with me in 2-3 months to reassess symptoms and    discuss test results.            Patient Education      ACO BMI High above 25:  Counseling Given, Encouraged weight loss, Encourage     dietary changes      Patient Education Provided:  How to use an Inhaler, Smoking Cessation                 Disclaimer: Converted document may not contain table formatting or lab diagrams. Please see [employer] [facility] for the authenticated document.

## 2021-05-28 NOTE — PROGRESS NOTES
Patient: HUSAM SHEIKH     Acct: [id number]     Report: #[id number]  UNIT #: [id number]     : 1965    Encounter Date:2021  PRIMARY CARE: GERMANIA CORADO  ***Signed***  --------------------------------------------------------------------------------------------------------------------  Chief Complaint      Encounter Date      2021            Primary Care Provider      GERMANIA CORADO            Referring Provider      GERMANIA CORADO            Patient Complaint      Patient is complaining of      PT here today for acute visit chest tightness, cough, nasal congestion, COPD            VITALS      Height 5 ft 4.00 in / 162.56 cm      Weight 194 lbs  / 87.715153 kg      BSA 1.93 m2      BMI 33.3 kg/m2      Temperature 97.1 F / 36.17 C - Temporal      Pulse 67      Respirations 17      Blood Pressure 190/88 Sitting, Right Arm      Pulse Oximetry 98%, room air            HPI      The patient is a 55 year old female patient of Dr. Dailey's who is a cigarette     smoker with COPD who presents for follow up visit today. The patient states over    the last week she has been having increasing productive cough, wheezing,     shortness of breath and sinus pain.  The patient states she is taking Symbicort     everyday as prescribed and uses albuterol inhaler and nebulizer treatments at     home as needed. The patient currently denies any fever, chills, night sweats,     hemoptysis, purulent sputum production, chest pain, chest tightness, swollen     glands in the head and neck, abdominal pain, vomiting or diarrhea.   The patient    states she has had some nausea from her sinuses when they drain down the back of    her throat and would like to have something for nausea.  The patient denies any     headaches, myalgias, sore throat, changes in sense of taste and/or smell or any     other coronavirus or flu-like symptoms.  The patient denies any leg swelling,     paroxysmal nocturnal dyspnea or  orthopnea.  The patient states she is still     smoking everyday and will try to quit on her own.            I have personally reviewed the review of systems, past family, social, surgical     and medical histories and I agree with those as entered in the chart.      Copies To:   CHARAN ABREU      Constitutional:  Denies: Fatigue, Fever, Weight gain, Weight loss, Chills,     Insomnia, Other      Respiratory/Breathing:  Complains of: Shortness of air, Wheezing, Cough; Denies:    Hemoptysis, Pleuritic pain      Endocrine:  Denies: Polydipsia, Polyuria, Heat/cold intolerance, Abnorml     menstrual pattern, Diabetes, Other      Eyes:  Denies: Blurred vision, Vision Changes, Other      Ears, nose, mouth, throat:  Complains of: Congestion, Nasal Discharge; Denies:     Dysphagia, Hearing Changes, Nose Bleeding, Throat pain, Tinnitus, Other      Cardiovascular:  Denies: Chest Pain, Exertional dyspnea, Peripheral Edema,     Palpitations, Syncope, Wake up Gasping for air, Orthopnea, Tachycardia, Other      Gastrointestinal:  Complains of: Nausea; Denies: Abdominal pain/cramping, Bloody    stools, Constipation, Diarrhea, Melena, Vomiting, Other      Genitourinary:  Denies: Dysuria, Urinary frequency, Incontinence, Hematuria,     Urgency, Other      Musculoskeletal:  Denies: Joint Pain, Joint Stiffness, Joint Swelling, Myalgias,    Other      Hematologic/lymphatic:  DENIES: Lymphadenopathy, Bruising, Bleeding tendencies,     Other      Neurologic:  Denies: Headache, Numbness, Weakness, Seizures, Other      Psychiatric:  Denies: Anxiety, Appropriate Effect, Depression, Other      Sleep:  No: Excessive daytime sleep, Morning Headache?, Snoring, Insomnia?, Stop    breathing at sleep?, Other      Integumentary:  Denies: Rash, Dry skin, Skin Warm to Touch, Other            FAMILY/SOCIAL/MEDICAL HX      Surgical History:  Yes: Abdominal Surgery (HIATAL HERNIA REPAIR, ING HERNIA     REPAIR), Bowel Surgery (HEMORROID  SURGERY INTERNA AND EXTERNAL),     Cholecystectomy, Hernia Surgery, Oral Surgery (TONSILLECTOMY), Orthopedic     Surgery (LT CTR; LEFT ARM ORIF), Tonsils; No: AAA Repair, Adenoids, Angioplasty,    Appendectomy, Back Surgery, Bladder Surgery, Breast Surgery, CABG, Carotid     Stenosis, Ear Surgery, Eye Surgery, Head Surgery, Kidney Surgery, Nose Surgery,     Prostatectomy, Rectal Surgery, Spinal Surgery, Testicular Surgery, Throat     Surgery, Valve Replacement, Vascular Surgery, Other Surgeries      Stroke - Family Hx:  Grandparent      Heart - Family Hx:  Mother, Grandparent      Diabetes - Family Hx:  Brother, Sister, Cousin      Is Father Still Living?:  No      Is Mother Still Living?:  No       Family History:  Yes      Social History:  Tobacco Use; No Alcohol Use, No Recreational Drug use      Smoking status:  Current every day smoker (started at age 19 smokes about 1/2     ppd, now smokes 1-2 ciggs day)       Section:  Yes (2 years ago)      Tubal Ligation:  Yes (2 years ago)      Hysterectomy:  Yes      Anticoagulation Therapy:  No      Antibiotic Prophylaxis:  No      Medical History:  Yes: Arthritis (HANDS, KNEES), Blood Disease (CIRRHOSIS STAGE     4, ANEMIA), Chronic Bronchitis/COPD (NO INHALERS), Anxiety (HYPERVENTILATES),     Diabetes (IDDM BS -130), Hemorrhoids/Rectal Prob (HIATAL HERNIA;     CONSTIPATION, HX HEMORRHOIDS), Hiatal Hernia (LARGE), High Blood Pressure (ON     MEDS), Reflux Disease, Shortness Of Breath, Thyroid Problem, Miscellaneous     Medical/oth (CIRRHOSIS STAGE 4); No: Alcoholism, Allergies, Anemia, Asthma,     Atrial Fibrillation, Broken Bones, Cataracts, Chemical Dependency,     Chemotherapy/Cancer, Emphysema, Chronic Liver Disease, Colon Trouble, Colitis,     Diverticulitis, Congestive Heart Failu, Deafness or Ringing Ears, Convulsions,     Depression, Bipolar Disorder, PTSD, Epilepsy, Seizures, Forgetfullness,     Glaucoma, Gall Stones, Gout, Head Injury, Heart  Attack (PANIC ATTACK FROM NOT     TAKING ANTIANXIETY MEDS), Heart Murmur, GERD, Hepatitis, High Cholesterol, HIV     (Do not ask - volu, Jaundice, Kidney or Bladder Disease, Kidney Stones, Migrane     Headaches, Mitral Valve Prolapse, Night sweats, Phlebitis, Psychiatric Care,     Rheumatic Fever, Sexually Transmitted Dis, Sinus Trouble, Skin     Disease/Psoriais/Ecz, Stroke, Tuberculosis or Pos TB Te      Psychiatric History      none            PREVENTION      Hx Influenza Vaccination:  No      Influenza Vaccine Declined:  Yes      2 or More Falls in Past Year?:  No      Fall Past Year with Injury?:  No      Hx Pneumococcal Vaccination:  Yes      Encouraged to follow-up with:  PCP regarding preventative exams.      Chart initiated by      Louise Nassar CMA            ALLERGIES/MEDICATIONS      Allergies:        Coded Allergies:             POTASSIUM (Verified  Allergy, Intermediate, RASH, 4/14/21)                  SHAKY, BREAKS OUT IN ITCHY RASH           AMOXICILLIN (Verified  Allergy, Unknown, RASH. ITCHING, 4/14/21)           DOXYCYCLINE (Verified  Allergy, Unknown, NAUSEA, ITCHING, 4/14/21)           INFLUENZA VIRUS VACCINES (Verified  Allergy, Unknown, SOB, ITCHING,     4/14/21)           PAPER TAPE (Verified  Allergy, Unknown, RASH, ITCHING, 4/14/21)           FLUTICASONE (Verified  Adverse Reaction, Unknown, NOSE BLEEDS, 4/14/21)           NSAIDS (NON-STEROIDAL ANTI-INFLAMMA (Verified  Adverse Reaction, Unknown, D    OES NOT TAKE, 4/14/21)           PENICILLINS (Verified  Adverse Reaction, Unknown, NAUSEA, 4/14/21)      Medications    Last Reconciled on 4/14/21 16:13 by DEANGELO MICHEL,       Azithromycin Z-Philippe (Zithromax Z-Philippe) 250 Mg Tablet      250 MG PO ASDIR, #1 PKT         Prov: DEANGELO MICHEL [facility]         4/14/21       Promethazine Hcl (Phenergan*) 25 Mg Supp.rect      25 MG RECTAL Q4H PRN for NAUSEA AND/OR VOMITING, #2 SUPP 0 Refills         Prov: DEANGELO MICHEL [facility]         4/14/21        Furosemide (Furosemide) 20 Mg Tablet      20 MG PO QDAY, #30 TAB 3 Refills         Prov: CHARAN ABREU         4/5/21       Budesonide/Formoterol Fumarate (Symbicort 160/4.5 Mcg) 10.2 Gm Inh      2 PUFF INH BID, #1 INH 11 Refills         Prov: CHARAN ABREU         3/2/21       Calcium Carbonate/Vitamin D3 (OYSCO 500-VIT D3 200 TABLET) 1 Each Tablet      2 EACH PO TID for 30 Days, #180 TABLET 2 Refills         Prov: MARINA Anthony         11/6/20       Lactulose (Enulose*) 10 Gm/15 Ml Solution      30 ML PO QID PRN for CONSTIPATION, ML         Reported         11/5/20       Lactulose (Lactulose*) 10 Gm/15 Ml Solution      30 ML PO QID, #1800 ML 0 Refills         Reported         11/4/20       Insulin Human Aspart (novoLOG VIAL) 100 Unit/1 Ml Vial      SUBQ, #1 VIAL 0 Refills         Reported         11/4/20       Cetirizine Hcl (CETIRIZINE HCL) 10 Mg Tablet      10 MG PO QDAY, #30 TAB 0 Refills         Reported         11/4/20       Spironolactone (Spironolactone*) 25 Mg Tablet      25 MG PO QDAY, #30 TAB 0 Refills         Reported         11/4/20       busPIRone HCl (busPIRone HCl) 10 Mg Tablet      10 MG PO BID, #60 TAB         Reported         11/4/20       Lisinopril* (Lisinopril*) 20 Mg Tablet      20 MG PO QDAY, #30 TAB 0 Refills         Reported         11/4/20       Gabapentin (Gabapentin) 600 Mg Tablet      600 MG PO QID, #90 TAB 0 Refills         Reported         11/4/20       Montelukast Sodium (Montelukast*) 10 Mg Tablet      10 MG PO HS for 90 Days, #90 TAB 1 Refill         Prov: CHARAN ABREU         8/5/20       Cyclobenzaprine Hcl (Cyclobenzaprine*) 10 Mg Tablet      10 MG PO TID, #90 TAB 0 Refills         Reported         9/11/19       NEB-Albuterol Sulf (Albuterol) 2.5 Mg/3 Ml Vial.neb      2.5 MG INH Q6H PRN for SHORTNESS OF BREATH, #120 NEB 5 Refills         Prov: KIRK MCMILLAN PA-C         9/11/19       Metoprolol Tartrate (Metoprolol Tartrate) 100 Mg Tablet      100 MG PO BID, TAB          Reported         8/18/18       Cholecalciferol (Vitamin D3) (Vitamin D3) 2,000 U Tablet      2000 UNITS PO QDAY, #30 TAB 0 Refills         Reported         8/18/18       oxyCODONE HCl (Roxicodone) 15 Mg Tablet      15 MG PO TID, TAB         Reported         8/18/18      Current Medications      Current Medications Reviewed 4/14/21            EXAM      Vital Signs Reviewed.      General:  WDWN, Alert, NAD.      HEENT: PERRL, EOMI.  OP, nares with mildly swollen turbinates, clear drainage,     bilateral maxillary sinus tenderness.        Neck: Supple, no JVD, no thyromegaly.      Lymph: No axillary, cervical, supraclavicular lymphadenopathy noted bilaterally.      Chest: Mildly decreased breath sounds throughout, no wheezes, rales or rhonchi     appreciated, normal work of breathing noted.  Patient is able to speak full     sentences without difficulty.        CV: RRR, no MGR, pulses 2+, equal.        Abd: Soft, NT, ND, +BS, no HSM.      EXT: No clubbing, no cyanosis, no edema, no joint tenderness.        Neuro:  A  Skin: No rashes or lesions.      Vitals      Vitals:             Height 5 ft 4.00 in / 162.56 cm           Weight 194 lbs  / 87.137747 kg           BSA 1.93 m2           BMI 33.3 kg/m2           Temperature 97.1 F / 36.17 C - Temporal           Pulse 67           Respirations 17           Blood Pressure 190/88 Sitting, Right Arm           Pulse Oximetry 98%, room air            REVIEW      Results Reviewed      PCCS Results Reviewed?:  Yes Prev Lab Results, Yes Prev Radiology Results, Yes     Previous [facility] Records      Lab Results      I reviewed Dr. Dailey's last office visit note.            Assessment      Cough - R05            Shortness of breath - R06.02            Notes      New Medications      * Azithromycin Z-Philippe (Zithromax Z-Philippe) 250 MG TABLET: 250 MG PO ASDIR #1         Instructions: Take 500 mg (two tablets) by mouth the first day, then 250 mg        (one tablet) daily until all taken.          Dx: Cough - R05      Renewed Medications      * Promethazine Hcl (Phenergan*) 25 MG SUPP.RECT: 25 MG RECTAL Q4H PRN NAUSEA       AND/OR VOMITING #2      New Diagnostics      * Chest 2 View, 1 DAY         Dx: Cough - R05      * CBC With Auto Diff, 1 DAY         Dx: Cough - R05      * Sputum Culture W/Gram Stain         Dx: Cough - R05      ASSESSMENT:       1. COPD with acute exacerbation, FEV1 of 79%.        2. Acute sinusitis.      3. Acute on chronic dyspnea.      4. Acute cough.      5.  Hypertension.      6. Chronic thyroiditis, status post thyroidectomy.      7. Seasonal allergies.      8.  Allergic rhinitis.      9. Ongoing tobacco abuse with cigarettes.            PLAN:      1.  I will prescribe patient a Z-Philippe for COPD exacerbation and Phenergan     suppositories for nausea.  Expectations and course of treatment were discussed     with patient.  How to take medications and possible side effects of medication     sdiscussed with patient.  The patient verbalized understanding and compliance.        2.  BP is extremely elevated in the office. The patient is advised to go to the     ER immediately.  The risks of not going to the ER discussed with the patient     such as worsening condition, delay in diagnosis and death discussed with patient    and patient verbalized understanding. The patient states she will talk with her      and he will take her to the ER.      3. I ordered a chest x-ray, sputum culture and a CBC.      4. I spent three minutes today counseling the patient on smoking cessation.  I     counseled the patient on the risks of continued smoking including the risk of     lung cancer, head and neck cancer, renal cancer, heart disease, stroke, and     early death. The patient refuses nicotine therapy or pharmacotherapy at this     time.  Patient is advised to decrease the number of cigarettes she is smoking up    to the point where she can quit.        5. I will have our office place a  call to the patient's PCP now to see if we can    get patient in as soon as possible as patient will need BP medication adjusted.           6.  The patient to continue Symbicort everyday as prescribed and rinse her mouth    out after each use.      7.  The patient to continue albuterol inhaler and nebulizer treatments as     needed.      8. The patient to continue Singulair and Zyrtec for seasonal allergies/allergic     rhinitis.      9. Patient is advised to call the office, 911 or go to the ER with any new or     worsening symptoms.      10. The patient reports she is up-to-date with her flu vaccine.  The patient is     advised to continue to follow CDC recommendations of social distancing, wearing     a mask and washing hands for at least 20 seconds.        11.  Recommended patient have COVID testing, however patient states she was just    recently tested for COVID twice and it came back negative and declines to have a    COVID test at this time.       12. We will discuss pneumonia vaccine at patient's next visit as she is not     feeling well today.      13.  The patient to follow up after ER visit, sooner if needed.                           PLAN:      1.            Patient Education      Tobacco Cessation Counseling:  for 3 - 10 minutes      Patient Education Provided:  COPD, Smoking Cessation      Time Spent:  > 50% /Coord Care            Electronically signed by DEANGELO MICHEL [facility]  04/19/2021 10:12       Disclaimer: Converted document may not contain table formatting or lab diagrams. Please see [employer] for the authenticated document.

## 2021-05-28 NOTE — PROGRESS NOTES
Patient: HUSAM SHEIKH     Acct: [id number]     Report: #[id number]  UNIT #: [id number]     : 1965    Encounter Date:2020  PRIMARY CARE: GERMANIA CORADO  ***Signed***  --------------------------------------------------------------------------------------------------------------------  History of Present Illness            Chief Complaint: 4 week f/u, COPD            Husam Sheikh is presenting for evaluation via Telehealth visit. Verbal consent     obtained before beginning visit.            PAST MEDICAL HISTORY/OVERVIEW OF PATIENT SYMPTOMS            Hx: COPD, Pneumonia      Current every day smoker (started at age 19 about 1/2 ppd)      Flu- allergic      Pneumonia- current            Complaints- productive cough, recovering from thyroid surgery            Provider spent 16 minutes with the patient during telehealth visit.            The following staff were present during this visit: Dr. Cuong Dailey, [facility]            The patient is a 54 year old  female cigarette smoker with COPD here     for follow up.  She had a thyroidectomy done recently and feels better in that     regard.  CAT scan was done recently noting no pulmonary nodules and essentially     shows normal lung fields other than some emphysema and cirrhosis.  She has had     worsening dyspnea over the last week. She gets short of breath walking 300-400     feet, moderate in severity, worse with exertion and relieved with rest. She has     a hacking cough productive of thick creamy sputum over the last few days with     increasing wheezing. Denies any nausea, vomiting, fevers, chills, headaches,     chest pain, weight loss or hemoptysis.  She is able to perform her ADLs without     difficulty.  Denies any swollen glands or lymph nodes of the head and neck.  She    is still smoking and is cutting back.  She is smoking about 5-6 cigarettes a     day.            I have personally reviewed the review of systems,  past family, social, surgical     and medical histories and I agree with the findings.              There is no exam performed as this is a TeleHealth telephone visit.              I personally reviewed my last office visit note. I personally reviewed Dr. Max abdalla's last office visit note.  I personally reviewed a CAT scan that was done in    2020.  I reviewed her thyroidectomy op note and her thyroidectomy pathology     from 2020 which showed nodular hyperplasia with chronic thyroiditis.                       [facility]                PACS RADIOLOGY REPORT            Patient: HUSAM SHEIKH   Acct: #[id number]   Report: #[id number]            UNIT #: [id number]    DOS: 20 1042      INSURANCE:MEDICARE PART A   LOCATION:CT     : 1965            PROVIDERS      ADMITTING:     ATTENDING: Juan Barbour      FAMILY:  GERMANIA CORADO   ORDERING:  Juan Barbour         OTHER:    DICTATING:  Enoch Castaneda MD            REQ #:[id number]   EXAM:CHW - CT CHEST with CONTRAST      REASON FOR EXAM:  DYSPNEA/COUGH      REASON FOR VISIT:  DYSPNEA/COUGH            *******Signed******         PROCEDURE:   CT CHEST W/ CONTRAST             COMPARISON:   [facility], CR, CHEST PA/AP   13:01.  [facility], CT, CHEST W/O CONTRAST, 2018, 14:42.             INDICATIONS:   DYSPNEA/COUGH PRE-OP THYROID             TECHNIQUE:   CT images were obtained with non-ionic intravenous contrast     material.               PROTOCOL:     Standard imaging protocol performed                RADIATION:     DLP: 809 mGy*cm          Automated exposure control was utilized to minimize radiation dose.       CONTRAST:   100cc Isovue 370 I.V.      LABS:     eGFR: >60 ml/min/1.73m2             FINDINGS:         Lung window images reveal no infiltrates, consolidations, or nodules.             Mediastinal windows reveal no mediastinal hilar, or axillary  adenopathy.             Extensive coronary artery calcifications are evident.             The liver has a cirrhotic morphology.  Mild splenomegaly is unchanged.  No     peritoneal fluid is       evident.             CONCLUSION:         CT scan of the chest with IV contrast demonstrating no pathologic mass or     adenopathy.             Coronary artery calcifications.             Cirrhosis with mild splenomegaly.              MATT VARGAS MD             Electronically Signed and Approved By: MATT VARGAS MD on 11/05/2020 at 14:54                                  Until signed, this is an unconfirmed preliminary report that may contain      errors and is subject to change.                                              COUST:      D:11/05/20 1454            Allergies and Medications      Allergies:        Coded Allergies:             POTASSIUM (Verified  Allergy, Intermediate, RASH, 11/17/20)                  SHAKY, BREAKS OUT IN ITCHY RASH           AMOXICILLIN (Verified  Allergy, Unknown, RASH. ITCHING, 11/17/20)           DOXYCYCLINE (Verified  Allergy, Unknown, NAUSEA, ITCHING, 11/17/20)           INFLUENZA VIRUS VACCINES (Verified  Allergy, Unknown, SOB, ITCHING,     11/17/20)           PAPER TAPE (Verified  Allergy, Unknown, RASH, ITCHING, 11/17/20)           FLUTICASONE (Verified  Adverse Reaction, Unknown, NOSE BLEEDS, 11/17/20)           NSAIDS (NON-STEROIDAL ANTI-INFLAMMA (Verified  Adverse Reaction, Unknown,     DOES NOT TAKE, 11/17/20)           PENICILLINS (Verified  Adverse Reaction, Unknown, NAUSEA, 11/17/20)      Medications    Last Reconciled on 11/17/20 14:44 by CHARAN ABREU MD      Furosemide (Furosemide) 20 Mg Tablet      20 MG PO QDAY, #30 TAB 3 Refills         Prov: CHARAN ABREU         11/17/20       predniSONE (Deltasone) 10 Mg Tablet      20 MG PO QDAY, #14 TAB 0 Refills         Prov: CHARAN ABREU         11/17/20       Azithromycin Z-Philippe (Zithromax Z-Philippe) 250 Mg Tablet      250 MG PO  ASDIR, #1 PACKET 0 Refills         Prov: CHARAN ABREU         11/17/20       Calcium Carbonate/Vitamin D3 (OYSCO 500-VIT D3 200 TABLET) 1 Each Tablet      2 EACH PO TID for 30 Days, #180 TABLET 2 Refills         Prov: MARINA Anthony         11/6/20       Promethazine Hcl (Phenergan*) 25 Mg Supp.rect      25 MG RECTAL Q4H PRN for NAUSEA AND/OR VOMITING, #2 SUPP 0 Refills         Prov: MARINA Anthony         11/6/20       Lactulose (Enulose*) 10 Gm/15 Ml Solution      30 ML PO QID PRN for CONSTIPATION, ML         Reported         11/5/20       Lactulose (Lactulose*) 10 Gm/15 Ml Solution      30 ML PO QID, #1800 ML 0 Refills         Reported         11/4/20       Insulin Human Aspart (novoLOG VIAL) 100 Unit/1 Ml Vial      SUBQ, #1 VIAL 0 Refills         Reported         11/4/20       Cetirizine Hcl (CETIRIZINE HCL) 10 Mg Tablet      10 MG PO QDAY, #30 TAB 0 Refills         Reported         11/4/20       Spironolactone (Spironolactone*) 25 Mg Tablet      25 MG PO QDAY, #30 TAB 0 Refills         Reported         11/4/20       busPIRone HCl (busPIRone HCl) 10 Mg Tablet      10 MG PO BID, #60 TAB         Reported         11/4/20       Lisinopril* (Lisinopril*) 20 Mg Tablet      20 MG PO QDAY, #30 TAB 0 Refills         Reported         11/4/20       Gabapentin (Gabapentin) 600 Mg Tablet      600 MG PO QID, #90 TAB 0 Refills         Reported         11/4/20       Montelukast Sodium (Montelukast*) 10 Mg Tablet      10 MG PO HS for 90 Days, #90 TAB 1 Refill         Prov: CHARAN ABREU         8/5/20       Cyclobenzaprine Hcl (Cyclobenzaprine*) 10 Mg Tablet      10 MG PO TID, #90 TAB 0 Refills         Reported         9/11/19       NEB-Albuterol Sulf (Albuterol) 2.5 Mg/3 Ml Vial.neb      2.5 MG INH Q6H PRN for SHORTNESS OF BREATH, #120 NEB 5 Refills         Prov: KIRK MCMILLAN PA-C         9/11/19       Metoprolol Tartrate (Metoprolol Tartrate) 100 Mg Tablet      100 MG PO BID, TAB         Reported         8/18/18        Cholecalciferol (Vitamin D3) (Vitamin D3) 2,000 U Tablet      2000 UNITS PO QDAY, #30 TAB 0 Refills         Reported         8/18/18       oxyCODONE HCl (Roxicodone) 15 Mg Tablet      15 MG PO TID, TAB         Reported         8/18/18            Assessment      Cough R05, Shortness of Air  R06.02            Plan      Orders:  Phone Eval 11-20 mi [id number]      Instructions      * Chronic conditions reviewed and taken in consideration for today's treatment       plan.      * Plan Of Care: ()      * Patient instructed to seek medical attention urgently for new or worsening       symptoms.      * Patient was educated/instructed on their diagnosis, treatment and medications       today.      * Recommend self monitoring. Instructions given.      * Recommend self quarantine for 14 days.      * Recommend self quarantine until without fever for 72 hours without using fever       reducing medications.      * Recommends over the counter medications for symptom management.            IMPRESSION:      1. COPD with acute COPD exacerbation.  FEV1 79%.  COPD assessment test score is     34 and she is having an active exacerbation. She is on triple inhaler therapy     and would benefit from a course of steroids and antibiotics.      2.  Acute on chronic dyspnea.      3. Acute cough.      4. Acute wheeze.      5. Ongoing tobacco abuse with cigarettes.      6. Chronic thyroiditis, status post thyroidectomy.      7. Seasonal allergies, well-controlled.            PLAN:      1.  We will give Z-Philippe with a 5 day steroid burst.      2. Continue Singulair and Zyrtec.      3.  Smoking cessation counseling provided.  I spent 5 minutes today counseling     the patient on risks of smoking, including throat cancer, lung cancer, COPD,     heart disease and death. I also discussed the benefits of quitting.        4. Continue Symbicort with albuterol as needed.       5. Encourage ambulation.      6. She is up-to-date with flu vaccine. Her next face  to face visit we will need     to arrange for her to have Prevnar followed by Pneumovax one year later.      7. Follow up with us in 4-6 months.            Electronically signed by CHARAN ABREU  11/20/2020 09:38       Disclaimer: Converted document may not contain table formatting or lab diagrams. Please see [employer] for the authenticated document.

## 2021-05-28 NOTE — PROGRESS NOTES
Patient: HUSAM SHEIKH     Acct: [id number]     Report: #[id number]  UNIT #: [id number]     : 1965    Encounter Date:2021  PRIMARY CARE: GERMANIA CORADO  ***Signed***  --------------------------------------------------------------------------------------------------------------------  Chief Complaint      Encounter Date      Mar 2, 2021            Primary Care Provider      GERMANIA CORADO            Referring Provider      GERMANIA CORADO            Patient Complaint      Patient is complaining of      Patient is here for 3 month f/u, results, COPD            VITALS      Height 5 ft 4 in / 162.56 cm      Weight 190 lbs  / 86.604846 kg      BSA 1.91 m2      BMI 32.6 kg/m2      Temperature 97.6 F / 36.44 C - Tympanic      Pulse 86      Respirations 18      Blood Pressure 120/67 Sitting, Right Arm      Pulse Oximetry 89%, room air            HPI      The patient is a 55 year old  female cigarette smoker with COPD here     for follow up.  Since last visit she has been Symbicort twice a day with     albuterol 2-3 times a day.  She is still smoking, but has cut back to one half     pack of cigarettes a day.  Previously she was at 1-2 packs and she is cutting     back on her own.  Dyspnea is at baseline. She gets short of breath walking about    700-800 feet or going up one flight of steps.  Dyspnea is worse with activity,     relieved with rest and moderate in severity.  She does have wheezing with     activity, but denies any coughing, chest pain, nausea, vomiting, fevers, chills     or headaches. She is able to perform her ADLs without difficulty.  Denies any     swollen glands or lymph nodes of the head and neck.            I have personally reviewed the review of systems, past family, social, surgical     and medical histories and I agree with the findings.            ROS      Constitutional:  Complains of: Fatigue; Denies: Fever, Weight gain, Weight loss,    Chills, Insomnia,  Other      Respiratory/Breathing:  Complains of: Shortness of air, Wheezing, Cough; Denies:    Hemoptysis, Pleuritic pain, Other      Endocrine:  Denies: Polydipsia, Polyuria, Heat/cold intolerance, Abnorml menstru    al pattern, Diabetes, Other      Eyes:  Denies: Blurred vision, Vision Changes, Other      Ears, nose, mouth, throat:  Denies: Congestion, Dysphagia, Hearing Changes, Nose    Bleeding, Nasal Discharge, Throat pain, Tinnitus, Other      Cardiovascular:  Denies: Chest Pain, Exertional dyspnea, Peripheral Edema,     Palpitations, Syncope, Wake up Gasping for air, Orthopnea, Tachycardia, Other      Gastrointestinal:  Denies: Abdominal pain/cramping, Bloody stools, Constipation,    Diarrhea, Melena, Nausea, Vomiting, Other      Genitourinary:  Denies: Dysuria, Urinary frequency, Incontinence, Hematuria,     Urgency, Other      Musculoskeletal:  Denies: Joint Pain, Joint Stiffness, Joint Swelling, Myalgias,    Other      Hematologic/lymphatic:  DENIES: Lymphadenopathy, Bruising, Bleeding tendencies,     Other      Neurologic:  Denies: Headache, Numbness, Weakness, Seizures, Other      Psychiatric:  Denies: Anxiety, Appropriate Effect, Depression, Other      Sleep:  No: Excessive daytime sleep, Morning Headache?, Snoring, Insomnia?, Stop    breathing at sleep?, Other      Integumentary:  Denies: Rash, Dry skin, Skin Warm to Touch, Other            FAMILY/SOCIAL/MEDICAL HX      Surgical History:  Yes: Abdominal Surgery (HIATAL HERNIA REPAIR, ING HERNIA     REPAIR), Bowel Surgery (HEMORROID SURGERY INTERNA AND EXTERNAL),     Cholecystectomy, Hernia Surgery, Oral Surgery (TONSILLECTOMY), Orthopedic     Surgery (LT CTR; LEFT ARM ORIF), Tonsils; No: AAA Repair, Adenoids, Angioplasty,    Appendectomy, Back Surgery, Bladder Surgery, Breast Surgery, CABG, Carotid     Stenosis, Ear Surgery, Eye Surgery, Head Surgery, Kidney Surgery, Nose Surgery,     Prostatectomy, Rectal Surgery, Spinal Surgery, Testicular Surgery,  Throat     Surgery, Valve Replacement, Vascular Surgery, Other Surgeries      Stroke - Family Hx:  Grandparent      Heart - Family Hx:  Mother, Grandparent      Diabetes - Family Hx:  Brother, Sister, Cousin      Is Father Still Living?:  No      Is Mother Still Living?:  No       Family History:  Yes      Social History:  Tobacco Use; No Alcohol Use, No Recreational Drug use      Smoking status:  Current every day smoker (started at age 19 about 1/2 ppd)       Section:  Yes (2 years ago)      Tubal Ligation:  Yes (2 years ago)      Hysterectomy:  Yes      Anticoagulation Therapy:  No      Antibiotic Prophylaxis:  No      Medical History:  Yes: Arthritis (HANDS, KNEES), Blood Disease (CIRRHOSIS STAGE     4, ANEMIA), Chronic Bronchitis/COPD (NO INHALERS), Anxiety (HYPERVENTILATES),     Diabetes (IDDM BS -130), Hemorrhoids/Rectal Prob (HIATAL HERNIA;     CONSTIPATION, HX HEMORRHOIDS), Hiatal Hernia (LARGE), High Blood Pressure (ON     MEDS), Reflux Disease, Shortness Of Breath, Thyroid Problem, Miscellaneous     Medical/oth (CIRRHOSIS STAGE 4); No: Alcoholism, Allergies, Anemia, Asthma,     Atrial Fibrillation, Broken Bones, Cataracts, Chemical Dependency,     Chemotherapy/Cancer, Emphysema, Chronic Liver Disease, Colon Trouble, Colitis,     Diverticulitis, Congestive Heart Failu, Deafness or Ringing Ears, Convulsions,     Depression, Bipolar Disorder, PTSD, Epilepsy, Seizures, Forgetfullness,     Glaucoma, Gall Stones, Gout, Head Injury, Heart Attack (PANIC ATTACK FROM NOT     TAKING ANTIANXIETY MEDS), Heart Murmur, GERD, Hepatitis, High Cholesterol, HIV     (Do not ask - volu, Jaundice, Kidney or Bladder Disease, Kidney Stones, Migrane     Headaches, Mitral Valve Prolapse, Night sweats, Phlebitis, Psychiatric Care,     Rheumatic Fever, Sexually Transmitted Dis, Sinus Trouble, Skin     Disease/Psoriais/Ecz, Stroke, Tuberculosis or Pos TB Te      Psychiatric History      anxiety            PREVENTION       Hx Influenza Vaccination:  No      Influenza Vaccine Declined:  Yes      2 or More Falls in Past Year?:  No      Fall Past Year with Injury?:  No      Hx Pneumococcal Vaccination:  Yes      Encouraged to follow-up with:  PCP regarding preventative exams.      Chart initiated by      Radha Penn CMA            ALLERGIES/MEDICATIONS      Allergies:        Coded Allergies:             POTASSIUM (Verified  Allergy, Intermediate, RASH, 3/2/21)                  SHAKY, BREAKS OUT IN ITCHY RASH           AMOXICILLIN (Verified  Allergy, Unknown, RASH. ITCHING, 3/2/21)           DOXYCYCLINE (Verified  Allergy, Unknown, NAUSEA, ITCHING, 3/2/21)           INFLUENZA VIRUS VACCINES (Verified  Allergy, Unknown, SOB, ITCHING, 3/2/21)           PAPER TAPE (Verified  Allergy, Unknown, RASH, ITCHING, 3/2/21)           FLUTICASONE (Verified  Adverse Reaction, Unknown, NOSE BLEEDS, 3/2/21)           NSAIDS (NON-STEROIDAL ANTI-INFLAMMA (Verified  Adverse Reaction, Unknown,     DOES NOT TAKE, 3/2/21)           PENICILLINS (Verified  Adverse Reaction, Unknown, NAUSEA, 3/2/21)      Medications    Last Reconciled on 3/2/21 09:52 by CHARAN ABREU MD      Budesonide/Formoterol Fumarate (Symbicort 160/4.5 Mcg) 10.2 Gm Inh      2 PUFF INH BID, #1 INH 11 Refills         Prov: CHARAN ABREU         3/2/21       Furosemide (Furosemide) 20 Mg Tablet      20 MG PO QDAY, #30 TAB 3 Refills         Prov: CHARAN ABREU         11/17/20       Calcium Carbonate/Vitamin D3 (OYSCO 500-VIT D3 200 TABLET) 1 Each Tablet      2 EACH PO TID for 30 Days, #180 TABLET 2 Refills         Prov: MARINA Anthony         11/6/20       Promethazine Hcl (Phenergan*) 25 Mg Supp.rect      25 MG RECTAL Q4H PRN for NAUSEA AND/OR VOMITING, #2 SUPP 0 Refills         Prov: MARINA Anthony         11/6/20       Lactulose (Enulose*) 10 Gm/15 Ml Solution      30 ML PO QID PRN for CONSTIPATION, ML         Reported         11/5/20       Lactulose (Lactulose*) 10 Gm/15 Ml Solution      30 ML  PO QID, #1800 ML 0 Refills         Reported         11/4/20       Insulin Human Aspart (novoLOG VIAL) 100 Unit/1 Ml Vial      SUBQ, #1 VIAL 0 Refills         Reported         11/4/20       Cetirizine Hcl (CETIRIZINE HCL) 10 Mg Tablet      10 MG PO QDAY, #30 TAB 0 Refills         Reported         11/4/20       Spironolactone (Spironolactone*) 25 Mg Tablet      25 MG PO QDAY, #30 TAB 0 Refills         Reported         11/4/20       busPIRone HCl (busPIRone HCl) 10 Mg Tablet      10 MG PO BID, #60 TAB         Reported         11/4/20       Lisinopril* (Lisinopril*) 20 Mg Tablet      20 MG PO QDAY, #30 TAB 0 Refills         Reported         11/4/20       Gabapentin (Gabapentin) 600 Mg Tablet      600 MG PO QID, #90 TAB 0 Refills         Reported         11/4/20       Montelukast Sodium (Montelukast*) 10 Mg Tablet      10 MG PO HS for 90 Days, #90 TAB 1 Refill         Prov: CHARAN ABREU         8/5/20       Cyclobenzaprine Hcl (Cyclobenzaprine*) 10 Mg Tablet      10 MG PO TID, #90 TAB 0 Refills         Reported         9/11/19       NEB-Albuterol Sulf (Albuterol) 2.5 Mg/3 Ml Vial.neb      2.5 MG INH Q6H PRN for SHORTNESS OF BREATH, #120 NEB 5 Refills         Prov: KIRK MCMILLAN-EMERY         9/11/19       Metoprolol Tartrate (Metoprolol Tartrate) 100 Mg Tablet      100 MG PO BID, TAB         Reported         8/18/18       Cholecalciferol (Vitamin D3) (Vitamin D3) 2,000 U Tablet      2000 UNITS PO QDAY, #30 TAB 0 Refills         Reported         8/18/18       oxyCODONE HCl (Roxicodone) 15 Mg Tablet      15 MG PO TID, TAB         Reported         8/18/18      Current Medications      Current Medications Reviewed 3/2/21            EXAM      Vital Signs Reviewed.      General:  WDWN, Alert, NAD.      HEENT: PERRL, EOMI.  OP, nares clear, no sinus tenderness.      Neck: Supple, no JVD, no thyromegaly.      Chest: Barrel chested, bilateral wheezing at bases, tympanic to percussion     bilaterally, no work of breathing  noted.      CV: RRR, no MGR, pulses 2+, equal.        Abd: Soft, NT, ND, +BS, no HSM.      EXT: No clubbing, no cyanosis, no edema.        Neuro:  A  Skin: No rashes or lesions.      Vitals      Vitals:             Height 5 ft 4 in / 162.56 cm           Weight 190 lbs  / 86.897629 kg           BSA 1.91 m2           BMI 32.6 kg/m2           Temperature 97.6 F / 36.44 C - Tympanic           Pulse 86           Respirations 18           Blood Pressure 120/67 Sitting, Right Arm           Pulse Oximetry 89%, room air            REVIEW      Results Reviewed      PCCS Results Reviewed?:  Yes Prev Lab Results, Yes Prev Radiology Results, Yes     Previous [facility] Records      Lab Results      I personally reviewed my last office visit note.  I personally reviewed     overnight oximetry showing significant desaturation with submaximal exertion.  I    personally reviewed Dr. Barbour's last office visit note.  I personally reviewed     labs from 2020 showing leukopenia and thrombocytopenia with no evidence of     chronic hypercapnic respiratory failure. I personally reviewed a noncontrast     chest CT scan that was done in 2020.      Radiographic Results               [facility]                PACS RADIOLOGY REPORT            Patient: HUSAM SHEIKH   Acct: #[id number]   Report: #[id number]            UNIT #: [id number]    DOS: 20 1042      INSURANCE:MEDICARE PART A   LOCATION:CT     : 1965            PROVIDERS      ADMITTING:     ATTENDING: Juan Barbour      FAMILY:  GERMANIA CORADO   ORDERING:  Juan Barbour         OTHER:    DICTATING:  Enoch Castaneda MD            REQ #:[id number]   EXAM:W - CT CHEST with CONTRAST      REASON FOR EXAM:  DYSPNEA/COUGH      REASON FOR VISIT:  DYSPNEA/COUGH            *******Signed******         PROCEDURE:   CT CHEST W/ CONTRAST             COMPARISON:   [facility], CR, CHEST PA/AP   13:01.  [facility]        [facility], CT, CHEST W/O CONTRAST, 11/14/2018, 14:42.             INDICATIONS:   DYSPNEA/COUGH PRE-OP THYROID             TECHNIQUE:   CT images were obtained with non-ionic intravenous contrast mater    ial.               PROTOCOL:     Standard imaging protocol performed                RADIATION:     DLP: 809 mGy*cm          Automated exposure control was utilized to minimize radiation dose.       CONTRAST:   100cc Isovue 370 I.V.      LABS:     eGFR: >60 ml/min/1.73m2             FINDINGS:         Lung window images reveal no infiltrates, consolidations, or nodules.             Mediastinal windows reveal no mediastinal hilar, or axillary adenopathy.             Extensive coronary artery calcifications are evident.             The liver has a cirrhotic morphology.  Mild splenomegaly is unchanged.  No     peritoneal fluid is       evident.             CONCLUSION:         CT scan of the chest with IV contrast demonstrating no pathologic mass or adenop    athy.             Coronary artery calcifications.             Cirrhosis with mild splenomegaly.              MATT VARGAS MD             Electronically Signed and Approved By: MATT VARGAS MD on 11/05/2020 at 14:54                                  Until signed, this is an unconfirmed preliminary report that may contain      errors and is subject to change.                                              COUST:      D:11/05/20 1454            Assessment      Tobacco abuse - Z72.0            COPD (chronic obstructive pulmonary disease)         Centrilobular emphysema - J43.2         COPD type: emphysema         Emphysema type: centrilobular            Notes      New Medications      * Budesonide/Formoterol Fumarate (Symbicort 160/4.5 Mcg) 10.2 GM INH: 2 PUFF INH      BID #1         Dx: Tobacco abuse - Z72.0      Discontinued Medications      * Azithromycin Z-Philippe (Zithromax Z-Philippe) 250 MG TABLET: 250 MG PO ASDIR #1         Instructions: Take 500 mg (two  tablets) by mouth the first day, then 250 mg        (one tablet) daily until all taken.      * predniSONE (Deltasone) 10 MG TABLET: 20 MG PO QDAY #14      New Office Procedures      * Prevnar-13, As Soon As Possible         Pneumoc 13-Osiris Conj-Dip CRm/Pf (Prevnar 13 Syringe) 0.5 ML SYRINGE: 0.5        MILLILITER INTRAMUSCULARLY Qty 0.5 ML         Dx: Tobacco abuse - Z72.0      IMPRESSION:      1.  COPD without exacerbation.  Mild with FEV1 79% of predicted.  She is on     Symbicort twice a day with gold stage B COPD.  She is doing well on triple     inhaler therapy and albuterol as needed.      2. Chronic dyspnea at baseline.      3. Chronic wheeze at baseline.      4. Ongoing tobacco abuse with cigarettes, trying to cut back.      5. Compensated cirrhosis.      6. Seasonal allergies, well-controlled.      7. Chronic hypoxemic respiratory failure.            PLAN:      1. Continue Singulair and Zyrtec.      2.  Continue current inhaler regimen.      3.  Smoking cessation counseling provided.  I spent 4 minutes today counseling     the patient on risks of smoking, including throat cancer, lung cancer, COPD,     heart disease and death. I also discussed the benefits of quitting.  She continu    es to cut back on her own, but declines nicotine replacement therapy or     pharmacotherapy.      4.  She is up-to-date with flu vaccine.  I gave her Prevnar today and she can     get a Pneumovax as early as 03/2022.      5. Follow up with us in 6-12 months.                           PLAN:      1.            Patient Education      Patient Education Provided:  COPD, Smoking Cessation            Patient Education:        Chronic Obstructive Pulmonary Disease            Electronically signed by CHARAN ABREU  03/03/2021 16:13       Disclaimer: Converted document may not contain table formatting or lab diagrams. Please see "[employer] System for the authenticated document.

## 2021-05-28 NOTE — PROGRESS NOTES
Patient: HUSAM SHEIKH     Acct: [id number]     Report: #[id number]  UNIT #: [id number]     : 1965    Encounter Date:2018  PRIMARY CARE: GERMANIA CORADO   ***Signed***  --------------------------------------------------------------------------------------------------------------------  Chief Complaint      Encounter Date      2018            Primary Care Provider      Germania Corado cfr            Referring Provider      GERMANIA CORADO fccrad            Patient Complaint      Patient is complaining of      f/u chest ct            VITALS      Height 5 ft 4.00 in / 162.56 cm      Weight 189 lbs 9.000 oz / 85.109783 kg      BSA 1.91 m2      BMI 32.5 kg/m2      Temperature 98.0 F / 36.67 C - Oral      Pulse 69      Respirations 12      Blood Pressure 186/81 Sitting, Right Arm      Pulse Oximetry 93%, roomair      Initial Exhaled Nitrous Oxide      Exhaled Nitrous Oxide Results:  5            HPI      The patient is a very pleasant 52 year old  female cigarettes smoker     with chronic bronchitis here for follow up.             Since her last office visit I started her on Symbicort 160/4.5, she was doing     great until about 2-3 weeks ago when she had a cold. She had 1 dose of steroids     and amoxicillin with minimal improvement in symptoms. She does not want a long     steroid taper because of hyperglycemia however she will need this to feel     better. she was doing great on Symbicort but since has had increasing     respiratory symptoms. She gets short of breath walking 50-60 feet. Her cough     throughout the day is productive of yellow and green sputum. She is constantly     wheezing. She is using a nebulizer four times a day with improvement and still     using her Symbicort. She has not had a sputum culture. She is still smoking 3-4     cigarettes a day and has cut back since she has been sick. She had a CT scan     down showing bronchial wall thickening, no nodules or  masses. Alpha-1     antitrypsin testing was normal, CBC and comprehensive metabolic profile and IgE     were unremarkable. Pulmonary function tests confirmed mild chronic obstructive     pulmonary disease. Walk test was unremarkable. She denies any sick contacts,     nausea and vomiting, fevers or chills. She states she is fatigued. She is able     to perform her activities of daily living without difficulty. Of note she has     stage IV cirrhosis from FRIED and sees a hepatologist in [place].             I have personally reviewed the Review of Systems, past family, social, surgical     and medical histories and I agree with the findings.            ROS      Constitutional:  Denies: Fatigue, Fever, Weight gain, Weight loss, Chills,     Insomnia, Other      Respiratory/Breathing:  Complains of: Shortness of air, Wheezing, Cough; Denies:    Hemoptysis, Pleuritic pain, Other      Endocrine:  Denies: Polydipsia, Polyuria, Heat/cold intolerance, Abnorml     menstrual pattern, Diabetes, Other      Eyes:  Denies: Blurred vision, Vision Changes, Other      Ears, nose, mouth, throat:  Denies: Mouth lesions, Thrush, Throat pain,     Hoarseness, Allergies/Hay Fever, Post Nasal Drip, Headaches, Recent Head Injury,    Nose Bleeding, Neck Stiffness, Thyroid Mass, Hearing Loss, Ear Fullness, Dry     Mouth, Nasal or Sinus Pain, Dry Lips, Nasal discharge, Nasal congestion, Other      Cardiovascular:  Denies: Palpitations, Syncope, Claudication, Chest Pain, Wake     up Gasping for air, Leg Swelling, Irregular Heart Rate, Cyanosis, Dyspnea on     Exertion, Other      Gastrointestinal:  Denies: Nausea, Constipation, Diarrhea, Abdominal pain,     Vomiting, Difficulty Swallowing, Reflux/Heartburn, Dysphagia, Jaundice, Bloatin    g, Melena, Bloody stools, Other      Genitourinary:  Denies: Urinary frequency, Incontinence, Hematuria, Urgency,     Nocturia, Dysuria, Testicular problems, Other      Musculoskeletal:  Denies: Joint Pain,  Joint Stiffness, Joint Swelling, Myalgias,    Other      Hematologic/lymphatic:  DENIES: Lymphadenopathy, Bruising, Bleeding tendencies,     Other      Neurological:  Denies: Headache, Numbness, Weakness, Seizures, Other      Psychiatric:  Denies: Anxiety, Appropriate Effect, Depression, Other      Sleep:  No: Excessive daytime sleep, Morning Headache?, Snoring, Insomnia?, Stop    breathing at sleep?, Other      Integumentary:  Denies: Rash, Dry skin, Skin Warm to Touch, Other      Immunologic/Allergic:  Denies: Latex allergy, Seasonal allergies, Asthma,     Urticaria, Eczema, Other      Immunization status:  No: Up to date            FAMILY/SOCIAL/MEDICAL HX      Surgical History:  Yes: Abdominal Surgery (HIATAL HERNIA REPAIR, ING HERNIA     REPAIR), Bowel Surgery (HEMORROID SURGERY INTERNA AND EXTERNAL),     Cholecystectomy, Hernia Surgery, Oral Surgery (TONSILLECTOMY), Orthopedic     Surgery (LT CTR; LEFT ARM ORIF), Tonsils      Stroke - Family Hx:  Grandparent      Heart - Family Hx:  Mother, Grandparent      Diabetes - Family Hx:  Brother, Sister, Cousin      Is Father Still Living?:  Yes      Is Mother Still Living?:  No       Family History:  Yes      Social History:  Tobacco Use; No Alcohol Use, No Recreational Drug use      Smoking status:  Current every day smoker (.5 ppd x 20 y)       Section:  Yes (2 years ago)      Tubal Ligation:  Yes (2 years ago)      Hysterectomy:  Yes      Medical History:  Yes: Arthritis (HANDS, KNEES), Blood Disease (CIRRHOSIS STAGE     4, ANEMIA), Chronic Bronchitis/COPD (NO INHALERS), Anxiety (HYPERVENTILATES),     Diabetes (IDDM BS -130), Hemorrhoids/Rectal Prob (HIATAL HERNIA;     CONSTIPATION, HX HEMORRHOIDS), Hiatal Hernia (LARGE), High Blood Pressure (ON     MEDS), Reflux Disease, Shortness Of Breath, Thyroid Problem, Miscellaneous     Medical/oth (CIRRHOSIS STAGE 4); No: Asthma, Chemotherapy/Cancer, Colon Trouble,    Congestive Heart Failu, Deafness or  Ringing Ears, Seizures, Heart Attack (PANIC     ATTACK FROM NOT TAKING ANTIANXIETY MEDS)      Psychiatric History      anxiety            PREVENTION      Hx Influenza Vaccination:  No      Influenza Vaccine Declined:  Yes      2 or More Falls Past Year?:  No      Fall Past Year with Injury?:  No      Hx Pneumococcal Vaccination:  No      Encouraged to follow-up with:  PCP regarding preventative exams.      Chart initiated by      natalia diane            ALLERGIES/MEDICATIONS      Allergies:        Coded Allergies:             DOXYCYCLINE (Verified  Allergy, Unknown, NAUSEA, ITCHING, 11/27/18)           INFLUENZA VIRUS VACCINES (Verified  Allergy, Unknown, 11/27/18)           FLUTICASONE (Verified  Adverse Reaction, Unknown, NOSE BLEEDS, 11/27/18)           NSAIDS (NON-STEROIDAL ANTI-INFLAMMA (Verified  Adverse Reaction, Unknown,     DOES NOT TAKE, 11/27/18)           PENICILLINS (Verified  Adverse Reaction, Unknown, NAUSEA, 11/27/18)      Medications    Last Reconciled on 11/27/18 11:26 by CHARAN ABREU MD      Azithromycin Z-Philippe (Zithromax Z-Philippe) 250 Mg Tablet      250 MG PO ASDIR, #1 PACKET         Prov: CHARAN ABREU         11/27/18       predniSONE* (Deltasone*) 10 Mg Tablet      10 MG PO ASDIR, #45 TAB 0 Refills         Prov: CHARAN ABREU         11/27/18       Benzonatate (Tessalon Perles) 100 Mg Cap      100 MG PO TID PRN for COUGH for 7 Days, #21 CAP         Prov: DAVE HAYWARD cfe         10/29/18       Furosemide* (Lasix*) 20 Mg Tablet      20 MG PO QDAY, #30 TAB 0 Refills         Reported         10/29/18       Promethazine Hcl (Phenergan*) 25 Mg Tablet      25 MG PO Q6H PRN for NAUSEA, TAB 0 Refills         Reported         10/29/18       Cetirizine Hcl (ZyrTEC) 10 Mg Tablet      10 MG PO QDAY, #30 TAB 6 Refills         Prov: CHARAN ABREU         8/28/18       Budesonide/Formoterol Fumarate (Symbicort 160/4.5 Mcg) 10.2 Gm Inh      2 PUFF INH BID, #1 INH 5 Refills         Prov: CHARAN ABREU          8/28/18       Spironolactone (Spironolactone*) 25 Mg Tablet      50 MG PO QDAY, #60 TAB 0 Refills         Reported         8/28/18       Montelukast Sodium (Montelukast*) 10 Mg Tablet      10 MG PO HS, TAB         Reported         8/18/18       Metoprolol Tartrate (Metoprolol Tartrate*) 100 Mg Tablet      100 MG PO QDAY, TAB         Reported         8/18/18       Insulin Glargine (Lantus SOLOSTAR) Unknown Strength Insuln.pen      MDD PT ON SLIDING SCALE PRN, #1 BOX 0 Refills         Reported         8/18/18       Levothyroxine (Levothyroxine) 0.112 Mg Tablet      0.112 MG PO QDAY@07, #30 TAB 0 Refills         Reported         8/18/18       Lisinopril* (Lisinopril*) 10 Mg Tablet      10 MG PO QDAY, #30 TAB 0 Refills         Reported         8/18/18       Meclizine Hcl (Meclizine*) 25 Mg Tablet      25 MG PO TID, #90 TAB 0 Refills         Reported         8/18/18       Lactobacillus Rhamnosus (Culturelle*) 1 Each Capsule      1 CAP PO QDAY, CAP         Reported         8/18/18       Cholecalciferol (Vitamin D3*) 2,000 U Tablet      2000 UNITS PO QDAY, #30 TAB 0 Refills         Reported         8/18/18       oxyCODONE HCl (Roxicodone*) 15 Mg Tablet      15 MG PO Q6H PRN for PAIN, TAB         Reported         8/18/18       morphine Sulfate (Morphine Sulfate ER*) 15 Mg Tablet.er      15 MG PO Q12HR, #60 TAB         Reported         8/18/18       Cyclobenzaprine Hcl (Cyclobenzaprine*) 10 Mg Tablet      10 MG PO TID PRN for MUSCLE SPASMS, TAB 0 Refills         Reported         8/18/18       Gabapentin (Gabapentin) 300 Mg Capsule      300 MG PO TID, #90 CAP 0 Refills         Reported         8/18/18       Vitamin E (Vitamin E*) 400 Units Capsule      400 UNITS PO QDAY, CAP         Reported         8/18/18       metFORMIN HCl/sitaGLIPtin Phos (Janumet 50/1000 MG) 1 Each Tablet      1 TAB PO BID, TAB         Reported         8/18/18      Current Medications      Current Medications Reviewed 11/27/18            EXAM       Vital Signs Reviewed      Gen: WDWN, Alert, NAD.        HEENT:  PERRL, EOMI.  OP, nares clear, no sinus tenderness.      Neck:  Supple, no JVD, no thyromegaly.      Lymph: No axillary, cervical, supraclavicular lymphadenopathy noted bilaterally.      Chest:  Good aeration, coarse rhonchi and wheezing throughout all lung fields,     tympanic to percussion bilaterally, no work of breathing noted.      CV:  RRR, no MGR, pulses 2+, equal.      Abd:  Soft, NT, ND, + BS, no HSM.      EXT:  No clubbing, no cyanosis, no edema, no joint tenderness.       Neuro:  A  Skin: No rashes or lesions.      Vtials      Vitals:             Height 5 ft 4.00 in / 162.56 cm           Weight 189 lbs 9.000 oz / 85.303117 kg           BSA 1.91 m2           BMI 32.5 kg/m2           Temperature 98.0 F / 36.67 C - Oral           Pulse 69           Respirations 12           Blood Pressure 186/81 Sitting, Right Arm           Pulse Oximetry 93%, roomair            REVIEW      Results Reviewed      PCCS Results Reviewed?:  Yes Prev Lab Results, Yes Prev Radiology Results, Yes     Previous Mecial Records      Lab Results      I personally reviewed the patient's alpha-1 antitrypsin testing, CBC and IgE     which were all unremarkable.      Radiographic Results               [facility]                PACS RADIOLOGY REPORT            Patient: HUSAM SHEIKH   Acct: #[id number]   Report: #[id number]            UNIT #: [id number]    DOS: 18 1412      INSURANCE:MEDICARE PART A   LOCATION:[facility]     : 1965            PROVIDERS      ADMITTING:     ATTENDING: CHARAN ABREU      FAMILY:  GERMANIA CORADO   ORDERING:  CHARAN ABREU         OTHER:    DICTATING:  Puma Carrero MD, IV            REQ #:[id number]   [facility]      REASON FOR EXAM:  COUGH      REASON FOR VISIT:  COUGH            *******Signed******         PROCEDURE:   CT CHEST WITHOUT  CONTRAST             COMPARISON:   [facility], CT, ABDOMEN/PELVIS WITH CONTRAST,     2018, 19:22.  [facility], CT, CHEST W/ CONTRAST, 2009, 10:41.             INDICATIONS:   COUGH, WHEEZING             TECHNIQUE:   CT images were created without the administration of contrast     material.               PROTOCOL:     Standard imaging protocol performed                RADIATION:     DLP: 889mGy*cm          Automated exposure control was utilized to minimize radiation dose.              TECHNIQUE:   Axial images of the chest without intravenous contrast.             FINDINGS:      Coronary artery calcification is present.  There are no enlarged mediastinal,     axillary or hilar       lymph nodes.  No evidence of pneumothorax or pleural effusion.  There is     prominent soft tissue in       the anterior mediastinum likely thymic hyperplasia.  No suspicious pulmonary     masses or acute       parenchymal consolidations are identified.  Cirrhosis and mild splenomegaly are     present.  The       thoracic aorta has a normal caliber.  The patient is status post     cholecystectomy.             IMPRESSION:              1.  Coronary artery calcification.               2.  Prominent soft tissue in the anterior mediastinum likely thymic hyperplasia.           3.  Cirrhosis and mild splenomegaly.              RADHA LEGER MD             Electronically Signed and Approved By: RADHA LEGER MD on 2018 at     15:50                        Until signed, this is an unconfirmed preliminary report that may contain      errors and is subject to change.                                              JOSE MJE:      D:18 1550      PFT Results      Patient: HUSAM SHEIKH   Acct: #[id number]   Report: #[id number]            UNIT #: [id number]    DOS:       LOCATION:[facility]     : 1965            PROVIDERS      ADMITTING:     FAMILY:  GERMANIA tezninmagdalena MICKIE         OTHER:        DICTATING:  CHARAN ABREU MD            REASON FOR VISIT:  COUGH            *******Signed******                                    [facility]                          Health Information Management Services                            [place]  [id number]               __________________________________________________________________________             Patient Name:                   Attending Physician:      mAy Miles M.D.             Patient Visit # MR #            Admit Date  Disch Date     Location      [id number]    [id number]      11/14/2018                 [facility] -             Date of Birth      1965      __________________________________________________________________________      0821 - DIAGNOSTIC REPORT             SIX-MINUTE WALK TEST             DATE OF TEST:      11/14/2018.             TEST PROCEDURE:      The 6-minute walk test was performed in accordance [employer]      criteria.             INTERPRETATION:      1. The patient walked 1320 feet over 6 minutes with average MET of 2.9.      2.  No desaturation with submaximal exertion, from 95% to 92% with SpO2.      3.  Heart rate increased appropriately from zero to 100.      4.  Sandra dyspnea scale increased from zero to 2.             IMPRESSION:      No indication for exertional oxygen therapy in this patient.             To be electronically signed in [employer]      [id number] CHARAN ABREU M.D.             AM:rt      D:  11/20/2018 15:58      T:  11/20/2018 16:19      #[id number]             Until signed, this is an unconfirmed preliminary report that may contain      errors and is subject to change.                   Until signed, this is an unconfirmed preliminary report that may contain      errors and is subject to change.                     <Electronically signed by CHARAN ABREU MD>                11/26/18 0807               CHARAN ABREU MD                                                                   AMMYAA:RJT      D:18 1558            Patient: HUSAM SHEIKH   Acct: #[id number]   Report: #[id number]            UNIT #: [id number]    DOS:       LOCATION:[facility]     : 1965            PROVIDERS      ADMITTING:     FAMILY:  GERMANIA CORADO         OTHER:       DICTATING:  CHARAN ABREU MD            REASON FOR VISIT:  COUGH            *******Signed******                                    [facility]                          Health Information Management Services                            [place]  [address]               __________________________________________________________________________             Patient Name:                   Attending Physician:      Husam Sheikh M.D.             Patient Visit # MR #            Admit Date  Disch Date     Location      [id number]    [id number]      2018                 [facility] -             Date of Birth      1965      __________________________________________________________________________      0821 - DIAGNOSTIC REPORT             PULMONARY FUNCTION TEST             DATE OF STUDY:      2018.             SPIROMETRY:      1.  Mild airflow obstruction present.      2.  FEV1/FVC ratio is normal; however, forced vital capacity is reduced and        total lung capacity is normal suggesting obstruction.  This is mild as post        bronchodilator FEV1 is 2.18 L, 79% predicted.      3.  No bronchodilator response present.      4.  Flow volume loop suggests obstruction.             LUNG VOLUMES:      No evidence of hyperinflation or air trapping.             DIFFUSION CAPACITY:      Diffusion capacity is moderately reduced at 59% predicted.             OVERALL IMPRESSION:      1. Mild airflow obstruction without bronchodilator response present.      2. No evidence of hyperinflation or air trapping present.      3.  Diffusion capacity is moderately reduced.      4. These findings can be seen in underlying chronic obstructive pulmonary        disease (COPD).             To be electronically signed in [employer]      [id number] CHARAN ABREU M.D.             AM:rt      D:  11/20/2018 13:30      T:  11/20/2018 14:03      #[id number]             Until signed, this is an unconfirmed preliminary report that may contain      errors and is subject to change.                   Until signed, this is an unconfirmed preliminary report that may contain      errors and is subject to change.                     <Electronically signed by CHARAN ABREU MD>                11/20/18 1557               CHARAN ABREU MD                                                                  MULAA:RJT      D:11/20/18 1330            Assessment      Cough - R05            Dyspnea         Dyspnea on exertion - R06.09         Dyspnea type: dyspnea on exertion            Obesity (BMI 30-39.9) - E66.9            Seasonal allergies - J30.2            COPD exacerbation - J44.1            Notes      New Medications      * predniSONE* (Deltasone*) 10 MG TABLET: 10 MG PO ASDIR #45         Instructions: 41bss4w,45zuu1y,41cpj3u,95cpu3t,29vul7x         Dx: Cough - R05      * Azithromycin Z-Philippe (Zithromax Z-Philippe) 250 MG TABLET: 250 MG PO ASDIR #1         Instructions: Take 500 mg (two tablets) by mouth the first day, then 250 mg        (one tablet) daily until all taken.         Dx: Cough - R05      New Diagnostics      * Sputum Culture W/Gram Stain, Week         Dx: Cough - R05      New Office Procedures      * Solu-Medrol 125 MG, As Soon As Possible         METHYLPRED SOD SUCC (Solu-Medrol) 125 MG VIAL: 125 MILLIGRAM INTRAMUSC Qty 1        VIAL         Dx: COPD exacerbation - J44.1      IMPRESSION:      1. Acute exacerbation of chronic obstructive pulmonary disease. Mild chronic     obstructive pulmonary disease with FEV1 79%, frequent exacerbator, appears to     have GOLD  stage B chronic obstructive pulmonary disease. She was doing well on     LABA/ICS until she got sick however she needs a sputum culture and treatment for    chronic obstructive pulmonary disease exacerbation.       2. Dyspnea.       3. Cough.       4. Wheeze.       5. Tobacco abuse of cigarettes.       6. Obesity with BMI 32.5.      7. Seasonal allergies well controlled.             PLAN:      1. Continue Symbicort 160/4.5 two puffs twice daily.       2. We will give Solu-Medrol 125 mg  intramuscular X 1 in the office followed by     2 weeks steroid taper. I instructed the patient to adjust her lancet to keep her    blood sugar less than 200.       3. Give Azithromycin Z-pack.       4. Check sputum culture and adjust antibiotics accordingly.       5. Continue Singulair and Zyrtec.       6. Smoking cessation was discussed again.       7. Diet and exercise counseling provided.       8. Up to date with flu vaccine. When she is feeling better we will give her     Prevnar followed by Pneumovax in 1 year.       9. Follow up in 1 month to reassess symptoms.            Patient Education      ACO BMI High above 25:  Encouraged weight loss, Encourage dietary changes      Patient Education Provided:  Acute Respiratory Syndrom, COPD, Smoking Cessation                 Disclaimer: Converted document may not contain table formatting or lab diagrams. Please see [employer] for the authenticated document.

## 2021-07-01 PROBLEM — D69.6 THROMBOCYTOPENIA (HCC): Status: ACTIVE | Noted: 2021-01-01

## 2021-07-01 PROBLEM — R79.89 ELEVATED LFTS: Status: ACTIVE | Noted: 2021-01-01

## 2021-07-01 PROBLEM — J18.9 SEPSIS DUE TO PNEUMONIA (HCC): Status: ACTIVE | Noted: 2021-01-01

## 2021-07-01 PROBLEM — R79.1 ELEVATED INR: Status: ACTIVE | Noted: 2021-01-01

## 2021-07-01 PROBLEM — I63.9 ISCHEMIC STROKE: Status: ACTIVE | Noted: 2021-01-01

## 2021-07-01 PROBLEM — K76.82 ENCEPHALOPATHY, HEPATIC (HCC): Status: ACTIVE | Noted: 2021-01-01

## 2021-07-01 PROBLEM — D72.825 BANDEMIA: Status: ACTIVE | Noted: 2021-01-01

## 2021-07-01 PROBLEM — A41.9 SEPSIS DUE TO PNEUMONIA (HCC): Status: ACTIVE | Noted: 2021-01-01

## 2021-07-01 NOTE — ED PROVIDER NOTES
.  Time: 11:46 EDT  Arrived by: EMS  Chief Complaint: AMS  History provided by: EMS/Spouse  History is limited by: Confusion    History of Present Illness:  Patient is a 55 y.o. female that presents to the emergency department with an AMS. Per spouse, the pt has not taken her thyroid medication in 2 days due to running out of it.       History provided by:  Patient  Altered Mental Status  Presenting symptoms: confusion    Severity:  Moderate  Most recent episode:  Today  Duration:  2 days  Chronicity:  New  Context: not taking medications as prescribed    Associated symptoms: bladder incontinence, decreased appetite, fever and weakness    Associated symptoms: no abdominal pain, no headaches, no nausea, no palpitations, no seizures and no vomiting      Patient Care Team  Primary Care Provider: Demarcus Tripp    Past Medical History:     Allergies   Allergen Reactions   • Adhesive Tape Unknown - Low Severity   • Amoxicillin-Pot Clavulanate Unknown - Low Severity   • Cephalexin Unknown - Low Severity   • Clavulanic Acid Unknown - Low Severity   • Doxycycline Hyclate Unknown - Low Severity   • Flonase [Fluticasone] Unknown - Low Severity   • Nsaids Unknown - Low Severity   • Penicillins Unknown - Low Severity   • Potassium Unknown - Low Severity     Past Medical History:   Diagnosis Date   • Anxiety    • Arm pain    • Arthritis    • Asthma    • Bladder disorder    • Bronchitis    • Chronic allergic rhinitis    • Chronic pain syndrome     Reflex Sympathetic Dystrophy, left arm.   • Cirrhosis of liver (CMS/HCC)    • COPD (chronic obstructive pulmonary disease) (CMS/HCC)    • CRPS (complex regional pain syndrome), type I, upper 12/27/2012   • Depression    • Diabetes mellitus type 1 (CMS/HCC)    • Essential hypertension, benign    • Fracture    • GERD (gastroesophageal reflux disease)    • Hand pain    • Heel pain    • Hyperthyroidism    • Iatrogenic hypothyroidism    • Leg pain    • Leg swelling    • Limb swelling    • Mood  disorder (CMS/[facility])    • Nausea    • RSD (reflex sympathetic dystrophy)    • Seasonal allergies    • Shortness of Air    • Toe fracture          Past Surgical History:   Procedure Laterality Date   • ABDOMINAL HYSTERECTOMY     • CARPAL TUNNEL RELEASE     •  SECTION     • CHOLECYSTECTOMY     • COLONOSCOPY     • GALLBLADDER SURGERY     • HAND SURGERY     • HEMORRHOIDECTOMY     • HERNIA REPAIR     • HIATAL HERNIA REPAIR     • INCISION AND DRAINAGE ABSCESS     • THYROIDECTOMY      by Dr. Anthony   • TONSILLECTOMY     • TUBAL ABDOMINAL LIGATION       Family History   Problem Relation Age of Onset   • Arthritis Mother    • Heart disease Father    • Arthritis Father    • Diabetes Sister         Unspecified   • Arthritis Sister    • Osteoporosis Sister    • Diabetes Brother         Unspecified   • Other Brother         Renal Calculus   • Diabetes Maternal Uncle         Unspecified       Home Medications:  Prior to Admission medications    Medication Sig Start Date End Date Taking? Authorizing Provider   busPIRone (BUSPAR) 10 MG tablet Take 10 mg by mouth 3 (Three) Times a Day.   Yes Kirby Sandhu MD   cyclobenzaprine (FLEXERIL) 10 MG tablet Take 10 mg by mouth 3 (Three) Times a Day As Needed for Muscle Spasms.   Yes Kirby Sandhu MD   furosemide (LASIX) 20 MG tablet Take 20 mg by mouth 2 (Two) Times a Day.   Yes Kirby Sandhu MD   gabapentin (NEURONTIN) 600 MG tablet Take 600 mg by mouth 3 (Three) Times a Day.   Yes Kirby Sandhu MD   insulin aspart (novoLOG) 100 UNIT/ML injection Inject  under the skin into the appropriate area as directed 3 (Three) Times a Day Before Meals.   Yes Kirby Sandhu MD   levothyroxine (SYNTHROID, LEVOTHROID) 175 MCG tablet Take 175 mcg by mouth Daily. Pt out of this med, has not taken in 2 days   Yes Kirby Sandhu MD   lisinopril (PRINIVIL,ZESTRIL) 20 MG tablet Take 20 mg by mouth Daily.   Yes Kirby Sandhu MD   metoprolol  "succinate XL (TOPROL-XL) 100 MG 24 hr tablet Take 100 mg by mouth Daily.   Yes Provider, MD Kirby   oxyCODONE (ROXICODONE) 15 MG immediate release tablet Take 15 mg by mouth Every 4 (Four) Hours As Needed for Moderate Pain .   Yes Provider, MD Kirby   spironolactone (ALDACTONE) 25 MG tablet Take 25 mg by mouth Daily.   Yes Provider, MD Kirby        Social History:   PT  reports that she has been smoking. She has been smoking about 1.00 pack per day. She does not have any smokeless tobacco history on file. She reports that she does not drink alcohol and does not use drugs.    Record Review:  I have reviewed the patient's records in [place].     Review of Systems  Review of Systems   Constitutional: Positive for appetite change, decreased appetite and fever. Negative for chills.   HENT: Negative for congestion, rhinorrhea and sore throat.    Eyes: Negative for pain and visual disturbance.   Respiratory: Negative for apnea, cough, chest tightness and shortness of breath.    Cardiovascular: Negative for chest pain and palpitations.   Gastrointestinal: Negative for abdominal pain, diarrhea, nausea and vomiting.   Genitourinary: Positive for bladder incontinence. Negative for difficulty urinating and dysuria.   Musculoskeletal: Negative for joint swelling and myalgias.   Skin: Negative for color change.   Neurological: Positive for weakness. Negative for seizures and headaches.   Psychiatric/Behavioral: Positive for confusion.   All other systems reviewed and are negative.       Physical Exam  /100   Pulse 83   Temp 99.4 °F (37.4 °C) (Rectal)   Resp (!) 30   Ht 162.6 cm (64\")   Wt 89 kg (196 lb 3.4 oz)   LMP  (LMP Unknown)   SpO2 96%   Breastfeeding No   BMI 33.68 kg/m²     Physical Exam  Vitals and nursing note reviewed.   HENT:      Head: Normocephalic and atraumatic.      Jaw: There is normal jaw occlusion.      Mouth/Throat:      Mouth: Mucous membranes are dry.   Eyes:      General: Lids " "are normal.      Extraocular Movements: Extraocular movements intact.      Conjunctiva/sclera: Conjunctivae normal.      Pupils: Pupils are equal, round, and reactive to light.   Neck:      Meningeal: Brudzinski's sign and Kernig's sign absent.   Cardiovascular:      Rate and Rhythm: Regular rhythm. Tachycardia present.      Pulses: Normal pulses.      Heart sounds: Normal heart sounds.   Pulmonary:      Effort: Pulmonary effort is normal. No respiratory distress.      Breath sounds: Normal breath sounds. No wheezing or rhonchi.   Abdominal:      General: Abdomen is flat.      Palpations: Abdomen is soft.      Tenderness: There is no abdominal tenderness. There is no guarding or rebound.   Musculoskeletal:         General: Normal range of motion.      Cervical back: Normal range of motion and neck supple.      Right lower leg: No edema.      Left lower leg: No edema.   Skin:     General: Skin is warm and dry.      Coloration: Skin is mottled.          Neurological:      Mental Status: She is lethargic and disoriented.      Motor: Weakness present.                  ED Course  /100   Pulse 83   Temp 99.4 °F (37.4 °C) (Rectal)   Resp (!) 30   Ht 162.6 cm (64\")   Wt 89 kg (196 lb 3.4 oz)   LMP  (LMP Unknown)   SpO2 96%   Breastfeeding No   BMI 33.68 kg/m²   Results for orders placed or performed during the hospital encounter of 07/01/21   Wound Culture - Swab, Coccyx    Specimen: Coccyx; Swab   Result Value Ref Range    Wound Culture Growth present, too young to evaluate     Gram Stain Occasional WBCs seen     Gram Stain No organisms seen    COVID-19,CEPHEID,COR/MARTELL/PAD/ERICKA IN-HOUSE(OR EMERGENT/ADD-ON),NP SWAB IN TRANSPORT MEDIA 3-4 HR TAT, RT-PCR - Swab, Nasopharynx    Specimen: Nasopharynx; Swab   Result Value Ref Range    COVID19 Not Detected Not Detected - Ref. Range   Comprehensive Metabolic Panel    Specimen: Blood   Result Value Ref Range    Glucose 357 (H) 65 - 99 mg/dL    BUN 36 (H) 6 - 20 mg/dL "    Creatinine 1.72 (H) 0.57 - 1.00 mg/dL    Sodium 137 136 - 145 mmol/L    Potassium 3.7 3.5 - 5.2 mmol/L    Chloride 102 98 - 107 mmol/L    CO2 17.5 (L) 22.0 - 29.0 mmol/L    Calcium 8.9 8.6 - 10.5 mg/dL    Total Protein 7.1 6.0 - 8.5 g/dL    Albumin 3.60 3.50 - 5.20 g/dL    ALT (SGPT) 325 (H) 1 - 33 U/L    AST (SGOT) 595 (H) 1 - 32 U/L    Alkaline Phosphatase 143 (H) 39 - 117 U/L    Total Bilirubin 1.6 (H) 0.0 - 1.2 mg/dL    eGFR Non African Amer 31 (L) >60 mL/min/1.73    Globulin 3.5 gm/dL    A/G Ratio 1.0 g/dL    BUN/Creatinine Ratio 20.9 7.0 - 25.0    Anion Gap 17.5 (H) 5.0 - 15.0 mmol/L   Troponin    Specimen: Blood   Result Value Ref Range    Troponin T 0.187 (C) 0.000 - 0.030 ng/mL   Magnesium    Specimen: Blood   Result Value Ref Range    Magnesium 1.9 1.6 - 2.6 mg/dL   Urinalysis With Microscopic If Indicated (No Culture) - Urine, Clean Catch    Specimen: Urine, Clean Catch   Result Value Ref Range    Color, UA Yellow Yellow, Straw    Appearance, UA Clear Clear    pH, UA <=5.0 5.0 - 8.0    Specific Gravity, UA >=1.030 1.005 - 1.030    Glucose,  mg/dL (2+) (A) Negative    Ketones, UA Negative Negative    Bilirubin, UA Small (1+) (A) Negative    Blood, UA Large (3+) (A) Negative    Protein,  mg/dL (2+) (A) Negative    Leuk Esterase, UA Negative Negative    Nitrite, UA Negative Negative    Urobilinogen, UA 1.0 E.U./dL 0.2 - 1.0 E.U./dL   CBC Auto Differential    Specimen: Blood   Result Value Ref Range    WBC 17.12 (H) 3.40 - 10.80 10*3/mm3    RBC 5.15 3.77 - 5.28 10*6/mm3    Hemoglobin 15.3 12.0 - 15.9 g/dL    Hematocrit 46.4 34.0 - 46.6 %    MCV 90.1 79.0 - 97.0 fL    MCH 29.7 26.6 - 33.0 pg    MCHC 33.0 31.5 - 35.7 g/dL    RDW 17.2 (H) 12.3 - 15.4 %    RDW-SD 54.9 (H) 37.0 - 54.0 fl    Platelets 108 (L) 140 - 450 10*3/mm3   Lactic Acid, Plasma    Specimen: Blood   Result Value Ref Range    Lactate 4.6 (C) 0.5 - 2.0 mmol/L   Scan Slide    Specimen: Blood   Result Value Ref Range    Scan Slide      Urinalysis, Microscopic Only - Urine, Clean Catch    Specimen: Urine, Clean Catch   Result Value Ref Range    RBC, UA None Seen None Seen /HPF    WBC, UA 13-20 (A) None Seen /HPF    Bacteria, UA Trace (A) None Seen /HPF    Squamous Epithelial Cells, UA 0-2 None Seen, 0-2 /HPF    Yeast, UA Large/3+ Budding Yeast None Seen /HPF    Hyaline Casts, UA 3-6 None Seen /LPF    Methodology Manual Light Microscopy    Manual Differential    Specimen: Blood   Result Value Ref Range    Neutrophil % 72.0 42.7 - 76.0 %    Lymphocyte % 8.0 (L) 19.6 - 45.3 %    Monocyte % 6.0 5.0 - 12.0 %    Bands %  14.0 (H) 0.0 - 5.0 %    Neutrophils Absolute 14.72 (H) 1.70 - 7.00 10*3/mm3    Lymphocytes Absolute 1.37 0.70 - 3.10 10*3/mm3    Monocytes Absolute 1.03 (H) 0.10 - 0.90 10*3/mm3    Macrocytes Slight/1+ None Seen    Smudge Cells Slight/1+ None Seen    Platelet Estimate Decreased Normal   STAT Lactic Acid, Reflex    Specimen: Blood   Result Value Ref Range    Lactate 1.9 0.5 - 2.0 mmol/L   Protime-INR    Specimen: Blood   Result Value Ref Range    Protime 25.1 (H) 9.4 - 12.0 Seconds    INR 2.50 2.00 - 3.00   Ammonia    Specimen: Blood   Result Value Ref Range    Ammonia 78 (H) 11 - 51 umol/L   ABG with Co-Ox and Electrolytes    Specimen: Arm, Right; Arterial Blood   Result Value Ref Range    pH, Arterial 7.474 (H) 7.350 - 7.450 pH units    pCO2, Arterial 26.6 (L) 35.0 - 45.0 mm Hg    pO2, Arterial 78.8 (L) 80.0 - 100.0 mm Hg    HCO3, Arterial 19.1 (L) 22.0 - 26.0 mmol/L    Base Excess, Arterial -2.7 (L) -2.0 - 2.0 mmol/L    O2 Saturation, Arterial 95.4 95.0 - 99.0 %    Hemoglobin, Blood Gas 15.6 (H) 11.7 - 14.6 g/dL    Carboxyhemoglobin 1.4 0 - 1.5 %    Methemoglobin 0.40 0.00 - 1.50 %    Oxyhemoglobin 93.7 (L) 94 - 99 %    FHHB 4.5 0.0 - 5.0 %    Manny's Test Positive     Note      Site Arterial: right radial     Modality Cannula - Nasal     FIO2 32 %    Flow Rate 3 lpm    Sodium, Arterial 136.8 136 - 146 mmol/L    Potassium, Arterial  3.43 (L) 3.5 - 5 mmol/L    Ionized Calcium, Arterial 1.11 (L) 1.13 - 1.32 mmol/L    Chloride, Arterial 108 (H) 98 - 106 mmol/L    Glucose, Arterial 343 (H) 65 - 99 mmol/L    Lactate, Arterial 3.32 (H) 0.5 - 2 mmol/L    PO2/FIO2 246 0 - 500   TSH    Specimen: Blood   Result Value Ref Range    TSH 5.700 (H) 0.270 - 4.200 uIU/mL   T4, Free    Specimen: Blood   Result Value Ref Range    Free T4 0.67 (L) 0.93 - 1.70 ng/dL   Gamma GT    Specimen: Blood   Result Value Ref Range    GGT 93 (H) 5 - 36 U/L   Lipid Panel    Specimen: Blood   Result Value Ref Range    Total Cholesterol 172 0 - 200 mg/dL    Triglycerides 151 (H) 0 - 150 mg/dL    HDL Cholesterol 36 (L) 40 - 60 mg/dL    LDL Cholesterol  109 (H) 0 - 100 mg/dL    VLDL Cholesterol 27 5 - 40 mg/dL    LDL/HDL Ratio 2.94    Comprehensive Metabolic Panel    Specimen: Blood   Result Value Ref Range    Glucose 351 (H) 65 - 99 mg/dL    BUN 38 (H) 6 - 20 mg/dL    Creatinine 1.54 (H) 0.57 - 1.00 mg/dL    Sodium 138 136 - 145 mmol/L    Potassium 3.9 3.5 - 5.2 mmol/L    Chloride 107 98 - 107 mmol/L    CO2 16.5 (L) 22.0 - 29.0 mmol/L    Calcium 8.2 (L) 8.6 - 10.5 mg/dL    Total Protein 5.9 (L) 6.0 - 8.5 g/dL    Albumin 2.70 (L) 3.50 - 5.20 g/dL    ALT (SGPT) 279 (H) 1 - 33 U/L    AST (SGOT) 396 (H) 1 - 32 U/L    Alkaline Phosphatase 105 39 - 117 U/L    Total Bilirubin 0.9 0.0 - 1.2 mg/dL    eGFR Non African Amer 35 (L) >60 mL/min/1.73    Globulin 3.2 gm/dL    A/G Ratio 0.8 g/dL    BUN/Creatinine Ratio 24.7 7.0 - 25.0    Anion Gap 14.5 5.0 - 15.0 mmol/L   Ammonia    Specimen: Blood   Result Value Ref Range    Ammonia 98 (H) 11 - 51 umol/L   Magnesium    Specimen: Blood   Result Value Ref Range    Magnesium 1.9 1.6 - 2.6 mg/dL   Phosphorus    Specimen: Blood   Result Value Ref Range    Phosphorus 3.0 2.5 - 4.5 mg/dL   Lactic Acid, Plasma    Specimen: Blood   Result Value Ref Range    Lactate 3.6 (C) 0.5 - 2.0 mmol/L   Procalcitonin    Specimen: Blood   Result Value Ref Range     Procalcitonin 2.48 (H) 0.00 - 0.25 ng/mL   POC Glucose Once    Specimen: Blood   Result Value Ref Range    Glucose 335 (H) 70 - 130 mg/dL   POC Glucose Once    Specimen: Blood   Result Value Ref Range    Glucose 257 (H) 70 - 130 mg/dL   POC Glucose Once    Specimen: Blood   Result Value Ref Range    Glucose 354 (H) 70 - 130 mg/dL   POC Glucose Once    Specimen: Blood   Result Value Ref Range    Glucose 293 (H) 70 - 130 mg/dL   POC Glucose Once    Specimen: Blood   Result Value Ref Range    Glucose 243 (H) 70 - 130 mg/dL   POC Glucose Once    Specimen: Blood   Result Value Ref Range    Glucose 210 (H) 70 - 130 mg/dL   POC Glucose Once    Specimen: Blood   Result Value Ref Range    Glucose 196 (H) 70 - 130 mg/dL   ECG 12 Lead   Result Value Ref Range    QT Interval 328 ms   Adult Transthoracic Echo Complete w/ Color, Spectral and Contrast if necessary per protocol   Result Value Ref Range    Target HR (85%) 139 bpm    Max. Pred. HR (100%) 164 bpm    IVRT 69.0 msec    LA Volume Index 28.0 mL/m2    Avg E/e' ratio 4.24     Ao root diam 2.1 cm    EDV(MOD-sp2) 25.0 ml    EDV(MOD-sp4) 51.0 ml    EF(MOD-bp) 67.0 %    ESV(MOD-sp2) 8.0 ml    ESV(MOD-sp4) 20.0 ml    Lat Peak E' Marquise 10.0 cm/sec    LVPWd 1.0 cm    Med Peak E' Marquise 7.00 cm/sec    MV dec time 118 msec    IVSd 0.9 cm    LA dimension(2D) 3.4 cm    LVIDd 4.3 cm    LVIDs 2.6 cm    MV E/A 0.5     MV A max marquise 70.0 cm/sec    MV E max marquise 36.0 cm/sec   Green Top (Gel)   Result Value Ref Range    Extra Tube Hold for add-ons.    Lavender Top   Result Value Ref Range    Extra Tube hold for add-on    Gold Top - SST   Result Value Ref Range    Extra Tube       Medications   sodium chloride 0.9 % flush 10 mL (10 mL Intravenous Given 7/2/21 0846)   sodium chloride 0.9 % flush 10 mL (has no administration in time range)   dexmedetomidine (PRECEDEX) 400 mcg in 100 mL NS infusion (0.2 mcg/kg/hr × 89.2 kg Intravenous New Bag 7/2/21 3428)   ketorolac (TORADOL) injection 30 mg (30 mg  Intravenous Given 7/2/21 0944)   acetaminophen (TYLENOL) tablet 650 mg (has no administration in time range)   dextrose (GLUTOSE) oral gel 15 g (has no administration in time range)   dextrose (D50W) 25 g/ 50mL Intravenous Solution 10-50 mL (has no administration in time range)   dextrose 10 % infusion (has no administration in time range)   glucagon (human recombinant) (GLUCAGEN DIAGNOSTIC) injection 1 mg (has no administration in time range)   insulin regular 1 unit/mL in 0.9% sodium chloride (13 Units/hr Intravenous New Bag 7/2/21 1126)   sodium chloride 0.9 % flush 10 mL (10 mL Intravenous Given 7/2/21 0054)   sodium chloride 0.9 % flush 10 mL (has no administration in time range)   norepinephrine (LEVOPHED) 8 mg in 250 mL NS infusion (premix) (0 mcg/kg/min × 89.2 kg Intravenous Stopped 7/2/21 0542)   meropenem (MERREM) 1 g/100 mL 0.9% NS VTB (mbp) (has no administration in time range)   Pharmacy to Dose meropenem (MERREM) (has no administration in time range)   Pharmacy to dose vancomycin (has no administration in time range)   sodium chloride 0.9 % flush 10 mL (has no administration in time range)   sodium chloride 0.9 % flush 10 mL (has no administration in time range)   heparin (porcine) 5000 UNIT/ML injection 5,000 Units (5,000 Units Subcutaneous Given 7/2/21 0844)   atorvastatin (LIPITOR) tablet 80 mg (has no administration in time range)   famotidine (PEPCID) tablet 20 mg (has no administration in time range)   gabapentin (NEURONTIN) capsule 300 mg (has no administration in time range)   metoprolol tartrate (LOPRESSOR) tablet 50 mg (has no administration in time range)   montelukast (SINGULAIR) tablet 10 mg (has no administration in time range)   oxyCODONE (ROXICODONE) 5 MG/5ML solution 5 mg (has no administration in time range)   spironolactone (ALDACTONE) tablet 25 mg (has no administration in time range)   aspirin chewable tablet 81 mg (has no administration in time range)     Or   aspirin suppository  300 mg (has no administration in time range)   lactulose (CHRONULAC) 10 GM/15ML solution 30 g (has no administration in time range)   levothyroxine (SYNTHROID, LEVOTHROID) tablet 175 mcg (has no administration in time range)   midodrine (PROAMATINE) tablet 10 mg (10 mg Nasogastric Given 7/2/21 1126)   riFAXIMin (XIFAXAN) tablet 550 mg (has no administration in time range)   sodium chloride 0.9 % bolus 1,000 mL (1,000 mL Intravenous Handoff 7/1/21 1822)   sodium chloride 0.9 % bolus 1,000 mL (1,000 mL Intravenous Handoff 7/1/21 1822)   meropenem (MERREM) 1 g/100 mL 0.9% NS VTB (mbp) (0 g Intravenous Stopped 7/1/21 1519)   vancomycin 1750 mg/500 mL 0.9% NS IVPB ([facility]) (0 mg/kg × 89.2 kg Intravenous Stopped 7/1/21 1800)   ziprasidone (GEODON) injection 10 mg (10 mg Intramuscular Given 7/1/21 1454)   LORazepam (ATIVAN) injection 1 mg (1 mg Intravenous Given 7/1/21 1452)   meropenem (MERREM) 1 g/100 mL 0.9% NS VTB (mbp) (1 g Intravenous New Bag 7/2/21 0046)   vancomycin 1750 mg/500 mL 0.9% NS IVPB ([facility]) (1,750 mg Intravenous New Bag 7/2/21 0211)     CT Head Without Contrast    Result Date: 7/1/2021  Narrative: PROCEDURE: CT HEAD WO CONTRAST  COMPARISON:  [facility], CT, HEAD W/O CONTRAST, 10/28/2020, 16:53.  [facility], CT, CTA HEAD AND NECK WITH CONTRAST  STROKE PROTOCOL, 10/28/2020, 16:56. INDICATIONS: Delirium/PT. VERY COMBATIVE AND COULD NOT LAY ON BACK FOR CT SCAN.  PROTOCOL:   Standard imaging protocol performed    RADIATION:   DLP: 1716.7mGy*cm   MA and/or KV was adjusted to minimize radiation dose.     TECHNIQUE: After obtaining the patient's consent, CT images were obtained without non-ionic intravenous contrast material.  FINDINGS:  There is an area of low attenuation in the left frontoparietal region measuring approximately 3.4 x 2 cm.  This is concerning for an acute/subacute infarct.  There is no acute hemorrhage.  There are no abnormal extra-axial fluid collections.  The osseous  structures are normal.  CONCLUSION: Low-attenuation area measuring 3.4 x 2 cm in the left frontoparietal region could be acute or subacute infarct.  Further evaluation with contrast-enhanced MRI with diffusion sequences would be recommended.     GRANT CHRISTY MD       Electronically Signed and Approved By: GRANT CHRISTY MD on 7/01/2021 at 14:45             MRI Brain Without Contrast    Result Date: 7/2/2021  Narrative: PROCEDURE: MRI BRAIN WO CONTRAST  COMPARISONS: [facility], CT, CT HEAD WO CONTRAST, 7/01/2021, 14:12.  [facility], MR, BRAIN W/WO CONTRAST, 12/16/2019, 13:10.  INDICATIONS: NEURO. DEFICIT; EVALUATE FOR STROKE.  TECHNIQUE: A variety of imaging planes and parameters were utilized for visualization of suspected pathology within the brain.  257 magnetic resonance (MR) images were obtained without intravenous gadolinium contrast agent administration.  Motion artifact obscures detail on the study.  FINDINGS: Foci (at least 11) of restricted diffusion are seen bilaterally within the cerebral hemispheres, especially in cortical and juxtacortical regions.  The largest such finding is present in the high posterior left frontal lobe, as seen on image 43 of series 3 and adjacent images, measuring approximately 3.2 x 2.9 x 2.3 cm in transverse, AP (anteroposterior), and craniocaudal extent, respectively.  The finding is also seen on image 14 of series 10.  The largest focus on the right involves the anterior, lateral high right frontal lobe and measures about 1.1 x 0.9 cm in transverse and AP extent, respectively, as seen on image 43 of series 3 and adjacent images.  The findings suggest an embolic etiology for infarcts, such as a cardioembolic etiology.  Arteritis cannot be excluded but is thought to be less likely.  Additionally, abnormal signal intensity is seen within the central aime, as on image 7 of series 6 and adjacent images.  It has a somewhat triangular (or trident)  configuration without associated restricted diffusion.  It does exhibit T2-shine through artifact.  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  It may be subacute to chronic in nature as no restricted diffusion is identified.  Other differential possibilities for the finding within the central aime, such as demyelinating disease, pontine infarction, or tumor are thought to be less likely.  There is suspected extension into the bilateral middle cerebellar peduncles (or brachia pontium, bilaterally), such as seen on images 6 and 7 of series 5. This may represent extrapontine myelinolysis.  It is thought less likely to be related to demyelinating disease.  Please correlate with known clinical history of multiple sclerosis (MS).  There is associated susceptibility with the largest focus of restricted diffusion in the high posterior left frontal lobe, which measures about 2.3 x 1.6 cm in transverse and AP extent, respectively, as seen on image 26 of series 9.  This finding is most suggestive of hemorrhage and is likely acute to subacute and is thought to represent a parenchymal hematoma type 2 (PH2), based upon the ECASS II classification scheme.  There may be a punctate focus of susceptibility involving the right thalamus, such as seen on image 16 of series 9; this finding is new since the prior 12/16/2019 study.  It is thought to be chronic.  It may represent chronic microhemorrhage.  No other such findings are appreciated.  Mild chronic small vessel ischemia/infarction may also be present.  The hippocampi are symmetric in size, signal intensities, and internal architecture.  CONCLUSION:   1. The study is abnormal.  Multiple bilateral cerebral hemispheric foci of restricted diffusion are seen in cortical and juxtacortical regions with the largest in the posterior left frontal lobe, measuring about 3.2 cm in greatest size, with associated hemorrhage by the SWI  sequence, series 9, as discussed.  The finding is thought to be most suggestive of hemorrhagic transformation of a cortical infarction.  2. The other foci of restricted diffusion are smaller in size and are (again) seen bilaterally.  No definite foci of restricted diffusion are seen in the infratentorial regions.  An embolic etiology of infarction(s), such as cardioembolic, would be a top differential consideration for the findings.  Arteritis cannot be excluded.  Other causes of restricted diffusion cannot be entirely excluded but are thought to be less likely, such as demyelinating disease (e.g., MS, multiple sclerosis).  3. There is a trident-shaped focus of FLAIR/T2 signal abnormality within the central aime with extension into the bilateral middle cerebellar peduncles.  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  The lack of associated restricted diffusion would favor a subacute-to-chronic time frame.  Other etiologies, such as demyelinating disease, again, cannot be excluded entirely but are thought to be less likely.  4. There may be mild chronic small vessel ischemia/infarction.   5. Please see above comments for further detail.    ISATU BROWN JR, MD       Electronically Signed and Approved By: ISATU BROWN JR, MD on 7/02/2021 at 7:39             XR Chest 1 View    Result Date: 7/1/2021  Narrative: PROCEDURE: XR CHEST 1 VW  COMPARISON: [facility], , CHEST AP/PA 1 VIEW, 10/28/2020, 17:11.  INDICATIONS: Weak/Dizzy/AMS triage protocol  FINDINGS:  The heart is enlarged.  The pulmonary vascular markings are normal.  The right lung is clear.  There is patchy left infrahilar airspace disease which could be secondary to pneumonia.  CONCLUSION: Mild left lower lobe airspace disease consistent with pneumonia.  GRANT CHRISTY MD       Electronically Signed and Approved By: GRANT CHRISTY MD on 7/01/2021 at 12:38              XR Abdomen KUB    Result Date: 7/2/2021  Narrative: PROCEDURE: XR ABDOMEN KUB  COMPARISON: [facility], CR, ABDOMEN FLAT & UPRIGHT, 1/15/2007, 18:18.  INDICATIONS: post ng tube placement  FINDINGS:  A single AP upright portable view of the abdomen was performed.  The tip of the nasogastric (NG) tube is in the expected location of the distal stomach.  There are postoperative changes of the right upper quadrant of the abdomen.  No pneumoperitoneum is seen.  Motion artifact obscures detail on the exam.  The patient is slightly rotated to the right.  External artifacts obscure detail.  CONCLUSION:  The tip of the nasogastric tube is in the expected location of the distal stomach.      ISATU BROWN JR, MD       Electronically Signed and Approved By: ISATU BROWN JR, MD on 7/02/2021 at 2:48               Procedures/EKGs:  Procedures    EKG:    Rhythm: Tachycardia  Rate: 139  Axis: normal  Intervals: prolonged QT and QTc  ST Segment: Non-specific     Interpreted by me    Medical Decision Making:  MDM  Number of Diagnoses or Management Options  Diagnosis management comments: The patient presents with altered mental status , fever and multiple underlying medical problems.  She is septic, has pneumonia, hepatic encephalopathy, and possible subacute stroke.   She has a severe bullous decubitus ulcer in the sacral area.  She was started on broad spectrum antibiotics and she will be admitted for further evaluation and treatment       Amount and/or Complexity of Data Reviewed  Clinical lab tests: reviewed  Tests in the radiology section of CPT®: reviewed  Tests in the medicine section of CPT®: reviewed  Decide to obtain previous medical records or to obtain history from someone other than the patient: yes    Critical Care  Total time providing critical care: 30-74 minutes    Patient Progress  Patient progress: other (comment) (Critical condition and unchanged)       Final diagnoses:   Sepsis with  encephalopathy without septic shock, due to unspecified organism (CMS/[facility])        Disposition:  ED Disposition     ED Disposition Condition Comment    Decision to Admit  Level of Care: Critical Care [6]   Diagnosis: Sepsis due to pneumonia (CMS/[facility]) [[id number]]   Admitting Physician: MYRON TUCKER [[id number]]   Attending Physician: MYRON TUCKER [[id number]]   Isolate for COVID?: No [0]   Bed Request Comments: Dr. Hurst recommends ICU admission   Certification: I Certify That Inpatient Hospital Services Are Medically Necessary For Greater Than 2 Midnights            Documentation assistance provided by Oleksandr Hurst DO acting as scribe for Dom Gutierrez MD. Information recorded by the scribe was done at my direction and has been verified and validated by me.        Ava Garg  07/01/21 1356       Ava Garg  07/01/21 1444       Oleksandr Hurst DO  07/02/21 1141

## 2021-07-01 NOTE — CONSULTS
Pulmonary / Critical Care Consult Note      Patient Name: Amy Miles  : 1965  MRN: [id number]  Primary Care Physician:  Provider, No Known  Referring Physician: No ref. provider found  Date of admission: 2021    Subjective   Subjective     Reason for Consult/ Chief Complaint:   Altered mental status with hepatic encephalopathy with a meld score of 23 child Denise class B+.    HPI:  Amy Miles is a 55 y.o. female morbidly obese female with end-stage liver disease with a meld score of 23 with nonalcoholic serohepatitis with hypothyroidism morbid obesity chronic pain hypertension.   states that the patient had change in mental status and was unarousable he notes that he has not taken her thyroid medication for 2 days because she is run out at baseline she takes 175 mcg of levothyroxine.  Additionally she takes Lasix spironolactone likely due to her underlying liver disease.  On examination sacrum is excoriated suggesting immobility for a significant period of time. The patient has never been hypotensive she has been tachycardic and relatively hypotensive. On presentation she did have a fever of 101 with elevated white count source of infection is not clear at this time.      Review of Systems  Unable to obtain review of systems due to altered mental status.    Personal History     Past Medical History:   Diagnosis Date   • Anxiety    • Arm pain    • Arthritis    • Asthma    • Bladder disorder    • Bronchitis    • Chronic allergic rhinitis    • Chronic pain syndrome    • Cirrhosis of liver (CMS/HCC)    • COPD (chronic obstructive pulmonary disease) (CMS/HCC)    • CRPS (complex regional pain syndrome), type I, upper 2012   • Depression    • Diabetes (CMS/HCC)    • Essential hypertension, benign    • Fracture    • GERD (gastroesophageal reflux disease)    • Hand pain    • HBP (high blood pressure)    • Heel pain    • Hyperthyroidism    • Leg pain    • Leg swelling    • Limb swelling    •  Mood disorder (CMS/HCC)    • Nausea    • RSD (reflex sympathetic dystrophy)    • Seasonal allergies    • Shortness of Air    • Thyroid disease    • Thyroid disorder    • Toe fracture        • Type 1 diabetes (CMS/HCC)        Past Surgical History:   Procedure Laterality Date   • ABDOMINAL HYSTERECTOMY     • CARPAL TUNNEL RELEASE     •  SECTION     • CHOLECYSTECTOMY     • GALLBLADDER SURGERY     • HAND SURGERY     • HEMORRHOIDECTOMY     • HERNIA REPAIR     • HIATAL HERNIA REPAIR     • HYSTERECTOMY     • INCISION AND DRAINAGE ABSCESS     • TONSILLECTOMY     • TUBAL ABDOMINAL LIGATION         Family History: family history includes Arthritis in her father, mother, and sister; Diabetes in her brother, maternal uncle, and sister; Heart disease in her father; Osteoporosis in her sister; Other in her brother. Otherwise pertinent FHx was reviewed and not pertinent to current issue.    Social History:  reports that she has been smoking. She has been smoking about 1.00 pack per day. She does not have any smokeless tobacco history on file. She reports that she does not drink alcohol and does not use drugs.    Home Medications:  busPIRone, cyclobenzaprine, furosemide, gabapentin, insulin aspart, levothyroxine, lisinopril, metoprolol succinate XL, oxyCODONE, and spironolactone    Allergies:  Allergies   Allergen Reactions   • Adhesive Tape Unknown - Low Severity   • Amoxicillin-Pot Clavulanate Unknown - Low Severity   • Cephalexin Unknown - Low Severity   • Clavulanic Acid Unknown - Low Severity   • Doxycycline Hyclate Unknown - Low Severity   • Flonase [Fluticasone] Unknown - Low Severity   • Nsaids Unknown - Low Severity   • Penicillins Unknown - Low Severity   • Potassium Unknown - Low Severity       Objective    Objective     Vitals:   Temp:  [98.9 °F (37.2 °C)-101 °F (38.3 °C)] 99.1 °F (37.3 °C)  Heart Rate:  [121-139] 123  Resp:  [17-22] 20  BP: (136-212)/() 142/88  Flow (L/min):  [1-3] 1    Physical  Exam:  Vital Signs Reviewed   Normally developed very well nourished with stigmata of chronic liver disease including livedo reticularis.   HEENT:  PERRL, EOMI.  OP, nares clear  Neck:  Supple, no JVD, no thyromegaly  Chest:  good aeration, clear to auscultation bilaterally, tympanic to percussion bilaterally, no work of breathing noted point-of-care ultrasound was used to examine the thorax no significant pleural effusion or pneumonia was noted.  Slight increase in B-lines in the left thorax suggesting vascular congestion  CV: RRR, no MGR, pulses 2+, equal.  Abd: Obese pannus Soft, NT, ND, + BS, no HSM           Point-of-care ultrasound was utilized to examine the abdomen there is minimal ascites  EXT:  no clubbing, no cyanosis, no edema  Neuro:  A&Ox3, CN grossly intact, no focal deficits.  Skin: Pressure ulcer on sacrum with skin breakdown      Result Review    Result Review:  I have personally reviewed the results from the time of this admission to 7/1/2021 18:57 EDT and agree with these findings:  [x]  Laboratory  [x]  Microbiology  [x]  Radiology  [x]  EKG/Telemetry   []  Cardiology/Vascular   []  Pathology  []  Old records  []  Other:  Most notable findings include: INR of 2.5 ammonia level of 74 elevated transaminases.      Assessment/Plan   Assessment / Plan     Active Hospital Problems:  Active Hospital Problems    Diagnosis    • Sepsis due to pneumonia (CMS/HCC)        Impression:  Amy Miles is a 55-year-old female critically ill with decompensated chronic liver failure, EMILIANA, and sepsis.  I doubt that she has pneumonia at this time.  I doubt that  Her lactic acidosis is due to shock. She has never been hypotensive.  It is a byproduct of liver disfunction and her likely stasis.  Her sacrum indicates that she has not moved much in several days.     Plan:    1.  Place NGT  2.  Start lactulose and rifaximin  3.  Resume Synthroid 175 mcg likely will need to use NGT for the Synthroid and then wait 2 hours  prior to administration of lactulose or rifaximin.  4. Continue aldactone   5. Continue lasix  6. Avoid NS and LR  Use d5 0.45 as a replacement fluid.   7. Use dexmedetomidine for sedation and anxiolytics. And liver patient we want avoid using benzodiazepines due to longer half-life and delayed clearance  8.  Empric antibiotics Cefepime and Vanc. There is no ascites to tap   9. MELD score suggests mortality is high in the next 3 months.     The patient is critically ill in the ICU with hepatic encephalopathy possible SBP multidisciplinary bedside critical care rounds were performed with nursing staff, respiratory therapy, pharmacy, nutritional services, social work. I have personally reviewed the chart, labs and any pertinent imaging available.  I have spent 35 minutes of critical care time, excluding procedures, in the care of this patient.    Electronically signed by GWYN LEPE M.D. [facility], 07/01/21, 6:57 PM EDT.

## 2021-07-02 NOTE — SIGNIFICANT NOTE
07/02/21 1101   Wound 07/01/21 Bilateral coccyx Blisters   Placement Date: 07/01/21   Present on Hospital Admission: Yes  Side: Bilateral  Location: coccyx  Primary Wound Type: Blisters  Stage, Pressure Injury : other (see comments)  Additional Comments: stage 2   Wound Image    Dressing Appearance intact;moist drainage;copious drainage   Base purple;red   Periwound redness   Periwound Temperature warm   Periwound Skin Turgor soft   Drainage Characteristics/Odor serous   Drainage Amount moderate   Care, Wound cleansed with;sterile normal saline   Dressing Care dressing applied;silver impregnated;silicone   Patient from home, critically ill with stage 2 pressure injury across coccyx and bilateral buttocks.  Review of H&P, patient was been down, in bed for some time before  brought pt to hospital.  Large blisters have been deroofed.  Non blanchable purple discoloration to coccyx and perirectal area with sloughing skin.  Area cleaned with ns and gauze to debride loose skin layer away.  Aquacel ag and large optifoam dressings applied.  Recommend side to side turns only.  Primary rn at bedside to assist in turning patient.  Cb,rn,wcc

## 2021-07-02 NOTE — PLAN OF CARE
Goal Outcome Evaluation:  Plan of Care Reviewed With: spouse  Patient with new CVA.  Currently with NG tube secondary to mental status and swallow function not conducive to safe oral intake.  Patient will require further/extensive speech pathology services for dysphagia.  Cannot recommend safe p.o. intake.  Will also need follow-up speech pathology evaluation for speech language function; when patient's mental status improves.

## 2021-07-02 NOTE — PLAN OF CARE
Goal Outcome Evaluation:   Brain MRI confirmed patient has had embolic strokes. She moves all 4 extremities purposefully and reacts to stimuli but not following commands. Insulin drip continued, tube feedings initiated. Pt has had bowel movement now. Wound care nurse following for buttocks and perianal area wound. Patient has not had fever today, vital signs stable. Mary Ann Puckett RN

## 2021-07-02 NOTE — PROGRESS NOTES
Pulmonary / Critical Care Progress Note      Patient Name: Amy Miles  : 1965  MRN: [id number]  Attending:  Dom Gutierrez MD   Date of admission: 2021    Subjective   Subjective   Patient critically ill with altered mental status that is multifactorial, end-stage liver disease with acute hepatitis, concern for sepsis and sub acute CVA with hemorraghic transformation.     · Overnight patient had to be started on Precedex due to being agitated, hollering out constantly.  Once started on Precedex patient had improvement in agitation but had fever that also became hypotensive.  Was given some fluid but ended up needing Levophed which subsequently made her hypertensive and it was discontinued.  Fever also dissipated once Precedex was stopped this morning.  · Transaminitis improved this morning  · Has been on IV Merrem and vancomycin.  · Did have elevated lactate this morning.  · MRI of brain showing multiple bilateral cerebral hemispheric foci of restricted diffusion with largest area being in the posterior left frontal lobe with some associated hemorrhage concerning for hemorrhagic transformation of cortical infarction.  · Receiving echocardiogram this morning to rule out embolic etiologies.      Review of Systems  Unable to be obtained, patient not optimally responsive    Objective   Objective     Vitals:   Vital signs for last 24 hours:  Temp:  [98.9 °F (37.2 °C)-102.8 °F (39.3 °C)] 99.3 °F (37.4 °C)  Heart Rate:  [] 83  Resp:  [17-22] 20  BP: ()/() 122/103    Intake/Output last 3 shifts:  I/O last 3 completed shifts:  In: 1717 [I.V.:997; NG/GT:120; IV Piggyback:600]  Out: 1000 [Urine:1000]  Intake/Output this shift:  No intake/output data recorded.    Physical Exam   Vital Signs Reviewed   Normally developed well nourished with stigmata of chronic liver disease including livedo reticularis.   HEENT:  PERRL, EOMI.  OP, nares clear  Neck:  Supple, no JVD, no thyromegaly  Chest:   good aeration, clear to auscultation bilaterally, tympanic to percussion bilaterally, no work of breathing noted   CV: RRR, no MGR, pulses 2+, equal.  Abd: Obese pannus Soft, NT, ND, + BS, no HSM  EXT:  no clubbing, no cyanosis, no edema  Neuro:  A&Ox0, moving all 4 extremities, cannot follow commands   skin: Pressure ulcer on sacrum with skin breakdown    Result Review    Result Review:  I have personally reviewed the results from the time of this admission to 7/2/2021 10:10 EDT and agree with these findings:  [x]  Laboratory  [x]  Microbiology  [x]  Radiology  [x]  EKG/Telemetry   []  Cardiology/Vascular   []  Pathology  []  Old records  []  Other:  Most notable findings include: MRI with multiple areas concerning for stroke with some hemorrhagic conversion versus demyelinating process.  Lactate elevated today, had some episodes last night of hypotension after starting Precedex, also had some fever that seemed to resolve after discontinuing Precedex.  No tachyarrhythmias noted on telemetry.    Assessment/Plan   Assessment / Plan   Amy Miles is a 55-year-old female critically ill with decompensated chronic liver failure, EMILIANA, and sepsis.  I doubt that she has pneumonia at this time.  I doubt that  Her lactic acidosis is due to shock. She has never been hypotensive.  It is a byproduct of liver disfunction and her likely stasis.  Her sacrum indicates that she has not moved much in several days.     Active Hospital Problems:  Active Hospital Problems    Diagnosis    • **Sepsis due to pneumonia (CMS/HCC)    • Elevated INR    • Ischemic stroke (CMS/HCC)    • Encephalopathy, hepatic (CMS/HCC)    • Thrombocytopenia (CMS/HCC)    • Bandemia    • Elevated LFTs    • Anxiety    • Depression    • Mood disorder (CMS/HCC)    • Asthma    • COPD (chronic obstructive pulmonary disease) (CMS/HCC)    • Diabetes mellitus type 1 (CMS/HCC)    • GERD (gastroesophageal reflux disease)    • Essential hypertension, benign    • Iatrogenic  hypothyroidism    • Chronic allergic rhinitis    • Cirrhosis of liver (CMS/HCC)    • Chronic pain syndrome    ·   Reflex Sympathetic Dystrophy, left arm.     I  Plan:    Liver failure:  · Continue with lactulose and rifaximin.  · Continue Aldactone as lasix for volume reduction.  · Continue Midodrine 10mg per ng TID.  · Possible etiology of patient's lactate being elevated is just from inability to clear due to liver dysfunction.  Last night had some hypotension after Precedex was started also had fever, was on a small dose of Levophed but is off this morning.  Post discontinuing Precedex patient's fever resolved.  At the moment our team does not feel like she is likely infected but will check a pro Christofer and leave antibiotics on for the next 24 hours.  If all cultures are negative and pro-Christofer is negative can discontinue Merrem and vancomycin.  · Would avoid benzodiazepines as much as possible given longer half-life and delayed clearance in liver patient.  · Ultrasound of patient's belly last night showed no ascites large enough for tap.    CVA:  · Echo being performed today with concern for embolic etiology of strokes.  · Ordering carotid doppler as well.   · Needs long cardiac dysrhythmia monitoring  · Likely will need outpatient polysomnogram since she is at risk for sleep disordered breathing after her stroke and with morbid obesity.  · Goal blood pressure is < 160  Wean midodrine as needed.    ICU Quality:  · Start tube feeds per dietitian recommendations per patient's NG tube.  · Continue with insulin drip.   · Continue NG tube, Synthroid was restarted yesterday.  · Patient is likely able to be discharged to the floor.  DVT prophylaxis:  Medical and mechanical DVT prophylaxis orders are present. Heparin and pepcid    CODE STATUS:   Limited Support to NOT Include: Cardioversion/Defibrillation; Intubation  Level Of Support Discussed With: Health Care Surrogate  Code Status: No CPR  Medical Interventions (Level of  Support Prior to Arrest): Limited  Comments: Reviewed with her .  He indicated these are her wishes.      Electronically signed by FELA Felix, 07/02/21, 10:29 AM EDT.    Amy Miles is a 55-year-old critically ill female with subacute stroke with hemorrhagic transformation with severe chronic liver disease with a meld score of 21  1 in 5 chance of dying in the next 90 days.  She is stable for transfer to the floor I suspect she will need short term rehab.     In this post stroke patient on room air pulmonary critical care services will sign off after transfer to the floor.    GWYN LEPE M.D. [facility]

## 2021-07-02 NOTE — CONSULTS
"Adult Nutrition  Assessment/PES    Patient Name:  Amy Miles  YOB: 1965  MRN: [id number]  Admit Date:  7/1/2021    Assessment Date:  7/2/2021    Reason for Assessment     Row Name 07/02/21 1002          Reason for Assessment    Reason For Assessment  TF/PN;physician consult     Identified At Risk by Screening Criteria  large or nonhealing wound, burn or pressure injury         Nutrition/Diet History     Row Name 07/02/21 1002          Nutrition/Diet History    Typical Food/Fluid Intake  Unable to obtain secondary to patient's altered mental status.     Factors Affecting Nutritional Intake  impaired cognitive status/motor control         Anthropometrics     Row Name 07/02/21 1002 07/02/21 0923       Anthropometrics    Height  162.6 cm (64\")  162.6 cm (64\")    Weight  89 kg (196 lb 3.4 oz)  88.9 kg (196 lb)       Admit Weight    Admit Weight  88.9 kg (195 lb 15.8 oz)  --       Ideal Body Weight (IBW)    Ideal Body Weight (IBW) (kg)  55  55    % Ideal Body Weight  161.81  161.63       Body Mass Index (BMI)    BMI (kg/m2)  33.75  33.71    BMI Assessment  BMI 30-34.9: obesity grade I  --        Labs/Tests/Procedures/Meds     Row Name 07/02/21 1002          Labs/Procedures/Meds    Lab Results Reviewed  reviewed        Diagnostic Tests/Procedures    Diagnostic Test/Procedure Reviewed  reviewed        Medications    Pertinent Medications Reviewed  reviewed           Estimated/Assessed Needs     Row Name 07/02/21 1002 07/02/21 0923       Calculation Measurements    Weight Used For Calculations  54.7 kg (120 lb 9.5 oz)  --    Height  162.6 cm (64\")  162.6 cm (64\")       Estimated/Assessed Needs    Additional Documentation  Protein Requirements (Group);Hardy-St. Jeor Equation (Group);Fluid Requirements (Group)  --       Hardy-St. Jeor Equation    RMR (Hardy-St. Jeor Equation)  1122  --    Hardy-St. Jeor Activity Factors  1.4 - 1.5  --    Activity Factors (Hardy-St. Jeor)  1570.8 - 1683  --       " Protein Requirements    Weight Used For Protein Calculations  54.7 kg (120 lb 9.5 oz)  --    Est Protein Requirement Amount (gms/kg)  1.2 gm protein  --    Estimated Protein Requirements (gms/day)  65.64  --       Fluid Requirements    Fluid Requirements (mL/day)  1600  --    RDA Method (mL)  1600  --        Nutrition Prescription Ordered     Row Name 07/02/21 1002          Nutrition Prescription PO    Current PO Diet  NPO                 Problem/Interventions:  Problem 1     Row Name 07/02/21 1002          Nutrition Diagnoses Problem 1    Problem 1  Inadequate Intake/Infusion     Inadequate Intake Type  Oral     Etiology (related to)  Medical Diagnosis     Neurological  AMS     Signs/Symptoms (evidenced by)  NPO               Intervention Goal     Row Name 07/02/21 1002          Intervention Goal    General  Nutrition support treatment     TF/PN  Inititiate TF/PN           Nutrition Prescription     Row Name 07/02/21 1002          Nutrition Prescription EN    Enteral Prescription  Enteral begin/change     Enteral Route  NG     Product  Diabetisource     TF Delivery Method  Continuous     Continuous TF Goal Rate (mL/hr)  56 mL/hr     Continuous TF Starting Rate (mL/hr)  25 mL/hr     Water flush (mL)   60 mL     Water Flush Frequency  Every 3 hours     New EN Prescription Ordered?  Yes             Electronically signed by:  Pat Bradley RD  07/02/21 10:14 EDT

## 2021-07-02 NOTE — PROGRESS NOTES
[facility]   Hospitalist Progress Note  Date: 2021  Patient Name: Amy Miles  : 1965  MRN: [id number]  Date of admission: 2021    Subjective   Subjective     Chief Complaint:   Follow-up sepsis    Summary:   Patient is a 55-year-old female with a past medical history significant for anxiety, cirrhosis, diabetes who presents with a 2 to 3-day history of being more somnolent, patient was in her normal state of health prior to this.  Patient has been in bed and difficult to arouse during that time.  In the emergency department there was concern for pneumonia possible acute versus subacute stroke, and she also met sepsis criteria.  Patient also was noted to have a large decubitus ulcer on her gluteal and sacral areas, patient was admitted to the ICU, MRI of the brain was obtained which was significant for multiple areas of scattered infarct, was some areas of hemorrhagic conversion.  Patient was continued on antibiotics as she continued to have fever, her procalcitonin was noted to be elevated, cultures were obtained.  Patient is being treated for decompensated cirrhosis.  Hospitalist service was asked to admit for further management    Interval Followup:   Mental status slightly improved, patient is able to vocalize.  Evaluated by speech, still not safe to swallow, MRI with multiple areas of infarction, neurology consultation is pending    Review of Systems  Unable to obtain secondary to altered mental status    Objective   Objective     Vitals:   Temp:  [99.1 °F (37.3 °C)-102.8 °F (39.3 °C)] 99.7 °F (37.6 °C)  Heart Rate:  [] 79  Resp:  [14-30] 17  BP: ()/() 126/82  Flow (L/min):  [0-5] 2    Physical Exam   General appearance: NAD, conversant, drowsy  Eyes: anicteric sclerae, moist conjunctivae; no lid-lag; PERRLA  HENT: Atraumatic; oropharynx clear with moist mucous membranes and no mucosal ulcerations; normal hard and soft palate  Neck: Trachea midline; FROM, supple, no  thyromegaly or lymphadenopathy  Lungs: CTA, with normal respiratory effort and no intercostal retractions  CV: Regular Rate and Rhythm, no Murmurs, Rubs, or Gallops   Abdomen: Soft, non-tender; no masses or Heptosplenomegally  Extremities: No peripheral edema or extremity lymphadenopathy  Skin: Normal temperature, turgor and texture; no rash, ulcers or subcutaneous nodules  Psych: Blunted affect, not oriented to person place or time  Neuro: Moving all 4 extremities, still seems confused, slow to respond, mostly garbled speech  Result Review    Result Review:  I have personally reviewed the results from the time of this admission to 7/2/2021 16:49 EDT and agree with these findings:  [x]  Laboratory  [x]  Microbiology  []  Radiology  [x]  EKG/Telemetry   []  Cardiology/Vascular   []  Pathology  []  Old records  []  Other:    Assessment/Plan   Assessment / Plan     Assessment:  Sepsis  Decompensated cirrhosis  Acute stroke with hemorrhagic conversion  History of asthma  COPD without acute exacerbation  Insulin-dependent diabetes mellitus  Essential hypertension  Chronic pain syndrome  Anxiety  Depression  Sacral decubitus ulcer  Coagulopathy  Thrombocytopenia    Plan:  • Patient mid to the ICU for further management of above  • Pulmonary critical care consulted, appreciate their recommendations  • Speech therapy consulted, currently not safe to swallow, tolerating tube feeds  • MRI of the brain reviewed, will consult neurology given multiple areas of stroke  • Echocardiogram ordered and pending, patient may require JOSEPH  • Blood pressure stable, continue to monitor off Levophed  • Continue broad-spectrum antibiotics with meropenem and vancomycin monitor vancomycin levels  • Cultures ordered and pending  • Monitor CBC, CMP  • Consult wound care  • Continue lactulose  • Check carotid Doppler  • Continue telemetry to evaluate for atrial fibrillation  • Continue insulin drip  • Clinical course will dictate further  management     Telemetry: Reviewed by me, sinus rhythm    Reviewed patients labs and imaging, and discussed with patient and nurse at bedside.    DVT prophylaxis:  Medical and mechanical DVT prophylaxis orders are present.    CODE STATUS:   Limited Support to NOT Include: Cardioversion/Defibrillation; Intubation  Level Of Support Discussed With: Health Care Surrogate  Code Status: No CPR  Medical Interventions (Level of Support Prior to Arrest): Limited  Comments: Reviewed with her .  He indicated these are her wishes.        Electronically signed by Dom Gutierrez MD, 07/02/21, 4:49 PM EDT.

## 2021-07-02 NOTE — H&P
[facility]   History & Physical    Patient Name: Amy Miles  : 1965  MRN: [id number]  Primary Care Physician:  MAILE Wilkinson and in the process of changing to Dr. Demarcus Tripp  ENT:  Dr. Leandro Anthony  Gastroenterologist: Dr. Pelayo  Pain management: Dr. Nilay Higginbotham  Pulmonologist:  Dr. Cuong Dailey  Endocrinologist:  Dr. Zelaya    Date of admission: 2021     Subjective   Subjective     Chief Complaint: Increasing sedation and fatigue, not herself.  G.    HPI:  Amy Miles is a 55 y.o. female was brought into the emergency room by her family after being somnolent, more guarded over the last 24 to 36 hours.  Her  noted that about 2 to 3 days ago, she had been up and ambulatory, in her usual state of health.  Then yesterday she began sleeping, and effectively did not get off the couch for the entire day.  He felt that something needed to be done to evaluate her.      Emergency department she is found to have a pneumonia, possible acute versus subacute stroke she is not thrombolytic candidate.  She was also septic, she had a large decubitus ulcer on her gluteal and sacral areas.   noted that a few days ago, she was generally not present at a time.  He says that he has wife take DePere 100 mg retrigger and he was quite certain that this decubitus was not present.  She is normally independent at home.    Other than the increase fatigue and lethargy, and increasing somnolence in the last 24-36 hours,  not noticed any other irregularities breathing and equal.  He noted that the patient had been trying to switch to a new primary care provider.  They have recently struggled to medications from a pharmacy, stating that prior authorizations have not gone through correctly.  He stated that given her somnolence, she has not taken any of her medications in the last 48 hours.  She does have a Dexcom insulin device, but typically goes to about 100 units of insulin every 3 days.   He said on average running about 35-45 units of insulin daily.  He says typically her blood sugars were running in the 200 range, occasionally getting up to the 300-400 range, but able to quickly bring these down.  He indicates he continues to smoke, but has not been having any respiratory symptoms.  Patient did have thyroid surgery within the last year or so Dr. Anthony, and has iatrogenic hypothyroidism now.  She is on levothyroxine therapy, and the dosage was recently increased.    The patient does take gabapentin routinely, 600 mg 4 times daily, with dosage recently being increased to 800 mg.  He says she suffers from reflex sympathetic dystrophy.  In addition, he states that his wife takes oxycodone 15 mg 3 times daily routinely.  Has not taken either of these medications in the last 48 hours.    In the Emergency department, she was treated with meropenem, vancomycin.  She had an elevated lactate level initially, and this improved after 2 L of NS IV fluids and antibiotics.  She was recommended to be admitted to the ICU.     Review of Systems:  Other than as mentioned the ROS is not obtainable from the patient as she is sedated on Precedex and recently was administered geodon.  I obtained the information above from Dr. Hurst, nursing staff, and the  by way of a phone call.  She has been febrile to 101-102.        Personal History     Past Medical History:   Diagnosis Date   • Anxiety    • Arm pain    • Arthritis    • Asthma    • Bladder disorder    • Bronchitis    • Chronic allergic rhinitis    • Chronic pain syndrome     Reflex Sympathetic Dystrophy, left arm.   • Cirrhosis of liver (CMS/HCC)    • COPD (chronic obstructive pulmonary disease) (CMS/[facility])    • CRPS (complex regional pain syndrome), type I, upper 12/27/2012   • Depression    • Diabetes mellitus type 1 (CMS/HCC)    • Essential hypertension, benign    • Fracture    • GERD (gastroesophageal reflux disease)    • Hand pain    • Heel pain    •  Hyperthyroidism    • Iatrogenic hypothyroidism    • Leg pain    • Leg swelling    • Limb swelling    • Mood disorder (CMS/HCC)    • Nausea    • RSD (reflex sympathetic dystrophy)    • Seasonal allergies    • Shortness of Air    • Toe fracture            Past Surgical History:   Procedure Laterality Date   • ABDOMINAL HYSTERECTOMY     • CARPAL TUNNEL RELEASE     •  SECTION     • CHOLECYSTECTOMY     • GALLBLADDER SURGERY     • HAND SURGERY     • HEMORRHOIDECTOMY     • HERNIA REPAIR     • HIATAL HERNIA REPAIR     • INCISION AND DRAINAGE ABSCESS     • THYROIDECTOMY      by Dr. Anthony   • TONSILLECTOMY     • TUBAL ABDOMINAL LIGATION         Family History: family history includes Arthritis in her father, mother, and sister; Diabetes in her brother, maternal uncle, and sister; Heart disease in her father; Osteoporosis in her sister; Other in her brother. Otherwise pertinent FHx was reviewed and not pertinent to current issue.    Social History:  reports that she has been smoking. She has been smoking about 1.00 pack per day. She does not have any smokeless tobacco history on file. She reports that she does not drink alcohol and does not use drugs.    Home Medications:  busPIRone, cetirizine, cyclobenzaprine, furosemide, gabapentin, insulin aspart, levothyroxine, lisinopril, metoprolol succinate XL, montelukast, oxyCODONE, and spironolactone    Allergies:  Allergies   Allergen Reactions   • Adhesive Tape Unknown - Low Severity   • Amoxicillin-Pot Clavulanate Unknown - Low Severity   • Cephalexin Unknown - Low Severity   • Clavulanic Acid Unknown - Low Severity   • Doxycycline Hyclate Unknown - Low Severity   • Flonase [Fluticasone] Unknown - Low Severity   • Nsaids Unknown - Low Severity   • Penicillins Unknown - Low Severity   • Potassium Unknown - Low Severity       Objective   Objective     Physical Exam:  Vitals:   Temp:  [98.9 °F (37.2 °C)-101 °F (38.3 °C)] 99.1 °F (37.3 °C)  Heart Rate:  [121-139]  135  Resp:  [17-22] 20  BP: (134-212)/() 177/142  Flow (L/min):  [1-3] 1       07/01/21  1325   Weight: 89.2 kg (196 lb 10.4 oz)        Constitutional: Ill-appearing, somnolent.  Psychiatric: Unable to assess at this time.  Reportedly she was quite agitated in the emergency department  Skin: Dry.  Mottled over the distal legs and feet.  No documentation of large gluteal and sacral with shearing.  Blister formation noted peripherally.  HEENT: mucous membranes in mouth dry.  No ocular drainage.  Neck: supple.  No rigidity  Lymphatic: No nodes in the neck or SC regions  Respiratory: diminished bilaterally.  Faint crackles in the left base and laterally.  Cardiovascular: tachycardic rate, reg. Rhythm.  No extopy.  Distant Heart sounds.  Gastrointestinal: Obese.  Soft, Nondistended  Genitourinary: Brice present.  No bladder distension noted.  Musculoskeletal: No gross deformities of arms or legs.  Neurologic: No obvious rigidity of the arms or legs.  No tremor.  Patient was sedated.    Result Review    Result Review:  I have personally reviewed the results from the time of this admission to 7/1/2021 23:52 EDT and agree with these findings:  [x]  Laboratory    []  Microbiology  [x]  Radiology  []  EKG/Telemetry   []  Cardiology/Vascular   []  Pathology  []  Old records  []  Other:  Most notable findings include: ABG showing respiratory alkalosis with pH 7.47, PCO2 of 26, bicarb 19.  Sats 95%.  Glucose 157, creatinine 1.72, BUN 36, alkaline phosphatase 143, , , total bilirubin 1.6, TSH 5.7, free T4 0.67, ammonia level 78, lactate 4.6 on presentation 91.9, magnesium 1.9, INR 2.5, white count 17.1, hemoglobin 15.3, Hct 46.4, platelets 108,000, 14% bands, urinalysis with 500 mg/dL of glucose, large blood, 100 mg/dL of protein, 13-20 white blood cells, trace bacteria, large yeast.  Blood cultures pending.    Chest x-ray showed:  FINDINGS:  The heart is enlarged.  The pulmonary vascular markings are normal.   The right lung is clear.  There is patchy left infrahilar airspace disease which could be secondary to pneumonia.     CONCLUSION: Mild left lower lobe airspace disease consistent with pneumonia.     CT Head w/o contrast:  FINDINGS:   There is an area of low attenuation in the left frontoparietal region measuring approximately 3.4 x 2 cm.  This is concerning for an acute/subacute infarct.  There is no acute hemorrhage.  There are no abnormal extra-axial fluid collections.  The osseous structures are normal.     CONCLUSION: Low-attenuation area measuring 3.4 x 2 cm in the left frontoparietal region could be   acute or subacute infarct.  Further evaluation with contrast-enhanced MRI with diffusion sequences   would be recommended.    MICROBIOLOGY FROM LAST 7 DAYS:  No results found for: ACANTHNAEG, AFBCX, BPERTUSSISCX, BLOODCX  No results found for: BCIDPCR, CXREFLEX, CSFCX, CULTURETIS  No results found for: CULTURES, HSVCX, URCX  No results found for: EYECULTURE, GCCX, HSVCULTURE, LABHSV  No results found for: LEGIONELLA, MRSACX, MUMPSCX, MYCOPLASCX  No results found for: NOCARDIACX, STOOLCX  No results found for: THROATCX, UNSTIMCULT, URINECX, CULTURE, VZVCULTUR  No results found for: VIRALCULTU, WOUNDCX     CARDIOVASCULAR:   EKG:  Sinus tachycardia, Borderline repolarization abnormality, Borderline prolonged QT interval        Assessment/Plan   Assessment / Plan     Active Hospital Problems:  Active Hospital Problems    Diagnosis    • **Sepsis due to pneumonia (CMS/HCC)    • Ischemic stroke (CMS/HCC)    • Encephalopathy, hepatic (CMS/HCC)    • Elevated LFTs    • Cirrhosis of liver (CMS/HCC)    • Elevated INR    • Thrombocytopenia (CMS/HCC)    • Bandemia    • Asthma    • COPD (chronic obstructive pulmonary disease) (CMS/HCC)    • Diabetes mellitus type 1 (CMS/HCC)    • GERD (gastroesophageal reflux disease)    • Essential hypertension, benign    • Chronic pain syndrome      Reflex Sympathetic Dystrophy, left arm.      • Anxiety    • Depression    • Mood disorder (CMS/HCC)    • Iatrogenic hypothyroidism    • Chronic allergic rhinitis    Sacral Decubitus Ulcer    Plan:   Patient was admitted to the intensive care unit.  Dr. Ackerman has already evaluated patient.  This consultation is appreciated.  IV meropenem and vancomycin have been administered in the emergency room we will continue with the dose per pharmacy.  IV fluids with normal saline x2 L provided in the emergency room.  I will redose use lactated Ringer's given patient's type I diabetic.  Will give 1 L additional over the next 2 hours, followed by 125 cc/h.  Labs for the morning to include CBC, CMP, lipids, hemoglobin A1c, magnesium, phosphorus, lactate level.  Thyroid studies were performed in the emergency room, and while revealing slight hypothyroidism, is noted that patient recently had the dosage increased.  I am not going to make further adjustment at this time.  Treat type 1 diabetes with Glucomander protocol for insulin.  Accu-Cheks every 6 hours and as needed.  Discontinue the Dexcom device temporarily hospitalized.  I reviewed and reconciled home medication.  I plan to continue a low-dose of oxycodone to minimize withdrawal.  We will convert from 15 mg 3 times daily to 5 mg 3 times daily.  We will also continue gabapentin at reduced dosage 300 mg 3 times daily.  She recently had her home dosage increased from 600 mg to 800 mg 4 times daily.  I think this is much too high given her liver disease.  Further evaluate CT findings possible stroke with MRI.  At this time, I do not believe that she is candidate for antiplatelet therapy given her elevated INR, low platelet count, and significant bleeding risk.  She is also not a thrombolytic candidate.  She is much too far outside the window for thrombolytic therapy  NG tube  Lactulose twice daily.  Rifaximin is also been ordered for 7 days.  Wound nursing evaluation.  Off weight, frequent position changes, minimize  moisture with damian catheter.  Max dose statin with lipitor 80 mg qday given probable stroke.  This is about all that can be done for stroke.  PT/OT/ST consults.        Prophylaxis:  DVT prophylaxis:  Mechanical DVT prophylaxis orders are present.  GI:  Pepcid 20 mg po BID  Pulmonary:  Monitor.  Check sats frequently.  O2 to keep sats 90-95%.  Upon awakening, add incentive spirometry.  Skin:  As above.  Already has large decub.  Wound nursing to eval in the a.m.    CODE STATUS:    Limited Support to NOT Include: Cardioversion/Defibrillation; Intubation  Level Of Support Discussed With: Health Care Surrogate  Code Status: No CPR  Medical Interventions (Level of Support Prior to Arrest): Limited  Comments: Reviewed with her .  He indicated these are her wishes.      Time Dedicated to Patient Care, Evaluation & Management:  I have spent a total of 85 minutes in evaluation & management of Amy Miles's conditions as described above.      Electronically signed by Leandro Hobbs MD, 07/02/21, 12:04 AM EDT.

## 2021-07-02 NOTE — PROGRESS NOTES
"Pharmacy to Dose Vancomycin Day: 2    Amy Miles is a 55 y.o.female admitted with somnolence, increased fatigue, sedation, and found to have a pneumonia, possible stroke and decubitus ulcer in the ED on arrival.  Pharmacy has been consulted to dose IV Vancomycin     Consulting Provider: Leandro Hobbs  Clinical Indication: sepsis d/t possible PNA, sacral decubitus ulcer    Pertinent Past Medical History: liver cirrhosis, COPD, type 1 diabetes, HTN, anxiety, complex regional pain syndrome, h/o MRSA PNA 2019, tobacco abuse, iatrogenic hypothydrodism s/p thyroid surgery    Goal -600 mg/L.hr  Duration of therapy: to be determined; currently set for 7 days    162.6 cm (64\")       07/02/21  0923      Weight: 88.9 kg (196 lb)         Estimated Creatinine Clearance: 44 mL/min (A) (by C-G formula based on SCr of 1.54 mg/dL (H)).  Results from last 7 days   Lab Units 07/02/21  0224 07/01/21  1207   CREATININE mg/dL 1.54* 1.72*     HD/PD/CRRT?: NO    Lab Results   Component Value Date    WBC 17.12 (H) 07/01/2021      Temperature    07/01/21 2000 07/02/21 0000 07/02/21 0400   Temp: (!) 102.8 °F (39.3 °C) (!) 101.8 °F (38.8 °C) 99.3 °F (37.4 °C)     Contrast Administered: NO      Relevant Micro:   Microbiology Results (last 10 days)       Procedure Component Value - Date/Time    COVID PRE-OP / PRE-PROCEDURE SCREENING ORDER (NO ISOLATION) - Swab, Nasopharynx [[id number]]  (Normal) Collected: 07/01/21 1734    Lab Status: Final result Specimen: Swab from Nasopharynx Updated: 07/01/21 2117    Narrative:      The following orders were created for panel order COVID PRE-OP / PRE-PROCEDURE SCREENING ORDER (NO ISOLATION) - Swab, Nasopharynx.  Procedure                               Abnormality         Status                     ---------                               -----------         ------                     COVID-19,CEPHEID,COR/MARTELL...[[id number]]  Normal              Final result                 Please view results for " these tests on the individual orders.    COVID-19,CEPHEID,COR/MARTELL/PAD/ERICKA IN-HOUSE(OR EMERGENT/ADD-ON),NP SWAB IN TRANSPORT MEDIA 3-4 HR TAT, RT-PCR - Swab, Nasopharynx [[id number]]  (Normal) Collected: 07/01/21 1734    Lab Status: Final result Specimen: Swab from Nasopharynx Updated: 07/01/21 2117     COVID19 Not Detected    Narrative:      Fact sheet for providers: https://www.fda.gov/media/030648/download     Fact sheet for patients: https://www.fda.gov/media/858860/download  Fact sheet for providers: https://www.fda.gov/media/370945/download     Fact sheet for patients: https://www.fda.gov/media/984573/download    Wound Culture - Swab, Coccyx [[id number]] Collected: 07/01/21 1729    Lab Status: Preliminary result Specimen: Swab from Coccyx Updated: 07/01/21 1951     Gram Stain Occasional WBCs seen      No organisms seen           Relevant Radiology:  7/1 CXR: Mild left lower lobe airspace disease consistent with pneumonia.    Other Antimicrobial Therapy: Meropenem    Assessment/Plan  Lab Results   Component Value Date    VANCORANDOM 24.30 07/02/2021     Will dose patient at vancomycin 1250 mg q 24 hours starting tomorrow at 0800 based on parameters below.    Regimen: 1250 mg IV every 24 hours  Exposure target: AUC24 (range)400-600 mg/L.hr   AUC24,ss: 559 mg/L.hr  PAUC*: 94 %  Ctrough,ss: 17 mg/L  Pconc*: 30 %  Tox.: 13 %    Labs ordered: AM BMP

## 2021-07-02 NOTE — THERAPY EVALUATION
Acute Care - Speech Language Pathology   Swallow Initial Evaluation  [place]     Patient Name: Amy Miles  : 1965  MRN: [id number]  Today's Date: 2021               Admit Date: 2021    Visit Dx:     ICD-10-CM ICD-9-CM   1. Oropharyngeal dysphagia  R13.12 787.22     Patient Active Problem List   Diagnosis   • Sepsis due to pneumonia (CMS/HCC)   • Anxiety   • Depression   • Mood disorder (CMS/HCC)   • Asthma   • COPD (chronic obstructive pulmonary disease) (CMS/HCC)   • Diabetes mellitus type 1 (CMS/HCC)   • GERD (gastroesophageal reflux disease)   • Essential hypertension, benign   • Iatrogenic hypothyroidism   • Chronic allergic rhinitis   • Cirrhosis of liver (CMS/HCC)   • Chronic pain syndrome   • Elevated INR   • Ischemic stroke (CMS/HCC)   • Encephalopathy, hepatic (CMS/HCC)   • Thrombocytopenia (CMS/HCC)   • Bandemia   • Elevated LFTs     Past Medical History:   Diagnosis Date   • Anxiety    • Arm pain    • Arthritis    • Asthma    • Bladder disorder    • Bronchitis    • Chronic allergic rhinitis    • Chronic pain syndrome     Reflex Sympathetic Dystrophy, left arm.   • Cirrhosis of liver (CMS/HCC)    • COPD (chronic obstructive pulmonary disease) (CMS/HCC)    • CRPS (complex regional pain syndrome), type I, upper 2012   • Depression    • Diabetes mellitus type 1 (CMS/HCC)    • Essential hypertension, benign    • Fracture    • GERD (gastroesophageal reflux disease)    • Hand pain    • Heel pain    • Hyperthyroidism    • Iatrogenic hypothyroidism    • Leg pain    • Leg swelling    • Limb swelling    • Mood disorder (CMS/HCC)    • Nausea    • RSD (reflex sympathetic dystrophy)    • Seasonal allergies    • Shortness of Air    • Toe fracture          Past Surgical History:   Procedure Laterality Date   • ABDOMINAL HYSTERECTOMY     • CARPAL TUNNEL RELEASE     •  SECTION     • CHOLECYSTECTOMY     • COLONOSCOPY     • GALLBLADDER SURGERY     • HAND SURGERY     •  HEMORRHOIDECTOMY     • HERNIA REPAIR     • HIATAL HERNIA REPAIR     • INCISION AND DRAINAGE ABSCESS     • THYROIDECTOMY  2021    by Dr. Anthony   • TONSILLECTOMY     • TUBAL ABDOMINAL LIGATION          SWALLOW EVALUATION (last 72 hours)      SLP Adult Swallow Evaluation     Row Name 07/02/21 1041                   Rehab Evaluation    Document Type  evaluation  -SN        Patient Observations  lethargic;poorly cooperative  -SN        Patient Effort  poor  -SN           General Information    Prior Level of Function-Swallowing  safe, efficient swallowing in all situations  -SN        Plans/Goals Discussed with  spouse/S.O.  -SN        Barriers to Rehab  medically complex  -SN        Family Goals for Discharge  patient able to return to PO diet  -SN           Recommendations    Therapy Frequency (Swallow)  2 times/day;5 days per week  -SN        Predicted Duration Therapy Intervention (Days)  until discharge  -SN        SLP Diet Recommendation  NPO;temporary alternate methods of nutrition/hydration  -SN          User Key  (r) = Recorded By, (t) = Taken By, (c) = Cosigned By    Initials Name Effective Dates    SN Alyssa Novak, SYED 03/31/21 -             Inpatient Speech Pathology Dysphagia Evaluation        PAIN SCALE: None indicated    PRECAUTIONS/CONTRAINDICATIONS: Standard    SUSPECTED ABUSE/NEGLECT/EXPLOITATION: Identified    SOCIAL/PSYCHOLOGICAL NEEDS/BARRIERS: None identified    PAST SOCIAL HISTORY: Lives at home with her     PRIOR FUNCTION: Reportedly independent, no prior swallowing difficulty    PATIENT GOALS/EXPECTATIONS: None indicated    HISTORY: Patient is a 55-year-old female referred for speech pathology dysphagia evaluation status post CVA.  Patient reportedly with acute onset of lethargy.  Per report, MRI revealed infarcts in left frontal lobe.  Patient currently in the ICU, NG tube placed secondary to mental status not conducive for safe p.o. intake.  Tube feedings to be initiated this  date.    CURRENT DIET LEVEL: Tube feeding    OBJECTIVE:    TEST ADMINISTERED: Clinical dysphagia evaluation    COGNITION/SAFETY AWARENESS: Impaired    BEHAVIORAL OBSERVATIONS: Patient opens eyes intermittently however does not track during evaluation.  Does not follow any verbal commands despite cueing level.    ORAL MOTOR EXAM: Natural dentition.  Dry oral mucosa    VOICE QUALITY: N/A    REFLEX EXAM: Weak    POSTURE: Total assistance, patient currently in two-point restraints    FEEDING/SWALLOWING FUNCTION: Assessed with multimodalic stimulation    CLINICAL OBSERVATIONS: Patient biting to swallow up and spoon.  Continuous throat clearing noted prior to evaluation requiring assist with oral suction of secretions.  Thick bloody secretions removed from oral pharynx.  With stimulation, patient eliciting swallows 3/3 however delayed greater than 5 seconds.  Small drops of water with swallows completed with continuous throat clearing.  Single ice chip with patient exhibiting chewing.  Delayed swallow completed with throat clearing.    DYSPHAGIA CRITERIA: Risk of aspiration    FUNCTIONAL ASSESSMENT INSTRUMENT: Patient currently scored a level 1 of 7 on Functional Communication Measures for swallowing indicating a 100% limitation in function.    ASSESSMENT/ PLAN OF CARE:  Pt presents with limitations, noted below, that impede patient's ability to swallow safely maintain nutrition. The skills of a therapist will be required to safely and effectively implement the following treatment plan to restore maximal level of function.    PROBLEMS:  1.  Risk of aspiration, decreased secretion management, swallow delay, decreased mental status for safe oral intake   LTG 1: 30 days.  Patient will increase functional communication measures for swallowing to level 4 of 7, indicating a 40-59% mentation function.   STG 1a: 14 days.  Patient will improve swallow function for management of secretions with minimal assistance.   STG 1b: 14  days.  Patient will improve swallow function for trials of ice chips with minimal to no signs or symptoms of aspiration.   STG 1c: 14 days.  Patient will improve swallow function for tolerance of trials of nectar thickened liquids with minimal to no signs or symptoms of aspiration at 50% of trials.   STG 1d: 14 days.  Patient will improve swallow function for tolerance of trials of purée solids with minimal to no signs or symptoms of aspiration at 50% of trials.   TREATMENT: Speech therapy for dysphagia, improvement of swallow function for secretion management and trials of p.o. intake.      FREQUENCY/DURATION: Twice daily, 5 days a week    REHAB POTENTIAL:  Pt has good/fair rehab potential.  The following limitations may influence improvement/ length of tx medical status.    Pt/responsible party agrees with plan of care and has been informed of all alternatives, risks and benefits.  EDUCATION  The patient has been educated in the following areas:   Dysphagia (Swallowing Impairment).    SLP Recommendation and Plan     SLP Diet Recommendation: NPO, temporary alternate methods of nutrition/hydration                    Anticipated Discharge Disposition (SLP): anticipate therapy at next level of care, skilled nursing facility, inpatient rehabilitation facility     Therapy Frequency (Swallow): 2 times/day, 5 days per week  Predicted Duration Therapy Intervention (Days): until discharge                         Plan of Care Reviewed With: spouse           Time Calculation:   Time Calculation- SLP     Row Name 07/02/21 1046             Time Calculation- SLP    SLP Start Time  0945  -SN      SLP Stop Time  1045  -SN      SLP Time Calculation (min)  60 min  -SN      SLP Received On  07/02/21  -SN         Untimed Charges    45455-EC Eval Oral Pharyng Swallow Minutes  60  -SN         Total Minutes    Untimed Charges Total Minutes  60  -SN       Total Minutes  60  -SN        User Key  (r) = Recorded By, (t) = Taken By, (c) =  Cosigned By    Initials Name Provider Type    SN Alyssa Novak, SLP Speech and Language Pathologist          Therapy Charges for Today     Code Description Service Date Service Provider Modifiers Qty    [id number] HC ST EVAL ORAL PHARYNG SWALLOW 4 7/2/2021 Alyssa Novak, SYED GN 1               SYED Narayanan  7/2/2021

## 2021-07-03 NOTE — PROGRESS NOTES
"Pharmacy to Dose Vancomycin Day: 3    Amy Miles is a 55 y.o.female admitted with somnolence, increased fatigue, sedation, and found to have a pneumonia, possible stroke and decubitus ulcer in the ED on arrival.  Pharmacy has been consulted to dose IV Vancomycin     Consulting Provider: Leandro Hobbs  Clinical Indication: sepsis d/t possible PNA, sacral decubitus ulcer    Pertinent Past Medical History: liver cirrhosis, COPD, type 1 diabetes, HTN, anxiety, complex regional pain syndrome, h/o MRSA PNA 2019, tobacco abuse, iatrogenic hypothydrodism s/p thyroid surgery    Goal -600 mg/L.hr  Duration of therapy: to be determined; currently set for 7 days    162.6 cm (64\")       07/02/21  1002      Weight: 89 kg (196 lb 3.4 oz)         Estimated Creatinine Clearance: 61.1 mL/min (A) (by C-G formula based on SCr of 1.11 mg/dL (H)).  Results from last 7 days  Lab  Units  07/03/21  0334  07/02/21  0224  07/01/21  1207  BUN  mg/dL  45*  38*  36*  CREATININE  mg/dL  1.11*  1.54*  1.72*    HD/PD/CRRT?: NO  Lab Results   Component Value Date    WBC 17.12 (H) 07/01/2021      Temperature    07/03/21 0000 07/03/21 0400 07/03/21 0600   Temp: 98.7 °F (37.1 °C) 99.2 °F (37.3 °C) 99.7 °F (37.6 °C)     Contrast Administered: NO    Relevant Micro:   Microbiology Results (last 10 days)       Procedure Component Value - Date/Time    MRSA Screen, PCR (Inpatient) - Swab, Nares [[id number]]  (Abnormal) Collected: 07/02/21 1239    Lab Status: Final result Specimen: Swab from Nares Updated: 07/02/21 1445     MRSA PCR MRSA Detected    Blood Culture - Blood, Hand, Left [[id number]] Collected: 07/01/21 2347    Lab Status: Preliminary result Specimen: Blood from Hand, Left Updated: 07/03/21 0000     Blood Culture No growth at 24 hours    Blood Culture - Blood, Arm, Left [[id number]] Collected: 07/01/21 2347    Lab Status: Preliminary result Specimen: Blood from Arm, Left Updated: 07/03/21 0000     Blood Culture No growth at 24 hours    " COVID PRE-OP / PRE-PROCEDURE SCREENING ORDER (NO ISOLATION) - Swab, Nasopharynx [[id number]]  (Normal) Collected: 07/01/21 1734    Lab Status: Final result Specimen: Swab from Nasopharynx Updated: 07/01/21 2117    Narrative:      The following orders were created for panel order COVID PRE-OP / PRE-PROCEDURE SCREENING ORDER (NO ISOLATION) - Swab, Nasopharynx.  Procedure                               Abnormality         Status                     ---------                               -----------         ------                     COVID-19,CEPHEID,COR/MARTELL...[[id number]]  Normal              Final result                 Please view results for these tests on the individual orders.    COVID-19,CEPHEID,COR/MARTELL/PAD/ERICKA IN-HOUSE(OR EMERGENT/ADD-ON),NP SWAB IN TRANSPORT MEDIA 3-4 HR TAT, RT-PCR - Swab, Nasopharynx [[id number]]  (Normal) Collected: 07/01/21 1734    Lab Status: Final result Specimen: Swab from Nasopharynx Updated: 07/01/21 2117     COVID19 Not Detected    Narrative:      Fact sheet for providers: https://www.fda.gov/media/946679/download     Fact sheet for patients: https://www.fda.gov/media/745657/download  Fact sheet for providers: https://www.fda.gov/media/742019/download     Fact sheet for patients: https://www.fda.gov/media/629857/download    Wound Culture - Swab, Coccyx [[id number]] Collected: 07/01/21 1729    Lab Status: Final result Specimen: Swab from Coccyx Updated: 07/03/21 0949     Wound Culture Heavy growth (4+) Normal Skin Diya     Gram Stain Occasional WBCs seen      No organisms seen    Blood Culture - Blood, Hand, Left [[id number]] Collected: 07/01/21 1208    Lab Status: Preliminary result Specimen: Blood from Hand, Left Updated: 07/02/21 1215     Blood Culture No growth at 24 hours    Blood Culture - Blood, Arm, Left [[id number]] Collected: 07/01/21 1208    Lab Status: Preliminary result Specimen: Blood from Arm, Left Updated: 07/02/21 1215     Blood Culture No growth at 24 hours            Relevant Radiology:  7/1 CXR: Mild left lower lobe airspace disease consistent with pneumonia.    Assessment/Plan  CXR consistent with pneumonia per report, MRSA nare PCR (detected)    Loading dose: vancomycin 1,750 mg (~19.7mg/kg) in the ED 7/1 @1520  Regimen: Vancomycin 1,250 mg IV daily    AUC dose modeling, still appropriate: , trough about 12.5 at steady state.   May need to follow closely for dose adjustment, Scr changing.   Next recommended trough prior to dose three of this regimen (Monday am, doses scheduled for 0800)  No level currently scheduled.

## 2021-07-03 NOTE — PROGRESS NOTES
[facility]   Hospitalist Progress Note  Date: 7/3/2021  Patient Name: Amy Miles  : 1965  MRN: [id number]  Date of admission: 2021    Subjective   Subjective     Chief Complaint:   Follow-up sepsis    Summary:   Patient is a 55-year-old female with a past medical history significant for anxiety, cirrhosis, diabetes who presents with a 2 to 3-day history of being more somnolent, patient was in her normal state of health prior to this.  Patient has been in bed and difficult to arouse during that time.  In the emergency department there was concern for pneumonia possible acute versus subacute stroke, and she also met sepsis criteria.  Patient also was noted to have a large decubitus ulcer on her gluteal and sacral areas, patient was admitted to the ICU, MRI of the brain was obtained which was significant for multiple areas of scattered infarct, was some areas of hemorrhagic conversion.  Patient was continued on antibiotics as she continued to have fever, her procalcitonin was noted to be elevated, cultures were obtained.  Patient is being treated for decompensated cirrhosis.  Hospitalist service was asked to admit for further management    Interval Followup:   No acute events overnight, patient remained stable, patient evaluated by neurology, critical care following    Review of Systems  Unable to obtain secondary to altered mental status    Objective   Objective     Vitals:   Temp:  [98.7 °F (37.1 °C)-99.7 °F (37.6 °C)] 99.7 °F (37.6 °C)  Heart Rate:  [73-95] 83  Resp:  [15-31] 18  BP: (113-151)/(64-99) 147/82  Flow (L/min):  [0-2] 2    Physical Exam   General appearance: NAD, conversant, drowsy  Eyes: anicteric sclerae, moist conjunctivae; no lid-lag; PERRLA  HENT: Atraumatic; oropharynx clear with moist mucous membranes and no mucosal ulcerations; normal hard and soft palate  Neck: Trachea midline; FROM, supple, no thyromegaly or lymphadenopathy  Lungs: CTA, with normal respiratory effort and  no intercostal retractions  CV: Regular Rate and Rhythm, no Murmurs, Rubs, or Gallops   Abdomen: Soft, non-tender; no masses or Heptosplenomegally  Extremities: No peripheral edema or extremity lymphadenopathy  Skin: Normal temperature, turgor and texture; no rash, ulcers or subcutaneous nodules  Psych: Blunted affect, not oriented to person place or time  Neuro: Moving all 4 extremities, still seems confused, slow to respond, mostly garbled speech  Result Review    Result Review:  I have personally reviewed the results from the time of this admission to 7/3/2021 13:23 EDT and agree with these findings:  [x]  Laboratory  [x]  Microbiology  []  Radiology  [x]  EKG/Telemetry   []  Cardiology/Vascular   []  Pathology  []  Old records  []  Other:    Assessment/Plan   Assessment / Plan     Assessment:  Sepsis  Decompensated cirrhosis  Acute stroke with hemorrhagic conversion  History of asthma  COPD without acute exacerbation  Insulin-dependent diabetes mellitus  Essential hypertension  Chronic pain syndrome  Anxiety  Depression  Sacral decubitus ulcer  Coagulopathy  Thrombocytopenia    Plan:  • Patient admitted to the ICU for further management of above  • Pulmonary critical care consulted, appreciate their recommendations  • Speech therapy consulted, currently not safe to swallow, tolerating tube feeds, will continue for now  • MRI of the brain reviewed, will consult neurology given multiple areas of stroke  • Echocardiogram reviewed  • Blood pressure stable, continue to monitor off Levophed  • Continue vancomycin, meropenem discontinued by cc  • Cultures ordered and pending  • Monitor CBC, CMP  • Consult wound care, appreciate their assistance  • Continue lactulose, rifaximin  • Carotid Doppler ordered  • Continue telemetry to evaluate for atrial fibrillation  • Transition off insulin drip  • Clinical course will dictate further management     Telemetry: Reviewed by me, sinus     Reviewed patients labs and imaging,  and discussed with patient and nurse at bedside.    DVT prophylaxis:  Medical and mechanical DVT prophylaxis orders are present.    CODE STATUS:   Limited Support to NOT Include: Cardioversion/Defibrillation; Intubation  Level Of Support Discussed With: Health Care Surrogate  Code Status: No CPR  Medical Interventions (Level of Support Prior to Arrest): Limited  Comments: Reviewed with her .  He indicated these are her wishes.        Electronically signed by Dom Gutierrez MD, 07/03/21, 1:23 PM EDT.

## 2021-07-03 NOTE — PROGRESS NOTES
[facility]     Progress Note             Patient Name: Amy Miles  : 1965  MRN: [id number]  Primary Care Physician:  Provider, No Known  Date of admission: 2021    Subjective     Subjective       Present illness:  She is doing better. she is awake and more alert. She Is more responsive.    Review of Systems  There is no headache dizziness nausea or vomiting.  No chest pains or abdominal pains.    Personal History     Past Medical History:   Diagnosis Date   • Anxiety    • Arm pain    • Arthritis    • Asthma    • Bladder disorder    • Bronchitis    • Chronic allergic rhinitis    • Chronic pain syndrome     Reflex Sympathetic Dystrophy, left arm.   • Cirrhosis of liver (CMS/HCC)    • COPD (chronic obstructive pulmonary disease) (CMS/HCC)    • CRPS (complex regional pain syndrome), type I, upper 2012   • Depression    • Diabetes mellitus type 1 (CMS/HCC)    • Essential hypertension, benign    • Fracture    • GERD (gastroesophageal reflux disease)    • Hand pain    • Heel pain    • Hyperthyroidism    • Iatrogenic hypothyroidism    • Leg pain    • Leg swelling    • Limb swelling    • Mood disorder (CMS/HCC)    • Nausea    • RSD (reflex sympathetic dystrophy)    • Seasonal allergies    • Shortness of Air    • Toe fracture            Past Surgical History:   Procedure Laterality Date   • ABDOMINAL HYSTERECTOMY     • CARPAL TUNNEL RELEASE     •  SECTION     • CHOLECYSTECTOMY     • COLONOSCOPY     • GALLBLADDER SURGERY     • HAND SURGERY     • HEMORRHOIDECTOMY     • HERNIA REPAIR     • HIATAL HERNIA REPAIR     • INCISION AND DRAINAGE ABSCESS     • THYROIDECTOMY      by Dr. Anthony   • TONSILLECTOMY     • TUBAL ABDOMINAL LIGATION         Family History: family history includes Arthritis in her father, mother, and sister; Diabetes in her brother, maternal uncle, and sister; Heart disease in her father; Osteoporosis in her sister; Other in her brother. Otherwise pertinent FHx was  reviewed and not pertinent to current issue.    Social History:  reports that she has been smoking. She has been smoking about 1.00 pack per day. She does not have any smokeless tobacco history on file. She reports that she does not drink alcohol and does not use drugs.      Home Medications:  cetirizine, cyclobenzaprine, furosemide, gabapentin, insulin, insulin aspart, lactulose, levothyroxine, lisinopril, metoprolol tartrate, montelukast, and oxyCODONE      Allergies:  Allergies   Allergen Reactions   • Adhesive Tape Unknown - Low Severity   • Amoxicillin-Pot Clavulanate Unknown - Low Severity   • Cephalexin Unknown - Low Severity   • Clavulanic Acid Unknown - Low Severity   • Doxycycline Hyclate Unknown - Low Severity   • Flonase [Fluticasone] Unknown - Low Severity   • Nsaids Unknown - Low Severity   • Penicillins Unknown - Low Severity   • Potassium Unknown - Low Severity       Objective   Objective     Vitals:   Temp:  [97.8 °F (36.6 °C)-99.7 °F (37.6 °C)] 97.8 °F (36.6 °C)  Heart Rate:  [73-95] 81  Resp:  [15-31] 18  BP: ()/(66-99) 140/76  Flow (L/min):  [0-2] 2    Physical Exam   Constitutional: Awake, a little more alert.    Her heart was regular.  Heart rate was 80/min.  No murmurs her lungs were clear.  Carotid pulses were 1+ and equal.  No bruit on either side.    Neurological Examination:  Patient is more alert.  She is more responsible.  She kept her head and eyes deviated to the left side.  However, during this, she is able to talk.  She continues to have left lower facial weakness and minimal left hemiparesis.      Result Review    Result Review:  I have personally reviewed the results from the time of this admission to 7/3/2021 17:38 EDT and agree with these findings:  [x]  Laboratory  []  Microbiology  []  Radiology  []  EKG/Telemetry   []  Cardiology/Vascular   []  Pathology  []  Old records  []  Other:      Assessment/Plan   Assessment / Plan     Impression:    Altered Mental  State  Multiple, recent, cerebral infarctions affecting both frontal and parietal lobes.  Hemorrhagic infarct, left frontoparietal lobe, recent  [place] Myelinolysis  Hepatic Encephalopathy  Hepatic Cirrhosis     Active Hospital Problems:  Active Hospital Problems    Diagnosis    • **Sepsis due to pneumonia (CMS/HCC)    • Elevated INR    • Ischemic stroke (CMS/HCC)    • Encephalopathy, hepatic (CMS/HCC)    • Thrombocytopenia (CMS/HCC)    • Bandemia    • Elevated LFTs    • Anxiety    • Depression    • Mood disorder (CMS/HCC)    • Asthma    • COPD (chronic obstructive pulmonary disease) (CMS/HCC)    • Diabetes mellitus type 1 (CMS/HCC)    • GERD (gastroesophageal reflux disease)    • Essential hypertension, benign    • Iatrogenic hypothyroidism    • Chronic allergic rhinitis    • Cirrhosis of liver (CMS/HCC)    • Chronic pain syndrome      Reflex Sympathetic Dystrophy, left arm.       Plan:   Continue present treatment      DVT prophylaxis:  Medical and mechanical DVT prophylaxis orders are present.    CODE STATUS:    Limited Support to NOT Include: Cardioversion/Defibrillation; Intubation  Level Of Support Discussed With: Health Care Surrogate  Code Status: No CPR  Medical Interventions (Level of Support Prior to Arrest): Limited  Comments: Reviewed with her .  He indicated these are her wishes.        Electronically signed by Anne-Marie Rolon Jr., MD, 07/03/21, 5:38 PM EDT.   [facility]   DVT prophylaxis:  Medical and mechanical DVT prophylaxis orders are present.    CODE STATUS:    Limited Support to NOT Include: Cardioversion/Defibrillation; Intubation  Level Of Support Discussed With: Health Care Surrogate  Code Status: No CPR  Medical Interventions (Level of Support Prior to Arrest): Limited  Comments: Reviewed with her .  He indicated these are her wishes.        Electronically signed by Anne-Marie Rolon Jr., MD, 07/03/21, 5:38 PM EDT.

## 2021-07-03 NOTE — PROGRESS NOTES
Pulmonary / Critical Care Progress Note      Patient Name: Amy Miles  : 1965  MRN: [id number]  Attending:  Dom Gutierrez MD   Date of admission: 2021    Subjective   Subjective   Patient critically ill with altered mental status that is multifactorial, end-stage liver disease with acute hepatitis, concern for sepsis and sub acute CVA with hemorraghic transformation.     · Overnight patient  Precedex stopped hemodynamically maps  In the 90s   · Ammonia 79 today > 90 yesterday on lactulose with stool output.  · IV Merrem and vancomycin discontinued.   · MRI of brain showing multiple bilateral cerebral hemispheric foci of restricted diffusion with largest area being in the posterior left frontal lobe with some associated hemorrhage concerning for hemorrhagic transformation of cortical infarction.  · Receiving echocardiogram this morning to rule out embolic etiologies.    Review of Systems  Unable to be obtained, patient not optimally responsive    Objective   Objective     Vitals:   Vital signs for last 24 hours:  Temp:  [98.7 °F (37.1 °C)-100 °F (37.8 °C)] 99.7 °F (37.6 °C)  Heart Rate:  [68-95] 92  Resp:  [15-31] 17  BP: (106-151)/() 142/74    Intake/Output last 3 shifts:  I/O last 3 completed shifts:  In: 3126 [I.V.:1606; Other:620; NG/GT:800; IV Piggyback:100]  Out: 2200 [Urine:1700; Stool:500]  Intake/Output this shift:  No intake/output data recorded.    Physical Exam   Vital Signs Reviewed   Normally developed well nourished with stigmata of chronic liver disease including livedo reticularis.   HEENT:  PERRL, EOMI.  OP, nares clear  Neck:  Supple, no JVD, no thyromegaly  Chest:  good aeration, clear to auscultation bilaterally, tympanic to percussion bilaterally, no work of breathing noted   CV: RRR, no MGR, pulses 2+, equal.  Abd: Obese pannus Soft, NT, ND, + BS, no HSM  EXT:  no clubbing, no cyanosis, no edema  Neuro:  A&Ox0, moving all 4 extremities, cannot follow commands   skin:  Pressure ulcer on sacrum with skin breakdown    Result Review    Result Review:  I have personally reviewed the results from the time of this admission to 7/3/2021 09:23 EDT and agree with these findings:  [x]  Laboratory  [x]  Microbiology  [x]  Radiology  [x]  EKG/Telemetry   []  Cardiology/Vascular   []  Pathology  []  Old records  []  Other:  Most notable findings include: MRI with multiple areas concerning for stroke with some hemorrhagic conversion versus demyelinating process.  Lactate elevated today, had some episodes last night of hypotension after starting Precedex, also had some fever that seemed to resolve after discontinuing Precedex.  No tachyarrhythmias noted on telemetry.    Assessment/Plan   Assessment / Plan   Amy Miles is a 55-year-old female critically ill with decompensated chronic liver failure, EMILIANA, and sepsis.  I doubt that she has pneumonia at this time.  I doubt that  Her lactic acidosis is due to shock. She has never been hypotensive.  It is a byproduct of liver disfunction and her likely stasis.  Her sacrum indicates that she has not moved much in several days.     Active Hospital Problems:  Active Hospital Problems    Diagnosis    • **Sepsis due to pneumonia (CMS/HCC)    • Elevated INR    • Ischemic stroke (CMS/HCC)    • Encephalopathy, hepatic (CMS/HCC)    • Thrombocytopenia (CMS/HCC)    • Bandemia    • Elevated LFTs    • Anxiety    • Depression    • Mood disorder (CMS/HCC)    • Asthma    • COPD (chronic obstructive pulmonary disease) (CMS/HCC)    • Diabetes mellitus type 1 (CMS/HCC)    • GERD (gastroesophageal reflux disease)    • Essential hypertension, benign    • Iatrogenic hypothyroidism    • Chronic allergic rhinitis    • Cirrhosis of liver (CMS/HCC)    • Chronic pain syndrome      Reflex Sympathetic Dystrophy, left arm.     I  Plan:    Liver failure:  · Continue with lactulose and rifaximin and rectal tube 7/3  Ammonia = 79  · Continue Brice for now while diarrhea is a in  progress.   · Continue Aldactone as lasix for volume reduction.  · Continue Midodrine 10mg per ng TID.  · Possible etiology of patient's lactate being elevated is just from inability to clear due to liver dysfunction.  Last night had some hypotension after Precedex was started also had fever, was on a small dose of Levophed but is off this morning.  Post discontinuing Precedex patient's fever resolved.  At the moment our team does not feel like she is likely infected but will check a pro Christofer and leave antibiotics on for the next 24 hours.  If all cultures are negative and pro-Christofer is negative can discontinue Merrem and vancomycin.  · Would avoid benzodiazepines as much as possible given longer half-life and delayed clearance in liver patient.  · Ultrasound of patient's belly last night showed no ascites large enough for tap.    CVA:  · Echo being performed today with concern for embolic etiology of strokes.  · Ordering carotid doppler as well.   · Needs long cardiac dysrhythmia monitoring  · Likely will need outpatient polysomnogram since she is at risk for sleep disordered breathing after her stroke and with morbid obesity.  · Goal blood pressure is < 160  Wean midodrine as needed.    ICU Quality:  · Start tube feeds per dietitian recommendations per patient's NG tube.  · Continue with insulin drip.   · Continue NG tube, Synthroid was restarted yesterday.  · Patient is likely able to be discharged to the floor.  DVT prophylaxis:  Medical and mechanical DVT prophylaxis orders are present. Heparin and pepcid    CODE STATUS:   Limited Support to NOT Include: Cardioversion/Defibrillation; Intubation  Level Of Support Discussed With: Health Care Surrogate  Code Status: No CPR  Medical Interventions (Level of Support Prior to Arrest): Limited  Comments: Reviewed with her .  He indicated these are her wishes.      Electronically signed by FELA Felix, 07/02/21, 10:29 AM EDT.    Amy Miles is a 55-year-old  critically ill female with subacute stroke with hemorrhagic transformation with severe decompensated chronic liver disease with a meld score of 21  1 in 5 chance of dying in the next 90 days.  She is stable for transfer to the floor I suspect she will need short term rehab.     In this post stroke patient on room air pulmonary critical care services will sign off after transfer to the floor.    GWYN LEPE M.D. [facility]

## 2021-07-03 NOTE — CONSULTS
[facility]   Consult Note      Patient Name: Amy Miles  : 1965  MRN: [id number]  Primary Care Physician:  Provider, No Known  Date of admission: 2021    Subjective   Subjective     This 55 years old woman was seen upon request of Dr. Dom Gutierrez for evaluation because of stroke.    Chief Complaint:   Stroke    HPI:  The information was obtained from the  who dated the onset of her illness sometime on 2021 1 she became progressively sleepy since that value of 2021, she could no longer be aroused.  On the morning of 2021, there has been noted blisters in her buttocks.  He is thought that perhaps, she has been lying on her urine.  The same time, she was unresponsive.  She was taken to the emergency room and has had a CAT scan Of her brain of her brain which showed a 3 x 2 cm area of low-attenuation in the left posterior frontal parietal lobe.He was then admitted and has had an MRI of the brain which showed multiple bilateral hemispheric foci of restricted diffusion seen in cortical and juxtacortical regions with the largest in the posterior left frontal lobe.  An embolic etiology was suspected.  There was a Trident-shaped focus of T2 FLAIR signal abnormality within the central aime with extension into the middle cerebellar peduncles on both sides.  It was felt that this is subacute to chronic process.  There are mild chronic small vessel ischemia/infarction.    Review of Systems  There is no report of any headache dizziness nausea or vomiting.  No report of any chest pains or abdominal pains.  She was not incontinent of urine.    Personal History     Past Medical History:   Diagnosis Date   • Anxiety    • Arm pain    • Arthritis    • Asthma    • Bladder disorder    • Bronchitis    • Chronic allergic rhinitis    • Chronic pain syndrome     Reflex Sympathetic Dystrophy, left arm.   • Cirrhosis of liver (CMS/HCC)    • COPD (chronic obstructive pulmonary disease)  (CMS/[facility])    • CRPS (complex regional pain syndrome), type I, upper 2012   • Depression    • Diabetes mellitus type 1 (CMS/[facility])    • Essential hypertension, benign    • Fracture    • GERD (gastroesophageal reflux disease)    • Hand pain    • Heel pain    • Hyperthyroidism    • Iatrogenic hypothyroidism    • Leg pain    • Leg swelling    • Limb swelling    • Mood disorder (CMS/[facility])    • Nausea    • RSD (reflex sympathetic dystrophy)    • Seasonal allergies    • Shortness of Air    • Toe fracture            Past Surgical History:   Procedure Laterality Date   • ABDOMINAL HYSTERECTOMY     • CARPAL TUNNEL RELEASE     •  SECTION     • CHOLECYSTECTOMY     • COLONOSCOPY     • GALLBLADDER SURGERY     • HAND SURGERY     • HEMORRHOIDECTOMY     • HERNIA REPAIR     • HIATAL HERNIA REPAIR     • INCISION AND DRAINAGE ABSCESS     • THYROIDECTOMY      by Dr. Anthony   • TONSILLECTOMY     • TUBAL ABDOMINAL LIGATION         Family History: family history includes Arthritis in her father, mother, and sister; Diabetes in her brother, maternal uncle, and sister; Heart disease in her father; Osteoporosis in her sister; Other in her brother. Otherwise pertinent FHx was reviewed and not pertinent to current issue.    Social History:  reports that she has been smoking. She has been smoking about 1.00 pack per day. She does not have any smokeless tobacco history on file. She reports that she does not drink alcohol and does not use drugs.    Psychosocial History: She has anxiety, depression, some type of mood disorder    Home Medications:  cetirizine, cyclobenzaprine, furosemide, gabapentin, insulin, insulin aspart, lactulose, levothyroxine, lisinopril, metoprolol tartrate, montelukast, and oxyCODONE      Allergies:  Allergies   Allergen Reactions   • Adhesive Tape Unknown - Low Severity   • Amoxicillin-Pot Clavulanate Unknown - Low Severity   • Cephalexin Unknown - Low Severity   • Clavulanic Acid Unknown - Low Severity    • Doxycycline Hyclate Unknown - Low Severity   • Flonase [Fluticasone] Unknown - Low Severity   • Nsaids Unknown - Low Severity   • Penicillins Unknown - Low Severity   • Potassium Unknown - Low Severity       Objective   Objective     Vitals:   Temp:  [99.3 °F (37.4 °C)-101.8 °F (38.8 °C)] 99.7 °F (37.6 °C)  Heart Rate:  [] 82  Resp:  [14-30] 24  BP: ()/() 132/75  Flow (L/min):  [2-5] 2  Physical Exam   She was extremely lethargic.  She would open her eyes on painful stimulation.  Her heart was regular.  Heart rate was 80/min.  No murmurs her lungs were clear.  Cardiac pulses were 1+ and equal.  No bruit on either side.    Neurological Examination:  She responded only to painful stimulation.  Her eyes were wide apart.  The left eye seems to be deviated laterally.  The pupils were 8 mm there were round and equal and reactive to light directly and consensually.  There is questionable left medial rectus weakness.  Dolls eye movements are absent.  She has a right lower facial weakness.  I could not detect any difference in the weakness or strength in the upper and lower extremities.  There is no spontaneous movement in both upper and lower extremities.  The left foot is turned out or externally rotated.  The deep tendon reflexes were absent on both sides.    Result Review    Result Review:  I have personally reviewed the results from the time of this admission to 7/2/2021 20:54 EDT and agree with these findings:  [x]  Laboratory  []  Microbiology  [x]  Radiology  []  EKG/Telemetry   []  Cardiology/Vascular   []  Pathology  []  Old records  []  Other:  Most notable findings include:   I reviewed the computer images of the CAT scan of her brain that was done on 07/01/2021.  This showed an area of low-attenuation in the left posterior parietal posterior frontal parietal lobe.  There is no shift of the midline structures.  This may represent an area of infarction measuring 2 x 3 cm.    Also reviewed the  computer images of the MRI of her brain that was done on 07/02/2021This showed multiple recent infarctions affecting both frontal parietal lobe with the largest one is on the left posterior frontal parietal lobe.  Hemorrhagic transformation.    In the FLAIR sequences, there is large area of T2 hyperintensities in the central aime extending to the middle cerebellar peduncles on both sides.  The exact etiology of this is not known at this time.  However, it may suggest the presence of a Central Pontine Myelinolysis.  There were minimal subcortical and periventricular white matter changes consistent with old microvascular ischemia.  There is mild asymmetric frontal and temporal lobe atrophy.    The Transthoracic Echocardiogram Normal Left Ventricular Systolic Function.      Assessment/Plan   Assessment / Plan     Impression:    Altered Mental State  Multiple, recent, cerebral infarctions affecting both frontal and parietal lobes.  Hemorrhagic infarct, left frontoparietal lobe, recent  Central Pontine Myelinolysis  Hepatic Encephalopathy  Hepatic Cirrhosis    Active Hospital Problems:  Active Hospital Problems    Diagnosis    • **Sepsis due to pneumonia (CMS/HCC)    • Elevated INR    • Ischemic stroke (CMS/HCC)    • Encephalopathy, hepatic (CMS/HCC)    • Thrombocytopenia (CMS/HCC)    • Bandemia    • Elevated LFTs    • Anxiety    • Depression    • Mood disorder (CMS/HCC)    • Asthma    • COPD (chronic obstructive pulmonary disease) (CMS/HCC)    • Diabetes mellitus type 1 (CMS/HCC)    • GERD (gastroesophageal reflux disease)    • Essential hypertension, benign    • Iatrogenic hypothyroidism    • Chronic allergic rhinitis    • Cirrhosis of liver (CMS/HCC)    • Chronic pain syndrome      Reflex Sympathetic Dystrophy, left arm.         Plan:   We will add thiamine to therapy.  Continue Aspirin therapy and  medical treatment.    DVT prophylaxis:  Medical and mechanical DVT prophylaxis orders are present.   We will see what  the Carotid Doppler study show.    CODE STATUS:    Limited Support to NOT Include: Cardioversion/Defibrillation; Intubation  Level Of Support Discussed With: Health Care Surrogate  Code Status: No CPR  Medical Interventions (Level of Support Prior to Arrest): Limited  Comments: Reviewed with her .  He indicated these are her wishes.        Electronically signed by Anne-Marie Rolon Jr., MD, 07/02/21, 8:54 PM EDT.

## 2021-07-03 NOTE — PLAN OF CARE
Goal Outcome Evaluation:      Patient alert to self and place intermittently, requires frequent reorientation. Patient receiving lactulose. Frequent liquid BM, rectal tube inserted. Insulin gtt per gluccomander. 2L NC.  VSS. Tmax 99.6. Wound care per wound nurse recommendation. Neurology assessed patient, Thiamine started.

## 2021-07-04 NOTE — PLAN OF CARE
Goal Outcome Evaluation:  Plan of Care Reviewed With: patient  Pt remains in restraints, pt managed to self removed ng tube, new ng replaced in right nare at 55cm. Placement verified with xray, tube feedings paused while trying to find prescribed feeding. Pt very agitated, rested well after night time oxy, continues with damian and fecal tube as well, remains confused, wound dressing changed. Eden Mancera RN      Progress: no change

## 2021-07-04 NOTE — PROGRESS NOTES
[facility]     Progress Note             Patient Name: Amy Miles  : 1965  MRN: [id number]  Primary Care Physician:  Provider, No Known  Date of admission: 2021    Subjective     Subjective       Present illness:  She is doing better. she is awake and more alert. She Is more responsive.  She is able to make few monosyllabic words.    Review of Systems  There is no headache dizziness nausea or vomiting.  No chest pains or abdominal pains.    Personal History     Past Medical History:   Diagnosis Date   • Anxiety    • Arm pain    • Arthritis    • Asthma    • Bladder disorder    • Bronchitis    • Chronic allergic rhinitis    • Chronic pain syndrome     Reflex Sympathetic Dystrophy, left arm.   • Cirrhosis of liver (CMS/HCC)    • COPD (chronic obstructive pulmonary disease) (CMS/HCC)    • CRPS (complex regional pain syndrome), type I, upper 2012   • Depression    • Diabetes mellitus type 1 (CMS/HCC)    • Essential hypertension, benign    • Fracture    • GERD (gastroesophageal reflux disease)    • Hand pain    • Heel pain    • Hyperthyroidism    • Iatrogenic hypothyroidism    • Leg pain    • Leg swelling    • Limb swelling    • Mood disorder (CMS/HCC)    • Nausea    • RSD (reflex sympathetic dystrophy)    • Seasonal allergies    • Shortness of Air    • Toe fracture            Past Surgical History:   Procedure Laterality Date   • ABDOMINAL HYSTERECTOMY     • CARPAL TUNNEL RELEASE     •  SECTION     • CHOLECYSTECTOMY     • COLONOSCOPY     • GALLBLADDER SURGERY     • HAND SURGERY     • HEMORRHOIDECTOMY     • HERNIA REPAIR     • HIATAL HERNIA REPAIR     • INCISION AND DRAINAGE ABSCESS     • THYROIDECTOMY      by Dr. Anthony   • TONSILLECTOMY     • TUBAL ABDOMINAL LIGATION         Family History: family history includes Arthritis in her father, mother, and sister; Diabetes in her brother, maternal uncle, and sister; Heart disease in her father; Osteoporosis in her sister; Other in  her brother. Otherwise pertinent FHx was reviewed and not pertinent to current issue.    Social History:  reports that she has been smoking. She has been smoking about 1.00 pack per day. She does not have any smokeless tobacco history on file. She reports that she does not drink alcohol and does not use drugs.      Home Medications:  cetirizine, cyclobenzaprine, furosemide, gabapentin, insulin, insulin aspart, lactulose, levothyroxine, lisinopril, metoprolol tartrate, montelukast, and oxyCODONE      Allergies:  Allergies   Allergen Reactions   • Adhesive Tape Unknown - Low Severity   • Amoxicillin-Pot Clavulanate Unknown - Low Severity   • Cephalexin Unknown - Low Severity   • Clavulanic Acid Unknown - Low Severity   • Doxycycline Hyclate Unknown - Low Severity   • Flonase [Fluticasone] Unknown - Low Severity   • Nsaids Unknown - Low Severity   • Penicillins Unknown - Low Severity   • Potassium Unknown - Low Severity       Objective   Objective     Vitals:   Temp:  [97.3 °F (36.3 °C)-98.2 °F (36.8 °C)] 98.2 °F (36.8 °C)  Heart Rate:  [75-94] 75  Resp:  [16-20] 18  BP: (140-165)/(76-96) 152/86  Flow (L/min):  [2] 2    Physical Exam   Constitutional: Awake, a little more alert.    Her heart was regular.  Heart rate was 80/min.  No murmurs her lungs were clear.  Carotid pulses were 1+ and equal.  No bruit on either side.    Neurological Examination:  Patient is more alert.  She is more responsible.  She kept her head and eyes deviated to the left side.  However, during this, she is able to talk.  She continues to have left lower facial weakness and minimal left hemiparesis.      Result Review    Result Review:  I have personally reviewed the results from the time of this admission to 7/4/2021 15:50 EDT and agree with these findings:  [x]  Laboratory  []  Microbiology  []  Radiology  []  EKG/Telemetry   []  Cardiology/Vascular   []  Pathology  []  Old records  []  Other:      Assessment/Plan   Assessment / Plan      Impression:    Altered Mental State  Multiple, recent, cerebral infarctions affecting both frontal and parietal lobes.  Hemorrhagic infarct, left frontoparietal lobe, recent  Central Pontine Myelinolysis  Hepatic Encephalopathy  Hepatic Cirrhosis     Active Hospital Problems:  Active Hospital Problems    Diagnosis    • **Sepsis due to pneumonia (CMS/HCC)    • Elevated INR    • Ischemic stroke (CMS/HCC)    • Encephalopathy, hepatic (CMS/HCC)    • Thrombocytopenia (CMS/HCC)    • Bandemia    • Elevated LFTs    • Anxiety    • Depression    • Mood disorder (CMS/HCC)    • Asthma    • COPD (chronic obstructive pulmonary disease) (CMS/HCC)    • Diabetes mellitus type 1 (CMS/HCC)    • GERD (gastroesophageal reflux disease)    • Essential hypertension, benign    • Iatrogenic hypothyroidism    • Chronic allergic rhinitis    • Cirrhosis of liver (CMS/HCC)    • Chronic pain syndrome      Reflex Sympathetic Dystrophy, left arm.       Plan:   Continue present treatment      DVT prophylaxis:  Medical and mechanical DVT prophylaxis orders are present.    CODE STATUS:    Limited Support to NOT Include: Cardioversion/Defibrillation; Intubation  Level Of Support Discussed With: Health Care Surrogate  Code Status: No CPR  Medical Interventions (Level of Support Prior to Arrest): Limited  Comments: Reviewed with her .  He indicated these are her wishes.           [facility]   DVT prophylaxis:  Medical and mechanical DVT prophylaxis orders are present.    CODE STATUS:    Limited Support to NOT Include: Cardioversion/Defibrillation; Intubation  Level Of Support Discussed With: Health Care Surrogate  Code Status: No CPR  Medical Interventions (Level of Support Prior to Arrest): Limited  Comments: Reviewed with her .  He indicated these are her wishes.

## 2021-07-04 NOTE — PLAN OF CARE
Goal Outcome Evaluation:         FMS removed, patient was unable to tolerate reinsertion of the FMS.  02 removed, patient is SAT is 97% on Room Air.  Patient is Aox3, able to ambulate and trasfer to the BSC.  Patient had change in cognition from the is morning and is more alert, orientated, and cognitive of her surroundings.

## 2021-07-04 NOTE — PROGRESS NOTES
[facility]   Hospitalist Progress Note  Date: 2021  Patient Name: Amy Miles  : 1965  MRN: [id number]  Date of admission: 2021      Subjective   Subjective     Chief Complaint: Sepsis    Summary: Summary:   Patient is a 55-year-old female with a past medical history significant for anxiety, cirrhosis, diabetes who presents with a 2 to 3-day history of being more somnolent, patient was in her normal state of health prior to this.  Patient has been in bed and difficult to arouse during that time.  In the emergency department there was concern for pneumonia possible acute versus subacute stroke, and she also met sepsis criteria.  Patient also was noted to have a large decubitus ulcer on her gluteal and sacral areas, patient was admitted to the ICU, MRI of the brain was obtained which was significant for multiple areas of scattered infarct, was some areas of hemorrhagic conversion.  Patient was continued on antibiotics as she continued to have fever, her procalcitonin was noted to be elevated, cultures were obtained.  Patient is being treated for decompensated cirrhosis.  Hospitalist service was asked to admit for further management     Interval Followup:   No acute events overnight,  Patient RN does report patient pulled NGT out this am, it has since been replaced.   Review of Systems  Unable to obtain secondary to altered mental status     Objective   Objective     Vitals:   Temp:  [97.3 °F (36.3 °C)-99.7 °F (37.6 °C)] 98.2 °F (36.8 °C)  Heart Rate:  [75-94] 75  Resp:  [16-20] 18  BP: (140-165)/(73-96) 152/86  Flow (L/min):  [2] 2  Physical Exam    Constitutional: Awake, alert, no acute distress   Eyes: Pupils equal, sclerae anicteric, no conjunctival injection   HENT: NCAT, mucous membranes moist   Neck: Supple, no thyromegaly, no lymphadenopathy, trachea midline   Respiratory: Clear to auscultation bilaterally, nonlabored respirations    Cardiovascular: RRR, no murmurs, rubs, or gallops,  palpable pedal pulses bilaterally   Gastrointestinal: Positive bowel sounds, soft, nontender, nondistended   Musculoskeletal: No bilateral ankle edema, no clubbing or cyanosis to extremities   Psychiatric: Appropriate affect, cooperative   Neurologic: Oriented x 2 to see person and place) in all extremities, Cranial Nerves grossly intact to confrontation Skin: No rashes     Result Review    Result Review:  I have personally reviewed the results from the time of this admission to 7/4/2021 14:52 EDT and agree with these findings:  [x]  Laboratory  [x]  Microbiology  []  Radiology  []  EKG/Telemetry   []  Cardiology/Vascular   []  Pathology  []  Old records  []  Other:    Assessment/Plan   Assessment / Plan       Assessment:  Sepsis  Decompensated cirrhosis  Acute stroke with hemorrhagic conversion  History of asthma  COPD without acute exacerbation  Insulin-dependent diabetes mellitus  Essential hypertension  Chronic pain syndrome  Anxiety  Depression  Sacral decubitus ulcer  Coagulopathy  Thrombocytopenia     Plan:  · Patient transferred to PCU on 7/3  · Pulmonary critical care consulted, appreciate their recommendations  · Speech therapy consulted, currently not safe to swallow, tolerating tube feeds, will continue for now with placement of NG tube on this morning.  · MRI of the brain reviewed, neurology consulted.  Tilt noted and appreciated.  · Echocardiogram reviewed  · Blood pressure stable, continue to monitor off Levophed  · Vancomycin and meropenem discontinued by cc  · Blood cultures-2 sets negative x2 and 3 days respectively.  Wound culture showing only normal edwin.  · Monitor CBC, CMP  · Consult wound care, appreciate their assistance  · Continue lactulose, rifaximin  · Carotid Doppler results reviewed.  · Continue telemetry to evaluate for atrial fibrillation  · Off of insulin drip.  Continue sliding scale insulin.  · Clinical course will dictate further management     Discussed plan with RN.    DVT  prophylaxis:  Medical and mechanical DVT prophylaxis orders are present.    CODE STATUS:   Limited Support to NOT Include: Cardioversion/Defibrillation; Intubation  Level Of Support Discussed With: Health Care Surrogate  Code Status: No CPR  Medical Interventions (Level of Support Prior to Arrest): Limited  Comments: Reviewed with her .  He indicated these are her wishes.        Electronically signed by Ole Mendes MD, 07/04/21, 2:52 PM EDT.

## 2021-07-05 NOTE — THERAPY TREATMENT NOTE
Acute Care - Speech Language Pathology   Swallow Treatment Note [facility]     Patient Name: Amy Miles  : 1965  MRN: [id number]  Today's Date: 2021               Admit Date: 2021    Visit Dx:     ICD-10-CM ICD-9-CM   1. Oropharyngeal dysphagia  R13.12 787.22   2. Sepsis with encephalopathy without septic shock, due to unspecified organism (CMS/HCC)  A41.9 038.9    R65.20 995.91    G93.40 348.30     Patient Active Problem List   Diagnosis   • Sepsis due to pneumonia (CMS/HCC)   • Anxiety   • Depression   • Mood disorder (CMS/HCC)   • Asthma   • COPD (chronic obstructive pulmonary disease) (CMS/HCC)   • Diabetes mellitus type 1 (CMS/HCC)   • GERD (gastroesophageal reflux disease)   • Essential hypertension, benign   • Iatrogenic hypothyroidism   • Chronic allergic rhinitis   • Cirrhosis of liver (CMS/HCC)   • Chronic pain syndrome   • Elevated INR   • Ischemic stroke (CMS/HCC)   • Encephalopathy, hepatic (CMS/HCC)   • Thrombocytopenia (CMS/HCC)   • Bandemia   • Elevated LFTs     Past Medical History:   Diagnosis Date   • Anxiety    • Arm pain    • Arthritis    • Asthma    • Bladder disorder    • Bronchitis    • Chronic allergic rhinitis    • Chronic pain syndrome     Reflex Sympathetic Dystrophy, left arm.   • Cirrhosis of liver (CMS/HCC)    • COPD (chronic obstructive pulmonary disease) (CMS/HCC)    • CRPS (complex regional pain syndrome), type I, upper 2012   • Depression    • Diabetes mellitus type 1 (CMS/HCC)    • Essential hypertension, benign    • Fracture    • GERD (gastroesophageal reflux disease)    • Hand pain    • Heel pain    • Hyperthyroidism    • Iatrogenic hypothyroidism    • Leg pain    • Leg swelling    • Limb swelling    • Mood disorder (CMS/HCC)    • Nausea    • RSD (reflex sympathetic dystrophy)    • Seasonal allergies    • Shortness of Air    • Toe fracture          Past Surgical History:   Procedure Laterality Date   • ABDOMINAL HYSTERECTOMY     • CARPAL TUNNEL  "RELEASE     •  SECTION     • CHOLECYSTECTOMY     • COLONOSCOPY     • GALLBLADDER SURGERY     • HAND SURGERY     • HEMORRHOIDECTOMY     • HERNIA REPAIR     • HIATAL HERNIA REPAIR     • INCISION AND DRAINAGE ABSCESS     • THYROIDECTOMY      by Dr. Anthony   • TONSILLECTOMY     • TUBAL ABDOMINAL LIGATION        SPEECH PATHOLOGY DYSPHAGIA TREATMENT    Subjective/Behavioral Observations: Patient is alert, following commands, frequently stating \"I want to go home\".  NG tube remains in place.        Day/time of Treatment: 2021, a.m. treatment        Current Diet: Tube feeding        Treatment received: Treatment focused on trials of p.o. intake for possible initiation of oral diet.  Nectar thick liquids were given by spoon and by control cup drink.  Patient holding in mouth with facial grimace.  Swallow completed with delay initiation.  Vocal quality and laryngeal sounds clear to auscultation.  Patient tolerated trials of thin liquids via spoon, control cup drink and single sips via straw with mild swallow delay.  No overt signs or symptoms of aspiration observed.  Purée solids with holding in mouth with facial grimace with swallow completed.  Double swallow observed.  Regular solids with extended chewing however patient not having her upper dentition.  Lingual pumping with swallow completed.  Oral residue however patient clears with minimal cues and liquid wash assist.  Free drinking of thin liquid via straw resulting in cough x1.        Results of treatment: Patient tolerating them creased trials of oral intake.  Signs and symptoms of aspiration with free drinking of thin liquids via straw.        Progress toward goals: Improvement in mental status and swallow function        Barriers to Achieving goals: Medical        Plan of care:/changes in plan: Recommend initiation of oral diet of mechanical soft solids and thin liquids.  Single sips via straw or cup.  One-on-one assist for feeding, small bites and " sips.                  EDUCATION  The patient has been educated in the following areas:   Dysphagia (Swallowing Impairment).                                                            Plan of Care Reviewed With: spouse           Time Calculation:   Time Calculation- SLP     Row Name 07/05/21 1059             Time Calculation- SLP    SLP Start Time  1015  -SN      SLP Stop Time  1100  -SN      SLP Time Calculation (min)  45 min  -SN      SLP Received On  07/05/21  -SN         Untimed Charges    [id number] Treatment Swallow Minutes  45  -SN         Total Minutes    Untimed Charges Total Minutes  45  -SN       Total Minutes  45  -SN        User Key  (r) = Recorded By, (t) = Taken By, (c) = Cosigned By    Initials Name Provider Type    Alyssa Garcia SLP Speech and Language Pathologist          Therapy Charges for Today     Code Description Service Date Service Provider Modifiers Qty    [id number] HC ST TREATMENT SWALLOW 3 7/5/2021 Alyssa Novak SLP GN 1               SYED Narayanan  7/5/2021

## 2021-07-05 NOTE — PROGRESS NOTES
"Pharmacy to dose Vancomycin Day: 4  Duration of therapy: TBD  Clinical indication: SEPSIS. POSS PNA  162.6 cm (64\")       07/05/21  0500      Weight: 89.2 kg (196 lb 10.4 oz)         Lab Results  Component  Value  Date    CREATININE  0.87  07/05/2021     Estimated Creatinine Clearance: 78.1 mL/min (by C-G formula based on SCr of 0.87 mg/dL).   HD/PD/CRRT?: NO  Lab Results   Component Value Date    WBC 8.74 07/04/2021      Tmax: AF  I/O last 3 completed shifts:  In: 300 [Other:300]  Out: 1400 [Urine:900; Stool:500]     Contrast Administered: NO    Relevant Micro:   Microbiology Results (last 10 days)       Procedure Component Value - Date/Time    MRSA Screen, PCR (Inpatient) - Swab, Nares [[id number]]  (Abnormal) Collected: 07/02/21 1239    Lab Status: Final result Specimen: Swab from Nares Updated: 07/02/21 1445     MRSA PCR MRSA Detected    Blood Culture - Blood, Hand, Left [[id number]] Collected: 07/01/21 2347    Lab Status: Preliminary result Specimen: Blood from Hand, Left Updated: 07/05/21 0000     Blood Culture No growth at 3 days    Blood Culture - Blood, Arm, Left [[id number]] Collected: 07/01/21 2347    Lab Status: Preliminary result Specimen: Blood from Arm, Left Updated: 07/05/21 0000     Blood Culture No growth at 3 days    COVID PRE-OP / PRE-PROCEDURE SCREENING ORDER (NO ISOLATION) - Swab, Nasopharynx [[id number]]  (Normal) Collected: 07/01/21 1734    Lab Status: Final result Specimen: Swab from Nasopharynx Updated: 07/01/21 2117    Narrative:      The following orders were created for panel order COVID PRE-OP / PRE-PROCEDURE SCREENING ORDER (NO ISOLATION) - Swab, Nasopharynx.  Procedure                               Abnormality         Status                     ---------                               -----------         ------                     COVID-19,CEPHEID,COR/MARTELL...[[id number]]  Normal              Final result                 Please view results for these tests on the individual orders.    " COVID-19,CEPHEID,COR/MARTELL/PAD/ERICKA IN-HOUSE(OR EMERGENT/ADD-ON),NP SWAB IN TRANSPORT MEDIA 3-4 HR TAT, RT-PCR - Swab, Nasopharynx [[id number]]  (Normal) Collected: 07/01/21 1734    Lab Status: Final result Specimen: Swab from Nasopharynx Updated: 07/01/21 2117     COVID19 Not Detected    Narrative:      Fact sheet for providers: https://www.fda.gov/media/508035/download     Fact sheet for patients: https://www.fda.gov/media/620669/download  Fact sheet for providers: https://www.fda.gov/media/413198/download     Fact sheet for patients: https://www.fda.gov/media/288920/download    Wound Culture - Swab, Coccyx [[id number]] Collected: 07/01/21 1729    Lab Status: Final result Specimen: Swab from Coccyx Updated: 07/03/21 0949     Wound Culture Heavy growth (4+) Normal Skin Diya     Gram Stain Occasional WBCs seen      No organisms seen    Blood Culture - Blood, Hand, Left [[id number]] Collected: 07/01/21 1208    Lab Status: Preliminary result Specimen: Blood from Hand, Left Updated: 07/04/21 1215     Blood Culture No growth at 3 days    Blood Culture - Blood, Arm, Left [[id number]] Collected: 07/01/21 1208    Lab Status: Preliminary result Specimen: Blood from Arm, Left Updated: 07/04/21 1215     Blood Culture No growth at 3 days          Other Antimicrobial Therapy: NO    Assessment/Plan  Regimen: 1250 mg IV every 12 hours.  Start time: 09:41 on 07/05/2021  Exposure target: AUC24 (range)400-600 mg/L.hr   AUC24,ss: 564 mg/L.hr  PAUC*: 99 %  Ctrough,ss: 16 mg/L  Pconc*: 13 %  Tox.: 11 %    Note:   VANCOMYCIN LEVEL THIS AM REPORTED AS 7.73.  WILL ADJUST DOSE TO 1250MG [id number] STARTING THIS PM.      Level due: LEVEL TOMORROW AT 0500.  Labs ordered:  [facility] TOMORROW

## 2021-07-05 NOTE — CASE MANAGEMENT/SOCIAL WORK
Per , pt has hx of ADEEL (not on PAP) and COPD.  Pt current with [facility] for her COPD and takes symbicort for maintenance.  states that prior to the stroke, pt was struggling to take her mdi correctly.  Pt will need nebulized maintenance meds once discharged home.  Per , pt will need a nebulizer.  SW are of this need.  Pt will need skilled rehab on d/c

## 2021-07-05 NOTE — PLAN OF CARE
Goal Outcome Evaluation:  Plan of Care Reviewed With: patient  Pt confused and not following instruction, pt is npo, pt had small glass of water for oral swabs, pt began drinking cup of water that was a distance away from the bed. Changed dressing, quickly soiled, was removed and open to air. Began pulling at ng tube while in the room. No displacement. Frequently calling family members. Eden Mancera RN   Progress: no change

## 2021-07-05 NOTE — PLAN OF CARE
Goal Outcome Evaluation:  Plan of Care Reviewed With: patient   Pt up to the chair with one-two assist. Spouse at the bedside today  vital signs are stable

## 2021-07-05 NOTE — PLAN OF CARE
Goal Outcome Evaluation:  Plan of Care Reviewed With: spouse  Patient with improvement in mental status from initial dysphagia evaluation.  Dysphagia treatment completed with results of recommendations of beginning p.o. diet of mechanical soft solids and thin liquids.  Recommend DC NG tube.

## 2021-07-05 NOTE — CONSULTS
"Adult Nutrition  Assessment/PES    Patient Name:  Amy Miles  YOB: 1965  MRN: [id number]  Admit Date:  7/1/2021    Assessment Date:  7/5/2021    Comments:   Diabetisource AC @ 56 ml/hr    Free water 60 ml every 3 hours    =1728 kcal, 86 g pro, 1661 ml fluid    Reason for Assessment     Row Name 07/05/21 1102          Reason for Assessment    Reason For Assessment  TF/PN         Nutrition/Diet History     Row Name 07/05/21 1102          Nutrition/Diet History    Typical Food/Fluid Intake  Tolerating enteral nutrition well at goal rate.  Speech therapy unable to recommend safe PO intake at this time.         Anthropometrics     Row Name 07/05/21 1102 07/05/21 0500       Anthropometrics    Height  162.6 cm (64\")  --    Weight  89.2 kg (196 lb 10.4 oz)  89.2 kg (196 lb 10.4 oz)       Admit Weight    Admit Weight  88.9 kg (195 lb 15.8 oz)  --       Ideal Body Weight (IBW)    Ideal Body Weight (IBW) (kg)  55  --    % Ideal Body Weight  162.17  --       Body Mass Index (BMI)    BMI (kg/m2)  33.83  --        Labs/Tests/Procedures/Meds     Row Name 07/05/21 1102          Labs/Procedures/Meds    Lab Results Reviewed  reviewed        Diagnostic Tests/Procedures    Diagnostic Test/Procedure Reviewed  reviewed        Medications    Pertinent Medications Reviewed  reviewed           Estimated/Assessed Needs     Row Name 07/05/21 1102          Calculation Measurements    Height  162.6 cm (64\")         Nutrition Prescription Ordered     Row Name 07/05/21 1102          Nutrition Prescription PO    Current PO Diet  NPO        Nutrition Prescription EN    Enteral Route  NG     Product  Diabetisource AC (Glucerna 1.2)     TF Delivery Method  Continuous     Continuous TF Goal Rate (mL/hr)  56 mL/hr     Continuous TF Current Rate (mL/hr)  56 mL/hr     Water flush (mL)   60 mL     Water Flush Frequency  Every 3 hours                 Problem/Interventions:  Problem 1     Row Name 07/05/21 1102          Nutrition " Diagnoses Problem 1    Problem 1  No Nutrition Diagnosis at this Time               Intervention Goal     Row Name 07/05/21 1102          Intervention Goal    General  Maintain nutrition;Nutrition support treatment           Nutrition Prescription     Row Name 07/05/21 1102          Nutrition Prescription EN    Enteral Prescription  Continue same protocol             Electronically signed by:  Pat Bradley RD  07/05/21 11:05 EDT

## 2021-07-05 NOTE — PROGRESS NOTES
[facility]   Hospitalist Progress Note  Date: 2021  Patient Name: Amy Miles  : 1965  MRN: [id number]  Date of admission: 2021      Subjective   Subjective     Chief Complaint: Sepsis    Summary: Summary:   Patient is a 55-year-old female with a past medical history significant for anxiety, cirrhosis, diabetes who presents with a 2 to 3-day history of being more somnolent, patient was in her normal state of health prior to this.  Patient has been in bed and difficult to arouse during that time.  In the emergency department there was concern for pneumonia possible acute versus subacute stroke, and she also met sepsis criteria.  Patient also was noted to have a large decubitus ulcer on her gluteal and sacral areas, patient was admitted to the ICU, MRI of the brain was obtained which was significant for multiple areas of scattered infarct, was some areas of hemorrhagic conversion.  Patient was continued on antibiotics as she continued to have fever, her procalcitonin was noted to be elevated, cultures were obtained.  Patient is being treated for decompensated cirrhosis.  Hospitalist service was asked to admit for further management     Interval Followup:   No acute events overnight.  Patient  is at bedside.    Review of Systems  Patient's only complaint is that she wants to go home.     Objective   Objective     Vitals:   Temp:  [96.9 °F (36.1 °C)-98.2 °F (36.8 °C)] 97.5 °F (36.4 °C)  Heart Rate:  [75-92] 80  Resp:  [16-20] 18  BP: (146-187)/(76-96) 175/84  Flow (L/min):  [2] 2  Physical Exam    Constitutional: Awake, alert, tearful crying that she wants to go home.   Eyes: Pupils equal, sclerae anicteric, no conjunctival injection   HENT: NCAT, mucous membranes moist   Neck: Supple, no thyromegaly, no lymphadenopathy, trachea midline   Respiratory: Clear to auscultation bilaterally, nonlabored respirations    Cardiovascular: RRR, no murmurs, rubs, or gallops, palpable pedal pulses  bilaterally   Gastrointestinal: Positive bowel sounds, soft, nontender, nondistended   Musculoskeletal: No bilateral ankle edema, no clubbing or cyanosis to extremities   Psychiatric: Tearful, depressed appearing.   Neurologic: Oriented x 2 (person and place) in all extremities, Cranial Nerves grossly intact to confrontation Skin: No rashes     Result Review    Result Review:  I have personally reviewed the results from the time of this admission to 7/5/2021 08:03 EDT and agree with these findings:  [x]  Laboratory  [x]  Microbiology  []  Radiology  []  EKG/Telemetry   []  Cardiology/Vascular   []  Pathology  []  Old records  []  Other:    Assessment/Plan   Assessment / Plan       Assessment:  Sepsis  Decompensated cirrhosis  Acute stroke with hemorrhagic conversion  History of asthma  COPD without acute exacerbation  Insulin-dependent diabetes mellitus  Essential hypertension  Chronic pain syndrome  Anxiety  Depression  Sacral decubitus ulcer  Coagulopathy  Thrombocytopenia     Plan:  · Patient transferred to PCU on 7/3  · Pulmonary critical care consulted, appreciate their recommendations  · Speech therapy consulted, and has passed a swallow eval on this morning.  · MRI of the brain reviewed, neurology consulted.  Tilt noted and appreciated.  · Echocardiogram reviewed  · Blood pressure stable, continue to monitor off Levophed  · Vancomycin and meropenem discontinued by cc  · Blood cultures-2 sets negative x 3 and 4 days respectively.  Wound culture showing only normal edwin.  · Monitor CBC, CMP  · Consult wound care, appreciate their assistance  · Continue lactulose, rifaximin  · Carotid Doppler results reviewed.  · Continue telemetry to evaluate for atrial fibrillation  · Off of insulin drip.  Continue sliding scale insulin.  · Clinical course will dictate further management     Discussed plan with RN.    DVT prophylaxis:  Medical and mechanical DVT prophylaxis orders are present.    CODE STATUS:   Limited  Support to NOT Include: Cardioversion/Defibrillation; Intubation  Level Of Support Discussed With: Health Care Surrogate  Code Status: No CPR  Medical Interventions (Level of Support Prior to Arrest): Limited  Comments: Reviewed with her .  He indicated these are her wishes.        Electronically signed by Ole Mendes MD, 07/05/21, 2:35 PM EDT.

## 2021-07-06 NOTE — THERAPY EVALUATION
Patient Name: Amy Miles  : 1965    MRN: [id number]                              Today's Date: 2021       Admit Date: 2021    Visit Dx:     ICD-10-CM ICD-9-CM   1. Oropharyngeal dysphagia  R13.12 787.22   2. Sepsis with encephalopathy without septic shock, due to unspecified organism (CMS/HCC)  A41.9 038.9    R65.20 995.91    G93.40 348.30   3. Difficulty in walking  R26.2 719.7   4. Decreased activities of daily living (ADL)  Z78.9 V49.89     Patient Active Problem List   Diagnosis   • Sepsis due to pneumonia (CMS/HCC)   • Anxiety   • Depression   • Mood disorder (CMS/HCC)   • Asthma   • COPD (chronic obstructive pulmonary disease) (CMS/HCC)   • Diabetes mellitus type 1 (CMS/HCC)   • GERD (gastroesophageal reflux disease)   • Essential hypertension, benign   • Iatrogenic hypothyroidism   • Chronic allergic rhinitis   • Cirrhosis of liver (CMS/HCC)   • Chronic pain syndrome   • Elevated INR   • Ischemic stroke (CMS/HCC)   • Encephalopathy, hepatic (CMS/HCC)   • Thrombocytopenia (CMS/HCC)   • Bandemia   • Elevated LFTs     Past Medical History:   Diagnosis Date   • Anxiety    • Arm pain    • Arthritis    • Asthma    • Bladder disorder    • Bronchitis    • Chronic allergic rhinitis    • Chronic pain syndrome     Reflex Sympathetic Dystrophy, left arm.   • Cirrhosis of liver (CMS/HCC)    • COPD (chronic obstructive pulmonary disease) (CMS/HCC)    • CRPS (complex regional pain syndrome), type I, upper 2012   • Depression    • Diabetes mellitus type 1 (CMS/HCC)    • Essential hypertension, benign    • Fracture    • GERD (gastroesophageal reflux disease)    • Hand pain    • Heel pain    • Hyperthyroidism    • Iatrogenic hypothyroidism    • Leg pain    • Leg swelling    • Limb swelling    • Mood disorder (CMS/HCC)    • Nausea    • RSD (reflex sympathetic dystrophy)    • Seasonal allergies    • Shortness of Air    • Toe fracture          Past Surgical History:   Procedure Laterality Date    • ABDOMINAL HYSTERECTOMY     • CARPAL TUNNEL RELEASE     •  SECTION     • CHOLECYSTECTOMY     • COLONOSCOPY     • GALLBLADDER SURGERY     • HAND SURGERY     • HEMORRHOIDECTOMY     • HERNIA REPAIR     • HIATAL HERNIA REPAIR     • INCISION AND DRAINAGE ABSCESS     • THYROIDECTOMY      by Dr. Anthony   • TONSILLECTOMY     • TUBAL ABDOMINAL LIGATION       General Information     Row Name 21 151          OT Time and Intention    Document Type  evaluation  -     Mode of Treatment  individual therapy;occupational therapy  -     Row Name 21 151          General Information    Patient Profile Reviewed  yes  -     Prior Level of Function  independent:;all household mobility;community mobility;ADL's;home management;cooking;cleaning  -     Existing Precautions/Restrictions  no known precautions/restrictions  -     Barriers to Rehab  none identified  -     Row Name 21 151          Occupational Profile    Reason for Services/Referral (Occupational Profile)  Patient was admitted with the above diagnosis. She was referred for OT evaluation due to functional status change with performance of ADLs, functional mobility, and/or transfers. No prior services for the current condition.  -     Row Name 21 151          Living Environment    Lives With  alone;child(priyanka), adult  -     Row Name 21 151          Cognition    Orientation Status (Cognition)  oriented to;person;place;situation asked if it was 1:30 in the day or night although blinds open and daylight outisde  -     Row Name 21 151          Safety Issues, Functional Mobility    Safety Issues Affecting Function (Mobility)  insight into deficits/self-awareness  -     Impairments Affecting Function (Mobility)  balance;cognition;endurance/activity tolerance;pain  -     Cognitive Impairments, Mobility Safety/Performance  awareness, need for assistance;judgment  -       User Key  (r) = Recorded By, (t) = Taken By,  (c) = Cosigned By    Initials Name Provider Type    Shadia Calzada OT Occupational Therapist          Mobility/ADL's     Row Name 07/06/21 1514          Bed Mobility    Bed Mobility  supine-sit-supine  -     Supine-Sit-Supine [place] (Bed Mobility)  minimum assist (75% patient effort)  -     Assistive Device (Bed Mobility)  bed rails  -     Row Name 07/06/21 1514          Transfers    Transfers  toilet transfer  -     [place] Level (Toilet Transfer)  minimum assist (75% patient effort)  -     Assistive Device (Toilet Transfer)  commode, 3-in-1 at edge of bed  -[facility] Name 07/06/21 1514          Toilet Transfer    Type (Toilet Transfer)  stand pivot/stand step  -[facility] Name 07/06/21 1514          Activities of Daily Living    BADL Assessment/Intervention  bathing;upper body dressing;lower body dressing;toileting  -[facility] Name 07/06/21 1514          Bathing Assessment/Intervention    [place] Level (Bathing)  bathing skills;upper body;minimum assist (75% patient effort);lower body;moderate assist (50% patient effort)  -[facility] Name 07/06/21 1514          Upper Body Dressing Assessment/Training    [place] Level (Upper Body Dressing)  upper body dressing skills;minimum assist (75% patient effort)  -[facility] Name 07/06/21 1514          Lower Body Dressing Assessment/Training    [place] Level (Lower Body Dressing)  lower body dressing skills;moderate assist (50% patient effort)  -[facility] Name 07/06/21 1514          Toileting Assessment/Training    [place] Level (Toileting)  toileting skills;moderate assist (50% patient effort)  -     Assistive Devices (Toileting)  commode, 3-in-1  -     Comment (Toileting)  damian catheter, BSC for BM  -KP       User Key  (r) = Recorded By, (t) = Taken By, (c) = Cosigned By    Initials Name Provider Type    Shadia Calzada OT Occupational Therapist        Obj/Interventions     Row Name 07/06/21 1516          Sensory  Assessment (Somatosensory)    Sensory Assessment (Somatosensory)  UE sensation intact  -KP     Row Name 07/06/21 1516          Vision Assessment/Intervention    Visual Impairment/Limitations  WNL  -KP     Row Name 07/06/21 1516          Range of Motion Comprehensive    General Range of Motion  bilateral upper extremity ROM [facility]  -KP     Row Name 07/06/21 1516          Strength Comprehensive (MMT)    Comment, General Manual Muscle Testing (MMT) Assessment  functional for self care in bilateral upper extremities  -KP     Row Name 07/06/21 1516          Motor Skills    Motor Skills  coordination;functional endurance  -     Coordination  [facility]     Functional Endurance  poor plus, room air  -KP     Row Name 07/06/21 1516          Balance    Balance Assessment  sitting static balance;standing dynamic balance  -     Static Sitting Balance  [facility]     Dynamic Standing Balance  mild impairment  -       User Key  (r) = Recorded By, (t) = Taken By, (c) = Cosigned By    Initials Name Provider Type    Shadia Calzada OT Occupational Therapist        Goals/Plan     Row Name 07/06/21 1518          Bed Mobility Goal 1 (OT)    Activity/Assistive Device (Bed Mobility Goal 1, OT)  bed mobility activities, all  -KP     [place] Level/Cues Needed (Bed Mobility Goal 1, OT)  modified independence  -KP     Time Frame (Bed Mobility Goal 1, OT)  long term goal (LTG);10 days  -KP     Row Name 07/06/21 1518          Transfer Goal 1 (OT)    Activity/Assistive Device (Transfer Goal 1, OT)  transfers, all  -KP     [place] Level/Cues Needed (Transfer Goal 1, OT)  modified independence  -KP     Time Frame (Transfer Goal 1, OT)  long term goal (LTG);10 days  -KP     Row Name 07/06/21 1518          Bathing Goal 1 (OT)    Activity/Device (Bathing Goal 1, OT)  bathing skills, all  -KP     [place] Level/Cues Needed (Bathing Goal 1, OT)  modified independence  -KP     Time Frame (Bathing Goal 1, OT)  long term goal (LTG);10  days  -[facility] Name 07/06/21 1518          Dressing Goal 1 (OT)    Activity/Device (Dressing Goal 1, OT)  dressing skills, all  -KP     [place]/Cues Needed (Dressing Goal 1, OT)  modified independence  -KP     Time Frame (Dressing Goal 1, OT)  long term goal (LTG);10 days  -KP     Row Name 07/06/21 1518          Toileting Goal 1 (OT)    Activity/Device (Toileting Goal 1, OT)  toileting skills, all  -KP     [place] Level/Cues Needed (Toileting Goal 1, OT)  modified independence  -KP     Time Frame (Toileting Goal 1, OT)  long term goal (LTG);10 days  -[facility] Name 07/06/21 1518          Grooming Goal 1 (OT)    Activity/Device (Grooming Goal 1, OT)  grooming skills, all  -KP     [place] (Grooming Goal 1, OT)  modified independence  -KP     Time Frame (Grooming Goal 1, OT)  long term goal (LTG);10 days  -KP     Row Name 07/06/21 1518          Therapy Assessment/Plan (OT)    Planned Therapy Interventions (OT)  activity tolerance training;BADL retraining;IADL retraining;occupation/activity based interventions;transfer/mobility retraining;functional balance retraining;patient/caregiver education/training;ROM/therapeutic exercise  -       User Key  (r) = Recorded By, (t) = Taken By, (c) = Cosigned By    Initials Name Provider Type    Shadia Calzada, OT Occupational Therapist        Clinical Impression     Row Name 07/06/21 1516          Pain Assessment    Additional Documentation  Pain Scale: FACES Pre/Post-Treatment (Group)  -KP     Row Name 07/06/21 1516          Pain Scale: FACES Pre/Post-Treatment    Pain: FACES Scale, Pretreatment  0-->no hurt  -     Posttreatment Pain Rating  0-->no hurt  -[facility] Name 07/06/21 1516          Plan of Care Review    Plan of Care Reviewed With  patient  -     Progress  no change  -     Outcome Summary  Patient presents with the following functional limitations which impact patient's independence and safety to return home: decreased independence with  ADLs, decreased independence with functional mobility/transfers, impaired activity tolerance/functional endurance, and impaired balance to support ADLs.  -     Row Name 07/06/21 1516          Therapy Assessment/Plan (OT)    Patient/Family Therapy Goal Statement (OT)  Patient wants to be able to return home.  -     Rehab Potential (OT)  good, to achieve stated therapy goals  -     Criteria for Skilled Therapeutic Interventions Met (OT)  yes;meets criteria;skilled treatment is necessary  -     Therapy Frequency (OT)  5 times/wk  -     Row Name 07/06/21 1516          Therapy Plan Review/Discharge Plan (OT)    Anticipated Discharge Disposition (OT)  inpatient rehabilitation facility;skilled nursing facility  -       User Key  (r) = Recorded By, (t) = Taken By, (c) = Cosigned By    Initials Name Provider Type    Shadia Calzada, CASSIDY Occupational Therapist        Outcome Measures     Row Name 07/06/21 1519          How much help from another is currently needed...    Putting on and taking off regular lower body clothing?  2  -KP     Bathing (including washing, rinsing, and drying)  2  -KP     Toileting (which includes using toilet bed pan or urinal)  2  -KP     Putting on and taking off regular upper body clothing  3  -KP     Taking care of personal grooming (such as brushing teeth)  3  -KP     Eating meals  3  -     AM-PAC 6 Clicks Score (OT)  15  -     Row Name 07/06/21 1039 07/06/21 0800       How much help from another person do you currently need...    Turning from your back to your side while in flat bed without using bedrails?  4  -CK  4  -RB    Moving from lying on back to sitting on the side of a flat bed without bedrails?  4  -CK  4  -RB    Moving to and from a bed to a chair (including a wheelchair)?  3  -CK  3  -RB    Standing up from a chair using your arms (e.g., wheelchair, bedside chair)?  3  -CK  2  -RB    Climbing 3-5 steps with a railing?  2  -CK  2  -RB    To walk in hospital room?  3   -CK  2  -RB    AM-PAC 6 Clicks Score (PT)  19  -CK  17  -RB    Row Name 07/06/21 1519 07/06/21 1039       Functional Assessment    Outcome Measure Options  AM-PAC 6 Clicks Daily Activity (OT);Optimal Instrument  -KP  AM-PAC 6 Clicks Basic Mobility (PT)  -CK    Row Name 07/06/21 1519          Optimal Instrument    Optimal Instrument  Optimal - 3  -KP     Bending/Stooping  4  -KP     Standing  2  -KP     Reaching  1  -KP     From the list, choose the 3 activities you would most like to be able to do without any difficulty  Bending/stooping;Standing;Reaching  -KP     Total Score Optimal - 3  7  -KP       User Key  (r) = Recorded By, (t) = Taken By, (c) = Cosigned By    Initials Name Provider Type    Maria R Valdez, RN Registered Nurse    Shadia Calzada, OT Occupational Therapist    Umesh Mendez, PT Physical Therapist        Occupational Therapy Education                 Title: PT OT SLP Therapies (In Progress)     Topic: Occupational Therapy (Done)     Point: ADL training (Done)     Description:   Instruct learner(s) on proper safety adaptation and remediation techniques during self care or transfers.   Instruct in proper use of assistive devices.              Learning Progress Summary           Patient Acceptance, E, VU,NR by  at 7/6/2021 1519                   Point: Home exercise program (Done)     Description:   Instruct learner(s) on appropriate technique for monitoring, assisting and/or progressing therapeutic exercises/activities.              Learning Progress Summary           Patient Acceptance, E, VU,NR by  at 7/6/2021 1519                   Point: Precautions (Done)     Description:   Instruct learner(s) on prescribed precautions during self-care and functional transfers.              Learning Progress Summary           Patient Acceptance, E, VU,NR by  at 7/6/2021 1519                   Point: Body mechanics (Done)     Description:   Instruct learner(s) on proper positioning and spine  alignment during self-care, functional mobility activities and/or exercises.              Learning Progress Summary           Patient Acceptance, E, VU,NR by  at 7/6/2021 1519                               User Key     Initials Effective Dates Name Provider Type Discipline     06/16/21 -  Shadia Doll OT Occupational Therapist OT              OT Recommendation and Plan  Planned Therapy Interventions (OT): activity tolerance training, BADL retraining, IADL retraining, occupation/activity based interventions, transfer/mobility retraining, functional balance retraining, patient/caregiver education/training, ROM/therapeutic exercise  Therapy Frequency (OT): 5 times/wk  Plan of Care Review  Plan of Care Reviewed With: patient  Progress: no change  Outcome Summary: Patient presents with the following functional limitations which impact patient's independence and safety to return home: decreased independence with ADLs, decreased independence with functional mobility/transfers, impaired activity tolerance/functional endurance, and impaired balance to support ADLs.     Time Calculation:   Time Calculation- OT     Row Name 07/06/21 1520             Time Calculation- OT    OT Received On  07/06/21  -      OT Goal Re-Cert Due Date  07/15/21  -         Untimed Charges    OT Eval/Re-eval Minutes  35  -KP         Total Minutes    Untimed Charges Total Minutes  35  -KP       Total Minutes  35  -KP        User Key  (r) = Recorded By, (t) = Taken By, (c) = Cosigned By    Initials Name Provider Type     Shadia Doll OT Occupational Therapist        Therapy Charges for Today     Code Description Service Date Service Provider Modifiers Qty    06802712955 HC OT EVAL LOW COMPLEXITY 3 7/6/2021 Shadia Doll OT GO 1               Shadia Doll OT  7/6/2021

## 2021-07-06 NOTE — PROGRESS NOTES
[facility]   Hospitalist Progress Note  Date: 2021  Patient Name: Amy Miles  : 1965  MRN: [id number]  Date of admission: 2021      Subjective   Subjective     Chief Complaint: Sepsis    Summary: Summary:   Patient is a 55-year-old female with a past medical history significant for anxiety, cirrhosis, diabetes who presents with a 2 to 3-day history of being more somnolent, patient was in her normal state of health prior to this.  Patient has been in bed and difficult to arouse during that time.  In the emergency department there was concern for pneumonia possible acute versus subacute stroke, and she also met sepsis criteria.  Patient also was noted to have a large decubitus ulcer on her gluteal and sacral areas, patient was admitted to the ICU, MRI of the brain was obtained which was significant for multiple areas of scattered infarct, was some areas of hemorrhagic conversion.  Patient was continued on antibiotics as she continued to have fever, her procalcitonin was noted to be elevated, cultures were obtained.  Patient is being treated for decompensated cirrhosis.  Hospitalist service was asked to admit for further management     Interval Followup:   Patient is more alert.  She is adamant that she wants to go home.  However, she continues to have profuse diarrhea secondary to lactulose.  Her ammonia levels have improved.  She has a lot of denuded area and redness around her anus/buttock from diarrhea.      She had her NG tube removed this morning.  She is tolerating an oral diet.      Review of Systems  Patient continues to have profuse diarrhea and skin breakdown around her buttocks.      Objective   Objective     Vitals:   Temp:  [97.2 °F (36.2 °C)-98 °F (36.7 °C)] 98 °F (36.7 °C)  Heart Rate:  [74-86] 86  Resp:  [18] 18  BP: (122-180)/(58-86) 172/84  Physical Exam    Constitutional: Awake, alert   Eyes: Pupils equal, sclerae anicteric, no conjunctival injection   HENT: NCAT, mucous  membranes moist   Neck: Supple, no thyromegaly, no lymphadenopathy, trachea midline   Respiratory: Clear to auscultation bilaterally, nonlabored respirations    Cardiovascular: RRR, no murmurs, rubs, or gallops, palpable pedal pulses bilaterally   Gastrointestinal: Positive bowel sounds, soft, nontender, nondistended. Denuded area around anus.    Musculoskeletal: No bilateral ankle edema, no clubbing or cyanosis to extremities   Neurologic: Oriented x 3 (person and place) in all extremities, Cranial Nerves grossly intact to confrontation Skin: No rashes     Result Review    Result Review:  I have personally reviewed the results from the time of this admission to 7/6/2021 12:51 EDT and agree with these findings:  [x]  Laboratory  [x]  Microbiology  []  Radiology  []  EKG/Telemetry   []  Cardiology/Vascular   []  Pathology  []  Old records  []  Other:    Assessment/Plan   Assessment / Plan       Assessment:  Sepsis  Decompensated cirrhosis  Acute stroke with hemorrhagic conversion  [place] Myelinolysis  History of asthma  COPD without acute exacerbation  Insulin-dependent diabetes mellitus  Essential hypertension  Chronic pain syndrome  Anxiety  Depression  Sacral decubitus ulcer  Coagulopathy  Thrombocytopenia     Plan:  #1 Acute stroke with hemorrhagic conversion  -MRI shows: cerebral infarctions affecting both frontal and parietal lobes. Hemorrhagic infarct, left frontoparietal lobe.   -On ASA, will need to be started on a high intensity statin; however, LFTs elevated today.        #2 Decompensated cirrhosis  -reduce lactulose. 2-3 BMs per day.  -continue rifaxmin  -started on lasix and continue spironolactone. Will need to increase spironolactone to 100 mg and 40 mg of lasix.      #3 Low potassium-repleted    #4 Wound care for buttocks care  -patient refusing FMS.      #5 [id number] with current regimen.  May need to titrate insulin further tomorrow.      Discussed plan with RN and  who is at  the bedside.    DVT prophylaxis:  Mechanical DVT prophylaxis orders are present.    CODE STATUS:   Limited Support to NOT Include: Cardioversion/Defibrillation; Intubation  Level Of Support Discussed With: Health Care Surrogate  Code Status: No CPR  Medical Interventions (Level of Support Prior to Arrest): Limited  Comments: Reviewed with her .  He indicated these are her wishes.        Mateo Nevarez DO

## 2021-07-06 NOTE — PLAN OF CARE
Goal Outcome Evaluation:  Plan of Care Reviewed With: patient   No acute changes overnight     Progress: no change

## 2021-07-06 NOTE — THERAPY EVALUATION
Acute Care - Physical Therapy Initial Evaluation  [facility]     Patient Name: Amy Miles  : 1965  MRN: [id number]  Today's Date: 2021     Admit date: 2021     Referring Physician: Mateo Nevarez DO     Visit diagnosis    ICD-10-CM ICD-9-CM   1. Oropharyngeal dysphagia  R13.12 787.22   2. Sepsis with encephalopathy without septic shock, due to unspecified organism (CMS/HCC)  A41.9 038.9    R65.20 995.91    G93.40 348.30   3. Difficulty in walking  R26.2 719.7   4. Decreased activities of daily living (ADL)  Z78.9 V49.89        Active Hospital Problems    Diagnosis  POA   • **Sepsis due to pneumonia (CMS/HCC) [J18.9, A41.9]  Yes   • Elevated INR [R79.1]  Yes   • Ischemic stroke (CMS/HCC) [I63.9]  Yes   • Encephalopathy, hepatic (CMS/HCC) [K72.90]  Yes   • Thrombocytopenia (CMS/HCC) [D69.6]  Yes   • Bandemia [D72.825]  Yes   • Elevated LFTs [R79.89]  Yes   • Anxiety [F41.9]  Yes   • Depression [F32.9]  Yes   • Mood disorder (CMS/HCC) [F39]  Yes   • Asthma [J45.909]  Yes   • COPD (chronic obstructive pulmonary disease) (CMS/HCC) [J44.9]  Yes   • Diabetes mellitus type 1 (CMS/HCC) [E10.9]  Yes   • GERD (gastroesophageal reflux disease) [K21.9]  Yes   • Essential hypertension, benign [I10]  Yes   • Iatrogenic hypothyroidism [E03.2]  Yes   • Chronic allergic rhinitis [J30.9]  Yes   • Cirrhosis of liver (CMS/HCC) [K74.60]  Yes   • Chronic pain syndrome [G89.4]  Yes     Reflex Sympathetic Dystrophy, left arm.          SurgeryDate:* No surgery found *        Past Medical History:   Diagnosis Date   • Anxiety    • Arm pain    • Arthritis    • Asthma    • Bladder disorder    • Bronchitis    • Chronic allergic rhinitis    • Chronic pain syndrome     Reflex Sympathetic Dystrophy, left arm.   • Cirrhosis of liver (CMS/HCC)    • COPD (chronic obstructive pulmonary disease) (CMS/HCC)    • CRPS (complex regional pain syndrome), type I, upper 2012   • Depression    • Diabetes mellitus type 1 (CMS/HCC)    •  Essential hypertension, benign    • Fracture    • GERD (gastroesophageal reflux disease)    • Hand pain    • Heel pain    • Hyperthyroidism    • Iatrogenic hypothyroidism    • Leg pain    • Leg swelling    • Limb swelling    • Mood disorder (CMS/HCC)    • Nausea    • RSD (reflex sympathetic dystrophy)    • Seasonal allergies    • Shortness of Air    • Toe fracture             Past Surgical History:   Procedure Laterality Date   • ABDOMINAL HYSTERECTOMY     • CARPAL TUNNEL RELEASE     •  SECTION     • CHOLECYSTECTOMY     • COLONOSCOPY     • GALLBLADDER SURGERY     • HAND SURGERY     • HEMORRHOIDECTOMY     • HERNIA REPAIR     • HIATAL HERNIA REPAIR     • INCISION AND DRAINAGE ABSCESS     • THYROIDECTOMY      by Dr. Anthony   • TONSILLECTOMY     • TUBAL ABDOMINAL LIGATION                   PT Assessment (last 12 hours)      PT Evaluation and Treatment     Row Name 21 1000          Physical Therapy Time and Intention    Subjective Information  no complaints  -CK     Document Type  evaluation  -CK     Mode of Treatment  individual therapy;physical therapy  -CK     Patient Effort  good  -CK     Symptoms Noted During/After Treatment  none  -CK     Row Name 21 1000          General Information    Patient Profile Reviewed  yes  -CK     Patient Observations  alert;cooperative  -CK     Prior Level of Function  independent:;all household mobility;ADL's  -CK     Equipment Currently Used at Home  oxygen;rollator  -CK     Existing Precautions/Restrictions  no known precautions/restrictions  -CK     Risks Reviewed  patient:  -CK     Benefits Reviewed  patient:  -CK     Barriers to Rehab  none identified  -CK     Row Name 21 1000          Living Environment    Current Living Arrangements  home/apartment/condo  -CK     Home Accessibility  wheelchair accessible  -CK     Lives With  spouse;child(priyanka), adult  -CK     Row Name 21 1000          Home Use of Assistive/Adaptive Equipment    Equipment  Currently Used at Home  oxygen;rollator  -CK     Row Name 07/06/21 1000          Range of Motion (ROM)    Range of Motion  bilateral lower extremities;ROM is WFL  -CK     Row Name 07/06/21 1000          Strength (Manual Muscle Testing)    Strength (Manual Muscle Testing)  bilateral lower extremities;other (see comments) BLE MMT: 4/5  -CK     Row Name 07/06/21 1000          Bed Mobility    Bed Mobility  supine-sit  -CK     Supine-Sit [place] (Bed Mobility)  contact guard  -CK     Assistive Device (Bed Mobility)  bed rails;head of bed elevated  -CK     Row Name 07/06/21 1514 07/06/21 1000       Transfers    Transfers  --  sit-stand transfer;bed-chair transfer  -CK    Bed-Chair [place] (Transfers)  --  contact guard  -CK    Assistive Device (Bed-Chair Transfers)  --  walker, front-wheeled  -CK    Sit-Stand [place] (Transfers)  contact guard  -CK  contact guard  -CK    Row Name 07/06/21 1000          Sit-Stand Transfer    Assistive Device (Sit-Stand Transfers)  walker, front-wheeled  -CK     Row Name 07/06/21 1000          Gait/Stairs (Locomotion)    Gait/Stairs Locomotion  gait/ambulation assistive device  -CK     [place] Level (Gait)  contact guard  -CK     Assistive Device (Gait)  walker, front-wheeled  -CK     Distance in Feet (Gait)  20  -CK     Row Name 07/06/21 1000          Balance    Balance Assessment  sitting dynamic balance;sit to stand dynamic balance;standing dynamic balance  -CK     Dynamic Sitting Balance  WNL  -CK     Sit to Stand Dynamic Balance  WFL  -CK     Dynamic Standing Balance  WFL  -CK     Row Name             Wound 07/01/21 Bilateral coccyx Blisters    Wound - Properties Group Placement Date: 07/01/21  -RA Present on Hospital Admission: Y  -RA Side: Bilateral  -RA Location: coccyx  -RA Primary Wound Type: Blisters  -RA Stage, Pressure Injury : other (see comments)  -RA Additional Comments: stage 2  -CB    Retired Wound - Properties Group Date first assessed: 07/01/21  -RA  Present on Hospital Admission: Y  -RA Side: Bilateral  -RA Location: coccyx  -RA Primary Wound Type: Blisters  -RA Additional Comments: stage 2  -CB    Row Name 07/06/21 1000          Plan of Care Review    Plan of Care Reviewed With  patient  -CK     Outcome Summary  Patient is a 56 y/o female admitted to the hospital for noted diagnosis. She presents with minimal impairments to functional mobility. Patient is recommended to receive skilled PT intervention to address mobility deficits.   -CK     Row Name 07/06/21 1000          Physical Therapy Goals    Bed Mobility Goal Selection (PT)  bed mobility, PT goal 1  -CK     Transfer Goal Selection (PT)  transfer, PT goal 1  -CK     Gait Training Goal Selection (PT)  gait training, PT goal 1  -CK     Row Name 07/06/21 1000          Bed Mobility Goal 1 (PT)    Activity/Assistive Device (Bed Mobility Goal 1, PT)  bed mobility activities, all  -CK     [place] Level/Cues Needed (Bed Mobility Goal 1, PT)  independent  -CK     Time Frame (Bed Mobility Goal 1, PT)  long term goal (LTG);10 days  -CK     Row Name 07/06/21 1000          Transfer Goal 1 (PT)    Activity/Assistive Device (Transfer Goal 1, PT)  transfers, all  -CK     [place] Level/Cues Needed (Transfer Goal 1, PT)  independent  -CK     Time Frame (Transfer Goal 1, PT)  long term goal (LTG);10 days  -CK     Row Name 07/06/21 1000          Gait Training Goal 1 (PT)    Activity/Assistive Device (Gait Training Goal 1, PT)  assistive device use;walker, rolling  -CK     [place] Level (Gait Training Goal 1, PT)  independent  -CK     Distance (Gait Training Goal 1, PT)  150  -CK     Time Frame (Gait Training Goal 1, PT)  long term goal (LTG);10 days  -CK     Row Name 07/06/21 1000          Positioning and Restraints    Pre-Treatment Position  in bed  -CK     Post Treatment Position  chair  -CK     In Chair  call light within reach;with PT;waffle cushion;reclined;exit alarm on  -CK     Restraints  other  (comment) Restraints not applied during evaluation.  -CK     Row Name 07/06/21 1000          Therapy Assessment/Plan (PT)    Rehab Potential (PT)  good, to achieve stated therapy goals  -CK     Criteria for Skilled Interventions Met (PT)  skilled treatment is necessary  -CK     Problem List (PT)  mobility  -CK     Row Name 07/06/21 1000          PT Evaluation Complexity    History, PT Evaluation Complexity  no personal factors and/or comorbidities  -CK     Examination of Body Systems (PT Eval Complexity)  total of 4 or more elements  -CK     Clinical Presentation (PT Evaluation Complexity)  stable  -CK     Clinical Decision Making (PT Evaluation Complexity)  low complexity  -CK     Overall Complexity (PT Evaluation Complexity)  low complexity  -CK     Row Name 07/06/21 1000          Therapy Plan Review/Discharge Plan (PT)    Therapy Plan Review (PT)  evaluation/treatment results reviewed;patient  -CK       User Dwyer  (r) = Recorded By, (t) = Taken By, (c) = Cosigned By    Initials Name Provider Type    Ava Fair, RN Registered Nurse    Loli Hein, RN Registered Nurse    Umesh Mendez, PT Physical Therapist        Physical Therapy Education                 Title: PT OT SLP Therapies (In Progress)     Topic: Physical Therapy (Done)     Point: Mobility training (Done)     Learning Progress Summary           Patient Acceptance, E, VU,DU by CK at 7/6/2021 1038                   Point: Home exercise program (Done)     Learning Progress Summary           Patient Acceptance, E, VU,DU by CK at 7/6/2021 1038                   Point: Body mechanics (Done)     Learning Progress Summary           Patient Acceptance, E, VU,DU by CK at 7/6/2021 1038                   Point: Precautions (Done)     Learning Progress Summary           Patient Acceptance, E, VU,DU by CK at 7/6/2021 1038                               User Key     Initials Effective Dates Name Provider Type Discipline    CK 06/03/21 -   Umesh Cuevas PT Physical Therapist PT              PT Recommendation and Plan  Anticipated Discharge Disposition (PT): sub acute care setting, skilled nursing facility, inpatient rehabilitation facility  Planned Therapy Interventions (PT): gait training, balance training, bed mobility training, transfer training  Therapy Frequency (PT): daily  Plan of Care Reviewed With: patient  Outcome Summary: Patient is a 54 y/o female admitted to the hospital for noted diagnosis. She presents with minimal impairments to functional mobility. Patient is recommended to receive skilled PT intervention to address mobility deficits.   Outcome Measures     Row Name 07/06/21 1039             How much help from another person do you currently need...    Turning from your back to your side while in flat bed without using bedrails?  4  -CK      Moving from lying on back to sitting on the side of a flat bed without bedrails?  4  -CK      Moving to and from a bed to a chair (including a wheelchair)?  3  -CK      Standing up from a chair using your arms (e.g., wheelchair, bedside chair)?  3  -CK      Climbing 3-5 steps with a railing?  2  -CK      To walk in hospital room?  3  -CK      AM-PAC 6 Clicks Score (PT)  19  -CK         Functional Assessment    Outcome Measure Options  AM-PAC 6 Clicks Basic Mobility (PT)  -CK        User Key  (r) = Recorded By, (t) = Taken By, (c) = Cosigned By    Initials Name Provider Type    Umesh Mendez PT Physical Therapist           Time Calculation:   PT Charges     Row Name 07/06/21 1038             Time Calculation    PT Received On  07/06/21  -CK      PT Goal Re-Cert Due Date  07/15/21  -CK         Untimed Charges    PT Eval/Re-eval Minutes  47  -CK         Total Minutes    Untimed Charges Total Minutes  47  -CK       Total Minutes  47  -CK        User Key  (r) = Recorded By, (t) = Taken By, (c) = Cosigned By    Initials Name Provider Type    Umesh Mendez PT Physical Therapist         Therapy Charges for Today     Code Description Service Date Service Provider Modifiers Qty    45799897683 HC PT EVAL LOW COMPLEXITY 4 7/6/2021 Umesh Cuevas, PT GP 1          PT G-Codes  Outcome Measure Options: AM-PAC 6 Clicks Daily Activity (OT), Optimal Instrument  AM-PAC 6 Clicks Score (PT): 19  AM-PAC 6 Clicks Score (OT): 15    Umesh Cuevas, PT  7/6/2021

## 2021-07-06 NOTE — PROGRESS NOTES
"Pharmacy to dose Vancomycin Day: 5  Duration of therapy: TBD  Clinical indication: SEPSIS, PNA  162.6 cm (64\")       07/06/21  0500      Weight: 88.9 kg (195 lb 15.8 oz)         Lab Results  Component  Value  Date    CREATININE  0.83  07/06/2021     Estimated Creatinine Clearance: 81.7 mL/min (by C-G formula based on SCr of 0.83 mg/dL).   HD/PD/CRRT?: NO  Lab Results   Component Value Date    WBC 5.56 07/06/2021      Tmax: AF  I/O last 3 completed shifts:  In: 1406 [Other:540; NG/GT:616; IV Piggyback:250]  Out: 1250 [Urine:1250]     Contrast Administered: MARCH 2021    Relevant Micro:   Microbiology Results (last 10 days)       Procedure Component Value - Date/Time    MRSA Screen, PCR (Inpatient) - Swab, Nares [[id number]]  (Abnormal) Collected: 07/02/21 1239    Lab Status: Final result Specimen: Swab from Nares Updated: 07/02/21 1445     MRSA PCR MRSA Detected    Blood Culture - Blood, Hand, Left [[id number]] Collected: 07/01/21 2347    Lab Status: Preliminary result Specimen: Blood from Hand, Left Updated: 07/06/21 0000     Blood Culture No growth at 4 days    Blood Culture - Blood, Arm, Left [[id number]] Collected: 07/01/21 2347    Lab Status: Preliminary result Specimen: Blood from Arm, Left Updated: 07/06/21 0000     Blood Culture No growth at 4 days    COVID PRE-OP / PRE-PROCEDURE SCREENING ORDER (NO ISOLATION) - Swab, Nasopharynx [[id number]]  (Normal) Collected: 07/01/21 1734    Lab Status: Final result Specimen: Swab from Nasopharynx Updated: 07/01/21 2117    Narrative:      The following orders were created for panel order COVID PRE-OP / PRE-PROCEDURE SCREENING ORDER (NO ISOLATION) - Swab, Nasopharynx.  Procedure                               Abnormality         Status                     ---------                               -----------         ------                     COVID-19,CEPHEID,COR/MARTELL...[[id number]]  Normal              Final result                 Please view results for these tests on the " individual orders.    COVID-19,CEPHEID,COR/MARTELL/PAD/[facility]ICKA IN-HOUSE(OR EMERGENT/ADD-ON),NP SWAB IN TRANSPORT MEDIA 3-4 HR TAT, RT-PCR - Swab, Nasopharynx [[id number]]  (Normal) Collected: 07/01/21 1734    Lab Status: Final result Specimen: Swab from Nasopharynx Updated: 07/01/21 2117     COVID19 Not Detected    Narrative:      Fact sheet for providers: https://www.fda.gov/media/072942/download     Fact sheet for patients: https://www.fda.gov/media/370907/download  Fact sheet for providers: https://www.fda.gov/media/364918/download     Fact sheet for patients: https://www.fda.gov/media/098959/download    Wound Culture - Swab, Coccyx [[id number]] Collected: 07/01/21 1729    Lab Status: Final result Specimen: Swab from Coccyx Updated: 07/03/21 0949     Wound Culture Heavy growth (4+) Normal Skin Diya     Gram Stain Occasional WBCs seen      No organisms seen    Blood Culture - Blood, Hand, Left [[id number]] Collected: 07/01/21 1208    Lab Status: Preliminary result Specimen: Blood from Hand, Left Updated: 07/05/21 1215     Blood Culture No growth at 4 days    Blood Culture - Blood, Arm, Left [[id number]] Collected: 07/01/21 1208    Lab Status: Preliminary result Specimen: Blood from Arm, Left Updated: 07/05/21 1215     Blood Culture No growth at 4 days          Other Antimicrobial Therapy: RIFAXIMIN    Assessment/Plan  Regimen: 1250 mg IV every 12 hours.  Start time: 08:21 on 07/06/2021  Exposure target: AUC24 (range)400-600 mg/L.hr   AUC24,ss: 565 mg/L.hr  PAUC*: 100 %  Ctrough,ss: 16.2 mg/L  Pconc*: 10 %  Tox.: 12 %    Note: VANCOMYCIN LEVEL TODAY REPORTED AS 19- OK TO CONTINUE ON CURRENT DOSE.    Labs ordered:  [facility] TOMORROW

## 2021-07-06 NOTE — PLAN OF CARE
Goal Outcome Evaluation:  Plan of Care Reviewed With: patient  NG TUBE REMOVED TODAY  PT IS TAKING IN PO FOOD AND MEDICATIONS    INTERMITTENT CONFUSION NOTED  VITAL SIGNS ARE STABLE

## 2021-07-06 NOTE — PLAN OF CARE
Goal Outcome Evaluation:  Plan of Care Reviewed With: patient           Outcome Summary: Patient is a 56 y/o female admitted to the hospital for noted diagnosis. She presents with minimal impairments to functional mobility. Patient is recommended to receive skilled PT intervention to address mobility deficits.

## 2021-07-06 NOTE — THERAPY TREATMENT NOTE
Acute Care - Speech Language Pathology   Swallow Treatment Note [facility]     Patient Name: Amy Miles  : 1965  MRN: [id number]  Today's Date: 2021               Admit Date: 2021    Visit Dx:     ICD-10-CM ICD-9-CM   1. Oropharyngeal dysphagia  R13.12 787.22   2. Sepsis with encephalopathy without septic shock, due to unspecified organism (CMS/HCC)  A41.9 038.9    R65.20 995.91    G93.40 348.30   3. Difficulty in walking  R26.2 719.7     Patient Active Problem List   Diagnosis   • Sepsis due to pneumonia (CMS/HCC)   • Anxiety   • Depression   • Mood disorder (CMS/HCC)   • Asthma   • COPD (chronic obstructive pulmonary disease) (CMS/HCC)   • Diabetes mellitus type 1 (CMS/HCC)   • GERD (gastroesophageal reflux disease)   • Essential hypertension, benign   • Iatrogenic hypothyroidism   • Chronic allergic rhinitis   • Cirrhosis of liver (CMS/HCC)   • Chronic pain syndrome   • Elevated INR   • Ischemic stroke (CMS/HCC)   • Encephalopathy, hepatic (CMS/HCC)   • Thrombocytopenia (CMS/HCC)   • Bandemia   • Elevated LFTs     Past Medical History:   Diagnosis Date   • Anxiety    • Arm pain    • Arthritis    • Asthma    • Bladder disorder    • Bronchitis    • Chronic allergic rhinitis    • Chronic pain syndrome     Reflex Sympathetic Dystrophy, left arm.   • Cirrhosis of liver (CMS/HCC)    • COPD (chronic obstructive pulmonary disease) (CMS/HCC)    • CRPS (complex regional pain syndrome), type I, upper 2012   • Depression    • Diabetes mellitus type 1 (CMS/HCC)    • Essential hypertension, benign    • Fracture    • GERD (gastroesophageal reflux disease)    • Hand pain    • Heel pain    • Hyperthyroidism    • Iatrogenic hypothyroidism    • Leg pain    • Leg swelling    • Limb swelling    • Mood disorder (CMS/HCC)    • Nausea    • RSD (reflex sympathetic dystrophy)    • Seasonal allergies    • Shortness of Air    • Toe fracture          Past Surgical History:   Procedure Laterality Date   •  ABDOMINAL HYSTERECTOMY     • CARPAL TUNNEL RELEASE     •  SECTION     • CHOLECYSTECTOMY     • COLONOSCOPY     • GALLBLADDER SURGERY     • HAND SURGERY     • HEMORRHOIDECTOMY     • HERNIA REPAIR     • HIATAL HERNIA REPAIR     • INCISION AND DRAINAGE ABSCESS     • THYROIDECTOMY      by Dr. Anthony   • TONSILLECTOMY     • TUBAL ABDOMINAL LIGATION        SPEECH PATHOLOGY DYSPHAGIA TREATMENT    Subjective/Behavioral Observations: Patient just finished physical therapy treatment.  Patient is alert, sitting upright in a chair.  Attempting to eat breakfast.  NG tube remained in place        Day/time of Treatment: 2021, a.m. treatment        Current Diet: Diet initiated on 2021, mechanical soft solids and thin liquids        Current Strategies: One-on-one assist for feeding, small bites and sips, controlled drink        Treatment received: Treatment received in conjunction with a.m. meal.  Physician also contacted regarding recommendation on NG tube removal.  Physician to place orders for nursing to remove.        Results of treatment: Patient tolerated approximately 240 mL of thin liquids (Sprite) without overt signs or symptoms of aspiration.  Also consumed approximately 25% of mechanical soft solids with occasional oral residue however patient clears with minimal cues for awareness.        Progress toward goals: Patient tolerating initial diet of mechanical soft solids and thin liquids with assistance.  Occasional impulsivity with self-feeding.        Barriers to Achieving goals: Medical status        Plan of care:/changes in plan: Continue per plan                  EDUCATION  The patient has been educated in the following areas:   Dysphagia (Swallowing Impairment).    SLP Recommendation and Plan                                                             Plan of Care Reviewed With: spouse           Time Calculation:   Time Calculation- SLP     Row Name 21 2696             Time Calculation- SLP     SLP Start Time  1015  -SN      SLP Received On  07/06/21  -SN         Untimed Charges    26037-HG Eval Oral Pharyng Swallow Minutes  45  -SN         Total Minutes    Untimed Charges Total Minutes  45  -SN       Total Minutes  45  -SN        User Key  (r) = Recorded By, (t) = Taken By, (c) = Cosigned By    Initials Name Provider Type    SN Alyssa Novak SLP Speech and Language Pathologist          Therapy Charges for Today     Code Description Service Date Service Provider Modifiers Qty    48464456821 HC ST TREATMENT SWALLOW 3 7/5/2021 Alyssa Novak, SYED GN 1    14495663260 HC ST TREATMENT SWALLOW 3 7/6/2021 Alyssa Novak SLP GN 1               SYED Narayanan  7/6/2021

## 2021-07-06 NOTE — PLAN OF CARE
Goal Outcome Evaluation:  Plan of Care Reviewed With: patient        Progress: no change  Outcome Summary: Patient presents with the following functional limitations which impact patient's independence and safety to return home: decreased independence with ADLs, decreased independence with functional mobility/transfers, impaired activity tolerance/functional endurance, and impaired balance to support ADLs.

## 2021-07-07 NOTE — CONSULTS
SW spoke with Pt and family, Pt is agreeable to Home Health Care at discharge.    Pt stated her  is her primary caregiver and will be able to continue to help her at home.  Pt also has an adult son who lives in home.    Pt is agreeable to [facility] home health referral.    SW has made referral to [facility] for Home Health Care needs to include PT/OT and nursing for wound care.

## 2021-07-07 NOTE — THERAPY TREATMENT NOTE
Acute Care - Speech Language Pathology   Swallow Treatment Note [facility]     Patient Name: Amy Miles  : 1965  MRN: [id number]  Today's Date: 2021               Admit Date: 2021    Visit Dx:     ICD-10-CM ICD-9-CM   1. Oropharyngeal dysphagia  R13.12 787.22   2. Sepsis with encephalopathy without septic shock, due to unspecified organism (CMS/HCC)  A41.9 038.9    R65.20 995.91    G93.40 348.30   3. Difficulty in walking  R26.2 719.7   4. Decreased activities of daily living (ADL)  Z78.9 V49.89     Patient Active Problem List   Diagnosis   • Sepsis due to pneumonia (CMS/HCC)   • Anxiety   • Depression   • Mood disorder (CMS/HCC)   • Asthma   • COPD (chronic obstructive pulmonary disease) (CMS/HCC)   • Diabetes mellitus type 1 (CMS/HCC)   • GERD (gastroesophageal reflux disease)   • Essential hypertension, benign   • Iatrogenic hypothyroidism   • Chronic allergic rhinitis   • Cirrhosis of liver (CMS/HCC)   • Chronic pain syndrome   • Elevated INR   • Ischemic stroke (CMS/HCC)   • Encephalopathy, hepatic (CMS/HCC)   • Thrombocytopenia (CMS/HCC)   • Bandemia   • Elevated LFTs     Past Medical History:   Diagnosis Date   • Anxiety    • Arm pain    • Arthritis    • Asthma    • Bladder disorder    • Bronchitis    • Chronic allergic rhinitis    • Chronic pain syndrome     Reflex Sympathetic Dystrophy, left arm.   • Cirrhosis of liver (CMS/HCC)    • COPD (chronic obstructive pulmonary disease) (CMS/HCC)    • CRPS (complex regional pain syndrome), type I, upper 2012   • Depression    • Diabetes mellitus type 1 (CMS/HCC)    • Essential hypertension, benign    • Fracture    • GERD (gastroesophageal reflux disease)    • Hand pain    • Heel pain    • Hyperthyroidism    • Iatrogenic hypothyroidism    • Leg pain    • Leg swelling    • Limb swelling    • Mood disorder (CMS/HCC)    • Nausea    • RSD (reflex sympathetic dystrophy)    • Seasonal allergies    • Shortness of Air    • Toe fracture           Past Surgical History:   Procedure Laterality Date   • ABDOMINAL HYSTERECTOMY     • CARPAL TUNNEL RELEASE     •  SECTION     • CHOLECYSTECTOMY     • COLONOSCOPY     • GALLBLADDER SURGERY     • HAND SURGERY     • HEMORRHOIDECTOMY     • HERNIA REPAIR     • HIATAL HERNIA REPAIR     • INCISION AND DRAINAGE ABSCESS     • THYROIDECTOMY      by Dr. Anthony   • TONSILLECTOMY     • TUBAL ABDOMINAL LIGATION        SPEECH PATHOLOGY DYSPHAGIA TREATMENT    Subjective/Behavioral Observations: Alert and cooperative.        Day/time of Treatment: 2021        Current Diet: Mechanical soft solids, thin liquids        Current Strategies: Small bites and sips, controlled drink        Treatment received: Treatment focused on trials of regular crunchy solids for possible diet upgrade.        Results of treatment: Patient tolerated regular solid consistencies and crunchy solid trials with functional chewing, oral transit and bolus clearance.  Also tolerating thin liquids via straw with single and multiple sips.  Swallows were completed within a timely manner.  Vocal quality and laryngeal sounds remained clear.  Occasional impulsivity with self feeding of regular solids however clear oral residue with only minimal cues.  Patient able to verbally state compensatory strategies however required occasional minimal cues for utilization.        Progress toward goals: Tolerating trials of regular solids and thin liquids        Barriers to Achieving goals: N/A        Plan of care:/changes in plan: Upgrade to regular solid consistencies.                  EDUCATION  The patient has been educated in the following areas:   Dysphagia (Swallowing Impairment).    SLP Recommendation and Plan                                                             Plan of Care Reviewed With: spouse           Time Calculation:   Time Calculation- SLP     Row Name 21 1151             Time Calculation- SLP    SLP Start Time  1115  -      SLP  Received On  07/07/21  -SN         Untimed Charges    08042-IA Treatment Swallow Minutes  45  -SN         Total Minutes    Untimed Charges Total Minutes  45  -SN       Total Minutes  45  -SN        User Key  (r) = Recorded By, (t) = Taken By, (c) = Cosigned By    Initials Name Provider Type    Alyssa Garcia SLP Speech and Language Pathologist          Therapy Charges for Today     Code Description Service Date Service Provider Modifiers Qty    40683389821 HC ST TREATMENT SWALLOW 3 7/6/2021 Alyssa Novak SLP GN 1    88536710126 HC ST TREATMENT SWALLOW 3 7/7/2021 Alyssa Novak SLP GN 1               SYED Narayanan  7/7/2021

## 2021-07-07 NOTE — DISCHARGE SUMMARY
[facility]         HOSPITALIST  DISCHARGE SUMMARY    Patient Name: Amy Miles  : 1965  MRN: [id number]    Date of Admission: 2021  Date of Discharge:  2021  Primary Care Physician: Demarcus Tripp DO    Consults     Date and Time Order Name Status Description    2021  7:27 AM Inpatient Neurology Consult Stroke      2021  4:35 PM Hospitalist (on-call MD unless specified) Completed           Active and Resolved Hospital Problems:  Active Hospital Problems    Diagnosis POA   • **Sepsis due to pneumonia (CMS/HCC) [J18.9, A41.9] Yes   • Elevated INR [R79.1] Yes   • Ischemic stroke (CMS/HCC) [I63.9] Yes   • Encephalopathy, hepatic (CMS/HCC) [K72.90] Yes   • Thrombocytopenia (CMS/HCC) [D69.6] Yes   • Bandemia [D72.825] Yes   • Elevated LFTs [R79.89] Yes   • Anxiety [F41.9] Yes   • Depression [F32.9] Yes   • Mood disorder (CMS/HCC) [F39] Yes   • Asthma [J45.909] Yes   • COPD (chronic obstructive pulmonary disease) (CMS/HCC) [J44.9] Yes   • Diabetes mellitus type 1 (CMS/HCC) [E10.9] Yes   • GERD (gastroesophageal reflux disease) [K21.9] Yes   • Essential hypertension, benign [I10] Yes   • Iatrogenic hypothyroidism [E03.2] Yes   • Chronic allergic rhinitis [J30.9] Yes   • Cirrhosis of liver (CMS/HCC) [K74.60] Yes   • Chronic pain syndrome [G89.4] Yes      Resolved Hospital Problems    Diagnosis POA   • Hyperthyroidism [E05.90] Yes       Hospital Course     Hospital Course:Patient is a 55-year-old female with a past medical history significant for anxiety, cirrhosis, diabetes who presents with a 2 to 3-day history of being more somnolent, patient was in her normal state of health prior to this.  Patient has been in bed and difficult to arouse during that time.  In the emergency department there was concern for pneumonia possible acute versus subacute stroke, and she also met sepsis criteria.  Patient also was noted to have a large decubitus ulcer on her gluteal and sacral areas,  patient was admitted to the ICU, MRI of the brain was obtained which was significant for multiple areas of scattered infarct, was some areas of hemorrhagic conversion.  Patient was continued on antibiotics as she continued to have fever, her procalcitonin was noted to be elevated, cultures were obtained.  Patient is being treated for decompensated cirrhosis.           Plan:  #1 Acute stroke with hemorrhagic conversion  -MRI shows: cerebral infarctions affecting both frontal and parietal lobes. Hemorrhagic infarct, left frontoparietal lobe.   -On ASA, will need to be started on a high intensity statin; however, LFTs elevated today.       #2 Decompensated cirrhosis  -reduce lactulose. 2-3 BMs per day.  -continue rifaxmin  -started on lasix and continue spironolactone. Will need to increase spironolactone to 100 mg and 40 mg of lasix.  Increase spironolactone to 50 mg today.       #3 Low potassium-repleted     #4 Wound care for buttocks care  -patient refusing FMS.    -on vancomycin for 7 days.  Course will be completed on 7/8/2021.  Discharged on clindamycin since patient refused to wait until tomorrow,       #5 [id number] with current regimen.       #6 Removed damian on 7/7/2021.        Day of Discharge     Vital Signs:  Temp:  [97.3 °F (36.3 °C)-98.4 °F (36.9 °C)] 97.9 °F (36.6 °C)  Heart Rate:  [68-84] 68  Resp:  [16-20] 16  BP: (132-162)/(62-88) 138/82  Flow (L/min):  [2-4] 4    See earlier Physical exam.       Discharge Details        Discharge Medications      New Medications      Instructions Start Date   aluminum sulfate-calcium acetate topical packet  Commonly known as: DOMEBORO   1 packet, Topical, Every 8 Hours Scheduled      aspirin 81 MG EC tablet   81 mg, Oral, Daily   Start Date: July 8, 2021     atorvastatin 80 MG tablet  Commonly known as: LIPITOR   80 mg, Oral, Nightly      cetirizine 10 MG tablet  Commonly known as: zyrTEC   10 mg, Oral, Daily      clindamycin 300 MG capsule  Commonly known as:  Cleocin   300 mg, Oral, 3 Times Daily      famotidine 20 MG tablet  Commonly known as: PEPCID   20 mg, Oral, 2 Times Daily Before Meals   Start Date: July 8, 2021     insulin detemir 100 UNIT/ML injection  Commonly known as: LEVEMIR   30 Units, Subcutaneous, 2 Times Daily      metoprolol tartrate 50 MG tablet  Commonly known as: LOPRESSOR  Replaces: metoprolol succinate  MG 24 hr tablet   50 mg, Oral, Every 12 Hours Scheduled      montelukast 10 MG tablet  Commonly known as: Singulair   10 mg, Oral, Nightly      riFAXIMin 550 MG tablet  Commonly known as: XIFAXAN   550 mg, Oral, Every 12 Hours Scheduled      thiamine 100 MG tablet tablet  Commonly known as: VITAMIN B-1   100 mg, Oral, Daily      zinc oxide 40 % paste paste  Commonly known as: DESITIN   Topical, Daily PRN      zinc sulfate 220 (50 Zn) MG capsule  Commonly known as: ZINCATE   220 mg, Oral, Daily   Start Date: July 8, 2021        Changes to Medications      Instructions Start Date   lactulose 10 GM/15ML solution  Commonly known as: CHRONULAC  What changed:   · how much to take  · when to take this  · reasons to take this   10 g, Oral, 3 Times Daily      spironolactone 50 MG tablet  Commonly known as: ALDACTONE  What changed:   · medication strength  · how much to take   50 mg, Oral, Daily   Start Date: July 8, 2021        Continue These Medications      Instructions Start Date   furosemide 20 MG tablet  Commonly known as: LASIX   20 mg, Oral, Daily PRN      insulin  patient supplied pump   Subcutaneous, Continuous, Waiting for Dr. Hendrickson office to fax us insulin pump info       insulin aspart 100 UNIT/ML injection  Commonly known as: novoLOG   Subcutaneous, 3 Times Daily Before Meals, Through the Dexcom device      levothyroxine 175 MCG tablet  Commonly known as: SYNTHROID, LEVOTHROID   175 mcg, Oral, Daily      oxyCODONE 15 MG immediate release tablet  Commonly known as: ROXICODONE   15 mg, Oral, 3 Times Daily         Stop These Medications     cyclobenzaprine 10 MG tablet  Commonly known as: FLEXERIL     gabapentin 800 MG tablet  Commonly known as: NEURONTIN     metoprolol succinate  MG 24 hr tablet  Commonly known as: TOPROL-XL  Replaced by: metoprolol tartrate 50 MG tablet            Allergies   Allergen Reactions   • Adhesive Tape Unknown - Low Severity   • Amoxicillin-Pot Clavulanate Unknown - Low Severity   • Cephalexin Unknown - Low Severity   • Clavulanic Acid Unknown - Low Severity   • Doxycycline Hyclate Unknown - Low Severity   • Flonase [Fluticasone] Unknown - Low Severity   • Nsaids Unknown - Low Severity   • Penicillins Unknown - Low Severity   • Potassium Unknown - Low Severity       Discharge Disposition:  Home-Health Care [facility]    Diet:  Hospital:  Diet Order   Procedures   • Diet Regular; Cardiac       Discharge Activity:       CODE STATUS:  Code Status and Medical Interventions:   Ordered at: 07/01/21 2252     Limited Support to NOT Include:    Cardioversion/Defibrillation    Intubation     Level Of Support Discussed With:    Health Care Surrogate     Code Status:    No CPR     Medical Interventions (Level of Support Prior to Arrest):    Limited     Comments:    Reviewed with her .  He indicated these are her wishes.         Future Appointments   Date Time Provider Department Center   7/8/2021  2:00 PM Demarcus Tripp DO [facility]   10/20/2021  9:15 AM Cuong Dailey MD [facility]           Pertinent  and/or Most Recent Results     PROCEDURES:       LAB RESULTS:      Lab 07/07/21  0421 07/06/21  1346 07/06/21  0410 07/04/21  0413 07/02/21  0224 07/01/21  1548 07/01/21  1435 07/01/21  1207   WBC 7.50 8.27 5.56 8.74  --   --   --  17.12*   HEMOGLOBIN 10.5* 11.5* 11.1* 11.4*  --   --   --  15.3   HEMATOCRIT 34.7 37.4 36.3 37.3  --   --   --  46.4   PLATELETS 75* 79* 61* 77*  --   --   --  108*   NEUTROS ABS  --   --   --  6.30  --   --   --  14.72*   IMMATURE GRANS (ABS)  --   --   --  0.04  --   --   --   --     LYMPHS ABS  --   --   --  1.37  --   --   --   --    MONOS ABS  --   --   --  0.83  --   --   --   --    EOS ABS  --   --   --  0.16  --   --   --   --    MCV 96.4 94.9 96.3 96.1  --   --   --  90.1   PROCALCITONIN  --   --   --   --  2.48*  --   --   --    LACTATE  --   --   --   --  3.6* 1.9  --  4.6*   LACTATE, ARTERIAL  --   --   --   --   --   --  3.32*  --    PROTIME  --   --   --   --   --  25.1*  --   --          Lab 07/07/21  0421 07/06/21  0410 07/05/21  0723 07/04/21  0413 07/03/21  0334 07/02/21  1203 07/02/21  0224 07/01/21  1435 07/01/21  1207   SODIUM 142 139 142 143 141  --  138  --  137   SODIUM, ARTERIAL  --   --   --   --   --   --   --  136.8  --    POTASSIUM 3.2* 3.1* 3.2* 3.5 3.6  --  3.9  --  3.7   CHLORIDE 105 102 107 109* 111*  --  107  --  102   CO2 25.1 25.7 26.3 25.1 21.8*  --  16.5*  --  17.5*   ANION GAP 11.9 11.3 8.7 8.9 8.2  --  14.5  --  17.5*   BUN 21* 24* 33* 48* 45*  --  38*  --  36*   CREATININE 0.88 0.83 0.87 1.13* 1.11*  --  1.54*  --  1.72*   GLUCOSE 91 222* 222* 266* 139*  --  351*  --  357*   GLUCOSE, ARTERIAL  --   --   --   --   --   --   --  343*  --    CALCIUM 8.6 8.8 8.6 8.5* 8.4*  --  8.2*  --  8.9   IONIZED CALCIUM  --   --   --   --   --   --   --  1.11*  --    MAGNESIUM  --   --   --  2.3  --   --  1.9  --  1.9   PHOSPHORUS  --   --   --   --   --   --  3.0  --   --    HEMOGLOBIN A1C  --   --   --   --   --  10.92*  --   --   --    TSH  --   --   --   --   --   --   --   --  5.700*         Lab 07/07/21  0421 07/04/21  0413 07/02/21  0224 07/01/21  1207   TOTAL PROTEIN 5.9* 6.3 5.9* 7.1   ALBUMIN 2.80* 2.90* 2.70* 3.60   GLOBULIN 3.1 3.4 3.2 3.5   ALT (SGPT) 115* 192* 279* 325*   AST (SGOT) 161* 245* 396* 595*   BILIRUBIN 1.1 0.6 0.9 1.6*   ALK PHOS 123* 163* 105 143*         Lab 07/01/21  1548 07/01/21  1207   TROPONIN T  --  0.187*   PROTIME 25.1*  --    INR 2.50  --          Lab 07/03/21  0334 07/02/21  0224   CHOLESTEROL 161 172   LDL CHOL 101* 109*   HDL CHOL  32* 36*   TRIGLYCERIDES 158* 151*             Lab 07/01/21  1435   PH, ARTERIAL 7.474*   PCO2, ARTERIAL 26.6*   PO2 ART 78.8*   O2 SATURATION ART 95.4   FIO2 32   HCO3 ART 19.1*   BASE EXCESS ART -2.7*   CARBOXYHEMOGLOBIN 1.4     Brief Urine Lab Results  (Last result in the past 365 days)      Color   Clarity   Blood   Leuk Est   Nitrite   Protein   CREAT   Urine HCG        07/01/21 1208 Yellow Clear Large (3+) Negative Negative 100 mg/dL (2+)             Microbiology Results (last 10 days)     Procedure Component Value - Date/Time    MRSA Screen, PCR (Inpatient) - Swab, Nares [[id number]]  (Abnormal) Collected: 07/02/21 1239    Lab Status: Final result Specimen: Swab from Nares Updated: 07/02/21 1445     MRSA PCR MRSA Detected    Blood Culture - Blood, Hand, Left [[id number]] Collected: 07/01/21 2347    Lab Status: Final result Specimen: Blood from Hand, Left Updated: 07/07/21 0000     Blood Culture No growth at 5 days    Blood Culture - Blood, Arm, Left [[id number]] Collected: 07/01/21 2347    Lab Status: Final result Specimen: Blood from Arm, Left Updated: 07/07/21 0000     Blood Culture No growth at 5 days    COVID PRE-OP / PRE-PROCEDURE SCREENING ORDER (NO ISOLATION) - Swab, Nasopharynx [[id number]]  (Normal) Collected: 07/01/21 1734    Lab Status: Final result Specimen: Swab from Nasopharynx Updated: 07/01/21 2117    Narrative:      The following orders were created for panel order COVID PRE-OP / PRE-PROCEDURE SCREENING ORDER (NO ISOLATION) - Swab, Nasopharynx.  Procedure                               Abnormality         Status                     ---------                               -----------         ------                     COVID-19,CEPHEID,COR/MARTELL...[[id number]]  Normal              Final result                 Please view results for these tests on the individual orders.    COVID-19,CEPHEID,COR/MARTELL/PAD/ERICKA IN-HOUSE(OR EMERGENT/ADD-ON),NP SWAB IN TRANSPORT MEDIA 3-4 HR TAT, RT-PCR - Swab, Nasopharynx  [[id number]]  (Normal) Collected: 07/01/21 1734    Lab Status: Final result Specimen: Swab from Nasopharynx Updated: 07/01/21 2117     COVID19 Not Detected    Narrative:      Fact sheet for providers: https://www.fda.gov/media/256797/download     Fact sheet for patients: https://www.fda.gov/media/178275/download  Fact sheet for providers: https://www.fda.gov/media/341118/download     Fact sheet for patients: https://www.fda.gov/media/158502/download    Wound Culture - Swab, Coccyx [[id number]] Collected: 07/01/21 1729    Lab Status: Final result Specimen: Swab from Coccyx Updated: 07/03/21 0949     Wound Culture Heavy growth (4+) Normal Skin Diya     Gram Stain Occasional WBCs seen      No organisms seen    Blood Culture - Blood, Hand, Left [[id number]] Collected: 07/01/21 1208    Lab Status: Final result Specimen: Blood from Hand, Left Updated: 07/06/21 1215     Blood Culture No growth at 5 days    Blood Culture - Blood, Arm, Left [[id number]] Collected: 07/01/21 1208    Lab Status: Final result Specimen: Blood from Arm, Left Updated: 07/06/21 1215     Blood Culture No growth at 5 days          MRI Brain Without Contrast    Addendum Date: 7/3/2021    ADDENDUM:  This report was phoned to ICU by the [facility] Radiology Department  Staff (as documented in the [facility] PACS) at approximately 0743 hours on 7/2/2021.  The report was phoned to the [facility] ICU Unit [facility] Guillen.   ISATU BROWN JR, MD       Electronically Signed and Approved By: ISATU BROWN JR, MD on 7/03/2021 at 4:41               Results for orders placed during the hospital encounter of 07/01/21    Duplex Carotid Ultrasound CAR    Interpretation Summary  · All other right side carotid system vessels are normal.  · All other left side carotid system vessels are normal.      Results for orders placed during the hospital encounter of 07/01/21    Duplex Carotid Ultrasound CAR    Interpretation Summary  · All other right side carotid system vessels  are normal.  · All other left side carotid system vessels are normal.      Results for orders placed during the hospital encounter of 07/01/21    Adult Transthoracic Echo Complete w/ Color, Spectral and Contrast if necessary per protocol    Interpretation Summary  1.  Normal left ventricular systolic function.  2.  No significant valve abnormalities noted.      Labs Pending at Discharge:        Time spent on Discharge including face to face service:  Greater than 30 minutes    Electronically signed by Mateo Nevarez DO, 07/07/21, 7:13 PM EDT.

## 2021-07-07 NOTE — SIGNIFICANT NOTE
07/07/21 1201   Wound 07/01/21 Bilateral coccyx Blisters   Placement Date: 07/01/21   Present on Hospital Admission: Yes  Side: Bilateral  Location: coccyx  Primary Wound Type: Blisters  Stage, Pressure Injury : other (see comments)  Additional Comments: stage 2   Dressing Appearance open to air   Base red;pink;moist   Drainage Characteristics/Odor serosanguineous   Drainage Amount moderate   Care, Wound cleansed with;sterile normal saline   Periwound Care barrier ointment applied   Patient follow up for stage 2 pressure injury to bilateral buttocks from patient being lethargic and down at home.  Pt is awake and alert, pt sitting up in bed and able to roll over for assessment.  Buttocks open and draining on pink pad.  Superficial skin loss from blisters breaking.  Recommend domeboro soaks TID to dry up the wound bed and cover with calazime.  Cb,rn,wcc

## 2021-07-07 NOTE — THERAPY TREATMENT NOTE
Acute Care - Physical Therapy Treatment Note   [facility]     Patient Name: Amy Miles  : 1965  MRN: [id number]  Today's Date: 2021           PT Assessment (last 12 hours)      PT Evaluation and Treatment     Row Name 21 1500          Physical Therapy Time and Intention    Subjective Information  no complaints  -CK     Document Type  therapy note (daily note)  -CK     Mode of Treatment  individual therapy;physical therapy  -CK     Patient Effort  good  -CK     Symptoms Noted During/After Treatment  none  -CK     Row Name 21 1500          Bed Mobility    Bed Mobility  supine-sit  -CK     Supine-Sit Ranger (Bed Mobility)  independent  -CK     Assistive Device (Bed Mobility)  bed rails;head of bed elevated  -CK     Row Name 21 1500          Transfers    Transfers  sit-stand transfer;stand-sit transfer;bed-chair transfer  -CK     Bed-Chair Ranger (Transfers)  contact guard;1 person assist  -CK     Assistive Device (Bed-Chair Transfers)  walker, front-wheeled  -CK     Sit-Stand Ranger (Transfers)  independent  -CK     Stand-Sit Ranger (Transfers)  independent  -CK     Row Name 21 1500          Sit-Stand Transfer    Assistive Device (Sit-Stand Transfers)  walker, front-wheeled  -CK     Row Name 21 1500          Stand-Sit Transfer    Assistive Device (Stand-Sit Transfers)  walker, front-wheeled  -CK     Row Name 21 1500          Gait/Stairs (Locomotion)    Gait/Stairs Locomotion  gait/ambulation assistive device  -CK     Ranger Level (Gait)  contact guard  -CK     Assistive Device (Gait)  walker, front-wheeled  -CK     Distance in Feet (Gait)  60  -CK     Row Name             Wound 21 Bilateral coccyx Blisters    Wound - Properties Group Placement Date: 21  -RA Present on Hospital Admission: Y  -RA Side: Bilateral  -RA Location: coccyx  -RA Primary Wound Type: Blisters  -RA Stage, Pressure Injury : other (see comments)  -RA  Additional Comments: stage 2  -CB    Retired Wound - Properties Group Date first assessed: 07/01/21  -RA Present on Hospital Admission: Y  -RA Side: Bilateral  -RA Location: coccyx  -RA Primary Wound Type: Blisters  -RA Additional Comments: stage 2  -CB    Row Name 07/07/21 1500          Progress Summary (PT)    Progress Toward Functional Goals (PT)  progress toward functional goals as expected  -CK     Daily Progress Summary (PT)  Patient is progressing well towards functional goals. She presents with mild cognitive deficits but her overall mobility is improving. Patient is to continue current plan of care.  -CK       User Key  (r) = Recorded By, (t) = Taken By, (c) = Cosigned By    Initials Name Provider Type     Ava Edwards, RN Registered Nurse    Loli Hein RN Registered Nurse    Umesh Mendez, PT Physical Therapist        Physical Therapy Education                 Title: PT OT SLP Therapies (In Progress)     Topic: Physical Therapy (Done)     Point: Mobility training (Done)     Learning Progress Summary           Patient Acceptance, E,TB, VU by EMERITA at 7/7/2021 1038    Acceptance, E, VU,DU by CK at 7/6/2021 1038                   Point: Home exercise program (Done)     Learning Progress Summary           Patient Acceptance, E,TB, VU by EMERITA at 7/7/2021 1038    Acceptance, E, VU,DU by CK at 7/6/2021 1038                   Point: Body mechanics (Done)     Learning Progress Summary           Patient Acceptance, E,TB, VU by EMERITA at 7/7/2021 1038    Acceptance, E, VU,DU by CK at 7/6/2021 1038                   Point: Precautions (Done)     Learning Progress Summary           Patient Acceptance, E,TB, VU by EMERITA at 7/7/2021 1038    Acceptance, E, VU,DU by CK at 7/6/2021 1038                               User Key     Initials Effective Dates Name Provider Type [facility] 06/16/21 -  Madison Peralta, RN Registered Nurse Nurse    CK 06/03/21 -  Umesh Cuevas, PT Physical Therapist PT               PT Recommendation and Plan  Anticipated Discharge Disposition (PT): sub acute care setting, skilled nursing facility, inpatient rehabilitation facility  Planned Therapy Interventions (PT): gait training, balance training, bed mobility training, transfer training  Therapy Frequency (PT): daily  Progress Summary (PT)  Progress Toward Functional Goals (PT): progress toward functional goals as expected  Daily Progress Summary (PT): Patient is progressing well towards functional goals. She presents with mild cognitive deficits but her overall mobility is improving. Patient is to continue current plan of care.  Plan of Care Reviewed With: patient  Outcome Summary: Patient is a 56 y/o female admitted to the hospital for noted diagnosis. She presents with minimal impairments to functional mobility. Patient is recommended to receive skilled PT intervention to address mobility deficits.   Outcome Measures     Row Name 07/07/21 1500 07/06/21 1039          How much help from another person do you currently need...    Turning from your back to your side while in flat bed without using bedrails?  4  -CK  4  -CK     Moving from lying on back to sitting on the side of a flat bed without bedrails?  4  -CK  4  -CK     Moving to and from a bed to a chair (including a wheelchair)?  3  -CK  3  -CK     Standing up from a chair using your arms (e.g., wheelchair, bedside chair)?  4  -CK  3  -CK     Climbing 3-5 steps with a railing?  3  -CK  2  -CK     To walk in hospital room?  3  -CK  3  -CK     AM-PAC 6 Clicks Score (PT)  21  -CK  19  -CK        Functional Assessment    Outcome Measure Options  AM-PAC 6 Clicks Basic Mobility (PT)  -CK  AM-PAC 6 Clicks Basic Mobility (PT)  -CK       User Key  (r) = Recorded By, (t) = Taken By, (c) = Cosigned By    Initials Name Provider Type    Umesh Mendez, PT Physical Therapist           Time Calculation:   PT Charges     Row Name 07/07/21 9801             Time Calculation    PT Received On   07/07/21  -CK      PT Goal Re-Cert Due Date  07/15/21  -CK         Timed Charges    53796 - Gait Training Minutes   15  -CK      77599 - PT Therapeutic Activity Minutes  15  -CK         Total Minutes    Timed Charges Total Minutes  30  -CK       Total Minutes  30  -CK        User Key  (r) = Recorded By, (t) = Taken By, (c) = Cosigned By    Initials Name Provider Type    Umesh Mendez, PT Physical Therapist        Therapy Charges for Today     Code Description Service Date Service Provider Modifiers Qty    36285585246 HC PT EVAL LOW COMPLEXITY 4 7/6/2021 Umesh Cuevas, PT GP 1    53530761434 HC GAIT TRAINING EA 15 MIN 7/7/2021 Umesh Cuevas, PT GP 1    48413113025 HC PT THERAPEUTIC ACT EA 15 MIN 7/7/2021 Umesh Cuevas, PT GP 1          PT G-Codes  Outcome Measure Options: AM-PAC 6 Clicks Basic Mobility (PT)  AM-PAC 6 Clicks Score (PT): 21  AM-PAC 6 Clicks Score (OT): 15    Umesh Cuevas PT  7/7/2021

## 2021-07-07 NOTE — PLAN OF CARE
Goal Outcome Evaluation:      PT OXYGEN DESAT TO 88 ON ROOM AIR. I APPLIED 2 L OXYGEN AND HAD TO EVENTUALLY BUMP IT UP TO 3 L. PT TOLERATED THIS WELL. BG WAS NORMAL SO I DID NOT HAVE TO ADMINISTER INSULIN.   Rachelle Fowler RN

## 2021-07-07 NOTE — PROGRESS NOTES
[facility]   Hospitalist Progress Note  Date: 2021  Patient Name: Amy Miles  : 1965  MRN: [id number]  Date of admission: 2021      Subjective   Subjective     Chief Complaint: Sepsis    Summary: Summary:   Patient is a 55-year-old female with a past medical history significant for anxiety, cirrhosis, diabetes who presents with a 2 to 3-day history of being more somnolent, patient was in her normal state of health prior to this.  Patient has been in bed and difficult to arouse during that time.  In the emergency department there was concern for pneumonia possible acute versus subacute stroke, and she also met sepsis criteria.  Patient also was noted to have a large decubitus ulcer on her gluteal and sacral areas, patient was admitted to the ICU, MRI of the brain was obtained which was significant for multiple areas of scattered infarct, was some areas of hemorrhagic conversion.  Patient was continued on antibiotics as she continued to have fever, her procalcitonin was noted to be elevated, cultures were obtained.  Patient is being treated for decompensated cirrhosis.  Hospitalist service was asked to admit for further management     Interval Followup:   Patient is more alert.  She is tolerating diet.  She is engaging in conversation with me more today.  Potassium is still low.      Review of Systems  Patient continues to have profuse diarrhea and skin breakdown around her buttocks.      Objective   Objective     Vitals:   Temp:  [97.3 °F (36.3 °C)-99 °F (37.2 °C)] 97.3 °F (36.3 °C)  Heart Rate:  [72-84] 75  Resp:  [16-20] 16  BP: (138-168)/(62-86) 138/62  Flow (L/min):  [2-4] 4  Physical Exam    Constitutional: Awake, alert   Eyes: Pupils equal, sclerae anicteric, no conjunctival injection   HENT: NCAT, mucous membranes moist   Neck: Supple, no thyromegaly, no lymphadenopathy, trachea midline   Respiratory: Clear to auscultation bilaterally, nonlabored respirations    Cardiovascular: RRR,  no murmurs, rubs, or gallops, palpable pedal pulses bilaterally   Gastrointestinal: Positive bowel sounds, soft, nontender, nondistended. Denuded area around anus.    Musculoskeletal: No bilateral ankle edema, no clubbing or cyanosis to extremities   Neurologic: Oriented x 3 (person and place) in all extremities, Cranial Nerves grossly intact to confrontation Skin: No rashes     Result Review    Result Review:  I have personally reviewed the results from the time of this admission to 7/7/2021 09:25 EDT and agree with these findings:  [x]  Laboratory  [x]  Microbiology  []  Radiology  []  EKG/Telemetry   []  Cardiology/Vascular   []  Pathology  []  Old records  []  Other:    Assessment/Plan   Assessment / Plan       Assessment:  Sepsis  Decompensated cirrhosis  Acute stroke with hemorrhagic conversion  [place] Myelinolysis  History of asthma  COPD without acute exacerbation  Insulin-dependent diabetes mellitus  Essential hypertension  Chronic pain syndrome  Anxiety  Depression  Sacral decubitus ulcer  Coagulopathy  Thrombocytopenia     Plan:  #1 Acute stroke with hemorrhagic conversion  -MRI shows: cerebral infarctions affecting both frontal and parietal lobes. Hemorrhagic infarct, left frontoparietal lobe.   -On ASA, will need to be started on a high intensity statin; however, LFTs elevated today.      #2 Decompensated cirrhosis  -reduce lactulose. 2-3 BMs per day.  -continue rifaxmin  -started on lasix and continue spironolactone. Will need to increase spironolactone to 100 mg and 40 mg of lasix.  Increase spironolactone to 50 mg today.      #3 Low potassium-repleted    #4 Wound care for buttocks care  -patient refusing FMS.    -on vancomycin for 7 days.  Course will be completed on 7/8/2021.      #5 [id number] with current regimen.      #6 Removed damian on 7/7/2021.       Discussed plan with RN and  who is at the bedside.    DVT prophylaxis:  Mechanical DVT prophylaxis orders are  present.    CODE STATUS:   Limited Support to NOT Include: Cardioversion/Defibrillation; Intubation  Level Of Support Discussed With: Health Care Surrogate  Code Status: No CPR  Medical Interventions (Level of Support Prior to Arrest): Limited  Comments: Reviewed with her .  He indicated these are her wishes.        Mateo Nevarez DO

## 2021-07-08 NOTE — PROGRESS NOTES
"Chief Complaint  Establish Care and Cerebrovascular Accident    Subjective          Amy Miles presents to [facility]  History of Present Illness  Patient presents with her caregiver to establish care     Has history of a stroke is recovering in a wheelchair today has a PMH significant for cirrhosis diabetes and multiple other comorbidities, she is using OmniPod still monitor her blood sugars and following up with endocrinology she is doing well with these and like to continue.  She is not having any swelling or confusion outside of the norm for her and caregiver agrees that she is doing okay has home health and other assistance coming out to help upcoming.    Having some nausea would like Phenergan to treat and seeing several specialist stable today overall      Objective   Vital Signs:   /76   Pulse 105   Temp 97.8 °F (36.6 °C) (Infrared)   Resp 22   Ht 162.6 cm (64\")   Wt 92.8 kg (204 lb 9.6 oz)   SpO2 91%   BMI 35.12 kg/m²     Physical Exam  Vitals reviewed.   Constitutional:       Appearance: Normal appearance. She is well-developed.   HENT:      Head: Normocephalic and atraumatic.      Right Ear: External ear normal.      Left Ear: External ear normal.      Mouth/Throat:      Pharynx: No oropharyngeal exudate.   Eyes:      Conjunctiva/sclera: Conjunctivae normal.      Pupils: Pupils are equal, round, and reactive to light.   Cardiovascular:      Rate and Rhythm: Normal rate and regular rhythm.      Heart sounds: No murmur heard.   No friction rub. No gallop.    Pulmonary:      Effort: Pulmonary effort is normal.      Breath sounds: Normal breath sounds. No wheezing or rhonchi.   Abdominal:      General: Bowel sounds are normal. There is no distension.      Palpations: Abdomen is soft.      Tenderness: There is no abdominal tenderness.   Skin:     General: Skin is warm and dry.      Coloration: Skin is jaundiced.   Neurological:      Mental Status: She is alert " and oriented to person, place, and time.      Cranial Nerves: No cranial nerve deficit.   Psychiatric:         Mood and Affect: Mood and affect normal.         Behavior: Behavior normal.         Thought Content: Thought content normal.         Judgment: Judgment normal.        Result Review :                 Assessment and Plan    Diagnoses and all orders for this visit:    1. Thrombocytopenia (CMS/HCC) (Primary)    2. Nausea  -     promethazine (PHENERGAN) 25 MG tablet; Take 1 tablet by mouth Daily.  Dispense: 30 tablet; Refill: 5    3. Sepsis due to pneumonia (CMS/HCC)    4. Mood disorder (CMS/HCC)    5. Ischemic stroke (CMS/HCC)    6. Iatrogenic hypothyroidism    7. Gastroesophageal reflux disease without esophagitis    8. Essential hypertension, benign    9. Encephalopathy, hepatic (CMS/HCC)    10. Elevated LFTs    11. Elevated INR    12. 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 Cirrhosis of liver with ascites, unspecified hepatic cirrhosis type (CMS/HCC)      Above chronic conditions reviewed from recent hospitalization and is stable for the most part recommended close follow-up we will see her back in a couple weeks or month sooner if concerns continue to follow-up with specialist as scheduled and strong follow-up precautions given      Follow Up   Return in about 6 weeks (around 8/19/2021), or if symptoms worsen or fail to improve, for Recheck hx of stroke.  Patient was given instructions and counseling regarding her condition or for health maintenance advice. Please see specific information pulled into the AVS if appropriate.

## 2021-07-08 NOTE — OUTREACH NOTE
Call Center TCM Note      Responses   [facility] patient discharged from?  [facility]   Does the patient have one of the following disease processes/diagnoses(primary or secondary)?  Sepsis   TCM attempt successful?  Yes   Call start time  1059   Call end time  1119   Discharge diagnosis  Sepsis due to pneumonia Cirrhosis of liver    Person spoke with today (if not patient) and relationship  Bk, spouse   Meds reviewed with patient/caregiver?  Yes   Is the patient having any side effects they believe may be caused by any medication additions or changes?  No   Does the patient have all medications related to this admission filled (includes all antibiotics, inhalers, nebulizers,steroids,etc.)  No   What is keeping the patient from filling the prescriptions?  Financial, Patient desires to consult PCP [ voiced concerns over numerous changes in medications w/o consulting with him first.  He has not picked up some yet as he wants to talk to Dr. Rojas and Dr. Tripp.]   Nursing Interventions  Nurse provided patient education   Prescription comments  Patient was discharged on Levemir w/o adequate supplies to administer but  is attempting to contact MD as he does not want to use and wants to continue to use her pump only   Medication comments  Enc'd to  antibiotics as ordered, Enc'd use of Lactulose as ordered   Does the patient have a primary care provider?   Yes   Does the patient have an appointment with their PCP within 7 days of discharge?  Yes [[facility] f/u scheduled for 7/8/21 @200pm]   Has the patient kept scheduled appointments due by today?  N/A   What is the Home health agency?   [facility]   Has home health visited the patient within 72 hours of discharge?  Call prior to 72 hours   Psychosocial issues?  No   Did the patient receive a copy of their discharge instructions?  Yes   Nursing interventions  Reviewed instructions with patient   What is the patient's perception of their  health status since discharge?  Same   Nursing interventions  Nurse provided patient education   Is the patient/caregiver able to teach back Sepsis?  S - Shivering,fever or very cold, E - Extreme pain or generalized discomfort (worst ever,especially abdomen), S - Sleepy, difficult to arouse,confused, S - Short of breath   Nursing interventions  Nurse provided patient education   Is patient/caregiver able to teach back steps to recovery at home?  Set small, achievable goals for return to baseline health, Rest and regain strength, Talk about feelings with family/friends   Is the patient/caregiver able to teach back signs and symptoms of worsening condition:  Fever, Shortness of breath/rapid respiratory rate, Altered mental status(confusion/coma)   Is the patient/caregiver able to teach back the hierarchy of who to call/visit for symptoms/problems? PCP, Specialist, Home health nurse, Urgent Care, ED, 911  Yes   Additional teach back comments  REviewed wound care with [facility], FAST stroke s/s.   TCM call completed?  Yes          Elana Bowles RN    7/8/2021, 11:23 EDT

## 2021-07-08 NOTE — TELEPHONE ENCOUNTER
Lakesha from [facility] called and is needing orders for home health for wound care blister on bottom , OT and skilled nursing.

## 2021-07-08 NOTE — OUTREACH NOTE
Prep Survey      Responses   [facility]?  [facility]   Is LACE score < 7 ?  No   Emergency Room discharge w/ pulse ox?  No   Eligibility  [facility]   Date of Admission  07/01/21   Date of Discharge  07/07/21   Discharge Disposition  Home-Health Care Sv   Discharge diagnosis  Sepsis due to pneumonia Cirrhosis of liver    Does the patient have one of the following disease processes/diagnoses(primary or secondary)?  Sepsis   Does the patient have Home health ordered?  Yes   What is the Home health agency?   [facility]   Is there a DME ordered?  No   Prep survey completed?  Yes          Monalisa Lopez RN

## 2021-07-08 NOTE — CASE MANAGEMENT/SOCIAL WORK
Pt d/c home with HH through her PCP with [facility] for RN/Wound care and PT/OT per SW.     PT has Home O2 prior to admission.     Pt d/c home on 7/7/21 after CM hours.     CM called Dr Recio to ask about DME order for rolling walker, had to leave message will f/u after lunch.

## 2021-07-15 NOTE — OUTREACH NOTE
Sepsis Week 2 Survey      Responses   [facility] patient discharged from?  [facility]   Does the patient have one of the following disease processes/diagnoses(primary or secondary)?  Sepsis   Week 2 attempt successful?  No   Unsuccessful attempts  Attempt 1          Cindy Myrick LPN

## 2021-07-16 NOTE — TELEPHONE ENCOUNTER
Caller: GE SHEIKH    Relationship: Emergency Contact    Best call back number: [phone]    What is the best time to reach you: ANY     Who are you requesting to speak with (clinical staff, provider,  specific staff member): CLINICAL         What was the call regarding: CALLER WANTED TO KNOW IF THE MEDICATION HOME HEALTH CALLED IN FOR PATIENT WAS SENT TO A PHARMACY YET. CALLER STATED THAT IT WAS SOME TYPE OF LOTION FOR PATIENT'S BACK SIDE.  ALSO NEEDS TO KNOW WHAT PHARMACY THE MEDICATION WAS OR IS BEING SENT TO.     Do you require a callback: YES

## 2021-07-16 NOTE — TELEPHONE ENCOUNTER
Beth with Home Health called stating pt was discharged from hospital recently and has big sores/blisters on her bottom. Beth request [employer] be sent to [facility] for pt. Please advise.

## 2021-07-19 NOTE — TELEPHONE ENCOUNTER
Caller: GE SHIEKH    Relationship: Emergency Contact    Best call back number: [phone]    What is the best time to reach you: ASAP    Who are you requesting to speak with (clinical staff, provider,  specific staff member): CLINICAL STAFF    Do you know the name of the person who called:     What was the call regarding: PATIENTS  CALLED BACK AGAIN STATING HE IS WANTING TO KNOW THE MEDICATION THAT HOME HEALTH CALLED IN FOR WAS SENT IN. THE MEDICATION WAS A CREAM MIXTURE. AND HE WOULD LIKE A CALL BACK ASAP TO LET HIM KNOW WHAT THE STATUS IS. AND WHEN ITS BEEN SENT OVER TO THE PHARMACY. PLEASE CALL BACK AND ADVISE. THANK YOU.    Do you require a callback: YES

## 2021-07-20 NOTE — TELEPHONE ENCOUNTER
I sent to ellen, if she needs a compounded butt cream may need to send to different pharmacy like [facility] but I sent cait in as hydrocortisone that's what it comes up as

## 2021-07-20 NOTE — TELEPHONE ENCOUNTER
NEED SILVADENE FOR PATIENTS BOTTOM SENT TO \[facility]\"" PHARMACY AND PLEASE CHECK AND SEE IF THE BO'S MAGIC BUTT CREAM WENT THROUGH TO THE SAME PHARMACY.

## 2021-07-20 NOTE — OUTREACH NOTE
Sepsis Week 2 Survey      Responses   [facility] patient discharged from?  [facility]   Does the patient have one of the following disease processes/diagnoses(primary or secondary)?  Sepsis   Week 2 attempt successful?  Yes   Call start time  1030   Call end time  1032   Discharge diagnosis  Sepsis due to pneumonia Cirrhosis of liver    Meds reviewed with patient/caregiver?  Yes   Is the patient taking all medications as directed (includes completed medication regime)?  Yes   Comments regarding PCP  7/8/21   Has the patient kept scheduled appointments due by today?  Yes   Home health comments  HH should visit on 7/20/21   What is the patient's perception of their health status since discharge?  Improving   Is the patient/caregiver able to teach back Sepsis?  S - Shivering,fever or very cold   Is patient/caregiver able to teach back steps to recovery at home?  Rest and regain strength, Eat a balanced diet   Is the patient/caregiver able to teach back signs and symptoms of worsening condition:  Fever, Hyperthermia   If the patient is a current smoker, are they able to teach back resources for cessation?  Smoking cessation medications   Week 2 call completed?  Yes          Eileen Floyd RN

## 2021-07-27 NOTE — TELEPHONE ENCOUNTER
Caller: NELLIE    Relationship to patient: [facility]    Best call back number: [phone]    New or established patient?  [] New  [x] Established    Date of discharge: 07/27/21    Facility discharged from: [facility]    Diagnosis/Symptoms: SYNCOPE      Specialty Only: Did you see a [facility] provider?    [] Yes  [] No  If so, who?

## 2021-07-27 NOTE — OUTREACH NOTE
Sepsis Week 3 Survey      Responses   [facility] patient discharged from?  [facility]   Does the patient have one of the following disease processes/diagnoses(primary or secondary)?  Sepsis   Week 3 attempt successful?  No   Revoke  Readmitted          Loli Be RN

## 2021-07-27 NOTE — TELEPHONE ENCOUNTER
Caller: [facility]HA[facility]    Relationship: PROVIDER    Best call back number: [phone]    DANIELLE WITH [facility] REPORTS PATIENT WAS TRANSFERRED TO [facility] ON 07/26/2021. REPORTS PATIENT WAS UNRESPONSIVE, BUT IS NOW AWAKE.

## 2021-07-27 NOTE — OUTREACH NOTE
Prep Survey      Responses   [facility] facility patient discharged from?  Non-BH   Is LACE score < 7 ?  Non-BH Discharge   Emergency Room discharge w/ pulse ox?  No   Eligibility  [facility]   Date of Discharge  07/27/21   Discharge diagnosis  syncope   Does the patient have one of the following disease processes/diagnoses(primary or secondary)?  Other   Prep survey completed?  Yes          Loli Be RN

## 2021-07-28 NOTE — OUTREACH NOTE
"Call Center TCM Note      Responses   [facility] patient discharged from?  Non-   Does the patient have one of the following disease processes/diagnoses(primary or secondary)?  Other   TCM attempt successful?  Yes   Call start time  1243   Call end time  1252   Discharge diagnosis  Syncope   Person spoke with today (if not patient) and relationship  Bk, spouse   Meds reviewed with patient/caregiver?  Yes   Is the patient having any side effects they believe may be caused by any medication additions or changes?  No   Does the patient have all medications ordered at discharge?  N/A   Is the patient taking all medications as directed (includes completed medication regime)?  Yes   Medication comments  NO medication changes---pt taking Lactulose as ordered having good BMs.  Blood glucose 172 this am. using insulin pump only    Does the patient have a primary care provider?   Yes   Does the patient have an appointment with their PCP within 7 days of discharge?  Yes [Hospital D/C follow-up scheduled for 8/2/21 @11:15am   ]   Has the patient kept scheduled appointments due by today?  N/A   What is the Home health agency?   [facility], OT   Has home health visited the patient within 72 hours of discharge?  Unsure   Psychosocial issues?  No   Did the patient receive a copy of their discharge instructions?  Yes   What is the patient's perception of their health status since discharge?  Returned to baseline/stable [Spouse reports pt was experiencing stroke like s/s--Stat flighted to [facility].  \"Encephalopathy\". Reviewed FAST s/s and other s/s which require urgent care,  reports no residual effects whatsover.  ]   Is the patient/caregiver able to teach back the hierarchy of who to call/visit for symptoms/problems? PCP, Specialist, Home health nurse, Urgent Care, ED, 911  Yes   Additional teach back comments   assisting with wound care.    TCM call completed?  Yes          Elana Bowles RN    7/28/2021, " 12:53 EDT

## 2021-08-02 NOTE — PROGRESS NOTES
"Chief Complaint  Hospital Follow Up Visit (discharged 7/27/2021 [facility] )    Subjective          Amy Miles presents to [facility]  History of Present Illness        Objective   Vital Signs:   /77 (BP Location: Right arm, Patient Position: Sitting, Cuff Size: Adult)   Pulse 79   Temp 96.9 °F (36.1 °C) (Temporal)   Ht 162.6 cm (64\")   Wt 96.1 kg (211 lb 12.8 oz)   SpO2 90%   BMI 36.36 kg/m²     Physical Exam  Vitals reviewed.   Constitutional:       Appearance: Normal appearance. She is well-developed.   HENT:      Head: Normocephalic and atraumatic.      Right Ear: External ear normal.      Left Ear: External ear normal.      Nose: Nose normal.      Mouth/Throat:      Pharynx: No oropharyngeal exudate.   Eyes:      Conjunctiva/sclera: Conjunctivae normal.      Pupils: Pupils are equal, round, and reactive to light.   Cardiovascular:      Rate and Rhythm: Normal rate and regular rhythm.      Heart sounds: No murmur heard.   No friction rub. No gallop.    Pulmonary:      Effort: Pulmonary effort is normal.      Breath sounds: Normal breath sounds. No wheezing or rhonchi.   Abdominal:      General: There is distension.      Tenderness: There is no abdominal tenderness.   Musculoskeletal:      Cervical back: Normal range of motion and neck supple.   Skin:     General: Skin is warm and dry.   Neurological:      Mental Status: She is alert and oriented to person, place, and time. Mental status is at baseline.      Cranial Nerves: No cranial nerve deficit.   Psychiatric:         Mood and Affect: Mood and affect normal.         Behavior: Behavior normal.         Thought Content: Thought content normal.         Judgment: Judgment normal.        Result Review :   The following data was reviewed by: Demarcus Tripp DO on 08/02/2021:  Common labs    Common Labsle 7/5/21 7/6/21 7/6/21 7/6/21 7/7/21 7/7/21     0410 0410 1346 0421 0421   Glucose 222 (A)  222 (A)   91   BUN 33 (A)  24 " (A)   21 (A)   Creatinine 0.87  0.83   0.88   eGFR Non  Am 67  71   66   Sodium 142  139   142   Potassium 3.2 (A)  3.1 (A)   3.2 (A)   Chloride 107  102   105   Calcium 8.6  8.8   8.6   Albumin      2.80 (A)   Total Bilirubin      1.1   Alkaline Phosphatase      123 (A)   AST (SGOT)      161 (A)   ALT (SGPT)      115 (A)   WBC  5.56  8.27 7.50    Hemoglobin  11.1 (A)  11.5 (A) 10.5 (A)    Hematocrit  36.3  37.4 34.7    Platelets  61 (A)  79 (A) 75 (A)    (A) Abnormal value            CMP    CMP 7/5/21 7/6/21 7/7/21   Glucose 222 (A) 222 (A) 91   BUN 33 (A) 24 (A) 21 (A)   Creatinine 0.87 0.83 0.88   eGFR Non African Am 67 71 66   Sodium 142 139 142   Potassium 3.2 (A) 3.1 (A) 3.2 (A)   Chloride 107 102 105   Calcium 8.6 8.8 8.6   Albumin   2.80 (A)   Total Bilirubin   1.1   Alkaline Phosphatase   123 (A)   AST (SGOT)   161 (A)   ALT (SGPT)   115 (A)   (A) Abnormal value            CBC    CBC 7/4/21 7/6/21 7/6/21 7/7/21     0410 1346    WBC 8.74 5.56 8.27 7.50   RBC 3.88 3.77 3.94 3.60 (A)   Hemoglobin 11.4 (A) 11.1 (A) 11.5 (A) 10.5 (A)   Hematocrit 37.3 36.3 37.4 34.7   MCV 96.1 96.3 94.9 96.4   MCH 29.4 29.4 29.2 29.2   MCHC 30.6 (A) 30.6 (A) 30.7 (A) 30.3 (A)   RDW 17.7 (A) 16.5 (A) 16.2 (A) 16.5 (A)   Platelets 77 (A) 61 (A) 79 (A) 75 (A)   (A) Abnormal value            Lipid Panel    Lipid Panel 7/2/21 7/3/21   Total Cholesterol 172 161   Triglycerides 151 (A) 158 (A)   HDL Cholesterol 36 (A) 32 (A)   VLDL Cholesterol 27 28   LDL Cholesterol  109 (A) 101 (A)   LDL/HDL Ratio 2.94 3.04   (A) Abnormal value            TSH    TSH 2/25/21 4/9/21 7/1/21   TSH 9.620 (A) 38.200 (A) 5.700 (A)   (A) Abnormal value            A1C Last 3 Results    HGBA1C Last 3 Results 7/2/21   Hemoglobin A1C 10.92 (A)   (A) Abnormal value                UA    Urinalysis 10/28/20 7/1/21 7/1/21     1208 1208   Squamous Epithelial Cells, UA   0-2   Specific Gravity, UA 1.046 >=1.030    Ketones, UA NEGATIVE Negative    Blood, UA  NEGATIVE Large (3+) (A)    Leukocytes, UA NEGATIVE Negative    Nitrite, UA NEGATIVE Negative    RBC, UA   None Seen   WBC, UA   13-20 (A)   Bacteria, UA   Trace (A)   (A) Abnormal value       Comments are available for some flowsheets but are not being displayed.                             Assessment and Plan    Diagnoses and all orders for this visit:    1. COPD with lower respiratory infection (CMS/HCC) (Primary)  -     levoFLOXacin (Levaquin) 500 MG tablet; Take 1 tablet by mouth Daily.  Dispense: 7 tablet; Refill: 0  -     Home Nebulizer  -     Home Nebulizer Accessories  -     albuterol (ACCUNEB) 1.25 MG/3ML nebulizer solution; Take 3 mL by nebulization Every 6 (Six) Hours As Needed for Wheezing for up to 360 days.  Dispense: 360 mL; Refill: 11    2. Mucopurulent chronic bronchitis (CMS/HCC)  -     levoFLOXacin (Levaquin) 500 MG tablet; Take 1 tablet by mouth Daily.  Dispense: 7 tablet; Refill: 0  -     Home Nebulizer  -     Home Nebulizer Accessories  -     albuterol (ACCUNEB) 1.25 MG/3ML nebulizer solution; Take 3 mL by nebulization Every 6 (Six) Hours As Needed for Wheezing for up to 360 days.  Dispense: 360 mL; Refill: 11    3. Yeast infection  -     fluconazole (Diflucan) 150 MG tablet; Take 1 tablet by mouth Daily.  Dispense: 3 tablet; Refill: 2    Other orders  -     Cancel: Hemoglobin A1c; Future        Follow Up {Instructions Charge Capture  Follow-up Communications :23}  Return in about 4 weeks (around 8/30/2021), or if symptoms worsen or fail to improve, for Recheck ammonia if needed.  Patient was given instructions and counseling regarding her condition or for health maintenance advice. Please see specific information pulled into the AVS if appropriate.

## 2021-08-08 NOTE — PROGRESS NOTES
Transitional Care Follow Up Visit  Subjective     Amy Miles is a 55 y.o. female who presents for a transitional care management visit.    Within 48 business hours after discharge our office contacted her via telephone to coordinate her care and needs.      I reviewed and discussed the details of that call along with the discharge summary, hospital problems, inpatient lab results, inpatient diagnostic studies, and consultation reports with Amy.     Current outpatient and discharge medications have been reconciled for the patient.  Reviewed by: Demarcus Tripp DO      Date of TCM Phone Call 7/27/2021   [facility]   Date of Admission -   Date of Discharge 7/27/2021   Discharge Disposition -     Risk for Readmission (LACE) No data recorded    Patient presents for TCM follow-up after admission to the hospital overall where she was admitted for acute metabolic encephalopathy had improved and was discharged within a day or so and a TCM call was placed in 48 hours with patient is here today without much complaint other than some COPD exacerbation symptoms.  He has a history of cirrhosis and is taking lactulose to improve her ammonia levels advise rechecking those regularly has home health coming or reorder that to be continued      History of Present Illness   Course During Hospital Stay:  Pt is a 54 yo female w/ a hx of COPD, FRIED cirrhosis (complicated by previous hepatic encephalopathy) and HTN who is admitted for acute encephalopathy following LOC resolved upon admission.     Acute Encephalopathy w/ associated loss of consciousness  -Possibly d/t hepatic encephalopathy vs. positional vertigo  -Workup for infectious etiology, hypoxic respiratory failure, pulmonary embolism, hypothyroid, stroke, orthostatics negative.   -ABG oa: 7.464/38/72/28 - no acute hypoxic respiratory failure 2/2 COPD; mild respiratory alkalosis 2/2 tachypnea.  -CXR negative; UA negative; pt is afebrile and WBC count wnl;  lactate 2.2 w/ negative procalcitonin. Unlikely infectious etiology.  -EKG sinus tach w/ t-wave abnormalities in lateral leads; negative troponins  -Elevated D-dimer w/ moderate pretest probability ([place] = 3; [place] =5) - CT PE negative  -TSH 2.41 wnl; on levothyroxine at home.  -No new neurologic deficits on exam and pt is back to baseline; will consider MRI if acute worsening  -repeat EKG NSR; QTc 458  -CBC w/o elevated WBC count; BMP significant for closed anion gap and resolution of acid base status      The following portions of the patient's history were reviewed and updated as appropriate: allergies, current medications, past family history, past medical history, past social history, past surgical history and problem list.        Objective   Physical Exam  Vitals reviewed.   Constitutional:       Appearance: Normal appearance. She is well-developed.   HENT:      Head: Normocephalic and atraumatic.      Right Ear: External ear normal.      Left Ear: External ear normal.      Mouth/Throat:      Pharynx: No oropharyngeal exudate.   Eyes:      Conjunctiva/sclera: Conjunctivae normal.      Pupils: Pupils are equal, round, and reactive to light.   Cardiovascular:      Rate and Rhythm: Normal rate and regular rhythm.      Heart sounds: No murmur heard.   No friction rub. No gallop.    Pulmonary:      Effort: Pulmonary effort is normal.      Breath sounds: Normal breath sounds. No wheezing or rhonchi.   Abdominal:      General: Bowel sounds are normal. There is no distension.      Palpations: Abdomen is soft.      Tenderness: There is no abdominal tenderness.   Musculoskeletal:      Cervical back: Neck supple.   Skin:     General: Skin is warm and dry.   Neurological:      Mental Status: She is alert and oriented to person, place, and time.      Cranial Nerves: No cranial nerve deficit.   Psychiatric:         Mood and Affect: Mood and affect normal.         Behavior: Behavior normal.         Thought Content: Thought  content normal.         Judgment: Judgment normal.         Assessment/Plan   Diagnoses and all orders for this visit:    1. COPD with lower respiratory infection (CMS/HCC) (Primary)  -     levoFLOXacin (Levaquin) 500 MG tablet; Take 1 tablet by mouth Daily.  Dispense: 7 tablet; Refill: 0  -     Home Nebulizer  -     Home Nebulizer Accessories  -     albuterol (ACCUNEB) 1.25 MG/3ML nebulizer solution; Take 3 mL by nebulization Every 6 (Six) Hours As Needed for Wheezing for up to 360 days.  Dispense: 360 mL; Refill: 11    2. Mucopurulent chronic bronchitis (CMS/HCC)  -     levoFLOXacin (Levaquin) 500 MG tablet; Take 1 tablet by mouth Daily.  Dispense: 7 tablet; Refill: 0  -     Home Nebulizer  -     Home Nebulizer Accessories  -     albuterol (ACCUNEB) 1.25 MG/3ML nebulizer solution; Take 3 mL by nebulization Every 6 (Six) Hours As Needed for Wheezing for up to 360 days.  Dispense: 360 mL; Refill: 11    3. Yeast infection  -     fluconazole (Diflucan) 150 MG tablet; Take 1 tablet by mouth Daily.  Dispense: 3 tablet; Refill: 2    4. Thrombocytopenia (CMS/HCC)  Assessment & Plan:  *Continue to monitor platelets and cirrhosis      5. Essential hypertension, benign  Assessment & Plan:  Blood pressure improved today 150/77 continue to monitor at home goal less than 140/90      Other orders  -     Cancel: Hemoglobin A1c; Future    Follow-up in the next 2 to 4 weeks sooner concerns precautions given we will treat COPD as above continue to monitor and follow-up with chronic condition cirrhosis COPD diabetes specialist

## 2021-08-08 NOTE — ASSESSMENT & PLAN NOTE
*Improved, continue to monitor ammonia levels and lactulose with bowel movements at home for cirrhosis stable today

## 2021-08-19 PROBLEM — F51.01 PRIMARY INSOMNIA: Status: ACTIVE | Noted: 2021-01-01

## 2021-08-19 PROBLEM — L89.309 PRESSURE INJURY OF SKIN OF BUTTOCK: Chronic | Status: ACTIVE | Noted: 2021-01-01

## 2021-08-19 NOTE — PROGRESS NOTES
"Chief Complaint  Follow-up (from hospital visit from buttocks wound)    Subjective          Amy Miles presents to [facility]  History of Present Illness    Patient presents to follow-up on her decubitus ulcer, is doing better wound care is coming out of the house she feels better overall a significant history of cirrhosis, no confusion or other on medicines or taking medicines as recommended such as Lasix lactulose spironolactone.    blood pressure is elevated today recommend medicine to lower her blood pressure,   consider lisinopril discussed today continue to follow-up on her diabetes with specialist    Patient complaining of some insomnia issues was on Valium before she has failed other medications in the past including trazodone doxepin counseled on this medication and continuing for only a slight  Objective   Vital Signs:   /97 (BP Location: Right arm, Patient Position: Sitting, Cuff Size: Adult)   Pulse 74   Temp 99.3 °F (37.4 °C) (Temporal)   Ht 162.6 cm (64\")   Wt 88.4 kg (194 lb 12.8 oz)   SpO2 95%   BMI 33.44 kg/m²     Physical Exam  Vitals reviewed.   Constitutional:       Appearance: Normal appearance. She is well-developed.   HENT:      Head: Normocephalic and atraumatic.      Right Ear: External ear normal.      Left Ear: External ear normal.      Mouth/Throat:      Pharynx: No oropharyngeal exudate.   Eyes:      Conjunctiva/sclera: Conjunctivae normal.      Pupils: Pupils are equal, round, and reactive to light.   Cardiovascular:      Rate and Rhythm: Normal rate and regular rhythm.      Heart sounds: No murmur heard.   No friction rub. No gallop.    Pulmonary:      Effort: Pulmonary effort is normal.      Breath sounds: Normal breath sounds. No wheezing or rhonchi.   Abdominal:      General: Bowel sounds are normal. There is no distension.      Palpations: Abdomen is soft.      Tenderness: There is no abdominal tenderness.   Skin:     General: Skin " is warm and dry.   Neurological:      Mental Status: She is alert and oriented to person, place, and time.      Cranial Nerves: No cranial nerve deficit.   Psychiatric:         Mood and Affect: Mood and affect normal.         Behavior: Behavior normal.         Thought Content: Thought content normal.         Judgment: Judgment normal.        Result Review :   The following data was reviewed by: Demarcus Tripp DO on 08/19/2021:  Common labs    Common Labsle 7/5/21 7/6/21 7/6/21 7/6/21 7/7/21 7/7/21     0410 0410 1346 0421 0421   Glucose 222 (A)  222 (A)   91   BUN 33 (A)  24 (A)   21 (A)   Creatinine 0.87  0.83   0.88   eGFR Non  Am 67  71   66   Sodium 142  139   142   Potassium 3.2 (A)  3.1 (A)   3.2 (A)   Chloride 107  102   105   Calcium 8.6  8.8   8.6   Albumin      2.80 (A)   Total Bilirubin      1.1   Alkaline Phosphatase      123 (A)   AST (SGOT)      161 (A)   ALT (SGPT)      115 (A)   WBC  5.56  8.27 7.50    Hemoglobin  11.1 (A)  11.5 (A) 10.5 (A)    Hematocrit  36.3  37.4 34.7    Platelets  61 (A)  79 (A) 75 (A)    (A) Abnormal value                 Reviewed last labs above had a potassium of 3.2 evaded AST ALT with history of cirrhosis and hemoglobin was stable at 10.5 on 7/7/2021     Assessment and Plan    Diagnoses and all orders for this visit:    1. Essential hypertension, benign (Primary)    2. Cirrhosis of liver with ascites, unspecified hepatic cirrhosis type (CMS/HCC)    3. 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 Pulmonary emphysema, unspecified emphysema type (CMS/HCC)    5. Pressure injury of deep tissue of buttock, unspecified laterality    6. Primary insomnia  -     diazePAM (Valium) 5 MG tablet; Take 1 tablet by mouth 3 (Three) Times a Day As Needed for Anxiety for up to 3 days.  Dispense: 9 tablet; Refill: 0    Other orders  -     Cancel: Hemoglobin A1c; Future    We will add lisinopril 10 to 20 mg daily recheck at night monitor blood pressure at  home continue other medicines as prescribed reviewed cirrhosis and pulmonary emphysema monitoring her breathing at home additionally has home health to help with decubitus ulcer healing      Follow Up   Return in about 3 months (around 11/19/2021), or if symptoms worsen or fail to improve, for Next scheduled follow up.  Patient was given instructions and counseling regarding her condition or for health maintenance advice. Please see specific information pulled into the AVS if appropriate.

## 2021-08-24 NOTE — TELEPHONE ENCOUNTER
Caller: Amy Miles    Relationship to patient: Self    Best call back number: [phone]    Chief complaint: PATIENTS NEEDS TO SCHEDULE LABS BUT DOESN'T HAVE AN ACTIVE ORDER      Requested date: ASAP

## 2021-08-27 NOTE — TELEPHONE ENCOUNTER
Linda with [facility] had left a message stating pt O2 was in the 80's, chest pain in front, back and around right side. Her right lower lobe sounded distressed. I spoke with Dr. Tripp and called Linda back, advised for pt to be seen at the ED. Linda is calling pt and letting her know.

## 2021-08-31 PROBLEM — R79.1 ELEVATED INR: Status: RESOLVED | Noted: 2021-01-01 | Resolved: 2021-01-01

## 2021-08-31 PROBLEM — D72.825 BANDEMIA: Status: RESOLVED | Noted: 2021-01-01 | Resolved: 2021-01-01

## 2021-08-31 PROBLEM — K76.82 ENCEPHALOPATHY, HEPATIC (HCC): Status: RESOLVED | Noted: 2021-01-01 | Resolved: 2021-01-01

## 2021-08-31 PROBLEM — R79.89 ELEVATED LFTS: Status: RESOLVED | Noted: 2021-01-01 | Resolved: 2021-01-01

## 2021-09-08 NOTE — OUTREACH NOTE
Called for CCM services, per her  she is out currently but he took my work cell and will have her call me back.

## 2021-09-09 NOTE — PROGRESS NOTES
A1c is at least down to 9 continue to monitor blood sugars at home blood count stable overall continue to manage diet and follow-up as recommended

## 2021-09-09 NOTE — TELEPHONE ENCOUNTER
----- Message from Demarcus Tripp DO sent at 9/9/2021 10:21 AM EDT -----  A1c is at least down to 9 continue to monitor blood sugars at home blood count stable overall continue to manage diet and follow-up as recommended

## 2021-09-15 NOTE — OUTREACH NOTE
Ambulatory Case Management Note    Attempted to reach 9/1, 9/8, 9/9, and today and never returned call, I spoke to her  once in that time frame and he took my cell and I left messages on her cell for return call. Will discuss with PCP to let him know so he can discuss at next follow up in person with patient.     There are no recently modified care plans to display for this patient.      Darcy Villarreal RN  Ambulatory Case Management    9/15/2021, 16:03 EDT

## 2021-09-23 NOTE — PROGRESS NOTES
Chief Complaint  Nasal Congestion (symptoms started a few days ago ), Earache, sneezing, and watery eyes    Subjective          Amy Miles presents to [facility]  History of Present Illness    Patient presents with  complaining of sinus pain pressure, has recurrent sinus infections. Has had symptoms for a few days. Her dad is home on comfort care measures and nearly his time to pass so she is in a hurry to get back to him, just wasn't feeling well either wanted to get checked out. Denies fever, sick contacts, short of breath, chest pain or other. Has chronic history of allergic rhinitis, smoking additionally which complicate recurrent history of sinus infections and lung health, has COPD stable with no exacerbations in a few months or so. Uses singular and albuterol inhaler/nebulizer at home.     Last a1c was 9.0 on 9/2021, she sees Diabetes specialist who helps manage, no lows or highs reported, has made adjustments.      Objective   Vital Signs:   Pulse 76   SpO2 96%     Physical Exam  Vitals reviewed.   Constitutional:       Appearance: Normal appearance. She is well-developed.   HENT:      Head: Normocephalic and atraumatic.      Right Ear: External ear normal.      Left Ear: External ear normal.      Nose: Congestion and rhinorrhea present. No nasal deformity, septal deviation, signs of injury or laceration. Rhinorrhea is clear.      Mouth/Throat:      Pharynx: No oropharyngeal exudate.   Eyes:      Conjunctiva/sclera: Conjunctivae normal.      Pupils: Pupils are equal, round, and reactive to light.   Cardiovascular:      Rate and Rhythm: Normal rate and regular rhythm.      Heart sounds: No murmur heard.   No friction rub. No gallop.    Pulmonary:      Effort: Pulmonary effort is normal.      Breath sounds: Normal breath sounds. No wheezing or rhonchi.   Abdominal:      General: Bowel sounds are normal. There is no distension.      Palpations: Abdomen is soft.       Tenderness: There is no abdominal tenderness.   Skin:     General: Skin is warm and dry.   Neurological:      Mental Status: She is alert and oriented to person, place, and time.      Cranial Nerves: No cranial nerve deficit.   Psychiatric:         Mood and Affect: Mood and affect normal.         Behavior: Behavior normal.         Thought Content: Thought content normal.         Judgment: Judgment normal.        Result Review :   The following data was reviewed by: Demarcus Tripp DO on 09/23/2021:  Hemoglobin A1C   Date Value Ref Range Status   09/07/2021 9.00 (H) 4.80 - 5.60 % Final     Total Cholesterol   Date Value Ref Range Status   07/03/2021 161 0 - 200 mg/dL Final     Triglycerides   Date Value Ref Range Status   07/03/2021 158 (H) 0 - 150 mg/dL Final     HDL Cholesterol   Date Value Ref Range Status   07/03/2021 32 (L) 40 - 60 mg/dL Final     LDL Cholesterol    Date Value Ref Range Status   07/03/2021 101 (H) 0 - 100 mg/dL Final     TSH   Date Value Ref Range Status   09/07/2021 12.500 (H) 0.270 - 4.200 uIU/mL Final     25 Hydroxy, Vitamin D   Date Value Ref Range Status   03/19/2020 47.0 30.0 - 100.0 ng/mL Final     Comment:     Vitamin D Status          Range  ---------------------------------------  Deficiency                <10 ng/mL  Insufficiency             10-30 ng/mL  Sufficiency                ng/mL  Toxicity                  >100 ng/mL       Creatinine   Date Value Ref Range Status   09/07/2021 0.69 0.57 - 1.00 mg/dL Final     GFR   Date Value Ref Range Status   11/05/2020 >60 >60 mL/min/[1.73_m2] Final     Comment:     Interpretative Data:  ------------------------------------  STAGE                  GFR  Stage 1                90 mL/min or greater  Stage 2                60-89 mL/min  Stage 3                30-59 mL/min  Stage 4                15-29 mL/min  Value <60 mL/min for 3 or more months is defined as CKD.                Reviewed labs above, kidney function stable, TSH and a1c  elevated follow up with Endocrinology, no anema and lipid were ok     Assessment and Plan    Diagnoses and all orders for this visit:    1. Acute recurrent frontal sinusitis (Primary)  Assessment & Plan:  *acute on recurrent  tx azithromycin given many allergies of patient, including penicillin  Continue sx treatment at home, fu if worse       Orders:  -     Discontinue: azithromycin (Zithromax Z-Philippe) 250 MG tablet; Take 2 tablets by mouth on day 1, then 1 tablet daily on days 2-5  Dispense: 6 tablet; Refill: 0    2. Yeast infection  Assessment & Plan:  *acute  From taking antibiotics, tx diflucan follow up if not treated adequately    Orders:  -     fluconazole (Diflucan) 150 MG tablet; Take 1 tablet by mouth Daily.  Dispense: 3 tablet; Refill: 2    3. Grief  -     diazePAM (Valium) 5 MG tablet; Take 1 tablet by mouth Every 8 (Eight) Hours As Needed for Anxiety.  Dispense: 9 tablet; Refill: 2    4. ZELALEM (generalized anxiety disorder)  -     diazePAM (Valium) 5 MG tablet; Take 1 tablet by mouth Every 8 (Eight) Hours As Needed for Anxiety.  Dispense: 9 tablet; Refill: 2    5. Chronic allergic rhinitis  Assessment & Plan:  *chronic  Advised treating sinusitis as above, chronic allergies contributing  Consider different allergy medicine and reviewing other sprays to use  Regular saline irrigation may help and quitting smoking      6. Chronic pain syndrome  Assessment & Plan:  *controlled  GABRIELE reviewed follow up with pain management, specialist, counseled       7. Essential hypertension, benign    8. Pressure injury of deep tissue of buttock, unspecified laterality  Assessment & Plan:   shows picture of sacral wound, home health assisting  Advised could refer to wound care, consider not packing tightly and using more wet to dry dressings  Follow up if continues no better, will refer to wound care and research alternative/additional recomendations    Orders:  -     Ambulatory Referral to Wound Clinic    9. 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   Continue current treatment regimen.  Discussed ways to avoid symptomatic hypoglycemia.  Discussed sick day management.  Discussed foot care.  Reminded to get yearly retinal exam.  continue follow up with specialist as scheduled  Diabetes will be reassessed as scheduled next appt date.      10. Pulmonary emphysema, unspecified emphysema type (HCC)  Assessment & Plan:  COPD is unchanged.  Counseled to avoid exposure to cigarette smoke.  Continue current medications.  Follow up in 1 week, or sooner should new symptoms or problems arise.           Follow Up   Return in about 4 weeks (around 10/21/2021), or if symptoms worsen or fail to improve, for Next scheduled follow up, Medicare Wellness.  Patient was given instructions and counseling regarding her condition or for health maintenance advice. Please see specific information pulled into the AVS if appropriate.

## 2021-09-28 NOTE — PROGRESS NOTES
Chief Complaint  Sinusitis (not getting better, seen 9/23/2021)    Subjective          Amy Miles presents to [facility]  History of Present Illness    Presents to follow up on sinus pain and pressure, not better seen 9/23/2021 and admitting subjective fever, pain pressure in both sinuses and intermittent headache. No known sick contacts, her father did pass away after we talked last week at her appt and she has been under a bit of stress you could say but she was there with him. No worse shortness of breath, chest pain, using nebulizer at home.     Has history of cirrhosis on meds to improve ascites, and taking as prescribed denies lethargy, confusion and  agrees. Blood pressure has been up in the past has been better at home on a few medicines, and home health is coming out to do rehab and other needs, unsure if they are doing wound care as  is packing wound and may be overdoing it. Admits increased drainage and may want to go to wound care clinic if not better.    Objective   Vital Signs:   Pulse 102   Temp 98 °F (36.7 °C) (Temporal)   SpO2 94%     Physical Exam  Vitals reviewed.   Constitutional:       Appearance: Normal appearance. She is well-developed.   HENT:      Head: Normocephalic and atraumatic.      Right Ear: External ear normal.      Left Ear: External ear normal.      Nose: Congestion and rhinorrhea present. No nasal deformity, septal deviation, signs of injury or laceration. Rhinorrhea is clear.      Right Sinus: Frontal sinus tenderness present.      Left Sinus: Frontal sinus tenderness present.      Mouth/Throat:      Pharynx: No oropharyngeal exudate.   Eyes:      Conjunctiva/sclera: Conjunctivae normal.      Pupils: Pupils are equal, round, and reactive to light.   Cardiovascular:      Rate and Rhythm: Normal rate and regular rhythm.      Heart sounds: No murmur heard.   No friction rub. No gallop.    Pulmonary:      Effort: Pulmonary effort  is normal.      Breath sounds: Normal breath sounds. No wheezing or rhonchi.   Abdominal:      General: Bowel sounds are normal. There is no distension.      Palpations: Abdomen is soft.      Tenderness: There is no abdominal tenderness.   Skin:     General: Skin is warm and dry.   Neurological:      Mental Status: She is alert and oriented to person, place, and time.      Cranial Nerves: No cranial nerve deficit.   Psychiatric:         Mood and Affect: Mood and affect normal.         Behavior: Behavior normal.         Thought Content: Thought content normal.         Judgment: Judgment normal.        Result Review :   The following data was reviewed by: Demarcus Tripp DO on 09/28/2021:  Common labs    Common Labsle 7/6/21 7/6/21 7/6/21 7/7/21 7/7/21 9/7/21 9/7/21 9/7/21    0410 0410 1346 0421 0421 1351 1351 1351   Glucose  222 (A)   91   338 (A)   BUN  24 (A)   21 (A)   9   Creatinine  0.83   0.88   0.69   eGFR Non  Am  71   66   88   Sodium  139   142   141   Potassium  3.1 (A)   3.2 (A)   3.8   Chloride  102   105   107   Calcium  8.8   8.6   8.9   Albumin     2.80 (A)   3.50   Total Bilirubin     1.1   0.4   Alkaline Phosphatase     123 (A)   156 (A)   AST (SGOT)     161 (A)   31   ALT (SGPT)     115 (A)   22   WBC 5.56  8.27 7.50  5.27     Hemoglobin 11.1 (A)  11.5 (A) 10.5 (A)  11.4 (A)     Hematocrit 36.3  37.4 34.7  39.7     Platelets 61 (A)  79 (A) 75 (A)  96 (A)     Hemoglobin A1C       9.00 (A)    (A) Abnormal value            CBC w/diff    CBC w/Diff 7/6/21 7/6/21 7/7/21 9/7/21    0410 1346     WBC 5.56 8.27 7.50 5.27   RBC 3.77 3.94 3.60 (A) 4.28   Hemoglobin 11.1 (A) 11.5 (A) 10.5 (A) 11.4 (A)   Hematocrit 36.3 37.4 34.7 39.7   MCV 96.3 94.9 96.4 92.8   MCH 29.4 29.2 29.2 26.6   MCHC 30.6 (A) 30.7 (A) 30.3 (A) 28.7 (A)   RDW 16.5 (A) 16.2 (A) 16.5 (A) 14.9   Platelets 61 (A) 79 (A) 75 (A) 96 (A)   Neutrophil Rel %    67.4   Lymphocyte Rel %    20.9   Monocyte Rel %    6.8   Eosinophil Rel %    3.6    Basophil Rel %    1.1   (A) Abnormal value                      Assessment and Plan    Diagnoses and all orders for this visit:    1. Chronic frontal sinusitis (Primary)  Assessment & Plan:  *subacute on recurrent  zpack no help, symptoms similar but with fever/chils periodically  No worse shortness of breath than baseline, pain pressure sinuses bilaterally, COVID swab today, contact tomorrow with results  rx levaquin to treat, tolerated previously    Orders:  -     COVID PRE-OP / PRE-PROCEDURE SCREENING ORDER (NO ISOLATION) - Swab, Nasopharynx; Future  -     levoFLOXacin (Levaquin) 500 MG tablet; Take 1 tablet by mouth Daily.  Dispense: 7 tablet; Refill: 0  -     COVID PRE-OP / PRE-PROCEDURE SCREENING ORDER (NO ISOLATION) - Swab, Nasopharynx    2. Chronic allergic rhinitis  Assessment & Plan:  *chronic  Same plan as when I saw last week, 9/23  Change antibiotics, has many intolerance and allergies  If any acute change or worsening symptom given many comorbidities including COPD, IDDM, Cirrhosis recommend being seen or go to ED          3. Essential hypertension, benign  Assessment & Plan:  *borderline control  Elevated last appt, advised monitoring at home  Followed up last 2 visits in last week for acute illness  After improves recommend fu on blood pressure and review BP log and consider adjusting or adding medicine  Goal <140/90, continue lisinopril aldactone and metoprolol  Dietary sodium restriction, Weight loss, Stop smoking  Blood pressure will be reassessed at the next regular appointment.        4. Grief  Assessment & Plan:  Father did pass away, reassurance given, GABRIELE reviewed counseled on risks benefits, understood, renew diazepam to use sparingly during stress griefing time only   No agreement to continue or renew repeatedly      5. Pressure injury of deep tissue of buttock, unspecified laterality  Assessment & Plan:  Wound healing slowly, complicated by chronic conditions cirrhosis, insulin  diabetes, chronic tobacco use COPD, referred to wound care clinic to assist with wound care to help with guidance on healing long term and avoiding complications like osteomyelitis      6. Pulmonary emphysema, unspecified emphysema type (HCC)  Assessment & Plan:  *chronic stable  No worsening symptoms, short of breath or increase use of inhaler  Lung exam without increased effort or wheezing  Close follow up if anything changes, continue nebs and other meds  Smoking cessation would help long term        7. Chronic pain syndrome      Follow Up   Return if symptoms worsen or fail to improve, for Next scheduled follow up, Medicare Wellness, BP & Log.  Patient was given instructions and counseling regarding her condition or for health maintenance advice. Please see specific information pulled into the AVS if appropriate.

## 2021-09-29 NOTE — TELEPHONE ENCOUNTER
Caller: Amy Miles    Relationship: Self    Best call back number: [phone]    What is the best time to reach you: ANYTIME    Who are you requesting to speak with (clinical staff, provider,  specific staff member): NURSE    What was the call regarding: PATIENT WAS RETURNING A CALL NOT SURE IF IT WAS WITH RESULTS OF COVID TEST.     Do you require a callback: YES

## 2021-09-30 PROBLEM — F43.21 GRIEF: Status: ACTIVE | Noted: 2021-01-01

## 2021-09-30 PROBLEM — A41.9 SEPSIS DUE TO PNEUMONIA: Status: RESOLVED | Noted: 2021-01-01 | Resolved: 2021-01-01

## 2021-09-30 PROBLEM — J32.1 CHRONIC FRONTAL SINUSITIS: Status: ACTIVE | Noted: 2021-01-01

## 2021-09-30 PROBLEM — J01.11 ACUTE RECURRENT FRONTAL SINUSITIS: Status: ACTIVE | Noted: 2021-01-01

## 2021-09-30 PROBLEM — J18.9 SEPSIS DUE TO PNEUMONIA (HCC): Status: RESOLVED | Noted: 2021-01-01 | Resolved: 2021-01-01

## 2021-09-30 PROBLEM — B37.9 YEAST INFECTION: Status: ACTIVE | Noted: 2021-01-01

## 2021-09-30 NOTE — ASSESSMENT & PLAN NOTE
*acute on recurrent  tx azithromycin given many allergies of patient, including penicillin  Continue sx treatment at home, fu if worse

## 2021-09-30 NOTE — ASSESSMENT & PLAN NOTE
*chronic  Advised treating sinusitis as above, chronic allergies contributing  Consider different allergy medicine and reviewing other sprays to use  Regular saline irrigation may help and quitting smoking

## 2021-09-30 NOTE — PATIENT INSTRUCTIONS
Wound Packing  Wound packing usually involves placing a moistened packing material into your wound and then covering it with an outer bandage (dressing). This helps promote proper healing of deep tissue and tissue under the skin. It also helps prevent bleeding, infection, and further injury.  Wounds are packed until deep tissues heal. The time it takes for this to occur is different for everyone. Your health care provider will show you how to pack and dress your wound. Using gloves and a clean technique is important in order to avoid spreading germs into your wound.  Supplies needed:  · Soap and water.  · Disposable gloves.  · Wetting solution.  · Clean bowl.  · Clean packing material (gauze or gauze sponges).  · Clean paper towels.  · Outer dressing.  · Tape.  · Cotton-tipped swabs.  · Small plastic bag.  How to pack your wound  Follow your health care provider's instructions on how often you need to change dressings and pack your wound. You will likely be asked to change dressings 1-2 times a day.  Preparing the new packing material    1. Clean and disinfect your work surface or countertop.  2. Set a plastic bag on or near your work surface.  3. Wash your hands well with soap and water.  4. Put a clean paper towel on the counter.  5. Put a clean bowl on the towel. Be sure to only touch the outside of the bowl when handling it.  6. Pour wetting solution into the bowl.  7. Cut your packing material (gauze or sponges) to the right size for your wound. Drop it into the bowl.  8. Cut 4 tape strips that you will use to seal the outer dressing.  9. Put cotton-tipped swabs on the clean paper towel.    Removing the old packing material and dressing  1. Put on a set of gloves.  2. Gently remove the old dressing and packing material.  3. Remove your gloves.  4. Put the removed items, including the gloves, into the plastic bag to throw away later.  5. Wash your hands well with soap and water again.  Applying the new packing  material and dressing  1. Put on a new set of gloves.  2. Squeeze the packing material in the bowl to release the extra liquid. The packing material should be moist, but not dripping wet.  3. Gently place the packing material into the wound. Use a cotton-tipped swab to guide it into place, filling all of the space.  4. Dry your gloved fingertips on the paper towel.  5. Open up your outer dressing supplies and put them on a dry part of the paper towel. Keep them from getting wet.  6. Place the outer dressing over the packed wound.  7. Tape the 4 outer edges of the outer dressing in place.  8. Remove your gloves.  9. Wash your hands again with soap and water.  10. Clean and disinfect your work surface or countertop.  General tips  · Follow your health care provider's instructions on how much to pack the wound. At first, you may need to pack it more tightly to help stop bleeding. As the wound begins to heal inside, you will use less packing material and pack the wound loosely to allow tissue to heal slowly from the inside out.  · Keep the dressing clean and dry.  · Follow any other instructions given by your health care provider on how to aid healing. This may include applying warm or cold compresses, raising (elevating) the affected area, or wearing a compression dressing.  · Check your wound site every day for signs of infection. Check for:  ? More redness, swelling, or pain.  ? More fluid or blood.  ? Warmth.  ? Pus or a bad smell.  · Ask your health care provider about avoiding sun exposure and using sunscreen when the dressings are no longer needed.  · Keep all follow-up visits as told by your health care provider. This is important.  Contact a health care provider if:  · You have more drainage, redness, swelling, or pain at your wound site.  · You notice a bad smell coming from the wound site.  · Your wound site feels warm to the touch.  · Your wound becomes larger or deeper.  · Your wound changes in size or  depth.  Get help right away if:  · Your pain is not controlled with pain medicine.  · The tissue inside your wound changes color from pink to white, yellow, or black.  · You have a fever.  · You have shaking chills.  · You are having trouble packing your wound.  Summary  · Wound packing usually involves placing a moistened packing material into your wound and then covering it with an outer bandage (dressing).  · Follow your health care provider's instructions on how often you need to change dressings and pack your wound. You will likely be asked to change dressings 1-2 times a day.  · When packing your wound, it is important to use gloves and a clean technique in order to avoid spreading germs into the wound.  · Check your wound site every day for signs of infection.  This information is not intended to replace advice given to you by your health care provider. Make sure you discuss any questions you have with your health care provider.  Document Revised: 01/24/2019 Document Reviewed: 01/24/2019  [employer] Patient Education © 2021 [employer]    Wound Packing  Wound packing usually involves placing a moistened packing material into your wound and then covering it with an outer bandage (dressing). This helps promote proper healing of deep tissue and tissue under the skin. It also helps prevent bleeding, infection, and further injury.  Wounds are packed until deep tissues heal. The time it takes for this to occur is different for everyone. Your health care provider will show you how to pack and dress your wound. Using gloves and a clean technique is important in order to avoid spreading germs into your wound.  Supplies needed:  · Soap and water.  · Disposable gloves.  · Wetting solution.  · Clean bowl.  · Clean packing material (gauze or gauze sponges).  · Clean paper towels.  · Outer dressing.  · Tape.  · Cotton-tipped swabs.  · Small plastic bag.  How to pack your wound  Follow your health care provider's  instructions on how often you need to change dressings and pack your wound. You will likely be asked to change dressings 1-2 times a day.  Preparing the new packing material    10. Clean and disinfect your work surface or countertop.  11. Set a plastic bag on or near your work surface.  12. Wash your hands well with soap and water.  13. Put a clean paper towel on the counter.  14. Put a clean bowl on the towel. Be sure to only touch the outside of the bowl when handling it.  15. Pour wetting solution into the bowl.  16. Cut your packing material (gauze or sponges) to the right size for your wound. Drop it into the bowl.  17. Cut 4 tape strips that you will use to seal the outer dressing.  18. Put cotton-tipped swabs on the clean paper towel.    Removing the old packing material and dressing  6. Put on a set of gloves.  7. Gently remove the old dressing and packing material.  8. Remove your gloves.  9. Put the removed items, including the gloves, into the plastic bag to throw away later.  10. Wash your hands well with soap and water again.  Applying the new packing material and dressing  11. Put on a new set of gloves.  12. Squeeze the packing material in the bowl to release the extra liquid. The packing material should be moist, but not dripping wet.  13. Gently place the packing material into the wound. Use a cotton-tipped swab to guide it into place, filling all of the space.  14. Dry your gloved fingertips on the paper towel.  15. Open up your outer dressing supplies and put them on a dry part of the paper towel. Keep them from getting wet.  16. Place the outer dressing over the packed wound.  17. Tape the 4 outer edges of the outer dressing in place.  18. Remove your gloves.  19. Wash your hands again with soap and water.  20. Clean and disinfect your work surface or countertop.  General tips  · Follow your health care provider's instructions on how much to pack the wound. At first, you may need to pack it more  tightly to help stop bleeding. As the wound begins to heal inside, you will use less packing material and pack the wound loosely to allow tissue to heal slowly from the inside out.  · Keep the dressing clean and dry.  · Follow any other instructions given by your health care provider on how to aid healing. This may include applying warm or cold compresses, raising (elevating) the affected area, or wearing a compression dressing.  · Check your wound site every day for signs of infection. Check for:  ? More redness, swelling, or pain.  ? More fluid or blood.  ? Warmth.  ? Pus or a bad smell.  · Ask your health care provider about avoiding sun exposure and using sunscreen when the dressings are no longer needed.  · Keep all follow-up visits as told by your health care provider. This is important.  Contact a health care provider if:  · You have more drainage, redness, swelling, or pain at your wound site.  · You notice a bad smell coming from the wound site.  · Your wound site feels warm to the touch.  · Your wound becomes larger or deeper.  · Your wound changes in size or depth.  Get help right away if:  · Your pain is not controlled with pain medicine.  · The tissue inside your wound changes color from pink to white, yellow, or black.  · You have a fever.  · You have shaking chills.  · You are having trouble packing your wound.  Summary  · Wound packing usually involves placing a moistened packing material into your wound and then covering it with an outer bandage (dressing).  · Follow your health care provider's instructions on how often you need to change dressings and pack your wound. You will likely be asked to change dressings 1-2 times a day.  · When packing your wound, it is important to use gloves and a clean technique in order to avoid spreading germs into the wound.  · Check your wound site every day for signs of infection.  This information is not intended to replace advice given to you by your health care  provider. Make sure you discuss any questions you have with your health care provider.  Document Revised: 01/24/2019 Document Reviewed: 01/24/2019  [employer] Patient Education © 2021 [employer]

## 2021-09-30 NOTE — ASSESSMENT & PLAN NOTE
shows picture of sacral wound, home health assisting  Advised could refer to wound care, consider not packing tightly and using more wet to dry dressings  Follow up if continues no better, will refer to wound care and research alternative/additional recomendations

## 2021-09-30 NOTE — ASSESSMENT & PLAN NOTE
Diabetes is improving with treatment.   Continue current treatment regimen.  Discussed ways to avoid symptomatic hypoglycemia.  Discussed sick day management.  Discussed foot care.  Reminded to get yearly retinal exam.  continue follow up with specialist as scheduled  Diabetes will be reassessed as scheduled next appt date.

## 2021-09-30 NOTE — ASSESSMENT & PLAN NOTE
Father did pass away, reassurance given, GABRIELE reviewed counseled on risks benefits, understood, renew diazepam to use sparingly during stress griefing time only   No agreement to continue or renew repeatedly

## 2021-09-30 NOTE — ASSESSMENT & PLAN NOTE
*chronic  Same plan as when I saw last week, 9/23  Change antibiotics, has many intolerance and allergies  If any acute change or worsening symptom given many comorbidities including COPD, IDDM, Cirrhosis recommend being seen or go to ED       Patient is requesting a bed rail be ordered so she does not fall out of bed. Patient is requesting this order be sent to [facility].  Patient will call back with fax number on Monday morning.

## 2021-09-30 NOTE — ASSESSMENT & PLAN NOTE
*borderline control  Elevated last appt, advised monitoring at home  Followed up last 2 visits in last week for acute illness  After improves recommend fu on blood pressure and review BP log and consider adjusting or adding medicine  Goal <140/90, continue lisinopril aldactone and metoprolol  Dietary sodium restriction, Weight loss, Stop smoking  Blood pressure will be reassessed at the next regular appointment.

## 2021-09-30 NOTE — ASSESSMENT & PLAN NOTE
COPD is unchanged.  Counseled to avoid exposure to cigarette smoke.  Continue current medications.  Follow up in 1 week, or sooner should new symptoms or problems arise.

## 2021-09-30 NOTE — ASSESSMENT & PLAN NOTE
*chronic stable  No worsening symptoms, short of breath or increase use of inhaler  Lung exam without increased effort or wheezing  Close follow up if anything changes, continue nebs and other meds  Smoking cessation would help long term

## 2021-09-30 NOTE — ASSESSMENT & PLAN NOTE
Wound healing slowly, complicated by chronic conditions cirrhosis, insulin diabetes, chronic tobacco use COPD, referred to wound care clinic to assist with wound care to help with guidance on healing long term and avoiding complications like osteomyelitis

## 2021-09-30 NOTE — ASSESSMENT & PLAN NOTE
*subacute on recurrent  zpack no help, symptoms similar but with fever/chils periodically  No worse shortness of breath than baseline, pain pressure sinuses bilaterally, COVID swab today, contact tomorrow with results  rx levaquin to treat, tolerated previously

## 2021-10-05 NOTE — TELEPHONE ENCOUNTER
Anahy with [facility] called stating pt has an infection in wound. States she has a wound on right ischial. States she obtained a culture just in case you wanted her to get one. States treating with Silvadene. Please advise if you have any recommendations.

## 2021-10-07 NOTE — TELEPHONE ENCOUNTER
Pt called requesting to speak directly to you. Tried to take a message but she stated it was a private matter. Ph# [phone].

## 2021-10-08 NOTE — TELEPHONE ENCOUNTER
CALLED TO INFORM PATIENT ABOUT HER WOUND CARE APPOINTMENT ON Friday October 15 AT 1:00 PM LOCATION IS [address] [place] [address] DIDN'T GET A RESPONCE

## 2021-10-08 NOTE — ED PROVIDER NOTES
Time: 7:22 PM EDT  Arrived by: ambulance  Chief Complaint: loss of consciousness  History provided by: ED nurse  History is limited by: altered mental status    History of Present Illness:  Patient is a 55 y.o. year old female that presents to the emergency department with loss of consciousness.     HPI    Similar Symptoms Previously: no  Recently seen: yes      Patient Care Team  Primary Care Provider: Demarcus Tripp DO    Past Medical History:     Allergies   Allergen Reactions   • Adhesive Tape Unknown - Low Severity   • Amoxicillin Unknown - High Severity   • Amoxicillin-Pot Clavulanate Unknown - Low Severity   • Cephalexin Unknown - Low Severity   • Clavulanic Acid Unknown - Low Severity   • Doxycycline Hyclate Unknown - Low Severity   • Flonase [Fluticasone] Unknown - Low Severity   • Nsaids Unknown - Low Severity   • Penicillins Unknown - Low Severity   • Potassium Unknown - Low Severity     Past Medical History:   Diagnosis Date   • Anxiety    • Arm pain    • Arthritis    • Asthma    • Bladder disorder    • Bronchitis    • Chronic allergic rhinitis    • Chronic pain syndrome     Reflex Sympathetic Dystrophy, left arm.   • Cirrhosis of liver ([facility])    • COPD (chronic obstructive pulmonary disease) ([facility])    • CRPS (complex regional pain syndrome), type I, upper 12/27/2012   • Depression    • Diabetes mellitus type 1 ([facility])    • Elevated INR 7/1/2021   • Elevated LFTs 7/1/2021   • Encephalopathy, hepatic ([facility]) 7/1/2021   • Essential hypertension, benign    • Fracture    • GERD (gastroesophageal reflux disease)    • Hand pain    • Heel pain    • Hyperthyroidism    • Iatrogenic hypothyroidism    • Leg pain    • Leg swelling    • Limb swelling    • Mood disorder ([facility])    • Mood disorder ([facility])    • Nausea    • RSD (reflex sympathetic dystrophy)    • Seasonal allergies    • Sepsis due to pneumonia ([facility]) 7/1/2021   • Shortness of Air    • Toe fracture     [id number]     Past Surgical History:   Procedure Laterality  "Date   • ABDOMINAL HYSTERECTOMY     • CARPAL TUNNEL RELEASE     •  SECTION     • CHOLECYSTECTOMY     • COLONOSCOPY     • GALLBLADDER SURGERY     • HAND SURGERY     • HEMORRHOIDECTOMY     • HERNIA REPAIR     • HIATAL HERNIA REPAIR     • INCISION AND DRAINAGE ABSCESS     • THYROIDECTOMY      by Dr. Anthony   • TONSILLECTOMY     • TUBAL ABDOMINAL LIGATION       Family History   Problem Relation Age of Onset   • Arthritis Mother    • Heart disease Father    • Arthritis Father    • Diabetes Sister         Unspecified   • Arthritis Sister    • Osteoporosis Sister    • Diabetes Brother         Unspecified   • Other Brother         Renal Calculus   • Diabetes Maternal Uncle         Unspecified       Home Medications:  Prior to Admission medications    Medication Sig Start Date End Date Taking? Authorizing Provider   Accu-Chek FastClix Lancets misc 1 each by Other route 4 (Four) Times a Day. 21   Kirby Sandhu MD   Accu-Chejennifer Guide test strip by Other route 4 (Four) Times a Day. Use to test blood sugar 4 times daily 21   Kirby Sandhu MD   albuterol (ACCUNEB) 1.25 MG/3ML nebulizer solution Take 3 mL by nebulization Every 6 (Six) Hours As Needed for Wheezing for up to 360 days. 21  Demarcus Tripp DO   aluminum sulfate-calcium acetate (DOMEBORO) topical packet Apply 1 packet topically to the appropriate area as directed Every 8 (Eight) Hours. 21   Mateo Nevarez DO   aspirin 81 MG EC tablet Take 1 tablet by mouth Daily. 21   Mateo Nevarez DO   atorvastatin (LIPITOR) 80 MG tablet Take 1 tablet by mouth Every Night. 21   Mateo Nevarez DO   busPIRone (BUSPAR) 10 MG tablet Take 10 mg by mouth 2 (Two) Times a Day. 7/15/21   Kirby Sandhu MD   Ca Alginate-Carboxymethylcell (Maxorb Extra 2\"x2\") pads Apply 10 each topically 2 (two) times a day. Indications: Bed Sores 10/5/21   Demarcus Tripp DO   cetirizine (zyrTEC) 10 MG tablet Take 1 tablet by mouth Daily. 21   " Demarcus Tripp DO   cyclobenzaprine (FLEXERIL) 10 MG tablet Take 10 mg by mouth 3 (Three) Times a Day As Needed. 7/30/21   Kirby Sandhu MD   diazePAM (Valium) 5 MG tablet Take 1 tablet by mouth Every 8 (Eight) Hours As Needed for Anxiety. 9/23/21   Demarcus Tripp DO   famotidine (PEPCID) 20 MG tablet Take 1 tablet by mouth 2 (Two) Times a Day Before Meals. 7/8/21   Mateo Nevarez DO   fluconazole (Diflucan) 150 MG tablet Take 1 tablet by mouth Daily. 9/23/21   Demarcus Tripp DO   furosemide (LASIX) 20 MG tablet Take 1 tablet by mouth Daily. 9/17/21   Demarcus Tripp DO   gabapentin (NEURONTIN) 800 MG tablet TAKE 1 TABLET BY MOUTH FOUR TIMES DAILY AS DIRECTED 7/29/21   Kirby Sandhu MD   insulin aspart (novoLOG) 100 UNIT/ML injection Inject  under the skin into the appropriate area as directed 3 (Three) Times a Day Before Meals. Through the Dexcom device    Kirby Sandhu MD   insulin detemir (LEVEMIR) 100 UNIT/ML injection Inject 30 Units under the skin into the appropriate area as directed 2 (Two) Times a Day. 7/7/21   Mateo Nevarez DO   insulin patient supplied pump Inject  under the skin into the appropriate area as directed Continuous. Waiting for Dr. Hendrickson office to fax us insulin pump info    Kirby Sandhu MD   lactulose (CHRONULAC) 10 GM/15ML solution Take 15 mL by mouth 3 (Three) Times a Day. 7/7/21   Mateo Nevarez DO   levoFLOXacin (LEVAQUIN) 500 MG tablet TAKE 1 TABLET BY MOUTH DAILY. 10/5/21   Demarcus Tripp DO   levothyroxine (SYNTHROID, LEVOTHROID) 175 MCG tablet Take 1 tablet by mouth Daily. 7/7/21   Mateo Nevarez DO   lisinopril (PRINIVIL,ZESTRIL) 20 MG tablet Take 1 tablet by mouth Daily. 8/31/21   Demarcus Tripp DO   metoprolol succinate XL (Toprol XL) 100 MG 24 hr tablet Take 1 tablet by mouth Daily. 8/31/21   Demarcus Tripp DO   montelukast (Singulair) 10 MG tablet Take 1 tablet by mouth Every Night. 7/2/21   Demarcus Tripp DO   Narcan 4 MG/0.1ML nasal spray   "9/7/21   ProviderKirby MD   oxyCODONE (ROXICODONE) 15 MG immediate release tablet Take 15 mg by mouth 3 (Three) Times a Day.    Kirby Sandhu MD   promethazine (PHENERGAN) 25 MG tablet Take 1 tablet by mouth Daily. 7/8/21   Demarcus Tripp DO   silver sulfadiazine (Silvadene) 1 % cream Apply  topically to the appropriate area as directed Daily As Needed for Wound Care. 7/20/21   Demarcus Tripp DO   spironolactone (ALDACTONE) 50 MG tablet Take 1 tablet by mouth Daily. 7/8/21   Mateo Nevarez DO   thiamine (thiamine) 100 MG tablet tablet Take 1 tablet by mouth Daily. 7/7/21   Mateo Nevarez DO   zinc oxide (DESITIN) 40 % paste paste Apply  topically to the appropriate area as directed Daily As Needed (break down). 7/7/21   Mateo Nevarez DO   zinc sulfate (ZINCATE) 220 (50 Zn) MG capsule Take 1 capsule by mouth Daily. 7/8/21   Mateo Nevarez DO        Social History:   Social History     Tobacco Use   • Smoking status: Current Every Day Smoker     Packs/day: 1.00     Years: 39.00     Pack years: 39.00     Start date: 1982   • Smokeless tobacco: Never Used   Vaping Use   • Vaping Use: Never used   Substance Use Topics   • Alcohol use: Never   • Drug use: Never     Recent travel: not applicable     Review of Systems:  Review of Systems   Unable to perform ROS: Mental status change        Physical Exam:  /49 (BP Location: Right arm, Patient Position: Lying)   Pulse 98   Temp 98.1 °F (36.7 °C) (Oral)   Resp 18   Ht 165.1 cm (65\")   Wt 83.5 kg (184 lb 1.4 oz)   LMP  (LMP Unknown)   SpO2 90%   BMI 30.63 kg/m²     Physical Exam  Vitals and nursing note reviewed.   Constitutional:       General: She is not in acute distress.     Appearance: Normal appearance. She is not toxic-appearing.   HENT:      Head: Normocephalic and atraumatic.      Nose: Nose normal.      Mouth/Throat:      Mouth: Mucous membranes are moist.   Eyes:      General: Lids are normal. No scleral icterus.     Conjunctiva/sclera: " Conjunctivae normal.   Cardiovascular:      Rate and Rhythm: Normal rate and regular rhythm.      Pulses: Normal pulses.      Heart sounds: Normal heart sounds. No murmur heard.     Pulmonary:      Effort: Pulmonary effort is normal. No respiratory distress.      Breath sounds: Normal breath sounds and air entry. No decreased air movement. No decreased breath sounds, wheezing, rhonchi or rales.   Chest:      Chest wall: No tenderness.   Abdominal:      Palpations: Abdomen is soft.      Tenderness: There is no abdominal tenderness. There is no guarding or rebound.   Musculoskeletal:         General: No tenderness. Normal range of motion.      Cervical back: Normal range of motion and neck supple. No tenderness.      Right lower leg: No edema.      Left lower leg: No edema.   Skin:     General: Skin is warm and dry.      Findings: No rash.      Comments: Large stage 4 decubitus ulcer to sacral with 3 open areas.    Neurological:      Mental Status: She is alert.      GCS: GCS eye subscore is 2. GCS verbal subscore is 2. GCS motor subscore is 5.      Sensory: Sensation is intact.      Motor: Motor function is intact.      Comments: Moves all extremities. Full neuro exam couldn't be performed secondary to altered mental status.    Psychiatric:         Behavior: Behavior normal.                Medications in the Emergency Department:  Medications   sodium chloride 0.9 % flush 10 mL (has no administration in time range)   levothyroxine (SYNTHROID, LEVOTHROID) tablet 175 mcg (175 mcg Oral Not Given 10/9/21 0618)   sodium chloride 0.9 % flush 10 mL (10 mL Intravenous Given 10/9/21 2056)   sodium chloride 0.9 % flush 10 mL (has no administration in time range)   aluminum-magnesium hydroxide-simethicone (MAALOX MAX) 400-400-40 MG/5ML suspension 15 mL (has no administration in time range)   melatonin tablet 5 mg (5 mg Oral Given 10/9/21 2340)   lactated ringers infusion (150 mL/hr Intravenous New Bag 10/9/21 2055)    ondansetron (ZOFRAN) injection 4 mg (4 mg Intravenous Given 10/9/21 0831)   Pharmacy to dose vancomycin (has no administration in time range)   Pharmacy to Dose meropenem (MERREM) (has no administration in time range)   clindamycin (CLEOCIN) 900 mg in dextrose 5% 50 mL IVPB (premix) (900 mg Intravenous Not Given 10/9/21 1812)   vancomycin 1000 mg/250 mL 0.9% NS IVPB ([facility]) (1,000 mg Intravenous New Bag 10/9/21 1512)   lactated ringers infusion ( Intravenous Stopped 10/9/21 0922)   collagenase ointment 1 application (1 application Topical Not Given 10/9/21 1137)   multivitamin with minerals 1 tablet (1 tablet Oral Not Given 10/9/21 1506)   meropenem (MERREM) 500mg/100 mL 0.9% NS IVPB (mbp) (500 mg Intravenous New Bag 10/9/21 1835)   HYDROcodone-acetaminophen (NORCO) 5-325 MG per tablet 1 tablet (1 tablet Oral Given 10/9/21 2340)   HYDROcodone-acetaminophen (NORCO)  MG per tablet 1 tablet (has no administration in time range)   morphine injection 2 mg (2 mg Intravenous Given 10/9/21 1757)   meropenem (MERREM) 1 g/100 mL 0.9% NS VTB (mbp) (0 g Intravenous Stopped 10/8/21 2101)   vancomycin 1750 mg/500 mL 0.9% NS IVPB ([facility]) (0 mg/kg × 83.5 kg Intravenous Stopped 10/8/21 2252)   sodium chloride 0.9 % bolus 1,000 mL (0 mL Intravenous Stopped 10/8/21 2101)   sodium chloride 0.9 % bolus 500 mL (500 mL Intravenous New Bag 10/9/21 0016)   lactated ringers infusion 1,000 mL (1,000 mL Intravenous New Bag 10/9/21 0411)   clindamycin (CLEOCIN) 900 mg in dextrose 5% 50 mL IVPB (premix) (900 mg Intravenous New Bag 10/9/21 0423)        Labs  Lab Results (last 24 hours)     Procedure Component Value Units Date/Time    Basic Metabolic Panel [[id number]]  (Abnormal) Collected: 10/09/21 0623    Specimen: Blood Updated: 10/09/21 0706     Glucose 220 mg/dL      BUN 37 mg/dL      Creatinine 2.07 mg/dL      Sodium 137 mmol/L      Potassium 4.2 mmol/L      Chloride 104 mmol/L      CO2 19.8 mmol/L      Calcium 6.8 mg/dL      eGFR Non   Amer 25 mL/min/1.73      BUN/Creatinine Ratio 17.9     Anion Gap 13.2 mmol/L     Narrative:      GFR Normal >60  Chronic Kidney Disease <60  Kidney Failure <15      CBC Auto Differential [[id number]]  (Abnormal) Collected: 10/09/21 0623    Specimen: Blood Updated: 10/09/21 0633     WBC 11.77 10*3/mm3      RBC 2.66 10*6/mm3      Hemoglobin 7.3 g/dL      Hematocrit 23.7 %      MCV 89.1 fL      MCH 27.4 pg      MCHC 30.8 g/dL      RDW 16.1 %      RDW-SD 52.3 fl      MPV 12.8 fL      Platelets 119 10*3/mm3      Neutrophil % 78.7 %      Lymphocyte % 11.0 %      Monocyte % 8.8 %      Eosinophil % 0.3 %      Basophil % 0.4 %      Immature Grans % 0.8 %      Neutrophils, Absolute 9.25 10*3/mm3      Lymphocytes, Absolute 1.30 10*3/mm3      Monocytes, Absolute 1.04 10*3/mm3      Eosinophils, Absolute 0.04 10*3/mm3      Basophils, Absolute 0.05 10*3/mm3      Immature Grans, Absolute 0.09 10*3/mm3      nRBC 0.0 /100 WBC     Magnesium [[id number]]  (Normal) Collected: 10/09/21 0623    Specimen: Blood Updated: 10/09/21 0706     Magnesium 1.7 mg/dL     Vancomycin, Random [[id number]]  (Normal) Collected: 10/09/21 0623    Specimen: Blood Updated: 10/09/21 0700     Vancomycin Random 22.10 mcg/mL     Narrative:      Therapeutic Ranges for Vancomycin    Vancomycin Random   5.0-40.0 mcg/mL  Vancomycin Trough   5.0-20.0 mcg/mL  Vancomycin Peak     20.0-40.0 mcg/mL    Lactic Acid, Plasma [[id number]]  (Normal) Collected: 10/09/21 0623    Specimen: Blood Updated: 10/09/21 0657     Lactate 2.0 mmol/L     Hepatitis Panel, Acute [[id number]]  (Normal) Collected: 10/09/21 0623    Specimen: Blood Updated: 10/09/21 1306     Hepatitis B Surface Ag Non-Reactive     Hep A IgM Non-Reactive     Hep B C IgM Non-Reactive     Hepatitis C Ab Non-Reactive    Narrative:      Results may be falsely decreased if patient taking Biotin.     Ammonia [[id number]]  (Abnormal) Collected: 10/09/21 0623    Specimen: Blood Updated: 10/09/21 0700     Ammonia 72  umol/L     POC Glucose Once [[id number]]  (Abnormal) Collected: 10/09/21 0752    Specimen: Blood Updated: 10/09/21 0753     Glucose 214 mg/dL      Comment: Serial Number: [id number]:  [id number]       Anaerobic Culture - Wound, Sacrum [[id number]] Collected: 10/09/21 0909    Specimen: Wound from Sacrum Updated: 10/09/21 0948    Wound Culture - Wound, Sacrum [[id number]] Collected: 10/09/21 0909    Specimen: Wound from Sacrum Updated: 10/09/21 1301     Gram Stain Occasional Gram positive cocci in pairs and clusters    AFB Culture - Wound, Sacrum [[id number]] Collected: 10/09/21 0909    Specimen: Wound from Sacrum Updated: 10/09/21 0948    POC Glucose Once [[id number]]  (Abnormal) Collected: 10/09/21 1029    Specimen: Blood Updated: 10/09/21 1031     Glucose 190 mg/dL      Comment: Serial Number: [id number]:  [id number]              Imaging:  No Radiology Exams Resulted Within Past 24 Hours    Procedures:  Procedures    Progress  ED Course as of 10/10/21 0154   Fri Oct 08, 2021   1930 Sepsis criteria was met in the emergency department and the Sepsis protocol (including antibiotic administration) was initiated.      SIRS criteria considered:   1.  Temperature > 100.4 or <98.6    2.  Heart Rate > 90    3.  Respiratory Rate > 22    4.  WBC > 12K or <4K.             Severe Sepsis:     Respiratory: Mechanical Ventilation or Bipap  Hypotension: SBP > 90 or MAP < 65  Renal: Creatinine > 2  Metabolic: Lactic Acid > 2  Hematologic: Platelets < 100K or INR > 1.5  Hepatic: BILI  >  2  CNS: Sudden AMS     Septic Shock:     Severe Sepsis + Persistent hypotension or Lactic Acid > 4     Normal saline bolus, Antibiotics, and final disposition was based on these definitions.        Sepsis was recognized at 19:31 EDT      Antibiotics were ordered.       30 cc/kg bolus was not indicated due to history of congestive heart failure. I did order 1 L normal saline bolus, but held off on full 30 mL/kg at this time. Will  reassess.      Patient did not receive the recommended 30ml/kg fluid bolus for sepsis because it would be harmful or detrimental to the patient.    The patient has End Stage Heart Failure with symptoms at rest or minimal exertion.   The patient was ordered 1 L of fluids.    Total Critical Care time of 45 minutes. Total critical care time documented does not include time spent on separately billed procedures for services of nurses or physician assistants. I personally saw and examined the patient. I have reviewed all diagnostic interpretations and treatment plans as written. I was present for the key portions of any procedures performed and the inclusive time noted in any critical care statement. Critical care time includes patient management by me, time spent at the patients bedside,  time to review lab and imaging results, discussing patient care, documentation in the medical record, and time spent with family or caregiver.  [RP]   1934 Abx ordered with pharmacy consultation. [RP]   2136 Pt re-assessed at this time. Pt BP was 100/60 and pulse in the 80s. Pt is more alert.  [JW]   2248 MAP consistently greater than 65. [RP]      ED Course User Index  [JW] Michaela Luna  [RP] Dwayne Hunt MD                            Medical Decision Making:  MDM  Number of Diagnoses or Management Options     Amount and/or Complexity of Data Reviewed  Clinical lab tests: reviewed  Tests in the radiology section of CPT®: reviewed  Decide to obtain previous medical records or to obtain history from someone other than the patient: yes  Obtain history from someone other than the patient: yes (ED nurse states pt was found at 1600 on the couch and family couldn't wake her so they notified EMS. )    Risk of Complications, Morbidity, and/or Mortality  Presenting problems: high  Management options: high    Critical Care  Total time providing critical care: 30-74 minutes    Patient Progress  Patient progress: stable       Final  diagnoses:   Severe sepsis (HCC)        Disposition:  ED Disposition     ED Disposition Condition Comment    Decision to Admit  Level of Care: Telemetry [5]   Diagnosis: Sepsis (HCC) [[id number]]   Admitting Physician: DINA SALAZAR [[id number]]   Attending Physician: DINA SALAZAR [[id number]]   Isolate for COVID?: Yes [1]   Certification: I Certify That Inpatient Hospital Services Are Medically Necessary For Greater Than 2 Midnights            This medical record created using voice recognition software and may contain unintended errors.    Documentation assistance provided by Michaela Luna acting as scribe for Dwayne Hunt MD. Information recorded by the scribe was done at my direction and has been verified and validated by me.          Michaela Luna  10/08/21 1932       Michaela Luna  10/08/21 1933       Dwayne Hunt MD  10/10/21 0155

## 2021-10-09 PROBLEM — A41.9 SEVERE SEPSIS (HCC): Status: ACTIVE | Noted: 2021-01-01

## 2021-10-09 PROBLEM — M79.89 NECROTIZING SOFT TISSUE INFECTION: Status: ACTIVE | Noted: 2021-01-01

## 2021-10-09 PROBLEM — R65.20 SEVERE SEPSIS: Status: ACTIVE | Noted: 2021-01-01

## 2021-10-09 PROBLEM — A41.9 SEPSIS (HCC): Status: ACTIVE | Noted: 2021-01-01

## 2021-10-09 NOTE — H&P (VIEW-ONLY)
General Surgery/Colorectal Surgery Note    Patient Name:  Amy Miles  YOB: 1965  [id number]    Referring Provider: No ref. provider found      Patient Care Team:  Demarcus Tripp DO as PCP - General (Family Medicine)    Chief complaint infection    Subjective .     History of present illness:    She has a history of multiple medical comorbidities including cirrhosis, hypertension, type 2 diabetes mellitus, COPD.  She has a history of a sacral decubitus.  She came to the emergency department after a syncopal episode.  She was found to be febrile with a T-max of 101.  Hypotensive with systolic pressures in 80s.  Work-up with labs CRP 6, lactic acid 4.7, creatinine 3.1, AST 1286, , total bilirubin 0.6, WBC 12, hemoglobin 8, platelets 189.  UA nitrite negative  Lactic acid down to 2.4  CT abdomen and pelvis with images personally reviewed with soft tissue gas noted in the right buttocks concerning for necrotizing soft tissue infection  CT chest with right-sided atelectasis.  Currently on meropenem, vancomycin.  Discussed with admitting provider and recommended to add clindamycin.  Patient moans when I asked her questions.  No family at the bedside.    History:  Past Medical History:   Diagnosis Date   • Anxiety    • Arm pain    • Arthritis    • Asthma    • Bladder disorder    • Bronchitis    • Chronic allergic rhinitis    • Chronic pain syndrome     Reflex Sympathetic Dystrophy, left arm.   • Cirrhosis of liver (HCC)    • COPD (chronic obstructive pulmonary disease) (HCC)    • CRPS (complex regional pain syndrome), type I, upper 12/27/2012   • Depression    • Diabetes mellitus type 1 (HCC)    • Elevated INR 7/1/2021   • Elevated LFTs 7/1/2021   • Encephalopathy, hepatic (HCC) 7/1/2021   • Essential hypertension, benign    • Fracture    • GERD (gastroesophageal reflux disease)    • Hand pain    • Heel pain    • Hyperthyroidism    • Iatrogenic hypothyroidism    • Leg pain    • Leg swelling    •  Limb swelling    • Mood disorder (HCC)    • Mood disorder (HCC)    • Nausea    • RSD (reflex sympathetic dystrophy)    • Seasonal allergies    • Sepsis due to pneumonia (CMS/HCC) 2021   • Shortness of Air    • Toe fracture            Past Surgical History:   Procedure Laterality Date   • ABDOMINAL HYSTERECTOMY     • CARPAL TUNNEL RELEASE     •  SECTION     • CHOLECYSTECTOMY     • COLONOSCOPY     • GALLBLADDER SURGERY     • HAND SURGERY     • HEMORRHOIDECTOMY     • HERNIA REPAIR     • HIATAL HERNIA REPAIR     • INCISION AND DRAINAGE ABSCESS     • THYROIDECTOMY      by Dr. Anthony   • TONSILLECTOMY     • TUBAL ABDOMINAL LIGATION         Family History   Problem Relation Age of Onset   • Arthritis Mother    • Heart disease Father    • Arthritis Father    • Diabetes Sister         Unspecified   • Arthritis Sister    • Osteoporosis Sister    • Diabetes Brother         Unspecified   • Other Brother         Renal Calculus   • Diabetes Maternal Uncle         Unspecified       Social History     Tobacco Use   • Smoking status: Current Every Day Smoker     Packs/day: 1.00     Years: 39.00     Pack years: 39.00     Start date:    • Smokeless tobacco: Never Used   Vaping Use   • Vaping Use: Never used   Substance Use Topics   • Alcohol use: Never   • Drug use: Never       Review of Systems  All systems were reviewed and negative except for:   Review of Systems   Constitutional: Negative for chills, fever and unexpected weight loss.   HENT: Negative for congestion, nosebleeds and voice change.    Eyes: Negative for blurred vision, double vision and discharge.   Respiratory: Negative for apnea, chest tightness and shortness of breath.    Cardiovascular: Negative for chest pain and leg swelling.   Gastrointestinal:        See HPI   Endocrine: Negative for cold intolerance and heat intolerance.   Genitourinary: Negative for dysuria, hematuria and urgency.   Musculoskeletal: Negative for back pain, joint  "swelling and neck pain.   Skin: Negative for color change and dry skin.  See HPI  Neurological: Negative for dizziness and confusion.   Hematological: Negative for adenopathy.   Psychiatric/Behavioral: Negative for agitation and behavioral problems.     MEDS:  Prior to Admission medications    Medication Sig Start Date End Date Taking? Authorizing Provider   Accu-Chek FastClix Lancets misc 1 each by Other route 4 (Four) Times a Day. 7/19/21   Kirby Sandhu MD   Accu-Chejennifer Guide test strip by Other route 4 (Four) Times a Day. Use to test blood sugar 4 times daily 5/17/21   Kirby Sandhu MD   albuterol (ACCUNEB) 1.25 MG/3ML nebulizer solution Take 3 mL by nebulization Every 6 (Six) Hours As Needed for Wheezing for up to 360 days. 8/2/21 7/28/22  Demarcus Tripp DO   aluminum sulfate-calcium acetate (DOMEBORO) topical packet Apply 1 packet topically to the appropriate area as directed Every 8 (Eight) Hours. 7/7/21   Mateo Nevarez DO   aspirin 81 MG EC tablet Take 1 tablet by mouth Daily. 7/8/21   Mateo Nevarez DO   atorvastatin (LIPITOR) 80 MG tablet Take 1 tablet by mouth Every Night. 7/7/21   Mateo Nevarez DO   busPIRone (BUSPAR) 10 MG tablet Take 10 mg by mouth 2 (Two) Times a Day. 7/15/21   Kirby Sandhu MD   Ca Alginate-Carboxymethylcell (Maxorb Extra 2\"x2\") pads Apply 10 each topically 2 (two) times a day. Indications: Bed Sores 10/5/21   Demarcus Tripp DO   cetirizine (zyrTEC) 10 MG tablet Take 1 tablet by mouth Daily. 7/2/21   Demarcus Tripp DO   cyclobenzaprine (FLEXERIL) 10 MG tablet Take 10 mg by mouth 3 (Three) Times a Day As Needed. 7/30/21   Kirby Sandhu MD   diazePAM (Valium) 5 MG tablet Take 1 tablet by mouth Every 8 (Eight) Hours As Needed for Anxiety. 9/23/21   Demarcus Tripp DO   famotidine (PEPCID) 20 MG tablet Take 1 tablet by mouth 2 (Two) Times a Day Before Meals. 7/8/21   Mateo Nevarez DO   fluconazole (Diflucan) 150 MG tablet Take 1 tablet by mouth Daily. 9/23/21  "  Demarcus Tripp DO   furosemide (LASIX) 20 MG tablet Take 1 tablet by mouth Daily. 9/17/21   Demarcus Tripp DO   gabapentin (NEURONTIN) 800 MG tablet TAKE 1 TABLET BY MOUTH FOUR TIMES DAILY AS DIRECTED 7/29/21   Kirby Sandhu MD   insulin aspart (novoLOG) 100 UNIT/ML injection Inject  under the skin into the appropriate area as directed 3 (Three) Times a Day Before Meals. Through the Dexcom device    Kirby Sandhu MD   insulin detemir (LEVEMIR) 100 UNIT/ML injection Inject 30 Units under the skin into the appropriate area as directed 2 (Two) Times a Day. 7/7/21   Mateo Nevarez DO   insulin patient supplied pump Inject  under the skin into the appropriate area as directed Continuous. Waiting for Dr. Hendrickson office to fax us insulin pump info    Kirby Sandhu MD   lactulose (CHRONULAC) 10 GM/15ML solution Take 15 mL by mouth 3 (Three) Times a Day. 7/7/21   Mateo Nevarez DO   levoFLOXacin (LEVAQUIN) 500 MG tablet TAKE 1 TABLET BY MOUTH DAILY. 10/5/21   Demarcus Tripp DO   levothyroxine (SYNTHROID, LEVOTHROID) 175 MCG tablet Take 1 tablet by mouth Daily. 7/7/21   Mateo Nevarez DO   lisinopril (PRINIVIL,ZESTRIL) 20 MG tablet Take 1 tablet by mouth Daily. 8/31/21   Demarcus Tripp DO   metoprolol succinate XL (Toprol XL) 100 MG 24 hr tablet Take 1 tablet by mouth Daily. 8/31/21   Demarcus Tripp DO   montelukast (Singulair) 10 MG tablet Take 1 tablet by mouth Every Night. 7/2/21   Demarcus Tripp DO   Narcan 4 MG/0.1ML nasal spray  9/7/21   Kirby Sandhu MD   oxyCODONE (ROXICODONE) 15 MG immediate release tablet Take 15 mg by mouth 3 (Three) Times a Day.    Kirby Sandhu MD   promethazine (PHENERGAN) 25 MG tablet Take 1 tablet by mouth Daily. 7/8/21   Demarcus Tripp DO   silver sulfadiazine (Silvadene) 1 % cream Apply  topically to the appropriate area as directed Daily As Needed for Wound Care. 7/20/21   Demarcus Tripp DO   spironolactone (ALDACTONE) 50 MG tablet Take 1 tablet by  mouth Daily. 7/8/21   Mateo Nevarez, DO   thiamine (thiamine) 100 MG tablet tablet Take 1 tablet by mouth Daily. 7/7/21   Mateo Nevarez, DO   zinc oxide (DESITIN) 40 % paste paste Apply  topically to the appropriate area as directed Daily As Needed (break down). 7/7/21   Mateo Nevarez, DO   zinc sulfate (ZINCATE) 220 (50 Zn) MG capsule Take 1 capsule by mouth Daily. 7/8/21   Mateo Nevarez, DO        clindamycin, 900 mg, Intravenous, Q8H  levothyroxine, 175 mcg, Oral, QAM  meropenem, 500 mg, Intravenous, Q12H  sodium chloride, 10 mL, Intravenous, Q12H         lactated ringers, 150 mL/hr, Last Rate: 150 mL/hr (10/09/21 0411)  Pharmacy to Dose meropenem (MERREM),   Pharmacy to dose vancomycin,          Allergies:  Adhesive tape, Amoxicillin, Amoxicillin-pot clavulanate, Cephalexin, Clavulanic acid, Doxycycline hyclate, Flonase [fluticasone], Nsaids, Penicillins, and Potassium    Objective     Vital Signs   Temp:  [97.5 °F (36.4 °C)-101.1 °F (38.4 °C)] 97.5 °F (36.4 °C)  Heart Rate:  [84-89] 88  Resp:  [18-22] 18  BP: ()/(46-78) 103/50    Physical Exam:     General Appearance:    Alert, cooperative, in no acute distress   Head:    Normocephalic, without obvious abnormality, atraumatic   Eyes:          Conjunctivae and sclerae normal, no icterus,     Ears:    Ears appear intact with no abnormalities noted   Throat:   No oral lesions, no thrush, oral mucosa moist   Neck:   No adenopathy, supple, trachea midline, no thyromegaly   Back:     No kyphosis present, no scoliosis present, no skin lesions,      erythema or scars, no tenderness to percussion or                   palpation,   range of motion normal   Lungs:     Clear to auscultation,respirations regular, even and                  unlabored    Heart:    Regular rhythm and normal rate, normal S1 and S2, no            murmur, no gallop, no rub, no click   Chest Wall:    No abnormalities observed   Abdomen:     Normal bowel sounds, no masses, no organomegaly, soft         non-tender, non-distended, no guarding, no rebound                tenderness   Rectal:        Extremities:   Moves all extremities well, no edema, no cyanosis, no             redness   Pulses:   Pulses palpable and equal bilaterally   Skin:   No bleeding, bruising or rash, unstageable sacral decubitus with foul-smelling drainage, no crepitus   Lymph nodes:   No palpable adenopathy   Neurologic:   A/o x 4 with no deficits       Results Review: I have reviewed the patient's labs and imaging    LABS/IMAGING:    Imaging Results (Last 72 Hours)     Procedure Component Value Units Date/Time    CT Chest Without Contrast Diagnostic [[id number]] Collected: 10/09/21 0133     Updated: 10/09/21 0354    Narrative:      PROCEDURE: CT CHEST WO CONTRAST DIAGNOSTIC     COMPARISONS: [facility], CR, XR CHEST 1 VW, 7/01/2021, 12:23.     [facility], CT, CT ABDOMEN PELVIS WO CONTRAST, 10/09/2021, 1:08.     [facility], CR, XR CHEST 1 VW, 10/08/2021, 19:19.     [facility], CT, CHEST W/ CONTRAST, 11/05/2020, 11:22.     INDICATIONS: Possible mass, pneumonia, and/or CHF.  Possible sepsis.     TECHNIQUE: 577 CT images were created without the administration of contrast material.       PROTOCOL:   Standard imaging protocol performed      RADIATION:   Automated exposure control was utilized to minimize radiation dose.      FINDINGS: The patient has undergone thyroidectomy.  There is motion artifact on the study.    Atherosclerotic change is noted, including involvement of the coronary arteries.  There is mild   cardiomegaly.  There is a small right pleural effusion.  No definite left pleural effusion.  No   pericardial effusion.  There are small to moderate sized mediastinal and hilar lymph nodes.  These   findings are nonspecific.  They may be reactive in nature.  Similar findings were seen previously.    No pneumothorax.  No pneumomediastinum.  Atelectasis and/or infiltrate(s) is (are) seen on the    right.  Subsegmental atelectasis is favored, especially in the right lung base.  There may be   slight chronic asymmetric elevation of the right diaphragm.  Mild subsegmental atelectasis is   suspected on the left.  Probably no acute infiltrate is appreciated on the left.  There is chronic   calcified granulomatous disease of the chest, spleen, and liver.  Mild degenerative changes involve   the imaged spine.  No acute fracture or aggressive osseous lesion is identified.  The increased   density on the recent chest radiograph from 10/8/2021 involving the right superior mediastinum is   thought to be related to low lung volumes and vascular structures, such as the superior vena cava   (SVC), azygos arch, and the thoracic aorta (especially the ascending aorta and the aortic arch).    No definite nonenhanced CT evidence of a mediastinal or right parahilar mass.  The patient has   undergone cholecystectomy.  A small hiatal hernia is possible.  The patient's upper extremities in   the scan field of view obscure detail.     CONCLUSION: Atelectasis and/or infiltrate(s) is (are) seen on the right with atelectasis favored.    The CT imaging features are atypical or uncommonly reported for COVID-19 pneumonia.  Alternative   diagnoses should be considered.    There is mild cardiac enlargement.  Atherosclerotic change is   seen, including involvement the coronary arteries.  There is a small right pleural effusion.    Please see above comments for further detail.          ISATU BROWN JR, MD         Electronically Signed and Approved By: ISATU BROWN JR, MD on 10/09/2021 at 1:40                     CT Abdomen Pelvis Without Contrast [[id number]] Collected: 10/09/21 0157     Updated: 10/09/21 0354    Narrative:      PROCEDURE: CT ABDOMEN PELVIS WO CONTRAST     COMPARISONS: [facility], CT, ABDOMEN/PELVIS W/ CONTRAST, 8/18/2018, 19:22.     [facility], CT, CHEST W/ CONTRAST, 11/05/2020, 11:22.      INDICATIONS: Sepsis; lactic acidosis.     TECHNIQUE: 553 CT images were created without intravenous contrast.       PROTOCOL:   Standard imaging protocol performed.      RADIATION:   Automated exposure control was utilized to minimize radiation dose.      FINDINGS: Motion artifact obscures detail on the study.  The liver has a cirrhotic contour.  There   is splenomegaly.  Portal hypertension and portosystemic venous shunting may be present.  The   patient has undergone cholecystectomy.  Atherosclerotic changes are present.  No aneurysmal   dilatation of the aortoiliac arterial system is identified.  There is a small right pleural   effusion.  No left pleural effusion is seen.  Atelectasis and/or infiltrate(s) is (are) seen in the   right lung base.  No definite nonenhanced CT evidence of acute pancreatitis.  No renal stones or   hydronephrosis or obstructive uropathy.  Surgical clips are seen in the anterior abdominal wall and   suggest prior ventral hernia repair.  No pneumoperitoneum or pneumatosis.  There may be a small   hiatal hernia.  The appendix is not clearly identified.  Please correlate with the surgical   history.  The patient has undergone hysterectomy.   A urinary bladder catheter is in place.  The   urinary bladder is underdistended.       There is subcutaneous emphysema and increased attenuation of the soft tissues involving the medial   aspect of the right buttock, such as seen on axial images 95 through 120 of series 202.  Foci of   gas may be within a central area of fluid (as with abscess).  An infectious/inflammatory phlegmon   or inflammatory mass is possible.  The extent of The greatest measured extent of the findings is   12.5 x 7.7 x 7 cm in craniocaudal, transverse, and anteroposterior (AP) extent, respectively, as   seen on image 94 of series 205, image 110 of series 202, and image 141 of series 203.  Again, the   findings may represent an abscess.  Infectious/inflammatory  myositis/cellulitis/fasciitis,   including necrotizing.  Please correlate with physical exam findings in this region as well as the   clinical history.  Adjacent osteomyelitis of the sacrum and/or coccyx cannot be excluded.  An   associated cutaneous sinus tract cannot be excluded.     There is formed stool throughout the colon suggesting fecal stasis as with constipation.  No   definite stercoral ulceration is seen.  No pneumoperitoneum or pneumatosis.  No definite   nonenhanced CT evidence of a sinus tract or fistula involving the rectosigmoid colon.  Sclerotic   changes involve the sacroiliac joints.  There are degenerative changes of the imaged spine.    Degenerative changes also involve the bilateral hip joints.  No definite acute fracture or acute   malalignment is seen.       CONCLUSION:      1. An age-indeterminate infectious/inflammatory process with associated subcutaneous gas is seen   involving the medial aspect of the right buttock and medial posterior right pelvic wall (overlying   the right sacrum and coccyx).  It involves the right gluteal musculature and the overlying   superficial subcutaneous tissues.  It does not appear to extend into the right ischiorectal fossa.    Soft tissue gas may indicate an age-indeterminate anaerobic infectious process, including (but not   necessarily limited to) necrotizing fasciitis, right gluteal myositis, and/or cellulitis.  An   associated phlegmon or abscess is possible.  Underlying osteomyelitis of the sacrum and/or coccyx   cannot be excluded.  No definite nonenhanced CT evidence of an associated sinus tract or fistula   involving the rectosigmoid colon.  A cutaneous sinus tract is possible, however.  Again, an abscess   or phlegmon involving the medial right buttock is possible.  It may measure as great is 12.5 cm in   craniocaudal extent.  The findings may represent a complication of a sacral decubitus ulcer.  The   findings are thought less likely to  originate from infectious/inflammatory colitis or proctitis   involving the rectosigmoid colon.     2. Please see above comments for further detail.             Pertinent findings were discussed with Selene Canales PA-C, ordering [facility] (Hospitalist's Physician   Assistant, be at approximately 0201 hours on 10/9/2021.       ISATU BROWN JR, MD         Electronically Signed and Approved By: ISATU BROWN JR, MD on 10/09/2021 at 2:05                     XR Chest 1 View [[id number]] Collected: 10/08/21 1935     Updated: 10/08/21 1939    Narrative:      PROCEDURE: XR CHEST 1 VW     COMPARISON: [facility], CR, XR ABDOMEN KUB, 7/02/2021, 1:46.  [facility], CR, XR CHEST 1 VW, 7/01/2021, 12:23.     INDICATIONS: Severe Sepsis triage protocol     FINDINGS:   Single frontal view chest AP upright portable reveals that the heart is enlarged.  There is   questionable increased density in the right side of the superior mediastinum.  This might be caused   by anatomic variation or tortuous blood vessels or mass.  There are bilateral diffuse increased   markings consistent with chronic lung disease and bronchitis and mild congestive heart failure.    Surgical clips are seen in the right upper quadrant the abdomen consistent with cholecystectomy.    There are dilated loops of bowel in the left upper quadrant the abdomen consistent with moderate   gaseous distension of the stomach and moderate gaseous distension of the splenic flexure of the   colon.     CONCLUSION:   1. Cardiomegaly.  2. Suspicion of focal increased density in the right side of the superior mediastinum might be   caused by artifact or anatomic variation or tortuous dilated blood vessels or mass.  CT of the   chest would be helpful to further evaluate this finding if clinically indicated.  3. Diffuse increased markings especially in the right lower lobe and left lower lobe lung   consistent with chronic lung disease and bronchitis and  mild congestive heart failure.                  LAKSHMI BARNARD MD         Electronically Signed and Approved By: LAKSHMI BARNARD MD on 10/08/2021 at 19:35                            Lab Results (last 72 hours)     Procedure Component Value Units Date/Time    Basic Metabolic Panel [[id number]]  (Abnormal) Collected: 10/09/21 0623    Specimen: Blood Updated: 10/09/21 0706     Glucose 220 mg/dL      BUN 37 mg/dL      Creatinine 2.07 mg/dL      Sodium 137 mmol/L      Potassium 4.2 mmol/L      Chloride 104 mmol/L      CO2 19.8 mmol/L      Calcium 6.8 mg/dL      eGFR Non African Amer 25 mL/min/1.73      BUN/Creatinine Ratio 17.9     Anion Gap 13.2 mmol/L     Narrative:      GFR Normal >60  Chronic Kidney Disease <60  Kidney Failure <15      Magnesium [[id number]]  (Normal) Collected: 10/09/21 0623    Specimen: Blood Updated: 10/09/21 0706     Magnesium 1.7 mg/dL     Ammonia [[id number]]  (Abnormal) Collected: 10/09/21 0623    Specimen: Blood Updated: 10/09/21 0700     Ammonia 72 umol/L     Vancomycin, Random [[id number]]  (Normal) Collected: 10/09/21 0623    Specimen: Blood Updated: 10/09/21 0700     Vancomycin Random 22.10 mcg/mL     Narrative:      Therapeutic Ranges for Vancomycin    Vancomycin Random   5.0-40.0 mcg/mL  Vancomycin Trough   5.0-20.0 mcg/mL  Vancomycin Peak     20.0-40.0 mcg/mL    Lactic Acid, Plasma [[id number]]  (Normal) Collected: 10/09/21 0623    Specimen: Blood Updated: 10/09/21 0657     Lactate 2.0 mmol/L     CBC Auto Differential [[id number]]  (Abnormal) Collected: 10/09/21 0623    Specimen: Blood Updated: 10/09/21 0633     WBC 11.77 10*3/mm3      RBC 2.66 10*6/mm3      Hemoglobin 7.3 g/dL      Hematocrit 23.7 %      MCV 89.1 fL      MCH 27.4 pg      MCHC 30.8 g/dL      RDW 16.1 %      RDW-SD 52.3 fl      MPV 12.8 fL      Platelets 119 10*3/mm3      Neutrophil % 78.7 %      Lymphocyte % 11.0 %      Monocyte % 8.8 %      Eosinophil % 0.3 %      Basophil % 0.4 %      Immature Grans % 0.8 %       "Neutrophils, Absolute 9.25 10*3/mm3      Lymphocytes, Absolute 1.30 10*3/mm3      Monocytes, Absolute 1.04 10*3/mm3      Eosinophils, Absolute 0.04 10*3/mm3      Basophils, Absolute 0.05 10*3/mm3      Immature Grans, Absolute 0.09 10*3/mm3      nRBC 0.0 /100 WBC     Hepatitis Panel, Acute [[id number]] Collected: 10/09/21 0623    Specimen: Blood Updated: 10/09/21 0627    Procalcitonin [[id number]]  (Abnormal) Collected: 10/08/21 1932    Specimen: Blood Updated: 10/09/21 0409     Procalcitonin 0.36 ng/mL     Narrative:      As a Marker for Sepsis (Non-Neonates):     1. <0.5 ng/mL represents a low risk of severe sepsis and/or septic shock.  2. >2 ng/mL represents a high risk of severe sepsis and/or septic shock.    As a Marker for Lower Respiratory Tract Infections that require antibiotic therapy:  PCT on Admission     Antibiotic Therapy             6-12 Hrs later  >0.5                          Strongly Recommended            >0.25 - <0.5             Recommended  0.1 - 0.25                  Discouraged                       Remeasure/reassess PCT  <0.1                         Strongly Discouraged         Remeasure/reassess PCT      As 28 day mortality risk marker: \"Change in Procalcitonin Result\" (>80% or <=80%) if Day 0 (or Day 1) and Day 4 values are available. Refer to http://www.[employer]-pct-calculator.com    Change in PCT <=80%   A decrease of PCT levels below or equal to 80% defines a positive change in PCT test result representing a higher risk for 28-day all-cause mortality of patients diagnosed with severe sepsis or septic shock.    Change in PCT >80%   A decrease of PCT levels of more than 80% defines a negative change in PCT result representing a lower risk for 28-day all-cause mortality of patients diagnosed with severe sepsis or septic shock.    This test is Prognostic not Diagnostic, if elevated correlate with clinical findings before administering antibiotic treatment.      Results may be falsely decreased " if patient taking Biotin.     COVID-19,CEPHEID/JULIA/BDMAX,COR/MARTELL/PAD/ERICKA IN-HOUSE(OR EMERGENT/ADD-ON),NP SWAB IN TRANSPORT MEDIA 3-4 HR TAT, RT-PCR - Swab, Nasopharynx [[id number]] Collected: 10/09/21 0021    Specimen: Swab from Nasopharynx Updated: 10/09/21 0028    STAT Lactic Acid, Reflex [[id number]]  (Abnormal) Collected: 10/08/21 2255    Specimen: Blood Updated: 10/08/21 2342     Lactate 2.4 mmol/L     Lactic Acid, Plasma [[id number]]  (Abnormal) Collected: 10/08/21 1932    Specimen: Blood Updated: 10/08/21 2030     Lactate 4.7 mmol/L     Urinalysis, Microscopic Only - Urine, Catheter [[id number]]  (Abnormal) Collected: 10/08/21 1933    Specimen: Urine, Catheter Updated: 10/08/21 2028     RBC, UA 0-2 /HPF      WBC, UA 6-12 /HPF      Bacteria, UA 1+ /HPF      Squamous Epithelial Cells, UA 7-12 /HPF      Hyaline Casts, UA 0-2 /LPF      Methodology Manual Light Microscopy    Comprehensive Metabolic Panel [[id number]]  (Abnormal) Collected: 10/08/21 1932    Specimen: Blood Updated: 10/08/21 2028     Glucose 137 mg/dL      BUN 32 mg/dL      Creatinine 3.10 mg/dL      Sodium 137 mmol/L      Potassium 4.8 mmol/L      Chloride 101 mmol/L      CO2 20.9 mmol/L      Calcium 7.9 mg/dL      Total Protein 5.7 g/dL      Albumin 2.50 g/dL      ALT (SGPT) 398 U/L      AST (SGOT) 1,286 U/L      Alkaline Phosphatase 110 U/L      Total Bilirubin 0.6 mg/dL      eGFR Non African Amer 16 mL/min/1.73      Globulin 3.2 gm/dL      A/G Ratio 0.8 g/dL      BUN/Creatinine Ratio 10.3     Anion Gap 15.1 mmol/L     Narrative:      GFR Normal >60  Chronic Kidney Disease <60  Kidney Failure <15      Urinalysis With Microscopic If Indicated (No Culture) - Urine, Catheter [[id number]]  (Abnormal) Collected: 10/08/21 1933    Specimen: Urine, Catheter Updated: 10/08/21 2022     Color, UA Dark Yellow     Appearance, UA Cloudy     pH, UA <=5.0     Specific Gravity, UA 1.024     Glucose,  mg/dL (Trace)     Ketones, UA Trace     Bilirubin, UA Small  (1+)     Blood, UA Trace     Protein, UA 30 mg/dL (1+)     Leuk Esterase, UA Trace     Nitrite, UA Negative     Urobilinogen, UA 1.0 E.U./dL    CBC & Differential [[id number]]  (Abnormal) Collected: 10/08/21 1932    Specimen: Blood Updated: 10/08/21 2022    Narrative:      The following orders were created for panel order CBC & Differential.  Procedure                               Abnormality         Status                     ---------                               -----------         ------                     CBC Auto Differential[[id number]]        Abnormal            Final result               Scan Slide[[id number]]                                       Final result                 Please view results for these tests on the individual orders.    CBC Auto Differential [[id number]]  (Abnormal) Collected: 10/08/21 1932    Specimen: Blood Updated: 10/08/21 2022     WBC 12.65 10*3/mm3      RBC 3.06 10*6/mm3      Hemoglobin 8.3 g/dL      Hematocrit 26.9 %      MCV 87.9 fL      MCH 27.1 pg      MCHC 30.9 g/dL      RDW 16.1 %      RDW-SD 51.8 fl      MPV 13.4 fL      Platelets 189 10*3/mm3      Neutrophil % 71.4 %      Lymphocyte % 15.5 %      Monocyte % 11.6 %      Eosinophil % 0.2 %      Basophil % 0.2 %      Immature Grans % 1.1 %      Neutrophils, Absolute 9.02 10*3/mm3      Lymphocytes, Absolute 1.96 10*3/mm3      Monocytes, Absolute 1.47 10*3/mm3      Eosinophils, Absolute 0.03 10*3/mm3      Basophils, Absolute 0.03 10*3/mm3      Immature Grans, Absolute 0.14 10*3/mm3      nRBC 0.0 /100 WBC     Scan Slide [[id number]] Collected: 10/08/21 1932    Specimen: Blood Updated: 10/08/21 2022     Anisocytosis Slight/1+     Hypochromia Slight/1+     Stomatocytes Slight/1+     WBC Morphology Normal     Platelet Estimate Adequate     Large Platelets Slight/1+     Giant Platelets Slight/1+    Magnesium [[id number]]  (Normal) Collected: 10/08/21 1932    Specimen: Blood Updated: 10/08/21 2020     Magnesium 1.9 mg/dL     Phosphorus  [[id number]]  (Abnormal) Collected: 10/08/21 1932    Specimen: Blood Updated: 10/08/21 2020     Phosphorus 7.3 mg/dL     C-reactive Protein [[id number]]  (Abnormal) Collected: 10/08/21 1932    Specimen: Blood Updated: 10/08/21 2020     C-Reactive Protein 6.37 mg/dL     Protime-INR [[id number]]  (Abnormal) Collected: 10/08/21 1933    Specimen: Blood Updated: 10/08/21 2014     Protime 18.2 Seconds      INR 1.76    Narrative:      Suggested Therapeutic Ranges For Oral Anticoagulant Therapy:  Level of Therapy                      INR Target Range  Standard Dose                            2.0-3.0  High Dose                                2.5-3.5  Patients not receiving anticoagulant  Therapy Normal Range                     0.6-1.2    aPTT [[id number]]  (Normal) Collected: 10/08/21 1933    Specimen: Blood Updated: 10/08/21 2014     PTT 24.1 seconds     [place] [facility] [[id number]] Collected: 10/08/21 1932    Specimen: Blood Updated: 10/08/21 2003    Narrative:      The following orders were created for panel order [place] Draw.  Procedure                               Abnormality         Status                     ---------                               -----------         ------                     Green Top (Gel)[[id number]]                                  Final result               Lavender Top[[id number]]                                     Final result               Gold Top - SST[[id number]]                                   Final result               Light Blue Top[[id number]]                                   Final result                 Please view results for these tests on the individual orders.    Light Blue Top [[id number]] Collected: 10/08/21 1933    Specimen: Blood Updated: 10/08/21 2003     Extra Tube hold for add-on     Comment: Auto resulted       Gold Top - SST [[id number]] Collected: 10/08/21 1932    Specimen: Blood Updated: 10/08/21 2003     Extra Tube Hold for add-ons.     Comment: Auto resulted.       Green Top  (Gel) [[id number]] Collected: 10/08/21 1932    Specimen: Blood Updated: 10/08/21 2003     Extra Tube Hold for add-ons.     Comment: Auto resulted.       Lavender Top [[id number]] Collected: 10/08/21 1932    Specimen: Blood Updated: 10/08/21 2002     Extra Tube hold for add-on     Comment: Auto resulted       Blood Culture - Blood, Arm, Left [[id number]] Collected: 10/08/21 1933    Specimen: Blood from Arm, Left Updated: 10/08/21 1952    Blood Culture - Blood, Arm, Right [[id number]] Collected: 10/08/21 1933    Specimen: Blood from Arm, Right Updated: 10/08/21 1951    Blood Gas, Arterial -With Co-Ox Panel: Yes [[id number]]  (Abnormal) Collected: 10/08/21 1933    Specimen: Arterial Blood Updated: 10/08/21 1945     pH, Arterial 7.425 pH units      pCO2, Arterial 32.3 mm Hg      pO2, Arterial 71.0 mm Hg      HCO3, Arterial 20.7 mmol/L      Base Excess, Arterial -3.1 mmol/L      O2 Saturation, Arterial 92.1 %      Hemoglobin, Blood Gas 8.9 g/dL      Carboxyhemoglobin 0.5 %      Methemoglobin 0.20 %      Oxyhemoglobin 91.5 %      FHHB 7.8 %      Site Arterial: right radial     Modality Cannula - Nasal     FIO2 28 %      Flow Rate 2 lpm      PO2/FIO2 254     Manny's Test Positive             Result Review :     Assessment/Plan     Sepsis (HCC)    Severe sepsis (HCC)  Necrotizing soft tissue infection to the buttocks    I have reviewed the patient's work-up with results mentioned above.  I personally reviewed the CT images.  N.p.o. for now.  Recommend incision drainage and debridement of buttocks.  I explained the procedure to the patient.  Benefits and alternatives discussed.  Risk of procedure including risk of anesthesia, bleeding, infection, need for more surgery, heart attack, stroke, blood clot, pneumonia were discussed.  Nurses obtained consent from the .  I attempted to call his cell phone with no answer. Continue antibiotics.  Add clindamycin.  Discussed with admitting provider.            Tom Colbert,  MD  10/09/21 07:32 EDT

## 2021-10-09 NOTE — PROGRESS NOTES
"Pharmacy to Dose Vancomycin Day: 1    Amy Miles is a 55 y.o.female admitted with LOSS OF CONSCIOUSNESS. Pharmacy has been consulted to dose IV Vancomycin     Consulting Provider: ALYSSA  Clinical Indication: SEPSS  Pertinent Past Medical History: SEPSIS WITH PNA (7/2021)  Goal -600 mg/L.hr  Duration of therapy: 7    165.1 cm (65\")       10/08/21  1914      Weight: 83.5 kg (184 lb 1.4 oz)         Estimated Creatinine Clearance: 32.4 mL/min (A) (by C-G formula based on SCr of 2.07 mg/dL (H)).  Results from last 7 days  Lab  Units  10/09/21  0623  10/08/21  1932  BUN  mg/dL  37*  32*  CREATININE  mg/dL  2.07*  3.10*      HD/PD/CRRT?: NO    Lab Results   Component Value Date    WBC 11.77 (H) 10/09/2021      Temperature    10/08/21 1917 10/09/21 0127 10/09/21 0324   Temp: (!) 101.1 °F (38.4 °C) 98.6 °F (37 °C) 97.5 °F (36.4 °C)     Contrast Administered:  NO    Relevant Micro:   Microbiology Results (last 10 days)       ** No results found for the last 240 hours. **           Relevant Radiology:   CHEST CT:  CONCLUSION: Atelectasis and/or infiltrate(s) is (are) seen on the right with atelectasis favored.  The CT imaging features are atypical or uncommonly reported for COVID-19 pneumonia.  Alternative diagnoses should be considered.    There is mild cardiac enlargement.  Atherosclerotic change is seen, including involvement the coronary arteries.  There is a small right pleural effusion.  Please see above comments for further detail.       Other Antimicrobial Therapy:  MERREM    Assessment/Plan  Loading dose: 1750MG ONCE ONE  Regimen: 1000 mg IV every 24 hours.  Start time: 08:30 on 10/09/2021  Exposure target: AUC24 (range)400-600 mg/L.hr   AUC24,ss: 559 mg/L.hr  PAUC*: 95 %  Ctrough,ss: 17.1 mg/L  Pconc*: 29 %  Tox.: 13 %    Level due: VANCOMYCIN RANDOM TOMORROW AT 1200  Labs ordered: [facility] TOMORROW   "

## 2021-10-09 NOTE — H&P
[facility]   HOSPITALIST HISTORY AND PHYSICAL  Date: 10/9/2021   Patient Name: Amy Miles  : 1965  MRN: [id number]  Primary Care Physician:  Demarcus Tripp DO  Date of admission: 10/8/2021    Subjective   Subjective     Chief Complaint: Loss of consciousness    HPI:    Amy Miles is a 55 y.o. female with history of cirrhosis, essential hypertension, type 2 diabetes mellitus COPD, CVA in July, hypothyroidism, reflux sympathetic dystrophy, chronic pain syndrome in the left arm, diabetes mellitus and sacral decubitus ulcer who presented to emergency department today due to reported syncopal episode at home.  History is obtained by discussion with the ED physician as the patient is currently confused.    On arrival in the emergency department the patient was febrile at 1-1.1.  She was hypotensive 85/56.  Her heart rate was stable in the 80s.  She required 2 L nasal cannula to maintain her saturations.  Laboratory evaluation revealed leukocytosis at 12.65.  Patient had acute renal insufficiency with creatinine of 3.1, BUN 32, GFR 16.  Patient had elevation in her AST and ALT at 1286 and 398 respectively.  This is acute change compared to 1 month ago.  Her initial lactic acid was 4.7.  This came down to 2.4 after fluids.  UA contaminated, not convincing for source of infection.  Chest x-ray revealed diffuse increased markings in the right lower and left lower lobes consistent for chronic lung disease and bronchitis with mild congestive heart failure as well as cardiomegaly.  Because of the above, the hospitalist team was contacted to admit for further management.  She was given vancomycin and Merrem in the ED.    At time of my evaluation, the patient is confused.  She is unable to provide any input.  I ordered a CT scan of the abdomen and pelvis as noted below.    Personal History     Past Medical History:  Past Medical History:   Diagnosis Date   • Anxiety    • Arm pain    • Arthritis    • Asthma     • Bladder disorder    • Bronchitis    • Chronic allergic rhinitis    • Chronic pain syndrome     Reflex Sympathetic Dystrophy, left arm.   • Cirrhosis of liver (HCC)    • COPD (chronic obstructive pulmonary disease) (HCC)    • CRPS (complex regional pain syndrome), type I, upper 2012   • Depression    • Diabetes mellitus type 1 (HCC)    • Elevated INR 2021   • Elevated LFTs 2021   • Encephalopathy, hepatic (HCC) 2021   • Essential hypertension, benign    • Fracture    • GERD (gastroesophageal reflux disease)    • Hand pain    • Heel pain    • Hyperthyroidism    • Iatrogenic hypothyroidism    • Leg pain    • Leg swelling    • Limb swelling    • Mood disorder (HCC)    • Mood disorder (HCC)    • Nausea    • RSD (reflex sympathetic dystrophy)    • Seasonal allergies    • Sepsis due to pneumonia (CMS/HCC) 2021   • Shortness of Air    • Toe fracture            Past Surgical History:  Past Surgical History:   Procedure Laterality Date   • ABDOMINAL HYSTERECTOMY     • CARPAL TUNNEL RELEASE     •  SECTION     • CHOLECYSTECTOMY     • COLONOSCOPY     • GALLBLADDER SURGERY     • HAND SURGERY     • HEMORRHOIDECTOMY     • HERNIA REPAIR     • HIATAL HERNIA REPAIR     • INCISION AND DRAINAGE ABSCESS     • THYROIDECTOMY      by Dr. Anthony   • TONSILLECTOMY     • TUBAL ABDOMINAL LIGATION         Family History:   Family History   Problem Relation Age of Onset   • Arthritis Mother    • Heart disease Father    • Arthritis Father    • Diabetes Sister         Unspecified   • Arthritis Sister    • Osteoporosis Sister    • Diabetes Brother         Unspecified   • Other Brother         Renal Calculus   • Diabetes Maternal Uncle         Unspecified       Social History:    reports that she has been smoking. She started smoking about 39 years ago. She has a 39.00 pack-year smoking history. She has never used smokeless tobacco. She reports that she does not drink alcohol and does not use drugs.    Home  Medications:  Accu-Chek FastClix Lancets, Ca Alginate-Carboxymethylcell, albuterol, aluminum sulfate-calcium acetate, aspirin, atorvastatin, busPIRone, cetirizine, cyclobenzaprine, diazePAM, famotidine, fluconazole, furosemide, gabapentin, glucose blood, insulin, insulin aspart, insulin detemir, lactulose, levoFLOXacin, levothyroxine, lisinopril, metoprolol succinate XL, montelukast, naloxone, oxyCODONE, promethazine, silver sulfadiazine, spironolactone, thiamine, zinc oxide, and zinc sulfate    Allergies:  Allergies   Allergen Reactions   • Adhesive Tape Unknown - Low Severity   • Amoxicillin Unknown - High Severity   • Amoxicillin-Pot Clavulanate Unknown - Low Severity   • Cephalexin Unknown - Low Severity   • Clavulanic Acid Unknown - Low Severity   • Doxycycline Hyclate Unknown - Low Severity   • Flonase [Fluticasone] Unknown - Low Severity   • Nsaids Unknown - Low Severity   • Penicillins Unknown - Low Severity   • Potassium Unknown - Low Severity       Review of Systems   Unable to obtain due to altered mental status    Objective   Objective     Vitals:   Temp:  [101.1 °F (38.4 °C)] 101.1 °F (38.4 °C)  Heart Rate:  [84-89] 88  Resp:  [22] 22  BP: ()/(53-78) 97/54  Flow (L/min):  [2-15] 2    Physical Exam    Constitutional: Awake, confused.  Lethargic but awakens to verbal and physical stimuli.   Eyes: Pupils equal, sclerae anicteric, no conjunctival injection   HENT: NCAT, mucous membranes moist   Neck: Supple, no thyromegaly, no lymphadenopathy, trachea midline   Respiratory: Clear to auscultation bilaterally, nonlabored respirations    Cardiovascular: RRR, no murmurs, rubs, or gallops, bilateral lower extremities are mottled and cool.  PT pulses are able to be dopplered.   Gastrointestinal: Positive bowel sounds, abdomen is distended, nontender   Musculoskeletal: No bilateral ankle edema, no clubbing or cyanosis to extremities   Psychiatric: Appropriate affect, cooperative   Neurologic: Oriented x 1,  moves all extremities, Cranial Nerves grossly intact to confrontation, speech clear   Skin: Large stage III-IV sacral decubitus ulcer with open area that is draining and appears to track deep.  There is foul-smelling watery discharge coming from the wound.    Imaging:   XR Chest 1 View    Result Date: 10/8/2021  PROCEDURE: XR CHEST 1 VW  COMPARISON: [facility], CR, XR ABDOMEN KUB, 7/02/2021, 1:46.  [facility], CR, XR CHEST 1 VW, 7/01/2021, 12:23.  INDICATIONS: Severe Sepsis triage protocol  FINDINGS:  Single frontal view chest AP upright portable reveals that the heart is enlarged.  There is questionable increased density in the right side of the superior mediastinum.  This might be caused by anatomic variation or tortuous blood vessels or mass.  There are bilateral diffuse increased markings consistent with chronic lung disease and bronchitis and mild congestive heart failure.  Surgical clips are seen in the right upper quadrant the abdomen consistent with cholecystectomy.  There are dilated loops of bowel in the left upper quadrant the abdomen consistent with moderate gaseous distension of the stomach and moderate gaseous distension of the splenic flexure of the colon.  CONCLUSION:  1. Cardiomegaly. 2. Suspicion of focal increased density in the right side of the superior mediastinum might be caused by artifact or anatomic variation or tortuous dilated blood vessels or mass.  CT of the chest would be helpful to further evaluate this finding if clinically indicated. 3. Diffuse increased markings especially in the right lower lobe and left lower lobe lung consistent with chronic lung disease and bronchitis and mild congestive heart failure.       LAKSHMI BARNARD MD       Electronically Signed and Approved By: LAKHSMI BARNARD MD on 10/08/2021 at 19:35               Result Review    Result Review:  I have personally reviewed the results from the time of this admission to 10/9/2021  00:12 EDT and agree with these findings:  [x]  Laboratory  []  Microbiology  [x]  Radiology  []  EKG/Telemetry   []  Cardiology/Vascular   []  Pathology  []  Old records  []  Other:      Assessment/Plan   Assessment / Plan     Assessment:   Sacral decubitus ulcer with concern necrotizing fasciitis or myositis  12.5 cm x 7.7 cm right medial gluteal abscess likely related to sacral decubitus ulcer  Septic shock due to above  Acute kidney injury  Suspect shock liver on top of chronic cirrhosis  Lactic acidosis  Metabolic encephalopathy  COPD, not acutely exacerbated  Hypothyroidism  Insulin-dependent diabetes mellitus  Reflex and pathetic dystrophy in the left arm  Chronic pain syndrome  Remote CVA in July    Plan:    Admit to hospitalist service  Labs and imaging reviewed  Consult placed for general surgery.  Discussed with Dr. Colbert.  Plans to go to the OR urgently this morning for washout.  Keep n.p.o.  Antibiotic therapy with Merrem, vancomycin, clindamycin  IV fluid resuscitation  Recheck lactic acid at 3 AM  Add on ammonia level  Patient's vitals have been stable after fluid resuscitation.  I have a very low threshold to transfer her to the ICU however.    Patient's clinical course will dictate further management  Discussed with ED physician, RN, general surgery    DVT prophylaxis:  No DVT prophylaxis order currently exists.    CODE STATUS:         Admission Status:  I believe this patient meets inpatient status.    Electronically signed by FELA Shi, 10/09/21, 12:12 AM EDT.         Attending documentation:  I reviewed the above documentation and independently reviewed and rounded and evaluated the patient and discussed the care plan with SAULO Canales PA-C, I agree with her findings and plan as documented, what I have added to the care plan and modified is as follows in my documentation and my medical decision making; 55-year-old female with history of cirrhosis, hypertension, diabetes, COPD, history of  CVA, hypothyroidism, chronic pain syndrome, sacral decubitus ulcer present on admission, presented to the emergency room with chief complaint of loss of consciousness.  HPI: 35-year-old female presented to the emergency room with fevers, hypotension, with altered mental status and lethargy.  She was found to be septic, lactic acidosis, improved with fluids, CT scan of the abdomen and pelvis revealed right medial gluteal abscess, sacral decubitus ulcer with necrotizing fasciitis General surgery was consulted, patient sepsis was managed, hospitalist service admitted and further manage the patient.  Her past medical history includes anxiety, lupus liver cirrhosis, COPD, CRPS, depression, diabetes, hepatic encephalopathy in the past, sent hypertension, hypothyroidism, mood disorder, reflex sympathetic dystrophy, her past surgical history includes abdominal hysterectomy, carpal tunnel surgery, , cholecystectomy, hand surgery, hernia repair, incision and drainage of abscess, tonsillectomy, tubal ligation.  She is current tobacco smoker 1 pack/day, 39+ years, no illicit drug use, no alcohol use.  I reviewed her allergies and home medications.  Her family history includes her mother having arthritis, her father having arthritis and heart disease, she has several sisters with diabetes, arthritis, osteoporosis, her brothers have diabetes.  When I saw her she was back from the OR whereby she underwent incision and drainage of the soft tissue, fascia of the right buttock and necrotizing soft tissue was divided.  She tolerated procedure well, she was still under anesthesia effect.  I cannot obtain review of systems.  She noted she had pain.  Reviewed vitals reviewed, labs reviewed, on physical exam, elderly appearing female older than stated age, in no acute distress, confused, lethargic but arousable, EOMI, full range of motion neck, clear to auscultation bilaterally, regular rate rhythm, soft nontender abdomen, lower  gluteal area was covered with dressing, no lower extremity edema.  Admitted with sepsis, severe sepsis, septic shock due to gluteal abscess, necrotizing fasciitis, EMILIANA, lactic acidosis, hypotension causing ischemia of her liver, metabolic acidosis, she has been on triple antibiotic therapy Merrem, vancomycin and clindamycin, general surgery was consulted, appreciate the incision and drainage, trending her labs, telemetry monitoring, pain control with p.o. and IV narcotics, monitor her vitals when receiving IV narcotics, a.m. labs, full code, VTE prophylaxis with SCDs at this time, clinical course to dictate further management, discussed with nurse at bedside.  -LUIS LUTZ    Electronically signed by Rosy Maxwell MD, 10/09/21, 5:52 PM EDT.

## 2021-10-09 NOTE — PLAN OF CARE
Goal Outcome Evaluation:  Plan of Care Reviewed With: patient        Progress: improving  Outcome Summary: Patient had incision and drainage on coccyx wound today. More alert this afternoon. VSS.

## 2021-10-09 NOTE — ANESTHESIA PREPROCEDURE EVALUATION
Anesthesia Evaluation     Patient summary reviewed and Nursing notes reviewed   no history of anesthetic complications:  NPO Solid Status: > 8 hours  NPO Liquid Status: > 2 hours           Airway   Mallampati: II  TM distance: >3 FB  Neck ROM: full  No difficulty expected  Dental - normal exam     Pulmonary - normal exam   (+) pneumonia , a smoker Current, COPD, asthma,shortness of breath,   Cardiovascular - normal exam  Exercise tolerance: good (4-7 METS)    Patient on routine beta blocker    (+) hypertension,       Neuro/Psych  (+) CVA, numbness, psychiatric history Anxiety and Depression,     GI/Hepatic/Renal/Endo    (+)  GERD,  liver disease (cirrhosis ), diabetes mellitus type 1 using insulin,     Musculoskeletal     Abdominal  - normal exam    Bowel sounds: normal.   Substance History - negative use     OB/GYN negative ob/gyn ROS         Other   arthritis, blood dyscrasia anemia,                   Anesthesia Plan    ASA 3 - emergent     general     intravenous induction     Anesthetic plan, all risks, benefits, and alternatives have been provided, discussed and informed consent has been obtained with: patient.

## 2021-10-09 NOTE — ANESTHESIA POSTPROCEDURE EVALUATION
Patient: Amy Miles    Procedure Summary     Date: 10/09/21 Room / Location: [facility] OR 02 / [facility] MAIN OR    Anesthesia Start: 0823 Anesthesia Stop: 0937    Procedure: INCISION AND DRAINAGE ABSCESS buttocks (Bilateral ) Diagnosis:       Necrotizing soft tissue infection      (Necrotizing soft tissue infection [M79.89])    Surgeons: Tom Colbert MD Provider: Lobo Santana MD    Anesthesia Type: general ASA Status: 3 - Emergent          Anesthesia Type: general    Vitals  Vitals Value Taken Time   /62 10/09/21 1017   Temp 36.4 °C (97.6 °F) 10/09/21 0935   Pulse 90 10/09/21 1018   Resp 17 10/09/21 1010   SpO2 96 % 10/09/21 1018   Vitals shown include unvalidated device data.        Post Anesthesia Care and Evaluation    Patient location during evaluation: PACU  Patient participation: complete - patient participated  Level of consciousness: awake and alert  Pain score: 0  Pain management: adequate  Airway patency: patent  Anesthetic complications: No anesthetic complications  PONV Status: none  Cardiovascular status: acceptable  Respiratory status: acceptable  Hydration status: acceptable  No anesthesia care post op

## 2021-10-09 NOTE — PLAN OF CARE
Goal Outcome Evaluation:  Patient admitted this shift for severe sepsis. She is still lethargic and oriented only to self. She is unable to answer questions for admission, attempted to contact  but no answer, voicemail was left. 3 large bore iv in place in right arm for upcoming surgery. Tolerating iv fluids well. Will continue to monitor. Kelli, rn

## 2021-10-09 NOTE — CONSULTS
General Surgery/Colorectal Surgery Note    Patient Name:  Amy Miles  YOB: 1965  [id number]    Referring Provider: No ref. provider found      Patient Care Team:  Demarcus Tripp DO as PCP - General (Family Medicine)    Chief complaint infection    Subjective .     History of present illness:    She has a history of multiple medical comorbidities including cirrhosis, hypertension, type 2 diabetes mellitus, COPD.  She has a history of a sacral decubitus.  She came to the emergency department after a syncopal episode.  She was found to be febrile with a T-max of 101.  Hypotensive with systolic pressures in 80s.  Work-up with labs CRP 6, lactic acid 4.7, creatinine 3.1, AST 1286, , total bilirubin 0.6, WBC 12, hemoglobin 8, platelets 189.  UA nitrite negative  Lactic acid down to 2.4  CT abdomen and pelvis with images personally reviewed with soft tissue gas noted in the right buttocks concerning for necrotizing soft tissue infection  CT chest with right-sided atelectasis.  Currently on meropenem, vancomycin.  Discussed with admitting provider and recommended to add clindamycin.  Patient moans when I asked her questions.  No family at the bedside.    History:  Past Medical History:   Diagnosis Date   • Anxiety    • Arm pain    • Arthritis    • Asthma    • Bladder disorder    • Bronchitis    • Chronic allergic rhinitis    • Chronic pain syndrome     Reflex Sympathetic Dystrophy, left arm.   • Cirrhosis of liver (HCC)    • COPD (chronic obstructive pulmonary disease) (HCC)    • CRPS (complex regional pain syndrome), type I, upper 12/27/2012   • Depression    • Diabetes mellitus type 1 (HCC)    • Elevated INR 7/1/2021   • Elevated LFTs 7/1/2021   • Encephalopathy, hepatic (HCC) 7/1/2021   • Essential hypertension, benign    • Fracture    • GERD (gastroesophageal reflux disease)    • Hand pain    • Heel pain    • Hyperthyroidism    • Iatrogenic hypothyroidism    • Leg pain    • Leg swelling    •  Limb swelling    • Mood disorder (HCC)    • Mood disorder (HCC)    • Nausea    • RSD (reflex sympathetic dystrophy)    • Seasonal allergies    • Sepsis due to pneumonia (CMS/HCC) 2021   • Shortness of Air    • Toe fracture            Past Surgical History:   Procedure Laterality Date   • ABDOMINAL HYSTERECTOMY     • CARPAL TUNNEL RELEASE     •  SECTION     • CHOLECYSTECTOMY     • COLONOSCOPY     • GALLBLADDER SURGERY     • HAND SURGERY     • HEMORRHOIDECTOMY     • HERNIA REPAIR     • HIATAL HERNIA REPAIR     • INCISION AND DRAINAGE ABSCESS     • THYROIDECTOMY      by Dr. Anthony   • TONSILLECTOMY     • TUBAL ABDOMINAL LIGATION         Family History   Problem Relation Age of Onset   • Arthritis Mother    • Heart disease Father    • Arthritis Father    • Diabetes Sister         Unspecified   • Arthritis Sister    • Osteoporosis Sister    • Diabetes Brother         Unspecified   • Other Brother         Renal Calculus   • Diabetes Maternal Uncle         Unspecified       Social History     Tobacco Use   • Smoking status: Current Every Day Smoker     Packs/day: 1.00     Years: 39.00     Pack years: 39.00     Start date:    • Smokeless tobacco: Never Used   Vaping Use   • Vaping Use: Never used   Substance Use Topics   • Alcohol use: Never   • Drug use: Never       Review of Systems  All systems were reviewed and negative except for:   Review of Systems   Constitutional: Negative for chills, fever and unexpected weight loss.   HENT: Negative for congestion, nosebleeds and voice change.    Eyes: Negative for blurred vision, double vision and discharge.   Respiratory: Negative for apnea, chest tightness and shortness of breath.    Cardiovascular: Negative for chest pain and leg swelling.   Gastrointestinal:        See HPI   Endocrine: Negative for cold intolerance and heat intolerance.   Genitourinary: Negative for dysuria, hematuria and urgency.   Musculoskeletal: Negative for back pain, joint  "swelling and neck pain.   Skin: Negative for color change and dry skin.  See HPI  Neurological: Negative for dizziness and confusion.   Hematological: Negative for adenopathy.   Psychiatric/Behavioral: Negative for agitation and behavioral problems.     MEDS:  Prior to Admission medications    Medication Sig Start Date End Date Taking? Authorizing Provider   Accu-Chek FastClix Lancets misc 1 each by Other route 4 (Four) Times a Day. 7/19/21   Kirby Sandhu MD   Accu-Chejennifer Guide test strip by Other route 4 (Four) Times a Day. Use to test blood sugar 4 times daily 5/17/21   Kirby Sandhu MD   albuterol (ACCUNEB) 1.25 MG/3ML nebulizer solution Take 3 mL by nebulization Every 6 (Six) Hours As Needed for Wheezing for up to 360 days. 8/2/21 7/28/22  Demarcus Tripp DO   aluminum sulfate-calcium acetate (DOMEBORO) topical packet Apply 1 packet topically to the appropriate area as directed Every 8 (Eight) Hours. 7/7/21   Mateo Nevarez DO   aspirin 81 MG EC tablet Take 1 tablet by mouth Daily. 7/8/21   Mateo Nevarez DO   atorvastatin (LIPITOR) 80 MG tablet Take 1 tablet by mouth Every Night. 7/7/21   Mateo Nevarez DO   busPIRone (BUSPAR) 10 MG tablet Take 10 mg by mouth 2 (Two) Times a Day. 7/15/21   Kirby Sandhu MD   Ca Alginate-Carboxymethylcell (Maxorb Extra 2\"x2\") pads Apply 10 each topically 2 (two) times a day. Indications: Bed Sores 10/5/21   Demarcus Tripp DO   cetirizine (zyrTEC) 10 MG tablet Take 1 tablet by mouth Daily. 7/2/21   Demarcus Tripp DO   cyclobenzaprine (FLEXERIL) 10 MG tablet Take 10 mg by mouth 3 (Three) Times a Day As Needed. 7/30/21   Kirby Sanduh MD   diazePAM (Valium) 5 MG tablet Take 1 tablet by mouth Every 8 (Eight) Hours As Needed for Anxiety. 9/23/21   Demarcus Tripp DO   famotidine (PEPCID) 20 MG tablet Take 1 tablet by mouth 2 (Two) Times a Day Before Meals. 7/8/21   Mateo Nevarez DO   fluconazole (Diflucan) 150 MG tablet Take 1 tablet by mouth Daily. 9/23/21  "  Demarcus Tripp DO   furosemide (LASIX) 20 MG tablet Take 1 tablet by mouth Daily. 9/17/21   Demarcus Tripp DO   gabapentin (NEURONTIN) 800 MG tablet TAKE 1 TABLET BY MOUTH FOUR TIMES DAILY AS DIRECTED 7/29/21   Kirby Sandhu MD   insulin aspart (novoLOG) 100 UNIT/ML injection Inject  under the skin into the appropriate area as directed 3 (Three) Times a Day Before Meals. Through the Dexcom device    Kirby Sandhu MD   insulin detemir (LEVEMIR) 100 UNIT/ML injection Inject 30 Units under the skin into the appropriate area as directed 2 (Two) Times a Day. 7/7/21   Mateo Nevarez DO   insulin patient supplied pump Inject  under the skin into the appropriate area as directed Continuous. Waiting for Dr. Hendrickson office to fax us insulin pump info    Kirby Sandhu MD   lactulose (CHRONULAC) 10 GM/15ML solution Take 15 mL by mouth 3 (Three) Times a Day. 7/7/21   Mateo Nevarez DO   levoFLOXacin (LEVAQUIN) 500 MG tablet TAKE 1 TABLET BY MOUTH DAILY. 10/5/21   Demarcus Tripp DO   levothyroxine (SYNTHROID, LEVOTHROID) 175 MCG tablet Take 1 tablet by mouth Daily. 7/7/21   Mateo Nevarez DO   lisinopril (PRINIVIL,ZESTRIL) 20 MG tablet Take 1 tablet by mouth Daily. 8/31/21   Demarcus Tripp DO   metoprolol succinate XL (Toprol XL) 100 MG 24 hr tablet Take 1 tablet by mouth Daily. 8/31/21   Demarcus Tripp DO   montelukast (Singulair) 10 MG tablet Take 1 tablet by mouth Every Night. 7/2/21   Demarcus Tripp DO   Narcan 4 MG/0.1ML nasal spray  9/7/21   Kirby Sandhu MD   oxyCODONE (ROXICODONE) 15 MG immediate release tablet Take 15 mg by mouth 3 (Three) Times a Day.    Kirby Sandhu MD   promethazine (PHENERGAN) 25 MG tablet Take 1 tablet by mouth Daily. 7/8/21   Demarcus Tripp DO   silver sulfadiazine (Silvadene) 1 % cream Apply  topically to the appropriate area as directed Daily As Needed for Wound Care. 7/20/21   Demarcus Tripp DO   spironolactone (ALDACTONE) 50 MG tablet Take 1 tablet by  mouth Daily. 7/8/21   Mateo Nevarez, DO   thiamine (thiamine) 100 MG tablet tablet Take 1 tablet by mouth Daily. 7/7/21   Mtaeo Nevarez, DO   zinc oxide (DESITIN) 40 % paste paste Apply  topically to the appropriate area as directed Daily As Needed (break down). 7/7/21   Mateo Nevarez, DO   zinc sulfate (ZINCATE) 220 (50 Zn) MG capsule Take 1 capsule by mouth Daily. 7/8/21   Mateo Nevarez, DO        clindamycin, 900 mg, Intravenous, Q8H  levothyroxine, 175 mcg, Oral, QAM  meropenem, 500 mg, Intravenous, Q12H  sodium chloride, 10 mL, Intravenous, Q12H         lactated ringers, 150 mL/hr, Last Rate: 150 mL/hr (10/09/21 0411)  Pharmacy to Dose meropenem (MERREM),   Pharmacy to dose vancomycin,          Allergies:  Adhesive tape, Amoxicillin, Amoxicillin-pot clavulanate, Cephalexin, Clavulanic acid, Doxycycline hyclate, Flonase [fluticasone], Nsaids, Penicillins, and Potassium    Objective     Vital Signs   Temp:  [97.5 °F (36.4 °C)-101.1 °F (38.4 °C)] 97.5 °F (36.4 °C)  Heart Rate:  [84-89] 88  Resp:  [18-22] 18  BP: ()/(46-78) 103/50    Physical Exam:     General Appearance:    Alert, cooperative, in no acute distress   Head:    Normocephalic, without obvious abnormality, atraumatic   Eyes:          Conjunctivae and sclerae normal, no icterus,     Ears:    Ears appear intact with no abnormalities noted   Throat:   No oral lesions, no thrush, oral mucosa moist   Neck:   No adenopathy, supple, trachea midline, no thyromegaly   Back:     No kyphosis present, no scoliosis present, no skin lesions,      erythema or scars, no tenderness to percussion or                   palpation,   range of motion normal   Lungs:     Clear to auscultation,respirations regular, even and                  unlabored    Heart:    Regular rhythm and normal rate, normal S1 and S2, no            murmur, no gallop, no rub, no click   Chest Wall:    No abnormalities observed   Abdomen:     Normal bowel sounds, no masses, no organomegaly, soft         non-tender, non-distended, no guarding, no rebound                tenderness   Rectal:        Extremities:   Moves all extremities well, no edema, no cyanosis, no             redness   Pulses:   Pulses palpable and equal bilaterally   Skin:   No bleeding, bruising or rash, unstageable sacral decubitus with foul-smelling drainage, no crepitus   Lymph nodes:   No palpable adenopathy   Neurologic:   A/o x 4 with no deficits       Results Review: I have reviewed the patient's labs and imaging    LABS/IMAGING:    Imaging Results (Last 72 Hours)     Procedure Component Value Units Date/Time    CT Chest Without Contrast Diagnostic [[id number]] Collected: 10/09/21 0133     Updated: 10/09/21 0354    Narrative:      PROCEDURE: CT CHEST WO CONTRAST DIAGNOSTIC     COMPARISONS: [facility], CR, XR CHEST 1 VW, 7/01/2021, 12:23.     [facility], CT, CT ABDOMEN PELVIS WO CONTRAST, 10/09/2021, 1:08.     [facility], CR, XR CHEST 1 VW, 10/08/2021, 19:19.     [facility], CT, CHEST W/ CONTRAST, 11/05/2020, 11:22.     INDICATIONS: Possible mass, pneumonia, and/or CHF.  Possible sepsis.     TECHNIQUE: 577 CT images were created without the administration of contrast material.       PROTOCOL:   Standard imaging protocol performed      RADIATION:   Automated exposure control was utilized to minimize radiation dose.      FINDINGS: The patient has undergone thyroidectomy.  There is motion artifact on the study.    Atherosclerotic change is noted, including involvement of the coronary arteries.  There is mild   cardiomegaly.  There is a small right pleural effusion.  No definite left pleural effusion.  No   pericardial effusion.  There are small to moderate sized mediastinal and hilar lymph nodes.  These   findings are nonspecific.  They may be reactive in nature.  Similar findings were seen previously.    No pneumothorax.  No pneumomediastinum.  Atelectasis and/or infiltrate(s) is (are) seen on the    right.  Subsegmental atelectasis is favored, especially in the right lung base.  There may be   slight chronic asymmetric elevation of the right diaphragm.  Mild subsegmental atelectasis is   suspected on the left.  Probably no acute infiltrate is appreciated on the left.  There is chronic   calcified granulomatous disease of the chest, spleen, and liver.  Mild degenerative changes involve   the imaged spine.  No acute fracture or aggressive osseous lesion is identified.  The increased   density on the recent chest radiograph from 10/8/2021 involving the right superior mediastinum is   thought to be related to low lung volumes and vascular structures, such as the superior vena cava   (SVC), azygos arch, and the thoracic aorta (especially the ascending aorta and the aortic arch).    No definite nonenhanced CT evidence of a mediastinal or right parahilar mass.  The patient has   undergone cholecystectomy.  A small hiatal hernia is possible.  The patient's upper extremities in   the scan field of view obscure detail.     CONCLUSION: Atelectasis and/or infiltrate(s) is (are) seen on the right with atelectasis favored.    The CT imaging features are atypical or uncommonly reported for COVID-19 pneumonia.  Alternative   diagnoses should be considered.    There is mild cardiac enlargement.  Atherosclerotic change is   seen, including involvement the coronary arteries.  There is a small right pleural effusion.    Please see above comments for further detail.          ISATU BROWN JR, MD         Electronically Signed and Approved By: ISATU BROWN JR, MD on 10/09/2021 at 1:40                     CT Abdomen Pelvis Without Contrast [[id number]] Collected: 10/09/21 0157     Updated: 10/09/21 0354    Narrative:      PROCEDURE: CT ABDOMEN PELVIS WO CONTRAST     COMPARISONS: [facility], CT, ABDOMEN/PELVIS W/ CONTRAST, 8/18/2018, 19:22.     [facility], CT, CHEST W/ CONTRAST, 11/05/2020, 11:22.      INDICATIONS: Sepsis; lactic acidosis.     TECHNIQUE: 553 CT images were created without intravenous contrast.       PROTOCOL:   Standard imaging protocol performed.      RADIATION:   Automated exposure control was utilized to minimize radiation dose.      FINDINGS: Motion artifact obscures detail on the study.  The liver has a cirrhotic contour.  There   is splenomegaly.  Portal hypertension and portosystemic venous shunting may be present.  The   patient has undergone cholecystectomy.  Atherosclerotic changes are present.  No aneurysmal   dilatation of the aortoiliac arterial system is identified.  There is a small right pleural   effusion.  No left pleural effusion is seen.  Atelectasis and/or infiltrate(s) is (are) seen in the   right lung base.  No definite nonenhanced CT evidence of acute pancreatitis.  No renal stones or   hydronephrosis or obstructive uropathy.  Surgical clips are seen in the anterior abdominal wall and   suggest prior ventral hernia repair.  No pneumoperitoneum or pneumatosis.  There may be a small   hiatal hernia.  The appendix is not clearly identified.  Please correlate with the surgical   history.  The patient has undergone hysterectomy.   A urinary bladder catheter is in place.  The   urinary bladder is underdistended.       There is subcutaneous emphysema and increased attenuation of the soft tissues involving the medial   aspect of the right buttock, such as seen on axial images 95 through 120 of series 202.  Foci of   gas may be within a central area of fluid (as with abscess).  An infectious/inflammatory phlegmon   or inflammatory mass is possible.  The extent of The greatest measured extent of the findings is   12.5 x 7.7 x 7 cm in craniocaudal, transverse, and anteroposterior (AP) extent, respectively, as   seen on image 94 of series 205, image 110 of series 202, and image 141 of series 203.  Again, the   findings may represent an abscess.  Infectious/inflammatory  myositis/cellulitis/fasciitis,   including necrotizing.  Please correlate with physical exam findings in this region as well as the   clinical history.  Adjacent osteomyelitis of the sacrum and/or coccyx cannot be excluded.  An   associated cutaneous sinus tract cannot be excluded.     There is formed stool throughout the colon suggesting fecal stasis as with constipation.  No   definite stercoral ulceration is seen.  No pneumoperitoneum or pneumatosis.  No definite   nonenhanced CT evidence of a sinus tract or fistula involving the rectosigmoid colon.  Sclerotic   changes involve the sacroiliac joints.  There are degenerative changes of the imaged spine.    Degenerative changes also involve the bilateral hip joints.  No definite acute fracture or acute   malalignment is seen.       CONCLUSION:      1. An age-indeterminate infectious/inflammatory process with associated subcutaneous gas is seen   involving the medial aspect of the right buttock and medial posterior right pelvic wall (overlying   the right sacrum and coccyx).  It involves the right gluteal musculature and the overlying   superficial subcutaneous tissues.  It does not appear to extend into the right ischiorectal fossa.    Soft tissue gas may indicate an age-indeterminate anaerobic infectious process, including (but not   necessarily limited to) necrotizing fasciitis, right gluteal myositis, and/or cellulitis.  An   associated phlegmon or abscess is possible.  Underlying osteomyelitis of the sacrum and/or coccyx   cannot be excluded.  No definite nonenhanced CT evidence of an associated sinus tract or fistula   involving the rectosigmoid colon.  A cutaneous sinus tract is possible, however.  Again, an abscess   or phlegmon involving the medial right buttock is possible.  It may measure as great is 12.5 cm in   craniocaudal extent.  The findings may represent a complication of a sacral decubitus ulcer.  The   findings are thought less likely to  originate from infectious/inflammatory colitis or proctitis   involving the rectosigmoid colon.     2. Please see above comments for further detail.             Pertinent findings were discussed with Selene Canales PA-C, ordering [facility] (Hospitalist's Physician   Assistant, be at approximately 0201 hours on 10/9/2021.       ISATU BROWN JR, MD         Electronically Signed and Approved By: ISATU BROWN JR, MD on 10/09/2021 at 2:05                     XR Chest 1 View [[id number]] Collected: 10/08/21 1935     Updated: 10/08/21 1939    Narrative:      PROCEDURE: XR CHEST 1 VW     COMPARISON: [facility], CR, XR ABDOMEN KUB, 7/02/2021, 1:46.  [facility], CR, XR CHEST 1 VW, 7/01/2021, 12:23.     INDICATIONS: Severe Sepsis triage protocol     FINDINGS:   Single frontal view chest AP upright portable reveals that the heart is enlarged.  There is   questionable increased density in the right side of the superior mediastinum.  This might be caused   by anatomic variation or tortuous blood vessels or mass.  There are bilateral diffuse increased   markings consistent with chronic lung disease and bronchitis and mild congestive heart failure.    Surgical clips are seen in the right upper quadrant the abdomen consistent with cholecystectomy.    There are dilated loops of bowel in the left upper quadrant the abdomen consistent with moderate   gaseous distension of the stomach and moderate gaseous distension of the splenic flexure of the   colon.     CONCLUSION:   1. Cardiomegaly.  2. Suspicion of focal increased density in the right side of the superior mediastinum might be   caused by artifact or anatomic variation or tortuous dilated blood vessels or mass.  CT of the   chest would be helpful to further evaluate this finding if clinically indicated.  3. Diffuse increased markings especially in the right lower lobe and left lower lobe lung   consistent with chronic lung disease and bronchitis and  mild congestive heart failure.                  LAKSHMI BARNARD MD         Electronically Signed and Approved By: LAKSHMI BARNARD MD on 10/08/2021 at 19:35                            Lab Results (last 72 hours)     Procedure Component Value Units Date/Time    Basic Metabolic Panel [[id number]]  (Abnormal) Collected: 10/09/21 0623    Specimen: Blood Updated: 10/09/21 0706     Glucose 220 mg/dL      BUN 37 mg/dL      Creatinine 2.07 mg/dL      Sodium 137 mmol/L      Potassium 4.2 mmol/L      Chloride 104 mmol/L      CO2 19.8 mmol/L      Calcium 6.8 mg/dL      eGFR Non African Amer 25 mL/min/1.73      BUN/Creatinine Ratio 17.9     Anion Gap 13.2 mmol/L     Narrative:      GFR Normal >60  Chronic Kidney Disease <60  Kidney Failure <15      Magnesium [[id number]]  (Normal) Collected: 10/09/21 0623    Specimen: Blood Updated: 10/09/21 0706     Magnesium 1.7 mg/dL     Ammonia [[id number]]  (Abnormal) Collected: 10/09/21 0623    Specimen: Blood Updated: 10/09/21 0700     Ammonia 72 umol/L     Vancomycin, Random [[id number]]  (Normal) Collected: 10/09/21 0623    Specimen: Blood Updated: 10/09/21 0700     Vancomycin Random 22.10 mcg/mL     Narrative:      Therapeutic Ranges for Vancomycin    Vancomycin Random   5.0-40.0 mcg/mL  Vancomycin Trough   5.0-20.0 mcg/mL  Vancomycin Peak     20.0-40.0 mcg/mL    Lactic Acid, Plasma [[id number]]  (Normal) Collected: 10/09/21 0623    Specimen: Blood Updated: 10/09/21 0657     Lactate 2.0 mmol/L     CBC Auto Differential [[id number]]  (Abnormal) Collected: 10/09/21 0623    Specimen: Blood Updated: 10/09/21 0633     WBC 11.77 10*3/mm3      RBC 2.66 10*6/mm3      Hemoglobin 7.3 g/dL      Hematocrit 23.7 %      MCV 89.1 fL      MCH 27.4 pg      MCHC 30.8 g/dL      RDW 16.1 %      RDW-SD 52.3 fl      MPV 12.8 fL      Platelets 119 10*3/mm3      Neutrophil % 78.7 %      Lymphocyte % 11.0 %      Monocyte % 8.8 %      Eosinophil % 0.3 %      Basophil % 0.4 %      Immature Grans % 0.8 %       "Neutrophils, Absolute 9.25 10*3/mm3      Lymphocytes, Absolute 1.30 10*3/mm3      Monocytes, Absolute 1.04 10*3/mm3      Eosinophils, Absolute 0.04 10*3/mm3      Basophils, Absolute 0.05 10*3/mm3      Immature Grans, Absolute 0.09 10*3/mm3      nRBC 0.0 /100 WBC     Hepatitis Panel, Acute [[id number]] Collected: 10/09/21 0623    Specimen: Blood Updated: 10/09/21 0627    Procalcitonin [[id number]]  (Abnormal) Collected: 10/08/21 1932    Specimen: Blood Updated: 10/09/21 0409     Procalcitonin 0.36 ng/mL     Narrative:      As a Marker for Sepsis (Non-Neonates):     1. <0.5 ng/mL represents a low risk of severe sepsis and/or septic shock.  2. >2 ng/mL represents a high risk of severe sepsis and/or septic shock.    As a Marker for Lower Respiratory Tract Infections that require antibiotic therapy:  PCT on Admission     Antibiotic Therapy             6-12 Hrs later  >0.5                          Strongly Recommended            >0.25 - <0.5             Recommended  0.1 - 0.25                  Discouraged                       Remeasure/reassess PCT  <0.1                         Strongly Discouraged         Remeasure/reassess PCT      As 28 day mortality risk marker: \"Change in Procalcitonin Result\" (>80% or <=80%) if Day 0 (or Day 1) and Day 4 values are available. Refer to http://www.[employer]-pct-calculator.com    Change in PCT <=80%   A decrease of PCT levels below or equal to 80% defines a positive change in PCT test result representing a higher risk for 28-day all-cause mortality of patients diagnosed with severe sepsis or septic shock.    Change in PCT >80%   A decrease of PCT levels of more than 80% defines a negative change in PCT result representing a lower risk for 28-day all-cause mortality of patients diagnosed with severe sepsis or septic shock.    This test is Prognostic not Diagnostic, if elevated correlate with clinical findings before administering antibiotic treatment.      Results may be falsely decreased " if patient taking Biotin.     COVID-19,CEPHEID/JULIA/BDMAX,COR/MARTELL/PAD/ERICKA IN-HOUSE(OR EMERGENT/ADD-ON),NP SWAB IN TRANSPORT MEDIA 3-4 HR TAT, RT-PCR - Swab, Nasopharynx [[id number]] Collected: 10/09/21 0021    Specimen: Swab from Nasopharynx Updated: 10/09/21 0028    STAT Lactic Acid, Reflex [[id number]]  (Abnormal) Collected: 10/08/21 2255    Specimen: Blood Updated: 10/08/21 2342     Lactate 2.4 mmol/L     Lactic Acid, Plasma [[id number]]  (Abnormal) Collected: 10/08/21 1932    Specimen: Blood Updated: 10/08/21 2030     Lactate 4.7 mmol/L     Urinalysis, Microscopic Only - Urine, Catheter [[id number]]  (Abnormal) Collected: 10/08/21 1934    Specimen: Urine, Catheter Updated: 10/08/21 2028     RBC, UA 0-2 /HPF      WBC, UA 6-12 /HPF      Bacteria, UA 1+ /HPF      Squamous Epithelial Cells, UA 7-12 /HPF      Hyaline Casts, UA 0-2 /LPF      Methodology Manual Light Microscopy    Comprehensive Metabolic Panel [[id number]]  (Abnormal) Collected: 10/08/21 1932    Specimen: Blood Updated: 10/08/21 2028     Glucose 137 mg/dL      BUN 32 mg/dL      Creatinine 3.10 mg/dL      Sodium 137 mmol/L      Potassium 4.8 mmol/L      Chloride 101 mmol/L      CO2 20.9 mmol/L      Calcium 7.9 mg/dL      Total Protein 5.7 g/dL      Albumin 2.50 g/dL      ALT (SGPT) 398 U/L      AST (SGOT) 1,286 U/L      Alkaline Phosphatase 110 U/L      Total Bilirubin 0.6 mg/dL      eGFR Non African Amer 16 mL/min/1.73      Globulin 3.2 gm/dL      A/G Ratio 0.8 g/dL      BUN/Creatinine Ratio 10.3     Anion Gap 15.1 mmol/L     Narrative:      GFR Normal >60  Chronic Kidney Disease <60  Kidney Failure <15      Urinalysis With Microscopic If Indicated (No Culture) - Urine, Catheter [[id number]]  (Abnormal) Collected: 10/08/21 1933    Specimen: Urine, Catheter Updated: 10/08/21 2022     Color, UA Dark Yellow     Appearance, UA Cloudy     pH, UA <=5.0     Specific Gravity, UA 1.024     Glucose,  mg/dL (Trace)     Ketones, UA Trace     Bilirubin, UA Small  (1+)     Blood, UA Trace     Protein, UA 30 mg/dL (1+)     Leuk Esterase, UA Trace     Nitrite, UA Negative     Urobilinogen, UA 1.0 E.U./dL    CBC & Differential [[id number]]  (Abnormal) Collected: 10/08/21 1932    Specimen: Blood Updated: 10/08/21 2022    Narrative:      The following orders were created for panel order CBC & Differential.  Procedure                               Abnormality         Status                     ---------                               -----------         ------                     CBC Auto Differential[[id number]]        Abnormal            Final result               Scan Slide[[id number]]                                       Final result                 Please view results for these tests on the individual orders.    CBC Auto Differential [[id number]]  (Abnormal) Collected: 10/08/21 1932    Specimen: Blood Updated: 10/08/21 2022     WBC 12.65 10*3/mm3      RBC 3.06 10*6/mm3      Hemoglobin 8.3 g/dL      Hematocrit 26.9 %      MCV 87.9 fL      MCH 27.1 pg      MCHC 30.9 g/dL      RDW 16.1 %      RDW-SD 51.8 fl      MPV 13.4 fL      Platelets 189 10*3/mm3      Neutrophil % 71.4 %      Lymphocyte % 15.5 %      Monocyte % 11.6 %      Eosinophil % 0.2 %      Basophil % 0.2 %      Immature Grans % 1.1 %      Neutrophils, Absolute 9.02 10*3/mm3      Lymphocytes, Absolute 1.96 10*3/mm3      Monocytes, Absolute 1.47 10*3/mm3      Eosinophils, Absolute 0.03 10*3/mm3      Basophils, Absolute 0.03 10*3/mm3      Immature Grans, Absolute 0.14 10*3/mm3      nRBC 0.0 /100 WBC     Scan Slide [[id number]] Collected: 10/08/21 1932    Specimen: Blood Updated: 10/08/21 2022     Anisocytosis Slight/1+     Hypochromia Slight/1+     Stomatocytes Slight/1+     WBC Morphology Normal     Platelet Estimate Adequate     Large Platelets Slight/1+     Giant Platelets Slight/1+    Magnesium [[id number]]  (Normal) Collected: 10/08/21 1932    Specimen: Blood Updated: 10/08/21 2020     Magnesium 1.9 mg/dL     Phosphorus  [[id number]]  (Abnormal) Collected: 10/08/21 1932    Specimen: Blood Updated: 10/08/21 2020     Phosphorus 7.3 mg/dL     C-reactive Protein [[id number]]  (Abnormal) Collected: 10/08/21 1932    Specimen: Blood Updated: 10/08/21 2020     C-Reactive Protein 6.37 mg/dL     Protime-INR [[id number]]  (Abnormal) Collected: 10/08/21 1933    Specimen: Blood Updated: 10/08/21 2014     Protime 18.2 Seconds      INR 1.76    Narrative:      Suggested Therapeutic Ranges For Oral Anticoagulant Therapy:  Level of Therapy                      INR Target Range  Standard Dose                            2.0-3.0  High Dose                                2.5-3.5  Patients not receiving anticoagulant  Therapy Normal Range                     0.6-1.2    aPTT [[id number]]  (Normal) Collected: 10/08/21 1933    Specimen: Blood Updated: 10/08/21 2014     PTT 24.1 seconds     [place] Draw [[id number]] Collected: 10/08/21 1932    Specimen: Blood Updated: 10/08/21 2003    Narrative:      The following orders were created for panel order [place] Draw.  Procedure                               Abnormality         Status                     ---------                               -----------         ------                     Green Top (Gel)[[id number]]                                  Final result               Lavender Top[[id number]]                                     Final result               Gold Top - SST[[id number]]                                   Final result               Light Blue Top[[id number]]                                   Final result                 Please view results for these tests on the individual orders.    Light Blue Top [[id number]] Collected: 10/08/21 1933    Specimen: Blood Updated: 10/08/21 2003     Extra Tube hold for add-on     Comment: Auto resulted       Gold Top - SST [[id number]] Collected: 10/08/21 1932    Specimen: Blood Updated: 10/08/21 2003     Extra Tube Hold for add-ons.     Comment: Auto resulted.       Green Top  (Gel) [[id number]] Collected: 10/08/21 1932    Specimen: Blood Updated: 10/08/21 2003     Extra Tube Hold for add-ons.     Comment: Auto resulted.       Lavender Top [[id number]] Collected: 10/08/21 1932    Specimen: Blood Updated: 10/08/21 2002     Extra Tube hold for add-on     Comment: Auto resulted       Blood Culture - Blood, Arm, Left [[id number]] Collected: 10/08/21 1933    Specimen: Blood from Arm, Left Updated: 10/08/21 1952    Blood Culture - Blood, Arm, Right [[id number]] Collected: 10/08/21 1933    Specimen: Blood from Arm, Right Updated: 10/08/21 1951    Blood Gas, Arterial -With Co-Ox Panel: Yes [[id number]]  (Abnormal) Collected: 10/08/21 1933    Specimen: Arterial Blood Updated: 10/08/21 1945     pH, Arterial 7.425 pH units      pCO2, Arterial 32.3 mm Hg      pO2, Arterial 71.0 mm Hg      HCO3, Arterial 20.7 mmol/L      Base Excess, Arterial -3.1 mmol/L      O2 Saturation, Arterial 92.1 %      Hemoglobin, Blood Gas 8.9 g/dL      Carboxyhemoglobin 0.5 %      Methemoglobin 0.20 %      Oxyhemoglobin 91.5 %      FHHB 7.8 %      Site Arterial: right radial     Modality Cannula - Nasal     FIO2 28 %      Flow Rate 2 lpm      PO2/FIO2 254     Manny's Test Positive             Result Review :     Assessment/Plan     Sepsis (HCC)    Severe sepsis (HCC)  Necrotizing soft tissue infection to the buttocks    I have reviewed the patient's work-up with results mentioned above.  I personally reviewed the CT images.  N.p.o. for now.  Recommend incision drainage and debridement of buttocks.  I explained the procedure to the patient.  Benefits and alternatives discussed.  Risk of procedure including risk of anesthesia, bleeding, infection, need for more surgery, heart attack, stroke, blood clot, pneumonia were discussed.  Nurses obtained consent from the .  I attempted to call his cell phone with no answer. Continue antibiotics.  Add clindamycin.  Discussed with admitting provider.            Tom Colbert,  MD  10/09/21 07:32 EDT

## 2021-10-09 NOTE — CONSULTS
"Nutrition Services    Patient Name: Amy Miles  YOB: 1965  MRN: [id number]  Admission date: 10/8/2021      CLINICAL NUTRITION ASSESSMENT      Reason for Assessment  Identified at risk by screening criteria     H&P:    Past Medical History:   Diagnosis Date   • Anxiety    • Arm pain    • Arthritis    • Asthma    • Bladder disorder    • Bronchitis    • Chronic allergic rhinitis    • Chronic pain syndrome     Reflex Sympathetic Dystrophy, left arm.   • Cirrhosis of liver (HCC)    • COPD (chronic obstructive pulmonary disease) (HCC)    • CRPS (complex regional pain syndrome), type I, upper 12/27/2012   • Depression    • Diabetes mellitus type 1 (HCC)    • Elevated INR 7/1/2021   • Elevated LFTs 7/1/2021   • Encephalopathy, hepatic (HCC) 7/1/2021   • Essential hypertension, benign    • Fracture    • GERD (gastroesophageal reflux disease)    • Hand pain    • Heel pain    • Hyperthyroidism    • Iatrogenic hypothyroidism    • Leg pain    • Leg swelling    • Limb swelling    • Mood disorder (HCC)    • Mood disorder (HCC)    • Nausea    • RSD (reflex sympathetic dystrophy)    • Seasonal allergies    • Sepsis due to pneumonia (CMS/HCC) 7/1/2021   • Shortness of Air    • Toe fracture     [id number]        Current Problems:   Active Hospital Problems    Diagnosis    • **Necrotizing soft tissue infection      Added automatically from request for surgery [id number]     • Sepsis ([facility])    • Severe sepsis ([facility])         Nutrition/Diet History         Narrative     Patient went to the OR today for I&D of buttocks wound.  Unable to interview patient.  She is asleep and not arousable.  We will add Farooq supplement for wound healing.       Anthropometrics        Current Height, Weight Height: 165.1 cm (65\")  Weight: 83.5 kg (184 lb 1.4 oz)   Current BMI Body mass index is 30.63 kg/m².       Weight Hx  Wt Readings from Last 30 Encounters:   10/08/21 1914 83.5 kg (184 lb 1.4 oz)   08/31/21 0747 88.5 kg (195 lb 3.2 oz) "   08/19/21 1019 88.4 kg (194 lb 12.8 oz)   08/02/21 1119 96.1 kg (211 lb 12.8 oz)   07/08/21 1402 92.8 kg (204 lb 9.6 oz)   07/07/21 0500 89 kg (196 lb 3.4 oz)   07/06/21 0500 88.9 kg (195 lb 15.8 oz)   07/05/21 1102 89.2 kg (196 lb 10.4 oz)   07/05/21 0500 89.2 kg (196 lb 10.4 oz)   07/02/21 1002 89 kg (196 lb 3.4 oz)   07/02/21 0923 88.9 kg (196 lb)   07/01/21 1325 89.2 kg (196 lb 10.4 oz)   04/14/21 1534 88 kg (194 lb)   03/02/21 0923 86.2 kg (190 lb)   11/05/20 0925 80.3 kg (177 lb)   03/05/20 0000 85.3 kg (188 lb)   01/07/20 0000 85.8 kg (189 lb 4 oz)   12/05/19 0000 85.8 kg (189 lb 1 oz)   09/11/19 0945 88.5 kg (195 lb)   04/08/19 1014 87.7 kg (193 lb 6 oz)   12/05/18 0000 86.2 kg (190 lb)   11/27/18 1046 86 kg (189 lb 9 oz)   11/05/18 0000 88.9 kg (196 lb)   09/04/18 0000 90.9 kg (200 lb 8 oz)   08/28/18 1502 92.1 kg (203 lb 2 oz)   08/24/18 0000 95.3 kg (210 lb)   07/27/18 0000 95.7 kg (211 lb)   07/06/18 0000 95.7 kg (211 lb)   06/11/18 0000 96.3 kg (212 lb 4 oz)   03/08/18 0000 98.7 kg (217 lb 8 oz)            Wt Change Observation      Estimated/Assessed Needs       Energy Requirements    EST Needs (kcal/day)  2000       Protein Requirements    EST Daily Needs (g/day)  74-85 g       Fluid Requirements     Estimated Needs (mL/day)  2000     Labs/Medications         Pertinent Labs Reviewed.   Results from last 7 days   Lab Units 10/09/21  0623 10/08/21  1932   SODIUM mmol/L 137 137   POTASSIUM mmol/L 4.2 4.8   CHLORIDE mmol/L 104 101   CO2 mmol/L 19.8* 20.9*   BUN mg/dL 37* 32*   CREATININE mg/dL 2.07* 3.10*   CALCIUM mg/dL 6.8* 7.9*   BILIRUBIN mg/dL  --  0.6   ALK PHOS U/L  --  110   ALT (SGPT) U/L  --  398*   AST (SGOT) U/L  --  1,286*   GLUCOSE mg/dL 220* 137*     Results from last 7 days   Lab Units 10/09/21  0623 10/08/21  1932 10/08/21  1932   MAGNESIUM mg/dL 1.7  --  1.9   PHOSPHORUS mg/dL  --   --  7.3*   HEMOGLOBIN g/dL 7.3*   < > 8.3*   HEMATOCRIT % 23.7*   < > 26.9*    < > = values in this  interval not displayed.     COVID19   Date Value Ref Range Status   10/09/2021 Not Detected Not Detected - Ref. Range Final     Lab Results   Component Value Date    HGBA1C 9.00 (H) 09/07/2021         Pertinent Medications Reviewed.     Current Nutrition Orders & Evaluation of Intake       Oral Nutrition     Current PO Diet Diet Regular; Consistent Carbohydrate   Supplement Orders Placed This Encounter      Dietary Nutrition Supplements Farooq       Nutrition Diagnosis         Nutrition Dx Problem 1 Inadequate nutrient intake related to increased nutrient needs due to catabolic disease as evidenced by Wound healing       Nutrition Intervention         Farooq (2.5g protein 90kcal) supplement twice daily     Medical Nutrition Therapy/Nutrition Education          Learner     Readiness Patient  Education not appropriate at this time     Method     Response Other  Other     Monitor/Evaluation        Monitor PO intake, Supplement intake       Nutrition Discharge Plan         Farooq twice daily until wound is healed.       Electronically signed by:  Monalisa Ding RD  10/09/21 12:52 EDT

## 2021-10-09 NOTE — OP NOTE
OP NOTE  INCISION AND DRAINAGE ABSCESS  Procedure Report    Patient Name:  Amy Miles  YOB: 1965  [id number]    Date of Surgery:  10/9/2021     Indications: See consult note for indications, discussion of risk benefits and alternatives    Pre-op Diagnosis:   Necrotizing soft tissue infection [M79.89]       Post-Op Diagnosis Codes:     * Necrotizing soft tissue infection [M79.89]    Procedure/CPT® Codes:      Procedure(s): Incision, drainage, debridement of skin, soft tissue, fascia from right buttock necrotizing soft tissue infection    Staff:  Surgeon(s):  Tom Colbert MD    Assistant: Loli Graham RN    Anesthesia: General, Local    Estimated Blood Loss: 10 cc    Implants:    Nothing was implanted during the procedure    Specimen:          Specimens     ID Source Type Tests Collected By Collected At Frozen?    1 Sacrum Wound · ANAEROBIC CULTURE  · WOUND CULTURE  · AFB CULTURE   Tom Colbert MD 10/9/21 0991     Description: Buttock Abscess    A Sacrum Tissue · TISSUE PATHOLOGY EXAM   Tom Colbert MD 10/9/21 6637     Description: Soft Tissue Fascia and Skin            Findings: Necrotizing soft tissue infection right buttock involving skin, soft tissue, fascia.  No involvement of the rectum.  Total area of excision less than 50 cm.  Santyl placed in the excision cavity.  Cultures taken.    Complications: None    Description of Procedure: After all questions were answered, consent verified.  She brought to the operating room per stretcher placed in supine position arms out all extremities padded.  Bilateral lower extremity SCDs placed.  General tracheal anesthesia induced.  Preoperative IV antibiotics administered.  Patient rotated prone with all extremities padded appropriately.  Patient's buttocks were prepped with Betadine draped in usual sterile fashion.  Critical timeout taken.  Began the procedure taking cultures of the necrotizing soft tissue infection  from the right buttock wound.  I then sharply debrided skin, soft tissue, fascia measuring less than 50 cm from an opening in her right buttock.  Findings consistent with necrotizing soft tissue infection.  Tissue was debrided back to healthy bleeding tissue.  Counterincision was made over an area of crepitus right cephalad with no pocket of infection found at the site.  I then irrigated with sterile water.  I then placed Santyl inside the excision cavity.  I then communicated the counterincision with the opening of her sacral decubitus with a Penrose securing this with a silk tie.  I then performed rectal exam with no communication to the necrotizing soft tissue infection noted.  Dressings were applied.  At the end of the procedure all counts were correct.  I was present for the procedure.    Tom Colbert MD     Date: 10/9/2021  Time: 09:31 EDT

## 2021-10-10 NOTE — PROGRESS NOTES
SURGERY PROGRESS NOTE     Patient Name:  Amy Miles  YOB: 1965  [id number]   LOS: 1 day   1 Day Post-Op  Patient Care Team:  Demarcus Tripp DO as PCP - General (Family Medicine)      Subjective     Interval History:   OR yesterday.  Afebrile, vital signs stable.  Creatinine 1.1 no complaints.  Feels better.      Objective     Vital Signs  Temp:  [97.3 °F (36.3 °C)-98.6 °F (37 °C)] 98.2 °F (36.8 °C)  Heart Rate:  [] 103  Resp:  [15-18] 18  BP: ()/(44-86) 118/55    Physical Exam: Drain in place.  No evidence of ongoing necrotizing infection.         Results Review:     I reviewed the patient's new clinical results.    LABS:  Lab Results (last 72 hours)     Procedure Component Value Units Date/Time    POC Glucose Once [[id number]]  (Abnormal) Collected: 10/10/21 0808    Specimen: Blood Updated: 10/10/21 0809     Glucose 209 mg/dL      Comment: Serial Number: [id number]:  [id number]       Basic Metabolic Panel [[id number]]  (Abnormal) Collected: 10/10/21 0422    Specimen: Blood Updated: 10/10/21 0516     Glucose 210 mg/dL      BUN 38 mg/dL      Creatinine 1.17 mg/dL      Sodium 139 mmol/L      Potassium 4.0 mmol/L      Chloride 107 mmol/L      CO2 21.4 mmol/L      Calcium 6.5 mg/dL      eGFR Non African Amer 48 mL/min/1.73      BUN/Creatinine Ratio 32.5     Anion Gap 10.6 mmol/L     Narrative:      GFR Normal >60  Chronic Kidney Disease <60  Kidney Failure <15      Blood Culture - Blood, Arm, Right [[id number]]  (Normal) Collected: 10/08/21 1933    Specimen: Blood from Arm, Right Updated: 10/09/21 2000     Blood Culture No growth at 24 hours    Blood Culture - Blood, Arm, Left [[id number]]  (Normal) Collected: 10/08/21 1933    Specimen: Blood from Arm, Left Updated: 10/09/21 2000     Blood Culture No growth at 24 hours    Hepatitis Panel, Acute [[id number]]  (Normal) Collected: 10/09/21 0623    Specimen: Blood Updated: 10/09/21 1306     Hepatitis B Surface Ag Non-Reactive     Hep A IgM  Non-Reactive     Hep B C IgM Non-Reactive     Hepatitis C Ab Non-Reactive    Narrative:      Results may be falsely decreased if patient taking Biotin.     Wound Culture - Wound, Sacrum [[id number]] Collected: 10/09/21 0909    Specimen: Wound from Sacrum Updated: 10/09/21 1301     Gram Stain Occasional Gram positive cocci in pairs and clusters    COVID-19,CEPHEID/JULIA/BDMAX,COR/MARTELL/PAD/ERICKA IN-HOUSE(OR EMERGENT/ADD-ON),NP SWAB IN TRANSPORT MEDIA 3-4 HR TAT, RT-PCR - Swab, Nasopharynx [[id number]]  (Normal) Collected: 10/09/21 0021    Specimen: Swab from Nasopharynx Updated: 10/09/21 1126     COVID19 Not Detected    Narrative:      Fact sheet for providers: https://www.fda.gov/media/886792/download     Fact sheet for patients: https://www.fda.gov/media/102941/download  Presumptive Positive: additional testing may be indicated if it is necessary to differentiate between SARS-CoV-2 and other Sarbecovirus, for epidemiological purposes, or clinical management.    POC Glucose Once [[id number]]  (Abnormal) Collected: 10/09/21 1029    Specimen: Blood Updated: 10/09/21 1031     Glucose 190 mg/dL      Comment: Serial Number: [id number]:  [id number]       Anaerobic Culture - Wound, Sacrum [[id number]] Collected: 10/09/21 0909    Specimen: Wound from Sacrum Updated: 10/09/21 0948    AFB Culture - Wound, Sacrum [[id number]] Collected: 10/09/21 0909    Specimen: Wound from Sacrum Updated: 10/09/21 0948    POC Glucose Once [[id number]]  (Abnormal) Collected: 10/09/21 0752    Specimen: Blood Updated: 10/09/21 0753     Glucose 214 mg/dL      Comment: Serial Number: [id number]:  [id number]       Basic Metabolic Panel [[id number]]  (Abnormal) Collected: 10/09/21 0623    Specimen: Blood Updated: 10/09/21 0706     Glucose 220 mg/dL      BUN 37 mg/dL      Creatinine 2.07 mg/dL      Sodium 137 mmol/L      Potassium 4.2 mmol/L      Chloride 104 mmol/L      CO2 19.8 mmol/L      Calcium 6.8 mg/dL      eGFR Non African Amer 25 mL/min/1.73       BUN/Creatinine Ratio 17.9     Anion Gap 13.2 mmol/L     Narrative:      GFR Normal >60  Chronic Kidney Disease <60  Kidney Failure <15      Magnesium [[id number]]  (Normal) Collected: 10/09/21 0623    Specimen: Blood Updated: 10/09/21 0706     Magnesium 1.7 mg/dL     Ammonia [[id number]]  (Abnormal) Collected: 10/09/21 0623    Specimen: Blood Updated: 10/09/21 0700     Ammonia 72 umol/L     Vancomycin, Random [[id number]]  (Normal) Collected: 10/09/21 0623    Specimen: Blood Updated: 10/09/21 0700     Vancomycin Random 22.10 mcg/mL     Narrative:      Therapeutic Ranges for Vancomycin    Vancomycin Random   5.0-40.0 mcg/mL  Vancomycin Trough   5.0-20.0 mcg/mL  Vancomycin Peak     20.0-40.0 mcg/mL    Lactic Acid, Plasma [[id number]]  (Normal) Collected: 10/09/21 0623    Specimen: Blood Updated: 10/09/21 0657     Lactate 2.0 mmol/L     CBC Auto Differential [[id number]]  (Abnormal) Collected: 10/09/21 0623    Specimen: Blood Updated: 10/09/21 0633     WBC 11.77 10*3/mm3      RBC 2.66 10*6/mm3      Hemoglobin 7.3 g/dL      Hematocrit 23.7 %      MCV 89.1 fL      MCH 27.4 pg      MCHC 30.8 g/dL      RDW 16.1 %      RDW-SD 52.3 fl      MPV 12.8 fL      Platelets 119 10*3/mm3      Neutrophil % 78.7 %      Lymphocyte % 11.0 %      Monocyte % 8.8 %      Eosinophil % 0.3 %      Basophil % 0.4 %      Immature Grans % 0.8 %      Neutrophils, Absolute 9.25 10*3/mm3      Lymphocytes, Absolute 1.30 10*3/mm3      Monocytes, Absolute 1.04 10*3/mm3      Eosinophils, Absolute 0.04 10*3/mm3      Basophils, Absolute 0.05 10*3/mm3      Immature Grans, Absolute 0.09 10*3/mm3      nRBC 0.0 /100 WBC     Procalcitonin [[id number]]  (Abnormal) Collected: 10/08/21 1932    Specimen: Blood Updated: 10/09/21 0409     Procalcitonin 0.36 ng/mL     Narrative:      As a Marker for Sepsis (Non-Neonates):     1. <0.5 ng/mL represents a low risk of severe sepsis and/or septic shock.  2. >2 ng/mL represents a high risk of severe sepsis and/or septic  "shock.    As a Marker for Lower Respiratory Tract Infections that require antibiotic therapy:  PCT on Admission     Antibiotic Therapy             6-12 Hrs later  >0.5                          Strongly Recommended            >0.25 - <0.5             Recommended  0.1 - 0.25                  Discouraged                       Remeasure/reassess PCT  <0.1                         Strongly Discouraged         Remeasure/reassess PCT      As 28 day mortality risk marker: \"Change in Procalcitonin Result\" (>80% or <=80%) if Day 0 (or Day 1) and Day 4 values are available. Refer to http://www.[facility]-pct-calculator.com    Change in PCT <=80%   A decrease of PCT levels below or equal to 80% defines a positive change in PCT test result representing a higher risk for 28-day all-cause mortality of patients diagnosed with severe sepsis or septic shock.    Change in PCT >80%   A decrease of PCT levels of more than 80% defines a negative change in PCT result representing a lower risk for 28-day all-cause mortality of patients diagnosed with severe sepsis or septic shock.    This test is Prognostic not Diagnostic, if elevated correlate with clinical findings before administering antibiotic treatment.      Results may be falsely decreased if patient taking Biotin.     STAT Lactic Acid, Reflex [[id number]]  (Abnormal) Collected: 10/08/21 2255    Specimen: Blood Updated: 10/08/21 2342     Lactate 2.4 mmol/L     Lactic Acid, Plasma [[id number]]  (Abnormal) Collected: 10/08/21 1932    Specimen: Blood Updated: 10/08/21 2030     Lactate 4.7 mmol/L     Urinalysis, Microscopic Only - Urine, Catheter [[id number]]  (Abnormal) Collected: 10/08/21 1933    Specimen: Urine, Catheter Updated: 10/08/21 2028     RBC, UA 0-2 /HPF      WBC, UA 6-12 /HPF      Bacteria, UA 1+ /HPF      Squamous Epithelial Cells, UA 7-12 /HPF      Hyaline Casts, UA 0-2 /LPF      Methodology Manual Light Microscopy    Comprehensive Metabolic Panel [[id number]]  (Abnormal) " Collected: 10/08/21 1932    Specimen: Blood Updated: 10/08/21 2028     Glucose 137 mg/dL      BUN 32 mg/dL      Creatinine 3.10 mg/dL      Sodium 137 mmol/L      Potassium 4.8 mmol/L      Chloride 101 mmol/L      CO2 20.9 mmol/L      Calcium 7.9 mg/dL      Total Protein 5.7 g/dL      Albumin 2.50 g/dL      ALT (SGPT) 398 U/L      AST (SGOT) 1,286 U/L      Alkaline Phosphatase 110 U/L      Total Bilirubin 0.6 mg/dL      eGFR Non African Amer 16 mL/min/1.73      Globulin 3.2 gm/dL      A/G Ratio 0.8 g/dL      BUN/Creatinine Ratio 10.3     Anion Gap 15.1 mmol/L     Narrative:      GFR Normal >60  Chronic Kidney Disease <60  Kidney Failure <15      Urinalysis With Microscopic If Indicated (No Culture) - Urine, Catheter [[id number]]  (Abnormal) Collected: 10/08/21 1933    Specimen: Urine, Catheter Updated: 10/08/21 2022     Color, UA Dark Yellow     Appearance, UA Cloudy     pH, UA <=5.0     Specific Gravity, UA 1.024     Glucose,  mg/dL (Trace)     Ketones, UA Trace     Bilirubin, UA Small (1+)     Blood, UA Trace     Protein, UA 30 mg/dL (1+)     Leuk Esterase, UA Trace     Nitrite, UA Negative     Urobilinogen, UA 1.0 E.U./dL    CBC & Differential [[id number]]  (Abnormal) Collected: 10/08/21 1932    Specimen: Blood Updated: 10/08/21 2022    Narrative:      The following orders were created for panel order CBC & Differential.  Procedure                               Abnormality         Status                     ---------                               -----------         ------                     CBC Auto Differential[[id number]]        Abnormal            Final result               Scan Slide[[id number]]                                       Final result                 Please view results for these tests on the individual orders.    CBC Auto Differential [[id number]]  (Abnormal) Collected: 10/08/21 1932    Specimen: Blood Updated: 10/08/21 2022     WBC 12.65 10*3/mm3      RBC 3.06 10*6/mm3      Hemoglobin 8.3 g/dL       Hematocrit 26.9 %      MCV 87.9 fL      MCH 27.1 pg      MCHC 30.9 g/dL      RDW 16.1 %      RDW-SD 51.8 fl      MPV 13.4 fL      Platelets 189 10*3/mm3      Neutrophil % 71.4 %      Lymphocyte % 15.5 %      Monocyte % 11.6 %      Eosinophil % 0.2 %      Basophil % 0.2 %      Immature Grans % 1.1 %      Neutrophils, Absolute 9.02 10*3/mm3      Lymphocytes, Absolute 1.96 10*3/mm3      Monocytes, Absolute 1.47 10*3/mm3      Eosinophils, Absolute 0.03 10*3/mm3      Basophils, Absolute 0.03 10*3/mm3      Immature Grans, Absolute 0.14 10*3/mm3      nRBC 0.0 /100 WBC     Scan Slide [[id number]] Collected: 10/08/21 1932    Specimen: Blood Updated: 10/08/21 2022     Anisocytosis Slight/1+     Hypochromia Slight/1+     Stomatocytes Slight/1+     WBC Morphology Normal     Platelet Estimate Adequate     Large Platelets Slight/1+     Giant Platelets Slight/1+    Magnesium [[id number]]  (Normal) Collected: 10/08/21 1932    Specimen: Blood Updated: 10/08/21 2020     Magnesium 1.9 mg/dL     Phosphorus [[id number]]  (Abnormal) Collected: 10/08/21 1932    Specimen: Blood Updated: 10/08/21 2020     Phosphorus 7.3 mg/dL     C-reactive Protein [[id number]]  (Abnormal) Collected: 10/08/21 1932    Specimen: Blood Updated: 10/08/21 2020     C-Reactive Protein 6.37 mg/dL     Protime-INR [[id number]]  (Abnormal) Collected: 10/08/21 1933    Specimen: Blood Updated: 10/08/21 2014     Protime 18.2 Seconds      INR 1.76    Narrative:      Suggested Therapeutic Ranges For Oral Anticoagulant Therapy:  Level of Therapy                      INR Target Range  Standard Dose                            2.0-3.0  High Dose                                2.5-3.5  Patients not receiving anticoagulant  Therapy Normal Range                     0.6-1.2    aPTT [[id number]]  (Normal) Collected: 10/08/21 1933    Specimen: Blood Updated: 10/08/21 2014     PTT 24.1 seconds     [place] Draw [[id number]] Collected: 10/08/21 1932    Specimen: Blood Updated: 10/08/21  2003    Narrative:      The following orders were created for panel order [place] Draw.  Procedure                               Abnormality         Status                     ---------                               -----------         ------                     Green Top (Gel)[[id number]]                                  Final result               Lavender Top[[id number]]                                     Final result               Gold Top - SST[[id number]]                                   Final result               Light Blue Top[[id number]]                                   Final result                 Please view results for these tests on the individual orders.    Light Blue Top [[id number]] Collected: 10/08/21 1933    Specimen: Blood Updated: 10/08/21 2003     Extra Tube hold for add-on     Comment: Auto resulted       Gold Top - SST [[id number]] Collected: 10/08/21 1932    Specimen: Blood Updated: 10/08/21 2003     Extra Tube Hold for add-ons.     Comment: Auto resulted.       Green Top (Gel) [[id number]] Collected: 10/08/21 1932    Specimen: Blood Updated: 10/08/21 2003     Extra Tube Hold for add-ons.     Comment: Auto resulted.       Lavender Top [[id number]] Collected: 10/08/21 1932    Specimen: Blood Updated: 10/08/21 2002     Extra Tube hold for add-on     Comment: Auto resulted       Blood Gas, Arterial -With Co-Ox Panel: Yes [[id number]]  (Abnormal) Collected: 10/08/21 1933    Specimen: Arterial Blood Updated: 10/08/21 1945     pH, Arterial 7.425 pH units      pCO2, Arterial 32.3 mm Hg      pO2, Arterial 71.0 mm Hg      HCO3, Arterial 20.7 mmol/L      Base Excess, Arterial -3.1 mmol/L      O2 Saturation, Arterial 92.1 %      Hemoglobin, Blood Gas 8.9 g/dL      Carboxyhemoglobin 0.5 %      Methemoglobin 0.20 %      Oxyhemoglobin 91.5 %      FHHB 7.8 %      Site Arterial: right radial     Modality Cannula - Nasal     FIO2 28 %      Flow Rate 2 lpm      PO2/FIO2 254     Manny's Test Positive           IMAGING:  Imaging Results (Last 72 Hours)     Procedure Component Value Units Date/Time    CT Chest Without Contrast Diagnostic [[id number]] Collected: 10/09/21 0133     Updated: 10/09/21 0354    Narrative:      PROCEDURE: CT CHEST WO CONTRAST DIAGNOSTIC     COMPARISONS: [facility], CR, XR CHEST 1 VW, 7/01/2021, 12:23.     [facility], CT, CT ABDOMEN PELVIS WO CONTRAST, 10/09/2021, 1:08.     [facility], CR, XR CHEST 1 VW, 10/08/2021, 19:19.     [facility], CT, CHEST W/ CONTRAST, 11/05/2020, 11:22.     INDICATIONS: Possible mass, pneumonia, and/or CHF.  Possible sepsis.     TECHNIQUE: 577 CT images were created without the administration of contrast material.       PROTOCOL:   Standard imaging protocol performed      RADIATION:   Automated exposure control was utilized to minimize radiation dose.      FINDINGS: The patient has undergone thyroidectomy.  There is motion artifact on the study.    Atherosclerotic change is noted, including involvement of the coronary arteries.  There is mild   cardiomegaly.  There is a small right pleural effusion.  No definite left pleural effusion.  No   pericardial effusion.  There are small to moderate sized mediastinal and hilar lymph nodes.  These   findings are nonspecific.  They may be reactive in nature.  Similar findings were seen previously.    No pneumothorax.  No pneumomediastinum.  Atelectasis and/or infiltrate(s) is (are) seen on the   right.  Subsegmental atelectasis is favored, especially in the right lung base.  There may be   slight chronic asymmetric elevation of the right diaphragm.  Mild subsegmental atelectasis is   suspected on the left.  Probably no acute infiltrate is appreciated on the left.  There is chronic   calcified granulomatous disease of the chest, spleen, and liver.  Mild degenerative changes involve   the imaged spine.  No acute fracture or aggressive osseous lesion is identified.  The increased   density on  the recent chest radiograph from 10/8/2021 involving the right superior mediastinum is   thought to be related to low lung volumes and vascular structures, such as the superior vena cava   (SVC), azygos arch, and the thoracic aorta (especially the ascending aorta and the aortic arch).    No definite nonenhanced CT evidence of a mediastinal or right parahilar mass.  The patient has   undergone cholecystectomy.  A small hiatal hernia is possible.  The patient's upper extremities in   the scan field of view obscure detail.     CONCLUSION: Atelectasis and/or infiltrate(s) is (are) seen on the right with atelectasis favored.    The CT imaging features are atypical or uncommonly reported for COVID-19 pneumonia.  Alternative   diagnoses should be considered.    There is mild cardiac enlargement.  Atherosclerotic change is   seen, including involvement the coronary arteries.  There is a small right pleural effusion.    Please see above comments for further detail.          ISATU BROWN JR, MD         Electronically Signed and Approved By: ISATU BROWN JR, MD on 10/09/2021 at 1:40                     CT Abdomen Pelvis Without Contrast [[id number]] Collected: 10/09/21 0157     Updated: 10/09/21 0354    Narrative:      PROCEDURE: CT ABDOMEN PELVIS WO CONTRAST     COMPARISONS: [facility], CT, ABDOMEN/PELVIS W/ CONTRAST, 8/18/2018, 19:22.     [facility], CT, CHEST W/ CONTRAST, 11/05/2020, 11:22.     INDICATIONS: Sepsis; lactic acidosis.     TECHNIQUE: 553 CT images were created without intravenous contrast.       PROTOCOL:   Standard imaging protocol performed.      RADIATION:   Automated exposure control was utilized to minimize radiation dose.      FINDINGS: Motion artifact obscures detail on the study.  The liver has a cirrhotic contour.  There   is splenomegaly.  Portal hypertension and portosystemic venous shunting may be present.  The   patient has undergone cholecystectomy.  Atherosclerotic  changes are present.  No aneurysmal   dilatation of the aortoiliac arterial system is identified.  There is a small right pleural   effusion.  No left pleural effusion is seen.  Atelectasis and/or infiltrate(s) is (are) seen in the   right lung base.  No definite nonenhanced CT evidence of acute pancreatitis.  No renal stones or   hydronephrosis or obstructive uropathy.  Surgical clips are seen in the anterior abdominal wall and   suggest prior ventral hernia repair.  No pneumoperitoneum or pneumatosis.  There may be a small   hiatal hernia.  The appendix is not clearly identified.  Please correlate with the surgical   history.  The patient has undergone hysterectomy.   A urinary bladder catheter is in place.  The   urinary bladder is underdistended.       There is subcutaneous emphysema and increased attenuation of the soft tissues involving the medial   aspect of the right buttock, such as seen on axial images 95 through 120 of series 202.  Foci of   gas may be within a central area of fluid (as with abscess).  An infectious/inflammatory phlegmon   or inflammatory mass is possible.  The extent of The greatest measured extent of the findings is   12.5 x 7.7 x 7 cm in craniocaudal, transverse, and anteroposterior (AP) extent, respectively, as   seen on image 94 of series 205, image 110 of series 202, and image 141 of series 203.  Again, the   findings may represent an abscess.  Infectious/inflammatory myositis/cellulitis/fasciitis,   including necrotizing.  Please correlate with physical exam findings in this region as well as the   clinical history.  Adjacent osteomyelitis of the sacrum and/or coccyx cannot be excluded.  An   associated cutaneous sinus tract cannot be excluded.     There is formed stool throughout the colon suggesting fecal stasis as with constipation.  No   definite stercoral ulceration is seen.  No pneumoperitoneum or pneumatosis.  No definite   nonenhanced CT evidence of a sinus tract or fistula  involving the rectosigmoid colon.  Sclerotic   changes involve the sacroiliac joints.  There are degenerative changes of the imaged spine.    Degenerative changes also involve the bilateral hip joints.  No definite acute fracture or acute   malalignment is seen.       CONCLUSION:      1. An age-indeterminate infectious/inflammatory process with associated subcutaneous gas is seen   involving the medial aspect of the right buttock and medial posterior right pelvic wall (overlying   the right sacrum and coccyx).  It involves the right gluteal musculature and the overlying   superficial subcutaneous tissues.  It does not appear to extend into the right ischiorectal fossa.    Soft tissue gas may indicate an age-indeterminate anaerobic infectious process, including (but not   necessarily limited to) necrotizing fasciitis, right gluteal myositis, and/or cellulitis.  An   associated phlegmon or abscess is possible.  Underlying osteomyelitis of the sacrum and/or coccyx   cannot be excluded.  No definite nonenhanced CT evidence of an associated sinus tract or fistula   involving the rectosigmoid colon.  A cutaneous sinus tract is possible, however.  Again, an abscess   or phlegmon involving the medial right buttock is possible.  It may measure as great is 12.5 cm in   craniocaudal extent.  The findings may represent a complication of a sacral decubitus ulcer.  The   findings are thought less likely to originate from infectious/inflammatory colitis or proctitis   involving the rectosigmoid colon.     2. Please see above comments for further detail.             Pertinent findings were discussed with Selene Canales PA-C, ordering [facility] (Hospitalist's Physician   Assistant, be at approximately 0201 hours on 10/9/2021.       ISATU BRWON JR, MD         Electronically Signed and Approved By: ISATU BROWN JR, MD on 10/09/2021 at 2:05                     XR Chest 1 View [[id number]] Collected: 10/08/21 1935     Updated: 10/08/21  1939    Narrative:      PROCEDURE: XR CHEST 1 VW     COMPARISON: [facility], CR, XR ABDOMEN KUB, 7/02/2021, 1:46.  [facility], CR, XR CHEST 1 VW, 7/01/2021, 12:23.     INDICATIONS: Severe Sepsis triage protocol     FINDINGS:   Single frontal view chest AP upright portable reveals that the heart is enlarged.  There is   questionable increased density in the right side of the superior mediastinum.  This might be caused   by anatomic variation or tortuous blood vessels or mass.  There are bilateral diffuse increased   markings consistent with chronic lung disease and bronchitis and mild congestive heart failure.    Surgical clips are seen in the right upper quadrant the abdomen consistent with cholecystectomy.    There are dilated loops of bowel in the left upper quadrant the abdomen consistent with moderate   gaseous distension of the stomach and moderate gaseous distension of the splenic flexure of the   colon.     CONCLUSION:   1. Cardiomegaly.  2. Suspicion of focal increased density in the right side of the superior mediastinum might be   caused by artifact or anatomic variation or tortuous dilated blood vessels or mass.  CT of the   chest would be helpful to further evaluate this finding if clinically indicated.  3. Diffuse increased markings especially in the right lower lobe and left lower lobe lung   consistent with chronic lung disease and bronchitis and mild congestive heart failure.                  LAKSHMI BARNARD MD         Electronically Signed and Approved By: LAKSHMI BARNARD MD on 10/08/2021 at 19:35                           Medications:    Current Facility-Administered Medications:   •  aluminum-magnesium hydroxide-simethicone (MAALOX MAX) 400-400-40 MG/5ML suspension 15 mL, 15 mL, Oral, Q6H PRN, Tom Colbert MD  •  aspirin EC tablet 81 mg, 81 mg, Oral, Daily, Rosy Maxwell MD, 81 mg at 10/10/21 0838  •  busPIRone (BUSPAR) tablet 10 mg, 10 mg,  Oral, BID, Rosy Maxwell MD, 10 mg at 10/10/21 0839  •  clindamycin (CLEOCIN) 900 mg in dextrose 5% 50 mL IVPB (premix), 900 mg, Intravenous, Q8H, Tom Colbert MD, Last Rate: 50 mL/hr at 10/10/21 0226, 900 mg at 10/10/21 0226  •  collagenase ointment 1 application, 1 application, Topical, Q24H, Tom Colbert MD  •  cyclobenzaprine (FLEXERIL) tablet 10 mg, 10 mg, Oral, TID PRN, Rosy Maxwell MD  •  diazePAM (VALIUM) tablet 5 mg, 5 mg, Oral, Q8H PRN, Rosy Maxwell MD  •  gabapentin (NEURONTIN) capsule 600 mg, 600 mg, Oral, Q8H, Rosy Maxwell MD, 600 mg at 10/10/21 0838  •  HYDROcodone-acetaminophen (NORCO)  MG per tablet 1 tablet, 1 tablet, Oral, Q6H PRN, Rosy Maxwell MD, 1 tablet at 10/10/21 0626  •  HYDROcodone-acetaminophen (NORCO) 5-325 MG per tablet 1 tablet, 1 tablet, Oral, Q6H PRN, Rosy Maxwell MD, 1 tablet at 10/09/21 2340  •  lactated ringers infusion, 100 mL/hr, Intravenous, Continuous, Rosy Maxwell MD, Last Rate: 100 mL/hr at 10/10/21 0804, 100 mL/hr at 10/10/21 0804  •  lactated ringers infusion, 9 mL/hr, Intravenous, Continuous PRN, Tom Colbert MD, Stopped at 10/09/21 0922  •  [START ON 10/11/2021] levothyroxine (SYNTHROID, LEVOTHROID) tablet 200 mcg, 200 mcg, Oral, Daily, Rosy Maxwell MD  •  melatonin tablet 5 mg, 5 mg, Oral, Nightly PRN, Tom Colbert MD, 5 mg at 10/09/21 2340  •  meropenem (MERREM) 1 g/100 mL 0.9% NS VTB (mbp), 1 g, Intravenous, Q8H, Parker Selene, PA  •  montelukast (SINGULAIR) tablet 10 mg, 10 mg, Oral, Nightly, Rosy Maxwell MD  •  morphine injection 2 mg, 2 mg, Intravenous, Q4H PRN, Rosy Maxwell MD, 2 mg at 10/10/21 0225  •  multivitamin with minerals 1 tablet, 1 tablet, Oral, Daily, Tom Clobert MD, 1 tablet at 10/10/21 0839  •  ondansetron (ZOFRAN) injection 4 mg, 4 mg, Intravenous, Q6H PRN, Tom Colbert MD, 4 mg at 10/10/21 0225  •  Pharmacy to Dose meropenem  (MERREM), , Does not apply, Continuous PRN, Tom Colbert MD  •  Pharmacy to dose vancomycin, , Does not apply, Continuous PRN, Tom Colbert MD  •  promethazine (PHENERGAN) tablet 25 mg, 25 mg, Oral, Daily, Rosy Maxwell MD, 25 mg at 10/10/21 0839  •  sodium chloride 0.9 % flush 10 mL, 10 mL, Intravenous, PRN, Tom Colbert MD  •  sodium chloride 0.9 % flush 10 mL, 10 mL, Intravenous, Q12H, Tom Colbert MD, 10 mL at 10/09/21 2056  •  sodium chloride 0.9 % flush 10 mL, 10 mL, Intravenous, PRN, Tom Colbert MD  •  thiamine (VITAMIN B-1) tablet 100 mg, 100 mg, Oral, Daily, Rosy Maxwell MD, 100 mg at 10/10/21 0839  •  vancomycin 1000 mg/250 mL 0.9% NS IVPB ([facility]), 1,000 mg, Intravenous, Q24H, Tom Colbert MD, 1,000 mg at 10/09/21 1512    Assessment/Plan       Necrotizing soft tissue infection    Sepsis (HCC)    Severe sepsis (HCC)    Right buttock necrotizing soft tissue infection status post incision debridement    Apply Santyl daily inside the cavity of the debridement.  Continue multivitamin.  Turn every 2 hour stay off the excision site to help with healing.  Follow-up with me in 2-3 weeks.  Keep drain.  Okay to shower or tub bath.  I stressed the importance of staying off the wound to allow healing to the family and patient.  Discussed this with the staff as well.  All questions answered.  Signing off.    Recommend MACHELLE Brice.         Tom Colbert MD  10/10/21  08:53 EDT

## 2021-10-10 NOTE — PROGRESS NOTES
"Pharmacy to Dose Vancomycin Day: 2    Amy Miles is a 55 y.o.female admitted with LOSS OF CONSCIOUSNESS. Pharmacy has been consulted to dose IV Vancomycin     Consulting Provider: ALYSSA  Clinical Indication: SEPSS  Pertinent Past Medical History: SEPSIS WITH PNA (7/2021)  Goal -600 mg/L.hr  Duration of therapy: 7    165.1 cm (65\")       10/08/21  1914      Weight: 83.5 kg (184 lb 1.4 oz)         Estimated Creatinine Clearance: 57.3 mL/min (A) (by C-G formula based on SCr of 1.17 mg/dL (H)).  Results from last 7 days  Lab  Units  10/10/21  0422  10/09/21  0623  10/08/21  1932  BUN  mg/dL  38*  37*  32*  CREATININE  mg/dL  1.17*  2.07*  3.10*      HD/PD/CRRT?: NO    Lab Results   Component Value Date    WBC 11.77 (H) 10/09/2021      Temperature    10/10/21 0500 10/10/21 0718 10/10/21 1111   Temp: 98.3 °F (36.8 °C) 98.2 °F (36.8 °C) 98.1 °F (36.7 °C)     Contrast Administered:  NO    Relevant Micro:   Microbiology Results (last 10 days)       Procedure Component Value - Date/Time    Wound Culture - Wound, Sacrum [[id number]]  (Abnormal) Collected: 10/09/21 0909    Lab Status: Preliminary result Specimen: Wound from Sacrum Updated: 10/10/21 1200     Wound Culture Scant growth (1+) Gram Negative Bacilli     Gram Stain Occasional Gram positive cocci in pairs and clusters    AFB Culture - Wound, Sacrum [[id number]] Collected: 10/09/21 0909    Lab Status: Preliminary result Specimen: Wound from Sacrum Updated: 10/10/21 1127     AFB Stain No acid fast bacilli seen    COVID-19,CEPHEID/JULIA/BDMAX,COR/MARTELL/PAD/ERICKA IN-HOUSE(OR EMERGENT/ADD-ON),NP SWAB IN TRANSPORT MEDIA 3-4 HR TAT, RT-PCR - Swab, Nasopharynx [[id number]]  (Normal) Collected: 10/09/21 0021    Lab Status: Final result Specimen: Swab from Nasopharynx Updated: 10/09/21 1126     COVID19 Not Detected    Narrative:      Fact sheet for providers: https://www.fda.gov/media/898195/download     Fact sheet for patients: " https://www.fda.gov/media/445595/download  Presumptive Positive: additional testing may be indicated if it is necessary to differentiate between SARS-CoV-2 and other Sarbecovirus, for epidemiological purposes, or clinical management.    Blood Culture - Blood, Arm, Right [[id number]]  (Normal) Collected: 10/08/21 1933    Lab Status: Preliminary result Specimen: Blood from Arm, Right Updated: 10/09/21 2000     Blood Culture No growth at 24 hours    Blood Culture - Blood, Arm, Left [[id number]]  (Normal) Collected: 10/08/21 1933    Lab Status: Preliminary result Specimen: Blood from Arm, Left Updated: 10/09/21 2000     Blood Culture No growth at 24 hours           Relevant Radiology:   CHEST CT:  CONCLUSION: Atelectasis and/or infiltrate(s) is (are) seen on the right with atelectasis favored.  The CT imaging features are atypical or uncommonly reported for COVID-19 pneumonia.  Alternative diagnoses should be considered.    There is mild cardiac enlargement.  Atherosclerotic change is seen, including involvement the coronary arteries.  There is a small right pleural effusion.  Please see above comments for further detail.       Other Antimicrobial Therapy:  MERREM    Assessment/Plan  Regimen: 1500 mg IV every 24 hours.  Start time: 13:58 on 10/10/2021  Exposure target: AUC24 (range)400-600 mg/L.hr   AUC24,ss: 479 mg/L.hr  PAUC*: 82 %  Ctrough,ss: 11.4 mg/L  Pconc*: 4 %  Tox.: 7 %    VANCOMYCIN LEVEL TODAY REPORTED AS 12.5.  DOSE ADJUSTED TO 1500MG T81FOLAQ PER [facility] RECOMMENDATION.    BMP TOMORROW.

## 2021-10-10 NOTE — PLAN OF CARE
Goal Outcome Evaluation:   Pt in stable condition. Pt more alert and oriented, interacting with staff. Pain being managed well with current regimen. No major events or changes. LITTLE FONSECA RN

## 2021-10-10 NOTE — PLAN OF CARE
Goal Outcome Evaluation:  Plan of Care Reviewed With: patient        Progress: improving  Outcome Summary: Dressing change to coccyx daily, patient receiving abx.

## 2021-10-10 NOTE — PROGRESS NOTES
[facility]   Hospitalist Progress Note  Date: 10/10/2021  Patient Name: Amy Miles  : 1965  MRN: [id number]  Date of admission: 10/8/2021      Subjective   Subjective     Admitted: 10/9/2021  Chief complaint: Altered mental status  Consultants: Dr. Colbert-General surgery  Antibiotics never:: Merrem, vancomycin  Summary:  35-year-old female presented with fevers, hypotension, with altered mental status and lethargy, septic, lactic acidosis, improved with fluids, CT scan of the abdomen and pelvis revealed right medial gluteal abscess, sacral decubitus ulcer with necrotizing fasciitis General surgery was consulted, sepsis managed, taken to the OR, status post I&D, debridement of skin of right buttock necrotizing soft tissue infection, tolerated procedure well, placed on Merrem, vancomycin, wound culture prelim growing gram-negative bacilli, EMILIANA on admission managed.    Interval follow-up: Patient seen and examined, no acute distress, no acute major overnight events, patient is alert and awake and oriented today, she is not quite at her baseline yet but is approaching, appears comfortable, is asking for more food, afebrile overnight.  On 3 L nasal cannula.  Blood pressures remain soft, several of her home antihypertensive medications have been held.  White blood cell count down to 11,000, creatinine improved to normal range, hemoglobin low at 7.3, no signs of bleeding per nursing staff, platelets at 119 K.  Telemetry reviewed, no acute saved arrhythmic events, baseline sinus rhythm in the 100s.    Review of systems:  Unable to obtain 10 point ROS due to patient's lack of focus and concentration with answering questions    Objective   Objective     Vitals:   Temp:  [98.1 °F (36.7 °C)-98.6 °F (37 °C)] 98.1 °F (36.7 °C)  Heart Rate:  [] 107  Resp:  [18] 18  BP: ()/(44-58) 92/58  Flow (L/min):  [3] 3  Physical Exam    Constitutional: Awake, alert, oriented, and pleasant mood              Eyes:  Pupils equal, sclerae anicteric, no conjunctival injection              HENT: NCAT, mucous membranes moist              Neck: Supple, no thyromegaly, no lymphadenopathy, trachea midline              Respiratory: Clear to auscultation bilaterally, nonlabored respirations               Cardiovascular: RRR, no murmurs, rubs, or gallops, bilateral lower extremities are mottled and cool.  PT pulses are able to be dopplered.              Gastrointestinal: Positive bowel sounds, abdomen is distended, nontender              Musculoskeletal: No bilateral ankle edema, no clubbing or cyanosis to extremities              Psychiatric: Appropriate affect, cooperative, and pleasant mood              Neurologic: Oriented x 2, moves all extremities, Cranial Nerves grossly intact to confrontation, speech clear              Skin: Large stage III-IV sacral decubitus ulcer covered with dressing      Result Review    Result Review:  I have personally reviewed the results from the time of this admission to 10/10/2021 14:21 EDT and agree with these findings:  [x]  Laboratory  [x]  Microbiology  [x]  Radiology  [x]  EKG/Telemetry   []  Cardiology/Vascular   []  Pathology  [x]  Old records  []  Other:    Assessment/Plan   Assessment / Plan     Assessment/Plan:    Assessment:  Severe sepsis secondary to skin soft tissue infection  Right buttock necrotizing soft tissue infection status post incision and drainage, debridement  Metabolic encephalopathy  Lactic acidosis  Hypoxemia only  Hypotension  Necrotizing fasciitis  Sacral decubitus ulcer present on admission  Transaminitis  Liver cirrhosis  COPD  Essential hypertension  Hypothyroidism  Diabetes mellitus with insulin use  Chronic pain syndrome  History of stroke  CRPS  Depression  History of lupus    Plan:  Labs and imaging reviewed  Continue holding antihypertensive medications which he takes at home  Continue LR at 100 cc/h  Continue Merrem, vancomycin  Monitor dose vancomycin by AUC  method  Can stop clindamycin  Continue supplemental oxygen to maintain O2 saturation greater 92%, wean accordingly  Telemetry monitoring  Pain control with Norco 5 and 10 mg every 6 hours as needed for moderate severe pain respectively, morphine IV for severe breakthrough pain  Carb consistent regular diet  Insulin sliding scale coverage  Santyl inside cavity with debridement, offloading, keep Penrose drain  A.m. labs  Full code  PT/OT  Clinical course to dictate further management  Discussed with nurse at the bedside    DVT prophylaxis:  Mechanical DVT prophylaxis orders are present.    CODE STATUS:   Code Status: CPR  Medical Interventions (Level of Support Prior to Arrest): Full        Electronically signed by Rosy Maxwell MD, 10/10/21, 2:21 PM EDT.

## 2021-10-11 NOTE — PROGRESS NOTES
"Pharmacy to Dose Vancomycin Day: 3  DURATION OF THERAPY: 10-16-21    INDICATION: SEPSIS  GOAL AUC: 400-600 MG/L.HR    HT: 165.1 cm (65\")       10/08/21  1914      Weight: 83.5 kg (184 lb 1.4 oz)         Estimated Creatinine Clearance: 72.1 mL/min (by C-G formula based on SCr of 0.93 mg/dL).  HD/PD/CRRT?: NO  CONTRAST ADMINISTERED? NO  I/O last 3 completed shifts:  In: 3000 [P.O.:200; I.V.:2300; IV Piggyback:500]  Out: 1600 [Urine:1600]    WBC: 6.59  TMAX: AF    Microbiology Results (last 10 days)       Procedure Component Value - Date/Time    Wound Culture - Wound, Sacrum [[id number]]  (Abnormal) Collected: 10/09/21 0909    Lab Status: Preliminary result Specimen: Wound from Sacrum Updated: 10/10/21 1200     Wound Culture Scant growth (1+) Gram Negative Bacilli     Gram Stain Occasional Gram positive cocci in pairs and clusters    AFB Culture - Wound, Sacrum [[id number]] Collected: 10/09/21 0909    Lab Status: Preliminary result Specimen: Wound from Sacrum Updated: 10/10/21 1127     AFB Stain No acid fast bacilli seen    COVID-19,CEPHEID/JULIA/BDMAX,COR/MARTELL/PAD/ERICKA IN-HOUSE(OR EMERGENT/ADD-ON),NP SWAB IN TRANSPORT MEDIA 3-4 HR TAT, RT-PCR - Swab, Nasopharynx [[id number]]  (Normal) Collected: 10/09/21 0021    Lab Status: Final result Specimen: Swab from Nasopharynx Updated: 10/09/21 1126     COVID19 Not Detected    Narrative:      Fact sheet for providers: https://www.fda.gov/media/436177/download     Fact sheet for patients: https://www.fda.gov/media/141362/download  Presumptive Positive: additional testing may be indicated if it is necessary to differentiate between SARS-CoV-2 and other Sarbecovirus, for epidemiological purposes, or clinical management.    Blood Culture - Blood, Arm, Right [[id number]]  (Normal) Collected: 10/08/21 1933    Lab Status: Preliminary result Specimen: Blood from Arm, Right Updated: 10/10/21 2000     Blood Culture No growth at 2 days    Blood Culture - Blood, Arm, Left [[id number]]  (Normal) " Collected: 10/08/21 1933    Lab Status: Preliminary result Specimen: Blood from Arm, Left Updated: 10/10/21 2000     Blood Culture No growth at 2 days           CHEST CT:  CONCLUSION: Atelectasis and/or infiltrate(s) is (are) seen on the right with atelectasis favored.  The CT imaging features are atypical or uncommonly reported for COVID-19 pneumonia.  Alternative diagnoses should be considered.    There is mild cardiac enlargement.  Atherosclerotic change is seen, including involvement the coronary arteries.  There is a small right pleural effusion.  Please see above comments for further detail.       Other Antimicrobial Therapy:  MERREM    Assessment/Plan  Lab Results   Component Value Date    VANCOTROUGH 7.73 07/05/2021    VANCORANDOM 12.50 10/10/2021     Current regimen: 1500 mg IV every 24 hours.    AUC24,ss: 399 mg/L.hr  PAUC*: 50 %  Ctrough,ss: 8.4 mg/L  Pconc*: 1 %  Tox.: 5 %    Low AUC; a regimen of 1000 mg IV every 12 hours should provide:     AUC24,ss: 528 mg/L.hr  PAUC*: 92 %  Ctrough,ss: 15.2 mg/L  Pconc*: 16 %  Tox.: 10 %    Will change to this and check a random level in a.m.  Labs ordered: BMP ordered for a.m.

## 2021-10-11 NOTE — PROGRESS NOTES
[facility]   Hospitalist Progress Note  Date: 10/11/2021  Patient Name: Amy Miles  : 1965  MRN: [id number]  Date of admission: 10/8/2021      Subjective   Subjective     Admitted: 10/9/2021  Chief complaint: Altered mental status  Consultants: Dr. Colbert-General surgery  Antibiotics never:: Merrem, vancomycin  Summary:  35-year-old female presented with fevers, hypotension, with altered mental status and lethargy, septic, lactic acidosis, improved with fluids, CT scan of the abdomen and pelvis revealed right medial gluteal abscess, sacral decubitus ulcer with necrotizing fasciitis General surgery was consulted, sepsis managed, taken to the OR, status post I&D, debridement of skin of right buttock necrotizing soft tissue infection, tolerated procedure well, placed on Merrem, vancomycin, wound culture . coli and Enterococcus, EMILIANA on admission managed. Will need rehab.    Interval follow-up: Patient seen and examined this morning, increasing lethargic, no acute events, no acute overnight events, telemetry reviewed over the past 24 hours, short period of tachycardia, otherwise baseline sinus rhythm in the 70s.  Blood pressures have been better, her heart rate is in the 70s, she has not had any fevers of the past 24 hours, her oxygen requirement has improved to 4 L nasal cannula, her hemoglobin today 6.5, her platelets are 100 8K, her INR is 1.35, she has had no acute signs of bleeding, her creatinine is at 0.93, she remains slightly acidotic, her AST and ALT are in the 400-500 range, she probably has some intrinsic liver dysfunction with her history, exacerbated with being septic and hypotensive.  Calcium is also low.  She is arousable and states that she feels okay, she is hungry.  Is anxious at times.  Her wound area still has a Penrose drain, draining yellow serosanguineous purulence.  Her wound culture is growing E. coli and Enterococcus.  The E. coli has several resistant antibiotics.   Enterococcus sensitivities is still pending.  Patient's  is demanding her oxycodone be resumed, given her mentation changes and lethargy,    Review of systems:  10 Point review of systems obtained, pertinent positives include lethargy, generalized weakness and fatigue, lower perineal pain.  Pertinent negatives include fevers, chills, sweats, headache, dizziness, chest pain, palpitations.    Objective   Objective     Vitals:   Temp:  [97.3 °F (36.3 °C)-98.3 °F (36.8 °C)] 97.6 °F (36.4 °C)  Heart Rate:  [] 71  Resp:  [12-18] 16  BP: (101-148)/(48-82) 135/73  Flow (L/min):  [3-6] 4  Physical Exam       Constitutional: Awake, alert, arousable, cooperative              Eyes: Pupils equal, sclerae anicteric, no conjunctival injection              HENT: NCAT, mucous membranes moist              Neck: Supple, no thyromegaly, no lymphadenopathy, trachea midline              Respiratory: Clear to auscultation bilaterally, nonlabored respirations               Cardiovascular: RRR, no murmurs, rubs, or gallops              Gastrointestinal: Positive bowel sounds, abdomen is distended, nontender              Musculoskeletal: No bilateral ankle edema, no clubbing or cyanosis to extremities              Psychiatric: Appropriate affect, cooperative, and pleasant mood              Neurologic: Oriented x 2, moves all extremities, Cranial Nerves grossly intact to confrontation, speech clear              Skin: Large stage III-IV sacral decubitus ulcer covered with dressing      Result Review    Result Review:  I have personally reviewed the results from the time of this admission to 10/11/2021 17:02 EDT and agree with these findings:  [x]  Laboratory  [x]  Microbiology  [x]  Radiology  [x]  EKG/Telemetry   []  Cardiology/Vascular   []  Pathology  [x]  Old records  []  Other:    Assessment/Plan   Assessment / Plan     Assessment/Plan:    Assessment:  Severe sepsis secondary to skin soft tissue infection due to E. coli and  Enterococcus  Right buttock necrotizing soft tissue infection status post incision and drainage, debridement  Metabolic encephalopathy  Lactic acidosis  Hypoxemia only  Hypotension  Necrotizing fasciitis  Sacral decubitus ulcer present on admission  Transaminitis  Liver cirrhosis  COPD  Essential hypertension  Hypothyroidism  Diabetes mellitus with insulin use  Chronic pain syndrome  History of stroke  CRPS  Depression  History of lupus     Plan:  Labs and imaging reviewed  DC LR  Resume oxycodone per family request  Hold gabapentin  Continue Merrem, vancomycin  Monitor dose vancomycin by AUC method  Follow-up culture sensitivities further  Continue supplemental oxygen to maintain O2 saturation greater 92%, wean accordingly  Telemetry monitoring continued  Resume home Lasix  Carb consistent regular diet  Insulin sliding scale coverage  Santyl inside cavity with debridement, offloading, keep Penrose drain  A.m. labs  Full code  PT/OT  Clinical course to dictate further management  Discussed with nurse at the bedside  Given the extent of this patient's oropharyngeal ulcers requiring significant wound care, I recommend this patient to be placed in a formal SNF for rehabilitation    DVT prophylaxis:  Mechanical DVT prophylaxis orders are present.    CODE STATUS:   Code Status: CPR  Medical Interventions (Level of Support Prior to Arrest): Full    Electronically signed by Rosy Maxwell MD, 10/11/21, 5:02 PM EDT.

## 2021-10-11 NOTE — SIGNIFICANT NOTE
10/11/21 1325   Wound 10/09/21 0917 coccyx Incision   Placement Date/Time: 10/09/21 0917   Location: coccyx  Primary Wound Type: (c) Incision   Wound Image    Dressing Appearance intact; moist drainage   Base red; slough; moist   Red (%), Wound Tissue Color 50   Yellow (%), Wound Tissue Color 50   Periwound macular; pink; redness; blanchable; moist   Edges irregular; open   Wound Length (cm) 4 cm   Wound Width (cm) 8 cm   Wound Depth (cm) 3.5 cm   Tunneling [Depth (cm)/Location] 7.2cm at 5 o'clock   Undermining [Depth (cm)/Location] from 12 o'clock to 6 o'clock   Drainage Characteristics/Odor serosanguineous; yellow; purulent   Drainage Amount small   Care, Wound cleansed with; sterile normal saline   Dressing Care dressing applied; border dressing; silicone   Periwound Care topical treatment applied   Wound consult: Patient admitted with necrotizing soft tissue infection. Hx of Anxiety, Arthritis, Asthma, Chronic pain, Cirrhosis, COPD, DM, HTN, GERD, Hyperthyroid and psychosocial disorders. Patient underwent I&D on 10/09/21 at which time open drain was left in place. Two incisions. Upper most measuring 2.1cml x 0.3cmw and lower incision measuring 2.2cml x 1.0cmw with wound bed access. Serosanguinous drainage as well as yellow drainage noted to dressing and actively oozing. Wound cleansed with NS moistened gauze. Wound bed palpated and measured with tunnel at 5 o'clock. Left side of open wound with thick yellow non-viable slough tissue obscuring full view. Periwound is discolored with blanchable tissue that is pink to light purple in color. Area around rectum with red moist tissue. Recommending moisture absorbtive dressing along with moisture prevention measure. F/C remains currently intact for bladder management.   Cate Gunderson RN

## 2021-10-11 NOTE — PLAN OF CARE
Goal Outcome Evaluation:   Patient lethargic throughout the day will wake up with physical touch, 1 unit of blood administered, 2nd unit of blood infusing, no other changes.

## 2021-10-11 NOTE — SIGNIFICANT NOTE
10/11/21 1100   Coping/Psychosocial   Observed Emotional State anxious   Verbalized Emotional State anxiety   Trust Relationship/Rapport empathic listening provided   Involvement in Care interacting with patient   Additional Documentation Spiritual Care (Group)   Spiritual Care   Use of Spiritual Resources non-Buddhist use of spiritual care   Spiritual Care Source  initiative   Spiritual Care Follow-Up follow-up, none required as presently assessed   Response to Spiritual Care receptive of support; thanks expressed   Spiritual Care Interventions supportive conversation provided   Spiritual Care Visit Type initial   Receptivity to Spiritual Care visit welcomed

## 2021-10-12 NOTE — THERAPY EVALUATION
Patient Name: Amy Miles  : 1965    MRN: [id number]                              Today's Date: 10/12/2021       Admit Date: 10/8/2021    Visit Dx:     ICD-10-CM ICD-9-CM   1. Severe sepsis (HCC)  A41.9 038.9    R65.20 995.92   2. Necrotizing soft tissue infection  M79.89 729.99   3. Decreased activities of daily living (ADL)  Z78.9 V49.89     Patient Active Problem List   Diagnosis   • ZELALEM (generalized anxiety disorder)   • Depression   • COPD (chronic obstructive pulmonary disease) (HCC)   • Diabetes mellitus type 1 (HCC)   • GERD (gastroesophageal reflux disease)   • Essential hypertension, benign   • Iatrogenic hypothyroidism   • Chronic allergic rhinitis   • Cirrhosis of liver (HCC)   • Chronic pain syndrome   • Ischemic stroke (HCC)   • Thrombocytopenia (HCC)   • Pressure injury of skin of buttock   • Primary insomnia   • Acute recurrent frontal sinusitis   • Grief   • Yeast infection   • Chronic frontal sinusitis   • Sepsis (HCC)   • Severe sepsis (HCC)   • Necrotizing soft tissue infection     Past Medical History:   Diagnosis Date   • Anxiety    • Arm pain    • Arthritis    • Asthma    • Bladder disorder    • Bronchitis    • Chronic allergic rhinitis    • Chronic pain syndrome     Reflex Sympathetic Dystrophy, left arm.   • Cirrhosis of liver (HCC)    • COPD (chronic obstructive pulmonary disease) (HCC)    • CRPS (complex regional pain syndrome), type I, upper 2012   • Depression    • Diabetes mellitus type 1 (HCC)    • Elevated INR 2021   • Elevated LFTs 2021   • Encephalopathy, hepatic (HCC) 2021   • Essential hypertension, benign    • Fracture    • GERD (gastroesophageal reflux disease)    • Hand pain    • Heel pain    • Hyperthyroidism    • Iatrogenic hypothyroidism    • Leg pain    • Leg swelling    • Limb swelling    • Mood disorder (HCC)    • Mood disorder (HCC)    • Nausea    • RSD (reflex sympathetic dystrophy)    • Seasonal allergies    • Sepsis due to pneumonia  (CMS/[facility]) 2021   • Shortness of Air    • Toe fracture     [id number]     Past Surgical History:   Procedure Laterality Date   • ABDOMINAL HYSTERECTOMY     • CARPAL TUNNEL RELEASE     •  SECTION     • CHOLECYSTECTOMY     • COLONOSCOPY     • GALLBLADDER SURGERY     • HAND SURGERY     • HEMORRHOIDECTOMY     • HERNIA REPAIR     • HIATAL HERNIA REPAIR     • INCISION AND DRAINAGE ABSCESS     • INCISION AND DRAINAGE ABSCESS Bilateral 10/9/2021    Procedure: INCISION AND DRAINAGE ABSCESS buttocks;  Surgeon: Tom Colbert MD;  Location: [facility] MAIN OR;  Service: General;  Laterality: Bilateral;   • THYROIDECTOMY      by Dr. Anthony   • TONSILLECTOMY     • TUBAL ABDOMINAL LIGATION        General Information     Row Name 10/12/21 0856          OT Time and Intention    Document Type evaluation  -PG     Mode of Treatment individual therapy; occupational therapy  -PG     Row Name 10/12/21 0856          General Information    Prior Level of Function independent:; ADL's; transfer  -PG     Existing Precautions/Restrictions fall  -PG     Barriers to Rehab none identified  -PG     Row Name 10/12/21 0856          Occupational Profile    Reason for Services/Referral (Occupational Profile) Decreased ADL performance  -PG     Row Name 10/12/21 0856          Living Environment    Lives With spouse  -PG     Row Name 10/12/21 0856          Cognition    Orientation Status (Cognition) oriented to; person; verbal cues/prompts needed for orientation  Patient mildly confused.  Answering simple one-step questions but not consistently  -PG     Row Name 10/12/21 0856          Safety Issues, Functional Mobility    Safety Issues Affecting Function (Mobility) ability to follow commands; awareness of need for assistance  -PG     Impairments Affecting Function (Mobility) balance; cognition; endurance/activity tolerance; strength  -PG     Cognitive Impairments, Mobility Safety/Performance judgment; problem-solving/reasoning; sequencing  abilities  -PG           User Key  (r) = Recorded By, (t) = Taken By, (c) = Cosigned By    Initials Name Provider Type    Cristofer Vicente OT Occupational Therapist                 Mobility/ADL's     Row Name 10/12/21 0859          Activities of Daily Living    BADL Assessment/Intervention --  Dependent with all upper body and lower body dressing and bathing, dependent toileting  -PG           User Key  (r) = Recorded By, (t) = Taken By, (c) = Cosigned By    Initials Name Provider Type    Cristofer Vicente OT Occupational Therapist               Obj/Interventions     Row Name 10/12/21 0900          Sensory Assessment (Somatosensory)    Sensory Assessment (Somatosensory) unable/difficult to assess  -PG     Row Name 10/12/21 0900          Vision Assessment/Intervention    Visual Impairment/Limitations WFL  -PG     Row Name 10/12/21 0900          Range of Motion Comprehensive    General Range of Motion no range of motion deficits identified  -PG     Row Name 10/12/21 0900          Strength Comprehensive (MMT)    Comment, General Manual Muscle Testing (MMT) Assessment Left upper extremity strength 4 -, right upper extremity 3/5  -PG     Row Name 10/12/21 0900          Motor Skills    Motor Skills coordination; functional endurance  -PG     Coordination fine motor deficit; gross motor deficit  -PG     Functional Endurance Poor plus/fair minus  -PG           User Key  (r) = Recorded By, (t) = Taken By, (c) = Cosigned By    Initials Name Provider Type    Cristofer Vicente OT Occupational Therapist               Goals/Plan     Row Name 10/12/21 0905          Transfer Goal 1 (OT)    Activity/Assistive Device (Transfer Goal 1, OT) transfers, all  -PG     [place] Level/Cues Needed (Transfer Goal 1, OT) minimum assist (75% or more patient effort)  -PG     Time Frame (Transfer Goal 1, OT) long term goal (LTG); 10 days  -PG     Row Name 10/12/21 0905          Bathing Goal 1 (OT)    Activity/Device (Bathing Goal 1, OT)  bathing skills, all  -PG     [place] Level/Cues Needed (Bathing Goal 1, OT) minimum assist (75% or more patient effort)  -PG     Time Frame (Bathing Goal 1, OT) long term goal (LTG); 10 days  -PG     Row Name 10/12/21 0905          Dressing Goal 1 (OT)    Activity/Device (Dressing Goal 1, OT) dressing skills, all  -PG     [place]/Cues Needed (Dressing Goal 1, OT) minimum assist (75% or more patient effort)  -PG     Time Frame (Dressing Goal 1, OT) long term goal (LTG); 10 days  -PG     Row Name 10/12/21 0905          Toileting Goal 1 (OT)    Activity/Device (Toileting Goal 1, OT) toileting skills, all  -PG     [place] Level/Cues Needed (Toileting Goal 1, OT) minimum assist (75% or more patient effort)  -PG     Time Frame (Toileting Goal 1, OT) long term goal (LTG); 10 days  -PG     Row Name 10/12/21 0905          Grooming Goal 1 (OT)    Activity/Device (Grooming Goal 1, OT) grooming skills, all  -PG     [place] (Grooming Goal 1, OT) minimum assist (75% or more patient effort)  -PG     Time Frame (Grooming Goal 1, OT) long term goal (LTG); 10 days  -PG     Row Name 10/12/21 0905          Strength Goal 1 (OT)    Strength Goal 1 (OT) Patient will improve bilateral upper extremity strength to 4/5 to support ADL performance  -PG     Time Frame (Strength Goal 1, OT) long term goal (LTG); 10 days  -PG     Row Name 10/12/21 0905          Therapy Assessment/Plan (OT)    Planned Therapy Interventions (OT) activity tolerance training; BADL retraining; strengthening exercise; transfer/mobility retraining; patient/caregiver education/training; occupation/activity based interventions  -PG           User Key  (r) = Recorded By, (t) = Taken By, (c) = Cosigned By    Initials Name Provider Type    PG Cristofer Hobbs OT Occupational Therapist               Clinical Impression     Row Name 10/12/21 0902          Pain Assessment    Additional Documentation Pain Scale: Numbers Pre/Post-Treatment (Group)  -PG     Row  Name 10/12/21 0902          Pain Scale: Numbers Pre/Post-Treatment    Pretreatment Pain Rating 0/10 - no pain  -PG     Posttreatment Pain Rating 0/10 - no pain  -PG     Row Name 10/12/21 0902          Plan of Care Review    Plan of Care Reviewed With patient  -PG     Progress no change  -PG     Outcome Summary Patient presents with limitations affecting strength, activity tolerance, and balance impacting patient's ability to return home safely and independently.  The skills of a therapist will be required to safely and effectively implement the following treatment plan to restore maximal level of function  -PG     Row Name 10/12/21 0902          Therapy Assessment/Plan (OT)    Patient/Family Therapy Goal Statement (OT) Return home independently  -PG     Rehab Potential (OT) good, to achieve stated therapy goals  -PG     Criteria for Skilled Therapeutic Interventions Met (OT) yes; meets criteria; skilled treatment is necessary  -PG     Therapy Frequency (OT) 5 times/wk  -PG     Row Name 10/12/21 0902          Therapy Plan Review/Discharge Plan (OT)    Anticipated Discharge Disposition (OT) sub acute care setting; inpatient rehabilitation facility  -PG           User Key  (r) = Recorded By, (t) = Taken By, (c) = Cosigned By    Initials Name Provider Type    PG Cristofer Hobbs, OT Occupational Therapist               Outcome Measures     Row Name 10/12/21 0907          How much help from another is currently needed...    Putting on and taking off regular lower body clothing? 1  -PG     Bathing (including washing, rinsing, and drying) 1  -PG     Toileting (which includes using toilet bed pan or urinal) 1  -PG     Putting on and taking off regular upper body clothing 1  -PG     Taking care of personal grooming (such as brushing teeth) 1  -PG     Eating meals 2  -PG     AM-PAC 6 Clicks Score (OT) 7  -PG     Row Name 10/12/21 0907          Functional Assessment    Outcome Measure Options AM-PAC 6 Clicks Daily Activity (OT);  Optimal Instrument  -PG     Row Name 10/12/21 0907          Optimal Instrument    Optimal Instrument Optimal - 3  -PG     Bending/Stooping 5  -PG     Standing 5  -PG     Reaching 3  -PG     From the list, choose the 3 activities you would most like to be able to do without any difficulty Bending/stooping; Standing; Reaching  -PG     Total Score Optimal - 3 13  -PG           User Key  (r) = Recorded By, (t) = Taken By, (c) = Cosigned By    Initials Name Provider Type    Cristofer Vicente OT Occupational Therapist                Occupational Therapy Education                 Title: PT OT SLP Therapies (In Progress)     Topic: Occupational Therapy (In Progress)     Point: ADL training (In Progress)     Description:   Instruct learner(s) on proper safety adaptation and remediation techniques during self care or transfers.   Instruct in proper use of assistive devices.              Learning Progress Summary           Patient Acceptance, D,E, NR by PG at 10/12/2021 0908                   Point: Home exercise program (In Progress)     Description:   Instruct learner(s) on appropriate technique for monitoring, assisting and/or progressing therapeutic exercises/activities.              Learning Progress Summary           Patient Acceptance, D,E, NR by PG at 10/12/2021 0908                   Point: Precautions (In Progress)     Description:   Instruct learner(s) on prescribed precautions during self-care and functional transfers.              Learning Progress Summary           Patient Acceptance, D,E, NR by PG at 10/12/2021 0908                   Point: Body mechanics (In Progress)     Description:   Instruct learner(s) on proper positioning and spine alignment during self-care, functional mobility activities and/or exercises.              Learning Progress Summary           Patient Acceptance, D,E, NR by PG at 10/12/2021 0908                               User Key     Initials Effective Dates Name Provider Type Discipline     PG 06/16/21 -  Cristofer Hobbs OT Occupational Therapist OT              OT Recommendation and Plan  Planned Therapy Interventions (OT): activity tolerance training, BADL retraining, strengthening exercise, transfer/mobility retraining, patient/caregiver education/training, occupation/activity based interventions  Therapy Frequency (OT): 5 times/wk  Plan of Care Review  Plan of Care Reviewed With: patient  Progress: no change  Outcome Summary: Patient presents with limitations affecting strength, activity tolerance, and balance impacting patient's ability to return home safely and independently.  The skills of a therapist will be required to safely and effectively implement the following treatment plan to restore maximal level of function     Time Calculation:    Time Calculation- OT     Row Name 10/12/21 0910             Time Calculation- OT    OT Received On 10/12/21  -PG      OT Goal Re-Cert Due Date 10/21/21  -PG              Untimed Charges    OT Eval/Re-eval Minutes 40  -PG              Total Minutes    Untimed Charges Total Minutes 40  -PG       Total Minutes 40  -PG            User Key  (r) = Recorded By, (t) = Taken By, (c) = Cosigned By    Initials Name Provider Type    PG Cristofer Hobbs OT Occupational Therapist              Therapy Charges for Today     Code Description Service Date Service Provider Modifiers Qty    94071249120 HC OT EVAL LOW COMPLEXITY 3 10/12/2021 Cristofer Hobbs OT GO 1               Cristofer Hobbs OT  10/12/2021

## 2021-10-12 NOTE — PROGRESS NOTES
"Pharmacy to Dose Vancomycin Day: 4  DURATION OF THERAPY: 10-16-21    INDICATION: SEPSIS  GOAL AUC: 400-600 MG/L.HR    HT: 165.1 cm (65\")       10/08/21  1914      Weight: 83.5 kg (184 lb 1.4 oz)         Estimated Creatinine Clearance: 89.4 mL/min (by C-G formula based on SCr of 0.75 mg/dL).  HD/PD/CRRT?: NO  CONTRAST ADMINISTERED? NO  I/O last 3 completed shifts:  In: 2840 [P.O.:320; I.V.:1150; Blood:770; IV Piggyback:600]  Out: 2875 [Urine:2875]    WBC: 4.58  TMAX: AF    Microbiology Results (last 10 days)     Procedure  Component  Value  -  Date/Time  Anaerobic Culture - Wound, Sacrum [[id number]]  (Normal)  Collected: 10/09/21 0909  Lab Status: Preliminary result  Specimen: Wound from Sacrum  Updated: 10/12/21 0708  Anaerobic Culture  No anaerobes isolated at 3 days  Wound Culture - Wound, Sacrum [[id number]]  (Abnormal)  (Susceptibility)  Collected: 10/09/21 0909  Lab Status: Preliminary result  Specimen: Wound from Sacrum  Updated: 10/11/21 1158  Wound Culture  Scant growth (1+) Escherichia coli  Scant growth (1+) Enterococcus species  Gram Stain  Occasional Gram positive cocci in pairs and clusters  Susceptibility   Escherichia coli  ABDI  Ampicillin  Resistant  Ampicillin + Sulbactam  Resistant  Cefepime  Susceptible  Ceftazidime  Susceptible  Ceftriaxone  Susceptible  Gentamicin  Susceptible  Levofloxacin  Resistant  Piperacillin + Tazobactam  Susceptible  Tetracycline  Resistant  Trimethoprim + Sulfamethoxazole  Resistant      Linear View  Susceptibility Comments   Escherichia coli  Cefazolin sensitivity will not be reported for Enterobacteriaceae in non-urine isolates. If cefazolin is preferred, please call the microbiology lab to request an E-test.  With the exception of urinary-sourced infections, aminoglycosides should not be used as monotherapy.  AFB Culture - Wound, Sacrum [[id number]]  Collected: 10/09/21 0909  Lab Status: Preliminary result  Specimen: Wound from Sacrum  Updated: 10/10/21 1127  AFB " Stain  No acid fast bacilli seen  COVID-19,CEPHEID/JULIA/BDMAX,COR/MARTELL/PAD/ERICKA IN-HOUSE(OR EMERGENT/ADD-ON),NP SWAB IN TRANSPORT MEDIA 3-4 HR TAT, RT-PCR - Swab, Nasopharynx [[id number]]  (Normal)  Collected: 10/09/21 0021  Lab Status: Final result  Specimen: Swab from Nasopharynx  Updated: 10/09/21 1126  COVID19  Not Detected  Narrative:    Fact sheet for providers: https://www.fda.gov/media/370092/download     Fact sheet for patients: https://www.fda.gov/media/946601/download  Presumptive Positive: additional testing may be indicated if it is necessary to differentiate between SARS-CoV-2 and other Sarbecovirus, for epidemiological purposes, or clinical management.  Blood Culture - Blood, Arm, Right [[id number]]  (Normal)  Collected: 10/08/21 1933  Lab Status: Preliminary result  Specimen: Blood from Arm, Right  Updated: 10/11/21 2000  Blood Culture  No growth at 3 days  Blood Culture - Blood, Arm, Left [[id number]]  (Normal)  Collected: 10/08/21 1933  Lab Status: Preliminary result  Specimen: Blood from Arm, Left  Updated: 10/11/21 2000  Blood Culture  No growth at 3 days     CHEST CT:  CONCLUSION: Atelectasis and/or infiltrate(s) is (are) seen on the right with atelectasis favored.  The CT imaging features are atypical or uncommonly reported for COVID-19 pneumonia.  Alternative diagnoses should be considered.    There is mild cardiac enlargement.  Atherosclerotic change is seen, including involvement the coronary arteries.  There is a small right pleural effusion.  Please see above comments for further detail.       Other Antimicrobial Therapy:  MERREM    Assessment/Plan  Lab Results   Component Value Date    VANCOTROUGH 7.73 07/05/2021    VANCORANDOM 25.95 10/12/2021     Current regimen: 1000 mg IV every 12 hours.    AUC24,ss: 470 mg/L.hr  PAUC*: 83 %  Ctrough,ss: 13.5 mg/L  Pconc*: 8 %  Tox.: 9 %    Will continue same dose for now  Labs ordered: BMP ordered for a.m.

## 2021-10-12 NOTE — CONSULTS
Midline Line Insertion Procedure Note    Procedure: Insertion of #20G/10CM PowerGlide    Indications:  Home IV therapy    Procedure Details   Sterile technique was used including antiseptics, gloves, gown, hand hygiene, mask and sheet.    #20G/10CM PowerGlide inserted to the R Cephalic vein per hospital protocol.   Blood return:  yes    Findings:  Catheter inserted to 10 cm, with 0 cm. Exposed. There were no changes to vital signs. Catheter was flushed with 20 cc NS. Patient did tolerate procedure well.    LOT: 2022-08-31  Expiration date: [id number]    Recommendations:  Midline Brochure given to patient with teaching instruction.    201

## 2021-10-12 NOTE — TELEPHONE ENCOUNTER
GOT A REFERRAL FROM [facility]/CALLED PT TO SEE IF PT NEEDED TO BE SEEN/PT HAD I&D ON 10/9/21 W/FE/FRANCOIS

## 2021-10-12 NOTE — SIGNIFICANT NOTE
10/12/21 1352   Plan   Plan MD informed SW that pt will need IV ABX for 2 weeks at discharge. MD provided SW with script. LINDA sent script to [facility]. MD anticipates discharge tomorrow 10/13. LINDA will follow up with [facility] regarding IV ABX set up.

## 2021-10-12 NOTE — PROGRESS NOTES
[facility]   Hospitalist Progress Note  Date: 10/12/2021  Patient Name: Amy Miles  : 1965  MRN: [id number]  Date of admission: 10/8/2021      Subjective   Subjective     Admitted: 10/9/2021  Chief complaint: Altered mental status  Consultants: Dr. Colbert-General surgery  Antibiotics never:: Merrem, vancomycin  Summary:  35-year-old female presented with fevers, hypotension, with altered mental status and lethargy, septic, lactic acidosis, improved with fluids, CT scan of the abdomen and pelvis revealed right medial gluteal abscess, sacral decubitus ulcer with necrotizing fasciitis General surgery was consulted, sepsis managed, taken to the OR, status post I&D, debridement of skin of right buttock necrotizing soft tissue infection, tolerated procedure well, placed on Merrem, vancomycin, wound culture . coli and Enterococcus, EMILIANA on admission managed. Will need rehab.    Interval follow-up: No events overnight.  Doing much better from a mental status standpoint this morning.  She was restarted on her home oxycodone, which apparently she takes every 8 hours on a schedule.  She states her pain is much better improved.  She is requesting to go home repeatedly, becoming agitated and screaming about wanting to go home.  States she will leave AMA.  Her  is at bedside and states he would not let her leave AGAINST MEDICAL ADVICE.    Review of systems:  Denies nausea, vomiting, abdominal pain, shortness of air    Objective   Objective     Vitals:   Temp:  [97.3 °F (36.3 °C)-98.2 °F (36.8 °C)] 98.1 °F (36.7 °C)  Heart Rate:  [67-75] 74  Resp:  [12-16] 16  BP: (116-145)/(67-87) 145/87  Flow (L/min):  [3-4] 3  Physical Exam       Constitutional: Awake, alert, initially calm but became more upset during our interview              Eyes: Pupils equal, sclerae anicteric, no conjunctival injection              HENT: NCAT, mucous membranes moist              Neck: Supple, no thyromegaly, no lymphadenopathy,  trachea midline              Respiratory: Clear to auscultation bilaterally, nonlabored respirations               Cardiovascular: RRR, no murmurs, rubs, or gallops              Gastrointestinal: Positive bowel sounds, abdomen soft, nondistended, nontender              Musculoskeletal: No bilateral ankle edema, no clubbing or cyanosis to extremities              Psychiatric: Appropriate affect, cooperative, and pleasant mood              Neurologic: Oriented x 3, moves all extremities, Cranial Nerves grossly intact to confrontation, speech clear              Skin: Large stage III-IV sacral decubitus ulcer covered with dressing      Result Review    Result Review:  I have personally reviewed the results from the time of this admission to 10/12/2021 13:32 EDT and agree with these findings:  [x]  Laboratory  [x]  Microbiology  [x]  Radiology  [x]  EKG/Telemetry   []  Cardiology/Vascular   []  Pathology  [x]  Old records  []  Other:  Telemetry reviewed showing normal sinus rhythm  Assessment/Plan   Assessment / Plan     Assessment/Plan:  Severe sepsis secondary to skin soft tissue infection due to E. coli and Enterococcus  Right buttock necrotizing soft tissue infection status post incision and drainage, debridement  Metabolic encephalopathy  Lactic acidosis  Hypoxemia only  Hypotension  Necrotizing fasciitis  Sacral decubitus ulcer present on admission  Transaminitis  Liver cirrhosis  COPD  Essential hypertension  Hypothyroidism  Diabetes mellitus with insulin use  Chronic pain syndrome  History of stroke  CRPS  Depression  History of lupus     Labs and imaging reviewed  Continue home gabapentin and oxycodone  Discussed the case with Dr. Suh in infectious disease, based on susceptibilities, will transition to Rocephin alone and treat for total of 2 weeks at discharge.  Patient does have mild documented allergy to Keflex and penicillin, will monitor closely after 1 dose of Rocephin  Have midline placed  Continue  supplemental oxygen to maintain O2 saturation greater 92%, wean accordingly  Continue appropriate home medications  Wound care following, Santyl inside cavity with debridement, offloading, keep Penrose drain until outpatient follow-up  It was recommended that the patient go to rehab for intensive wound care, however, she and her  are refusing and would like to return home with home health.   states he can change her dressing  Continue sliding-scale insulin  Trend renal function and electrolytes with a.m. CMP  Trend Hgb and WBC with a.m. CBC    Discussed case with: Bedside RN, infectious disease    DVT prophylaxis:  Mechanical DVT prophylaxis orders are present.    CODE STATUS:   Code Status: CPR  Medical Interventions (Level of Support Prior to Arrest): Full    Electronically signed by Mando Fraire MD, 10/12/21, 1:32 PM EDT.

## 2021-10-12 NOTE — PLAN OF CARE
Goal Outcome Evaluation:   Patient had midline placed for antibiotics after discharge, discharge expected tomorrow, patient on 2L NC,  states she wears oxygen at home as needed, complaints of pain relieved with medication, no other changes.

## 2021-10-12 NOTE — NURSING NOTE
Patient was found lethargic, difficult to arouse, vitals 147/80 pulse oxygenation 94% and blood sugar 185, RRT called no intervention from their stand point, MD called and notified, new orders placed. Patient now awake and answering questions.

## 2021-10-13 NOTE — SIGNIFICANT NOTE
10/13/21 1130   Wound 10/09/21 0444 midline coccyx Pressure Injury   Placement Date/Time: 10/09/21 0444   Present on Hospital Admission: Yes  Orientation: midline  Location: coccyx  Primary Wound Type: Pressure Injury   Dressing Appearance intact   Base red; moist; purple   Periwound redness; non-blanchable   Edges irregular   Wound Length (cm) 1.7 cm  (perirectal area 2.9cm l)   Wound Width (cm) 2.3 cm  (perirectal area 1.0cmw)   Wound Depth (cm)   (perirectal area 0.7cmd)   Drainage Characteristics/Odor serosanguineous   Drainage Amount small   Care, Wound irrigated with; sterile normal saline   Dressing Care dressing applied; border dressing; silicone   Wound 10/09/21 0917 coccyx Incision   Placement Date/Time: 10/09/21 0917   Location: coccyx  Primary Wound Type: (c) Incision   Dressing Appearance intact   Base moist; red   Periwound redness; non-blanchable   Edges open   Wound Length (cm) 2.4 cm   Wound Width (cm) 4 cm   Wound Depth (cm) 3.1 cm   Tunneling [Depth (cm)/Location] 8cm at 5o'clock and 4.2 cm at 2 o'clock   Drainage Characteristics/Odor serosanguineous   Drainage Amount small   Care, Wound cleansed with; irrigated with; sterile normal saline; enzymatic agent applied   Dressing Care dressing applied; dressing changed; gauze; gauze, wet-to-moist; border dressing; silicone   Wound Check / Follow-up. Patient likely discharging home. Spouse to do dressing changes and states Home Health will also be there. Demonstrated steps and verbalized steps of dressing change to spouse. Explained use of Santyl, Demonstrated tunnels and depth of wound. Patient's spouse states she knows how to do it, he's been doing it. Explained drain is to remain in place. Demonstrated dressing change. Slough to wound thinning creating larger opening for visibility and access to actual wound bed. DTI noted to left side of wound at around 9 o'clock. Open moist red tissue to perirectal area seperated with health tissue from other  wounds. Drain intact and two surgical incision sites visible. Once wound care completed, site covered with silicone border dressing. Patient further concerned with dry crusting and discolored skin to right elbow. Recommended moisturizer. Patient possibly to discharge today.  Cate Gunderosn RN

## 2021-10-13 NOTE — OUTREACH NOTE
Prep Survey      Responses   [facility] facility patient discharged from? [facility]   Is LACE score < 7 ? No   Emergency Room discharge w/ pulse ox? No   Eligibility [facility]   Hospital [facility]   Date of Admission 10/08/21   Date of Discharge 10/13/21   Discharge Disposition Home-Health Care [facility]   Discharge diagnosis severe sepsis d/t right buttock necrotizing soft tissue infection, metabolic encephalopathy, sacral decub, cirrhosis, DM   Does the patient have one of the following disease processes/diagnoses(primary or secondary)? Sepsis   Does the patient have Home health ordered? Yes   What is the Home health agency?  [facility], [facility] abx from [facility]   Is there a DME ordered? No   Prep survey completed? Yes          Cara Rubin RN

## 2021-10-13 NOTE — PLAN OF CARE
Goal Outcome Evaluation:  Plan of Care Reviewed With: patient        Progress: improving  Outcome Summary: Patient being discharged today.

## 2021-10-13 NOTE — SIGNIFICANT NOTE
10/13/21 1232   Plan   Final Discharge Disposition Code 06 - home with home health care   Final Note SW discussed IV ABX with [facility]. [facility] states that [facility] will deliver pts IV ABX to pts room. Pt to get next dose of IV ABX here in the hospital. [facility] will be sending an RN for dose of IV ABX tomorrow 10/14.

## 2021-10-13 NOTE — SIGNIFICANT NOTE
10/13/21 1010   Coping/Psychosocial   Observed Emotional State anxious   Verbalized Emotional State anxiety   Trust Relationship/Rapport empathic listening provided   Involvement in Care interacting with patient   Additional Documentation Recreation Therapy (Group)   Spiritual Care   Spiritual Care Source  initiative   Spiritual Care Visit Type initial   Receptivity to Spiritual Care visit declined; other (see comments)  (Pt says)

## 2021-10-13 NOTE — NURSING NOTE
Exercise Oximetry    Patient Name:Amy Miles   MRN: [id number]   Date: 10/13/21             ROOM AIR BASELINE   SpO2% 88   Heart Rate   Blood Pressure      EXERCISE ON ROOM AIR SpO2% EXERCISE ON O2 @ 3 LPM SpO2%   1 MINUTE  1 MINUTE 94   2 MINUTES  2 MINUTES    3 MINUTES  3 MINUTES    4 MINUTES  4 MINUTES    5 MINUTES  5 MINUTES    6 MINUTES  6 MINUTES               Distance Walked   Distance Walked   Dyspnea (Sandra Scale)   Dyspnea (Sandra Scale)   Fatigue (Sandra Scale)   Fatigue (Sandra Scale)   SpO2% Post Exercise   SpO2% Post Exercise   HR Post Exercise   HR Post Exercise   Time to Recovery   Time to Recovery     Comments: Patient 88% on room air at rest, recovered to 96% with 3 liters nasal cannula, ambulated in room  94% with 3 liters nasal cannula.

## 2021-10-13 NOTE — DISCHARGE SUMMARY
[facility]         HOSPITALIST  DISCHARGE SUMMARY    Patient Name: Amy Miles  : 1965  MRN: [id number]    Date of Admission: 10/8/2021  Date of Discharge: 10/13/2021  Primary Care Physician: Demarcus Tripp DO    Consults     Date and Time Order Name Status Description    10/9/2021  4:03 AM Inpatient General Surgery Consult Completed     10/8/2021 11:50 PM Hospitalist (on-call MD unless specified) Completed           Active and Resolved Hospital Problems:  Active Hospital Problems    Diagnosis POA   • **Necrotizing soft tissue infection [M79.89] Yes   • Sepsis (HCC) [A41.9] Yes   • Severe sepsis (HCC) [A41.9, R65.20] Yes      Resolved Hospital Problems   No resolved problems to display.   Severe sepsis secondary to skin soft tissue infection due to E. coli   Right buttock necrotizing soft tissue infection status post incision and drainage, debridement  Metabolic encephalopathy  Lactic acidosis  Hypoxemia only  Hypotension  Necrotizing fasciitis  Sacral decubitus ulcer present on admission  Transaminitis  Liver cirrhosis  COPD  Essential hypertension  Hypothyroidism  Diabetes mellitus with insulin use  Chronic pain syndrome  History of stroke  CRPS  Depression  History of lupus    Hospital Course     Hospital Course:  Amy Miles is a 55 y.o. female presented with fevers, hypotension, with altered mental status and lethargy, septic, lactic acidosis. She was admitted for further care.  Her blood pressures improved with fluids, CT scan of the abdomen and pelvis revealed right medial gluteal abscess, sacral decubitus ulcer with necrotizing fasciitis.  Broad-spectrum antibiotics were initiated and general surgery was consulted.  She was to the OR, status post I&D, debridement of skin of right buttock necrotizing soft tissue infection. She tolerated the procedure well.  Wound culture grew E. coli and Enterococcus.  Discussed the case with infectious disease who felt that E. coli was the  primary cause, Enterococcus was not because of her necrotizing fasciitis.  Based on sensitivities, she was transitioned to IV Rocephin which she tolerated in the hospital despite a history of Keflex allergy.  She will complete 14 days of IV Rocephin, a midline was placed prior to discharge.  Social work got her set up with home health and antibiotic infusions.  She will continue a Penrose drain until she follows up with general surgery in 2 weeks.  Her  at bedside was instructed on wound care.  She was discharged home in stable condition on 10/13/2021.  Recommend follow-up with PCP in 1 week, general surgery in 2 weeks.      Day of Discharge     Vital Signs:  Temp:  [97.3 °F (36.3 °C)-98.6 °F (37 °C)] 97.7 °F (36.5 °C)  Heart Rate:  [65-84] 79  Resp:  [16-20] 20  BP: (114-158)/(63-97) 143/83  Flow (L/min):  [2-4] 3  Physical Exam:   Gen: NAD, WDWN  ENT: PERRL, EOMI   CV: RRR no MRG  Pulm: CTAB, no w/r/r  GI: Abd soft, NTND, +bs  Neuro: Moving all extremities spontaneously, CN II-XII grossly intact   Psych: A&O*3, normal mood and affect  Skin: Buttock has dressing that is c/d/i, drain in place with serosanguineous drainage noted      Discharge Details        Discharge Medications      New Medications      Instructions Start Date   cefTRIAXone 20 MG/ML IVPB  Commonly known as: ROCEPHIN   1 g, Intravenous, Every 24 Hours         Changes to Medications      Instructions Start Date   lactulose 10 GM/15ML solution  Commonly known as: CHRONULAC  What changed:   · how much to take  · when to take this  · reasons to take this  · additional instructions   10 g, Oral, 3 Times Daily      oxyCODONE 15 MG immediate release tablet  Commonly known as: ROXICODONE  What changed:   · when to take this  · reasons to take this   15 mg, Oral, Every 8 Hours PRN         Continue These Medications      Instructions Start Date   albuterol 1.25 MG/3ML nebulizer solution  Commonly known as: ACCUNEB   1.25 mg, Nebulization, Every 6 Hours  "PRN      aluminum sulfate-calcium acetate topical packet  Commonly known as: DOMEBORO   1 packet, Topical, Every 8 Hours Scheduled      aspirin 81 MG EC tablet   81 mg, Oral, Daily      busPIRone 10 MG tablet  Commonly known as: BUSPAR   10 mg, Oral, 2 Times Daily      cetirizine 10 MG tablet  Commonly known as: zyrTEC   10 mg, Oral, Daily      cyclobenzaprine 10 MG tablet  Commonly known as: FLEXERIL   10 mg, Oral, 3 Times Daily PRN      diazePAM 5 MG tablet  Commonly known as: Valium   5 mg, Oral, Every 8 Hours PRN      fluconazole 150 MG tablet  Commonly known as: Diflucan   150 mg, Oral, Daily      furosemide 20 MG tablet  Commonly known as: LASIX   20 mg, Oral, Daily      gabapentin 800 MG tablet  Commonly known as: NEURONTIN   800 mg, Oral, 4 Times Daily      insulin  patient supplied pump   Subcutaneous, Continuous, Waiting for Dr. Hendrickson office to fax us insulin pump info       insulin aspart 100 UNIT/ML injection  Commonly known as: novoLOG   Subcutaneous, 3 Times Daily Before Meals, Through the Dexcom device      levothyroxine 200 MCG tablet  Commonly known as: SYNTHROID, LEVOTHROID   200 mcg, Oral, Daily      lisinopril 20 MG tablet  Commonly known as: PRINIVIL,ZESTRIL   20 mg, Oral, Daily      Maxorb Extra 2\"x2\" pads  Generic drug: Ca Alginate-Carboxymethylcell   10 each, Apply externally, 2 times daily      metoprolol succinate  MG 24 hr tablet  Commonly known as: Toprol XL   100 mg, Oral, Daily      montelukast 10 MG tablet  Commonly known as: Singulair   10 mg, Oral, Nightly      Narcan 4 MG/0.1ML nasal spray  Generic drug: naloxone   1 spray, Nasal, As Needed      promethazine 25 MG tablet  Commonly known as: PHENERGAN   25 mg, Oral, Daily      silver sulfadiazine 1 % cream  Commonly known as: Silvadene   Topical, Daily PRN      spironolactone 50 MG tablet  Commonly known as: ALDACTONE   50 mg, Oral, Daily      thiamine 100 MG tablet tablet  Commonly known as: VITAMIN B-1   100 mg, Oral, " Daily      zinc oxide 40 % paste paste  Commonly known as: DESITIN   Topical, Daily PRN         Stop These Medications    levoFLOXacin 500 MG tablet  Commonly known as: LEVAQUIN            Allergies   Allergen Reactions   • Adhesive Tape Unknown - Low Severity   • Amoxicillin Unknown - High Severity   • Amoxicillin-Pot Clavulanate Unknown - Low Severity   • Cephalexin Unknown - Low Severity   • Clavulanic Acid Unknown - Low Severity   • Doxycycline Hyclate Unknown - Low Severity   • Flonase [Fluticasone] Unknown - Low Severity   • Nsaids Unknown - Low Severity   • Penicillins Unknown - Low Severity   • Potassium Unknown - Low Severity       [place]position:  [place] [facility]iet:  Hospital:  Diet Order   Procedures   • Diet Regular; Consistent Carbohydrate       Discharge Activity:   Activity Instructions     Activity as Tolerated            CODE STATUS:  Code Status and Medical Interventions:   Ordered at: 10/09/21 0024     Code Status:    CPR     Medical Interventions (Level of Support Prior to Arrest):    Full         Future Appointments   Date Time Provider Department Center   10/15/2021  1:00 PM ROOM 2 NURSE [facility] WOUND CARE [facility]   10/18/2021  2:00 PM Reggie Vargas MD Health system   10/20/2021  9:15 AM [facility]       Additional Instructions for the Follow-ups that You Need to Schedule     Discharge Follow-up with PCP   As directed       Currently Documented PCP:    Demarcus Tripp DO    PCP Phone Number:    [phone]     Follow Up Details: 3-5 days         Discharge Follow-up with Specified Provider: Dr. Colbert; 2 Weeks   As directed      To: Dr. Colbert    Follow Up: 2 Weeks               Pertinent  and/or Most Recent Results     PROCEDURES:   I&D, debridement    LAB RESULTS:      Lab 10/12/21  0501 10/11/21  2143 10/11/21  0904 10/11/21  0510 10/09/21  0623 10/08/21  2255 10/08/21  1933 10/08/21  1932   WBC 4.58  --   --  6.59 11.77*  --   --  12.65*    HEMOGLOBIN 9.4* 9.9*  --  6.5* 7.3*  --   --  8.3*   HEMATOCRIT 29.4* 31.1*  --  21.8* 23.7*  --   --  26.9*   PLATELETS 88*  --   --  108* 119*  --   --  189   NEUTROS ABS 3.16  --   --  4.70 9.25*  --   --  9.02*   IMMATURE GRANS (ABS) 0.06*  --   --  0.13* 0.09*  --   --  0.14*   LYMPHS ABS 0.69*  --   --  0.89 1.30  --   --  1.96   MONOS ABS 0.48  --   --  0.63 1.04*  --   --  1.47*   EOS ABS 0.16  --   --  0.19 0.04  --   --  0.03   MCV 91.0  --   --  92.0 89.1  --   --  87.9   CRP  --   --   --   --   --   --   --  6.37*   PROCALCITONIN  --   --   --   --   --   --   --  0.36*   LACTATE  --   --   --   --  2.0 2.4*  --  4.7*   PROTIME  --   --  14.3*  --   --   --  18.2*  --    APTT  --   --   --   --   --   --  24.1  --          Lab 10/13/21  0454 10/12/21  0501 10/11/21  0510 10/10/21  0422 10/09/21  0623 10/08/21  1932 10/08/21  1932   SODIUM 141 140 138 139 137   < > 137   POTASSIUM 3.3* 3.8 4.1 4.0 4.2   < > 4.8   CHLORIDE 105 108* 108* 107 104   < > 101   CO2 27.4 24.9 18.8* 21.4* 19.8*   < > 20.9*   ANION GAP 8.6 7.1 11.2 10.6 13.2   < > 15.1*   BUN 21* 26* 36* 38* 37*   < > 32*   CREATININE 0.86 0.75 0.93 1.17* 2.07*   < > 3.10*   GLUCOSE 209* 166* 166* 210* 220*   < > 137*   CALCIUM 8.1* 7.8* 7.3* 6.5* 6.8*   < > 7.9*   MAGNESIUM  --  1.6 1.8  --  1.7  --  1.9   PHOSPHORUS  --  2.6 2.3*  --   --   --  7.3*    < > = values in this interval not displayed.         Lab 10/12/21  0501 10/11/21  0510 10/08/21  1932   TOTAL PROTEIN 5.0* 4.8* 5.7*   ALBUMIN 2.10* 1.90* 2.50*   GLOBULIN  --   --  3.2   ALT (SGPT) 351* 442* 398*   AST (SGOT) 370* 594* 1,286*   BILIRUBIN 0.6 0.5 0.6   INDIRECT BILIRUBIN 0.4 0.3  --    BILIRUBIN DIRECT 0.2 0.2  --    ALK PHOS 124* 110 110         Lab 10/11/21  0904 10/08/21  1933   PROTIME 14.3* 18.2*   INR 1.35* 1.76*             Lab 10/11/21  0904   ABO TYPING A   RH TYPING Positive   ANTIBODY SCREEN Negative         Lab 10/10/21  1556 10/08/21  1933   PH, ARTERIAL 7.458*  7.425   PCO2, ARTERIAL 35.8 32.3*   PO2 ART 87.4 71.0*   O2 SATURATION ART 95.1 92.1*   FIO2  --  28   HCO3 ART 24.8 20.7*   BASE EXCESS ART 0.9 -3.1*   CARBOXYHEMOGLOBIN 0.3 0.5     Brief Urine Lab Results  (Last result in the past 365 days)      Color   Clarity   Blood   Leuk Est   Nitrite   Protein   CREAT   Urine HCG        10/08/21 1933 Dark Yellow   Cloudy   Trace   Trace   Negative   30 mg/dL (1+)               Microbiology Results (last 10 days)     Procedure Component Value - Date/Time    Anaerobic Culture - Wound, Sacrum [[id number]]  (Normal) Collected: 10/09/21 0909    Lab Status: Preliminary result Specimen: Wound from Sacrum Updated: 10/12/21 0708     Anaerobic Culture No anaerobes isolated at 3 days    Wound Culture - Wound, Sacrum [[id number]]  (Abnormal)  (Susceptibility) Collected: 10/09/21 0909    Lab Status: Final result Specimen: Wound from Sacrum Updated: 10/12/21 1124     Wound Culture Scant growth (1+) Escherichia coli      Scant growth (1+) Enterococcus faecalis     Gram Stain Occasional Gram positive cocci in pairs and clusters    Susceptibility      Escherichia coli     ABDI     Ampicillin Resistant     Ampicillin + Sulbactam Resistant     Cefepime Susceptible     Ceftazidime Susceptible     Ceftriaxone Susceptible     Gentamicin Susceptible     Levofloxacin Resistant     Piperacillin + Tazobactam Susceptible     Tetracycline Resistant     Trimethoprim + Sulfamethoxazole Resistant                  Linear View               Susceptibility      Enterococcus faecalis     ABDI     Ampicillin Susceptible     Vancomycin Susceptible                  Linear View               Susceptibility Comments     Escherichia coli    Cefazolin sensitivity will not be reported for Enterobacteriaceae in non-urine isolates. If cefazolin is preferred, please call the microbiology lab to request an E-test.  With the exception of urinary-sourced infections, aminoglycosides should not be used as monotherapy.              AFB Culture - Wound, Sacrum [[id number]] Collected: 10/09/21 0909    Lab Status: Preliminary result Specimen: Wound from Sacrum Updated: 10/10/21 1127     AFB Stain No acid fast bacilli seen    COVID-19,CEPHEID/JULIA/BDMAX,COR/MARTELL/PAD/ERICKA IN-HOUSE(OR EMERGENT/ADD-ON),NP SWAB IN TRANSPORT MEDIA 3-4 HR TAT, RT-PCR - Swab, Nasopharynx [[id number]]  (Normal) Collected: 10/09/21 0021    Lab Status: Final result Specimen: Swab from Nasopharynx Updated: 10/09/21 1126     COVID19 Not Detected    Narrative:      Fact sheet for providers: https://www.fda.gov/media/883014/download     Fact sheet for patients: https://www.fda.gov/media/980202/download  Presumptive Positive: additional testing may be indicated if it is necessary to differentiate between SARS-CoV-2 and other Sarbecovirus, for epidemiological purposes, or clinical management.    Blood Culture - Blood, Arm, Right [[id number]]  (Normal) Collected: 10/08/21 1933    Lab Status: Preliminary result Specimen: Blood from Arm, Right Updated: 10/12/21 2000     Blood Culture No growth at 4 days    Blood Culture - Blood, Arm, Left [[id number]]  (Normal) Collected: 10/08/21 1933    Lab Status: Preliminary result Specimen: Blood from Arm, Left Updated: 10/12/21 2000     Blood Culture No growth at 4 days               Results for orders placed during the hospital encounter of 07/01/21    Duplex Carotid Ultrasound CAR    Interpretation Summary  · All other right side carotid system vessels are normal.  · All other left side carotid system vessels are normal.      Results for orders placed during the hospital encounter of 07/01/21    Duplex Carotid Ultrasound CAR    Interpretation Summary  · All other right side carotid system vessels are normal.  · All other left side carotid system vessels are normal.      Results for orders placed during the hospital encounter of 07/01/21    Adult Transthoracic Echo Complete w/ Color, Spectral and Contrast if necessary per  protocol    Interpretation Summary  1.  Normal left ventricular systolic function.  2.  No significant valve abnormalities noted.      Labs Pending at Discharge:  Pending Labs     Order Current Status    AFB Culture - Wound, Sacrum Preliminary result    Anaerobic Culture - Wound, Sacrum Preliminary result    Blood Culture - Blood, Arm, Left Preliminary result    Blood Culture - Blood, Arm, Right Preliminary result        CT Abdomen Pelvis Without Contrast    Result Date: 10/9/2021  Narrative: PROCEDURE: CT ABDOMEN PELVIS WO CONTRAST  COMPARISONS: [facility], CT, ABDOMEN/PELVIS W/ CONTRAST, 8/18/2018, 19:22.   [facility], CT, CHEST W/ CONTRAST, 11/05/2020, 11:22.  INDICATIONS: Sepsis; lactic acidosis.  TECHNIQUE: 553 CT images were created without intravenous contrast.   PROTOCOL:   Standard imaging protocol performed.    RADIATION:   Automated exposure control was utilized to minimize radiation dose.  FINDINGS: Motion artifact obscures detail on the study.  The liver has a cirrhotic contour.  There is splenomegaly.  Portal hypertension and portosystemic venous shunting may be present.  The patient has undergone cholecystectomy.  Atherosclerotic changes are present.  No aneurysmal dilatation of the aortoiliac arterial system is identified.  There is a small right pleural effusion.  No left pleural effusion is seen.  Atelectasis and/or infiltrate(s) is (are) seen in the right lung base.  No definite nonenhanced CT evidence of acute pancreatitis.  No renal stones or hydronephrosis or obstructive uropathy.  Surgical clips are seen in the anterior abdominal wall and suggest prior ventral hernia repair.  No pneumoperitoneum or pneumatosis.  There may be a small hiatal hernia.  The appendix is not clearly identified.  Please correlate with the surgical history.  The patient has undergone hysterectomy.   A urinary bladder catheter is in place.  The urinary bladder is underdistended.   There is  subcutaneous emphysema and increased attenuation of the soft tissues involving the medial aspect of the right buttock, such as seen on axial images 95 through 120 of series 202.  Foci of gas may be within a central area of fluid (as with abscess).  An infectious/inflammatory phlegmon or inflammatory mass is possible.  The extent of The greatest measured extent of the findings is 12.5 x 7.7 x 7 cm in craniocaudal, transverse, and anteroposterior (AP) extent, respectively, as seen on image 94 of series 205, image 110 of series 202, and image 141 of series 203.  Again, the findings may represent an abscess.  Infectious/inflammatory myositis/cellulitis/fasciitis, including necrotizing.  Please correlate with physical exam findings in this region as well as the clinical history.  Adjacent osteomyelitis of the sacrum and/or coccyx cannot be excluded.  An associated cutaneous sinus tract cannot be excluded.  There is formed stool throughout the colon suggesting fecal stasis as with constipation.  No definite stercoral ulceration is seen.  No pneumoperitoneum or pneumatosis.  No definite nonenhanced CT evidence of a sinus tract or fistula involving the rectosigmoid colon.  Sclerotic changes involve the sacroiliac joints.  There are degenerative changes of the imaged spine.  Degenerative changes also involve the bilateral hip joints.  No definite acute fracture or acute malalignment is seen.   CONCLUSION:   1. An age-indeterminate infectious/inflammatory process with associated subcutaneous gas is seen involving the medial aspect of the right buttock and medial posterior right pelvic wall (overlying the right sacrum and coccyx).  It involves the right gluteal musculature and the overlying superficial subcutaneous tissues.  It does not appear to extend into the right ischiorectal fossa.  Soft tissue gas may indicate an age-indeterminate anaerobic infectious process, including (but not necessarily limited to) necrotizing  fasciitis, right gluteal myositis, and/or cellulitis.  An associated phlegmon or abscess is possible.  Underlying osteomyelitis of the sacrum and/or coccyx cannot be excluded.  No definite nonenhanced CT evidence of an associated sinus tract or fistula involving the rectosigmoid colon.  A cutaneous sinus tract is possible, however.  Again, an abscess or phlegmon involving the medial right buttock is possible.  It may measure as great is 12.5 cm in craniocaudal extent.  The findings may represent a complication of a sacral decubitus ulcer.  The findings are thought less likely to originate from infectious/inflammatory colitis or proctitis involving the rectosigmoid colon.  2. Please see above comments for further detail.     Pertinent findings were discussed with Selene Canales PA-C, ordering [facility] (Hospitalist's Physician Assistant, be at approximately 0201 hours on 10/9/2021.   ISATU BROWN JR, MD       Electronically Signed and Approved By: ISATU BROWN JR, MD on 10/09/2021 at 2:05             CT Head Without Contrast    Result Date: 10/12/2021  Narrative: PROCEDURE: CT HEAD WO CONTRAST  COMPARISON:  [facility], MR, MRI BRAIN WO CONTRAST, 7/02/2021, 6:52.  [facility], CT, CT HEAD WO CONTRAST, 7/01/2021, 14:12. INDICATIONS: Delirium  PROTOCOL:   Standard imaging protocol performed    RADIATION:   DLP: 1995 mGy*cm   MA and/or KV was adjusted to minimize radiation dose.     TECHNIQUE: After obtaining the patient's consent, CT images were obtained without non-ionic intravenous contrast material.  FINDINGS:  There is no intracranial hemorrhage.  No mass effect or midline shift.  There is encephalomalacia involving the left frontal lobe related to sequela of prior infarct on image 41 extending to the vertex, better assessed on prior MRI.  No mass effect or midline shift.  Posterior fossa without acute abnormality.  Globes symmetric.  No retro-orbital abnormality.  There are multiple  nonspecific areas of subcortical white matter hypoattenuation which may relate to chronic microvascular disease other etiologies may have a similar appearance, better assessed on recent MRI from 7/2/2021.  Mastoid air cells are well-aerated.  Negative for sinus fluid level.  Calvarium intact.  CONCLUSION:  1. No intracranial hemorrhage. 2. Redemonstration of focal area of hypoattenuation involving the posterior left frontal lobe likely related to sequela of prior infarct seen on MRI from 7/2/2021. 3. Multiple white matter lesions better assessed on recent MRI. 4. Additional stable chronic findings above.     SHELBIE CARNES MD       Electronically Signed and Approved By: SHELBIE CARNES MD on 10/12/2021 at 21:46             CT Chest Without Contrast Diagnostic    Result Date: 10/9/2021  Narrative: PROCEDURE: CT CHEST WO CONTRAST DIAGNOSTIC  COMPARISONS: [facility], CR, XR CHEST 1 VW, 7/01/2021, 12:23.   [facility], CT, CT ABDOMEN PELVIS WO CONTRAST, 10/09/2021, 1:08.   [facility], CR, XR CHEST 1 VW, 10/08/2021, 19:19.   [facility], CT, CHEST W/ CONTRAST, 11/05/2020, 11:22.  INDICATIONS: Possible mass, pneumonia, and/or CHF.  Possible sepsis.  TECHNIQUE: 577 CT images were created without the administration of contrast material.   PROTOCOL:   Standard imaging protocol performed    RADIATION:   Automated exposure control was utilized to minimize radiation dose.  FINDINGS: The patient has undergone thyroidectomy.  There is motion artifact on the study.  Atherosclerotic change is noted, including involvement of the coronary arteries.  There is mild cardiomegaly.  There is a small right pleural effusion.  No definite left pleural effusion.  No pericardial effusion.  There are small to moderate sized mediastinal and hilar lymph nodes.  These findings are nonspecific.  They may be reactive in nature.  Similar findings were seen previously.  No pneumothorax.  No pneumomediastinum.   Atelectasis and/or infiltrate(s) is (are) seen on the right.  Subsegmental atelectasis is favored, especially in the right lung base.  There may be slight chronic asymmetric elevation of the right diaphragm.  Mild subsegmental atelectasis is suspected on the left.  Probably no acute infiltrate is appreciated on the left.  There is chronic calcified granulomatous disease of the chest, spleen, and liver.  Mild degenerative changes involve the imaged spine.  No acute fracture or aggressive osseous lesion is identified.  The increased density on the recent chest radiograph from 10/8/2021 involving the right superior mediastinum is thought to be related to low lung volumes and vascular structures, such as the superior vena cava (SVC), azygos arch, and the thoracic aorta (especially the ascending aorta and the aortic arch).  No definite nonenhanced CT evidence of a mediastinal or right parahilar mass.  The patient has undergone cholecystectomy.  A small hiatal hernia is possible.  The patient's upper extremities in the scan field of view obscure detail.  CONCLUSION: Atelectasis and/or infiltrate(s) is (are) seen on the right with atelectasis favored.  The CT imaging features are atypical or uncommonly reported for COVID-19 pneumonia.  Alternative diagnoses should be considered.    There is mild cardiac enlargement.  Atherosclerotic change is seen, including involvement the coronary arteries.  There is a small right pleural effusion.  Please see above comments for further detail.    ISATU BROWN JR, MD       Electronically Signed and Approved By: ISATU BROWN JR, MD on 10/09/2021 at 1:40             XR Chest 1 View    Result Date: 10/8/2021  Narrative: PROCEDURE: XR CHEST 1 VW  COMPARISON: [facility], CR, XR ABDOMEN KUB, 7/02/2021, 1:46.  [facility], CR, XR CHEST 1 VW, 7/01/2021, 12:23.  INDICATIONS: Severe Sepsis triage protocol  FINDINGS:  Single frontal view chest AP upright portable  reveals that the heart is enlarged.  There is questionable increased density in the right side of the superior mediastinum.  This might be caused by anatomic variation or tortuous blood vessels or mass.  There are bilateral diffuse increased markings consistent with chronic lung disease and bronchitis and mild congestive heart failure.  Surgical clips are seen in the right upper quadrant the abdomen consistent with cholecystectomy.  There are dilated loops of bowel in the left upper quadrant the abdomen consistent with moderate gaseous distension of the stomach and moderate gaseous distension of the splenic flexure of the colon.  CONCLUSION:  1. Cardiomegaly. 2. Suspicion of focal increased density in the right side of the superior mediastinum might be caused by artifact or anatomic variation or tortuous dilated blood vessels or mass.  CT of the chest would be helpful to further evaluate this finding if clinically indicated. 3. Diffuse increased markings especially in the right lower lobe and left lower lobe lung consistent with chronic lung disease and bronchitis and mild congestive heart failure.       LAKSHMI BARNARD MD       Electronically Signed and Approved By: LAKSHMI BARNARD MD on 10/08/2021 at 19:35               Time spent on Discharge including face to face service:  34 minutes    Electronically signed by Mando Fraire MD, 10/13/21, 12:33 PM EDT.

## 2021-10-14 NOTE — PROGRESS NOTES
"Enter Query Response Below      Query Response: Excisional debridement             If applicable, please update the problem list.          Patient: Amy Miles        : 1965  Account: [id number]           Admit Date: 10/8/2021        Options to Respond to Query:    1. Access the Encounter     a. From the To-Do Side bar, click Respond With Note.     b. Click New Note     c. Answer query within the yellow box.                d. Update the Problem List if applicable.     :    55 year old admitted with severe sepsis with \"Necrotizing soft tissue infection.\" Taken to OR for INCISION AND DRAINAGE ABSCESS . Noted per your note \" I then sharply debrided skin, soft tissue, fascia .\" After study can procedure be further clarified as:    Excisional debridement   Non excision debridement   Other________  Unable to determine    By submitting this query, we are merely seeking further clarification of documentation to accurately reflect all conditions that you are monitoring, evaluating, treating or that extend the hospitalization or utilize additional resources of care. Please utilize your independent clinical judgment when addressing the question(s) above.     This query and your response, once completed, will be entered into the legal medical record.    Sincerely,  Sarah Richards   Ext.[phone]  [place]  Clinical Documentation Integrity Program   "

## 2021-10-14 NOTE — OUTREACH NOTE
Call Center TCM Note      Responses   [facility] patient discharged from? [facility]   Does the patient have one of the following disease processes/diagnoses(primary or secondary)? Sepsis   TCM attempt successful? Yes   Call start time 1139   Call end time 1157   Discharge diagnosis severe sepsis d/t right buttock necrotizing soft tissue infection, metabolic encephalopathy, sacral decub, cirrhosis, DM   Person spoke with today (if not patient) and relationship Bk, spouse   Medication alerts for this patient IV ATBS   Meds reviewed with patient/caregiver? Yes   Is the patient having any side effects they believe may be caused by any medication additions or changes? No   Does the patient have all medications related to this admission filled (includes all antibiotics, inhalers, nebulizers,steroids,etc.) Yes   Is the patient taking all medications as directed (includes completed medication regime)? Yes   Medication comments Pt is taking pain medications as ordered---Insulin pump in use, blood glucose around 200 per  report   Comments regarding appointments Dr. Vargas -10/18/21   Does the patient have a primary care provider?  Yes   Comments regarding PCP Hospital PCP FOLLOW UP APPOINTMENT IS 10/15/21@415pm   Does the patient have an appointment with their PCP within 7 days of discharge? Yes   Has the patient kept scheduled appointments due by today? N/A   What is the Home health agency?  [facility], [facility] from [facility]-----visit scheduled 10/14/21   Has home health visited the patient within 72 hours of discharge? Yes   Has all DME been delivered? No   Psychosocial issues? No   Did the patient receive a copy of their discharge instructions? Yes   Nursing interventions Reviewed instructions with patient   What is the patient's perception of their health status since discharge? Same  [Pt to start IV ATBS today, patient does offer compliants of neck pain and right arm pain-- thinks she slept the  wrong way.  Pt has a midline in right arm--HH will be in home today to administer atbs, enc'd  to have nurse evaluate site/pt. ]   Nursing interventions Nurse provided patient education   Is the patient/caregiver able to teach back Sepsis? S - Shivering,fever or very cold,  E - Extreme pain or generalized discomfort (worst ever,especially abdomen),  S - Sleepy, difficult to arouse,confused,  S - Short of breath   Nursing interventions Nurse provided patient education   Is patient/caregiver able to teach back steps to recovery at home? Set small, achievable goals for return to baseline health,  Rest and regain strength,  Eat a balanced diet   Is the patient/caregiver able to teach back signs and symptoms of worsening condition: Fever,  Shortness of breath/rapid respiratory rate,  Altered mental status(confusion/coma)   Is the patient/caregiver able to teach back the hierarchy of who to call/visit for symptoms/problems? PCP, Specialist, Home health nurse, Urgent Care, ED, 911 Yes   Additional teach back comments  is completing wound care to buttocks--removing packing, cleaning with NS, repacking and cover with gauze dressing.  Disc'd wound healing and s/s of increased infection.   TCM call completed? Yes          Elana Bowles RN    10/14/2021, 11:57 EDT

## 2021-10-15 PROBLEM — M21.371 FOOT DROP, RIGHT FOOT: Status: ACTIVE | Noted: 2021-01-01

## 2021-10-15 PROBLEM — L89.504: Status: ACTIVE | Noted: 2021-01-01

## 2021-10-15 PROBLEM — M54.2 NECK PAIN: Status: ACTIVE | Noted: 2021-01-01

## 2021-10-15 NOTE — PROGRESS NOTES
Transitional Care Follow Up Visit  Subjective     Amy Miles is a 55 y.o. female who presents for a transitional care management visit.    Within 48 business hours after discharge our office contacted her via telephone to coordinate her care and needs.      I reviewed and discussed the details of that call along with the discharge summary, hospital problems, inpatient lab results, inpatient diagnostic studies, and consultation reports with Amy.     Current outpatient and discharge medications have been reconciled for the patient.  Reviewed by: Demarcus Tripp DO      Date of TCM Phone Call 10/13/2021   H[facility]   Date of Admission 10/8/2021   Date of Discharge 10/13/2021   [facility]     Risk for Readmission (LACE) Score: 14 (10/13/2021  6:00 AM)      History of Present Illness     Patient presents for hospital follow-up she was seen in the hospital for severe sepsis and wound of her decubitus ulcer.  She was treated with antibiotics was in the hospital from 10/8/2021 to 10/13/2021 also noted to have past medical history significant for liver cirrhosis HTN hypothyroidism diabetes with insulin use chronic pain history of stroke depression history of lupus she was discharged on ceftriaxone IV every 24 hours oxycodone lactulose albuterol continue no other chronic medicines stop Levaquin.    Course During Hospital Stay:    Amy Miles is a 55 y.o. female presented with fevers, hypotension, with altered mental status and lethargy, septic, lactic acidosis. She was admitted for further care.  Her blood pressures improved with fluids, CT scan of the abdomen and pelvis revealed right medial gluteal abscess, sacral decubitus ulcer with necrotizing fasciitis.  Broad-spectrum antibiotics were initiated and general surgery was consulted.  She was to the OR, status post I&D, debridement of skin of right buttock necrotizing soft tissue infection. She tolerated the procedure well.   "Wound culture grew E. coli and Enterococcus.  Discussed the case with infectious disease who felt that E. coli was the primary cause, Enterococcus was not because of her necrotizing fasciitis.  Based on sensitivities, she was transitioned to IV Rocephin which she tolerated in the hospital despite a history of Keflex allergy.  She will complete 14 days of IV Rocephin, a midline was placed prior to discharge.  Social work got her set up with home health and antibiotic infusions.  She will continue a Penrose drain until she follows up with general surgery in 2 weeks.  Her  at bedside was instructed on wound care.  She was discharged home in stable condition on 10/13/2021.  Recommend follow-up with PCP in 1 week, general surgery in 2 weeks.        The following portions of the patient's history were reviewed and updated as appropriate: allergies, current medications, past family history, past medical history, past social history, past surgical history and problem list.  Reviewed hospital course      /62   Pulse 95   Temp 97.5 °F (36.4 °C) (Temporal)   Ht 165.1 cm (65\")   Wt 92.1 kg (203 lb 1.6 oz)   LMP  (LMP Unknown)   SpO2 91%   BMI 33.80 kg/m²       Objective   Physical Exam  Constitutional:       Appearance: Normal appearance.   HENT:      Head: Normocephalic and atraumatic.      Nose: Nose normal.      Mouth/Throat:      Mouth: Mucous membranes are dry.   Cardiovascular:      Rate and Rhythm: Normal rate and regular rhythm.      Pulses: Normal pulses.      Heart sounds: Normal heart sounds.   Pulmonary:      Effort: Pulmonary effort is normal.      Breath sounds: Normal breath sounds.   Musculoskeletal:      Cervical back: Rigidity and tenderness present.   Skin:     General: Skin is warm and dry.   Neurological:      Mental Status: She is alert and oriented to person, place, and time.      Motor: Weakness present.         Assessment/Plan   Diagnoses and all orders for this visit:    1. Foot " drop, right foot (Primary)  Assessment & Plan:  Dragging foot, did not have prior to stroke, ordered EMG to eval and neuro      Orders:  -     EMG 1 Limb; Future    2. Neck pain  Assessment & Plan:  *acute  Likely developed crick in her neck from sleeping In exaggerated position  Had stroke and also likely involed, continue therapy at home    Orders:  -     Collar Cervical Foam LG 3IN    3. 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 Pressure injury of ankle, stage 4, unspecified laterality (HCC)  Assessment & Plan:  Continue follow up with wound care, IV antibiotics    Orders:  -     Ambulatory Referral to Chronic Care Management    5. Essential hypertension, benign    6. Thrombocytopenia (HCC)    7. ZELALEM (generalized anxiety disorder)    8. Severe sepsis (HCC)    9. Cirrhosis of liver with ascites, unspecified hepatic cirrhosis type (HCC)    10. Recurrent major depressive disorder, in full remission (HCC)    11. Ischemic stroke (HCC)  Assessment & Plan:  *acute in hospital, deficits with weakness,  Follow up with neurology, continue therapy with home health, PT/OT          Return in about 2 weeks (around 10/29/2021), or if symptoms worsen or fail to improve, for Next scheduled follow up, Labs before.

## 2021-10-15 NOTE — PROGRESS NOTES
"Chief Complaint  No chief complaint on file.    Subjective     {Problem List  Visit Diagnosis   Encounters  Notes  Medications  Labs  Result Review Imaging  Media :23}     Amy Miles presents to [facility]  History of Present Illness    Objective   Vital Signs:   /62   Pulse 95   Temp 97.5 °F (36.4 °C) (Temporal)   Ht 165.1 cm (65\")   Wt 92.1 kg (203 lb 1.6 oz)   SpO2 91%   BMI 33.80 kg/m²     Physical Exam   Result Review :{Labs  Result Review  Imaging  Med Tab  Media  Procedures :23}   {The following data was reviewed by (Optional):49830}  {Ambulatory Labs (Optional):09221}  {Data reviewed (Optional):26781:::1}          Assessment and Plan {CC Problem List  Visit Diagnosis   ROS  Review (Popup)  Health Maintenance  Quality  BestPractice  Medications  SmartSets  SnapShot Encounters  Media :23}   There are no diagnoses linked to this encounter.  {Time Spent (Optional):73941}  Follow Up {Instructions Charge Capture  Follow-up Communications :23}  No follow-ups on file.  Patient was given instructions and counseling regarding her condition or for health maintenance advice. Please see specific information pulled into the AVS if appropriate.   asdf    "

## 2021-10-18 NOTE — PROGRESS NOTES
Inpatient History and Physical Surgical Orders    Preadmission Location:   Preadmission Time:  Facility:  Surgery Date:  Surgery Time:  Preadmission Test date:     Chief Complaint  Outpatient History and Physical / Surgical Orders    Primary Care Provider: Demarcus Tripp DO    Referring Provider: No ref. provider found    Subjective      Patient Name: Amy Miles : 1965    HPI  The patient is a 55-year-old female that I have taken care of in the past.  She recently had a debridement of a necrotizing soft tissue wound by Dr. Colbert and has been having trouble with hemorrhoids for some time.  She is having a lot of discomfort and significant bleeding.  She has known cirrhosis and a recent CT scan did show evidence of portal hypertension.  Her most recent CBC following her debridement did show some pancytopenia with some mild thrombocytopenia.    Past History:  Medical History: has a past medical history of Anxiety, Arm pain, Arthritis, Asthma, Bladder disorder, Bronchitis, Chronic allergic rhinitis, Chronic pain syndrome, Cirrhosis of liver (HCC), COPD (chronic obstructive pulmonary disease) ([facility]), CRPS (complex regional pain syndrome), type I, upper (2012), Depression, Diabetes mellitus type 1 ([facility]), Elevated INR (2021), Elevated LFTs (2021), Encephalopathy, hepatic ([facility]) (2021), Essential hypertension, benign, Fracture, GERD (gastroesophageal reflux disease), Hand pain, Heel pain, Hyperthyroidism, Iatrogenic hypothyroidism, Leg pain, Leg swelling, Limb swelling, Mood disorder (HCC), Mood disorder (HCC), Nausea, RSD (reflex sympathetic dystrophy), Seasonal allergies, Sepsis due to pneumonia (CMS/HCC) (2021), Shortness of Air, and Toe fracture.   Surgical History: has a past surgical history that includes  section; Gallbladder surgery; Hand surgery; Hemorrhoid surgery; Hiatal hernia repair; Abdominal hysterectomy; Incision and Drainage Abscess; Tubal ligation; Carpal tunnel  release; Cholecystectomy; Tonsillectomy; Hernia repair; Thyroidectomy (2021); Colonoscopy; and Incision and Drainage Abscess (Bilateral, 10/9/2021).   Family History: family history includes Arthritis in her father, mother, and sister; Diabetes in her brother, maternal uncle, and sister; Heart disease in her father; Osteoporosis in her sister; Other in her brother.   Social History: reports that she has been smoking cigarettes. She started smoking about 39 years ago. She has a 39.00 pack-year smoking history. She has never used smokeless tobacco. She reports that she does not drink alcohol and does not use drugs.  Allergies: Adhesive tape, Amoxicillin, Amoxicillin-pot clavulanate, Cephalexin, Clavulanic acid, Doxycycline hyclate, Flonase [fluticasone], Nsaids, Penicillins, and Potassium       Current Outpatient Medications:   •  albuterol (ACCUNEB) 1.25 MG/3ML nebulizer solution, Take 3 mL by nebulization Every 6 (Six) Hours As Needed for Wheezing for up to 360 days., Disp: 360 mL, Rfl: 11  •  aluminum sulfate-calcium acetate (DOMEBORO) topical packet, Apply 1 packet topically to the appropriate area as directed Every 8 (Eight) Hours., Disp: 1 each, Rfl: 12  •  aspirin 81 MG EC tablet, Take 1 tablet by mouth Daily., Disp: 30 tablet, Rfl: 0  •  cetirizine (zyrTEC) 10 MG tablet, Take 1 tablet by mouth Daily., Disp: 90 tablet, Rfl: 3  •  cyclobenzaprine (FLEXERIL) 10 MG tablet, Take 10 mg by mouth 3 (Three) Times a Day As Needed., Disp: , Rfl:   •  diazePAM (Valium) 5 MG tablet, Take 1 tablet by mouth Every 8 (Eight) Hours As Needed for Anxiety., Disp: 9 tablet, Rfl: 2  •  EPINEPHrine (EPIPEN) 0.3 MG/0.3ML solution auto-injector injection, epinephrine 0.3 mg/0.3 mL injection, auto-injector  USE AS DIRECTED INCASE OF ALLERGIC REACTION, Disp: , Rfl:   •  furosemide (LASIX) 20 MG tablet, Take 1 tablet by mouth Daily., Disp: 90 tablet, Rfl: 3  •  gabapentin (NEURONTIN) 800 MG tablet, Take 800 mg by mouth 4 (Four) Times a  "Day., Disp: , Rfl:   •  insulin aspart (novoLOG) 100 UNIT/ML injection, Inject  under the skin into the appropriate area as directed 3 (Three) Times a Day Before Meals. Through the Dexcom device, Disp: , Rfl:   •  Insulin Disposable Pump (OmniPod Dash 5 Pack Pods) misc, CHANGE POD EVERY 72 HOURS AS DIRECTED, Disp: , Rfl:   •  insulin patient supplied pump, Inject  under the skin into the appropriate area as directed Continuous. Waiting for Dr. Hendrickson office to fax us insulin pump info, Disp: , Rfl:   •  lactulose (CHRONULAC) 10 GM/15ML solution, Take 15 mL by mouth 3 (Three) Times a Day. (Patient taking differently: Take 15 g by mouth As Needed. Patient does not take tid ,  stated that she may take 30 ml and then 15 ml but only prn), Disp: 1892 mL, Rfl: 0  •  levothyroxine (SYNTHROID, LEVOTHROID) 200 MCG tablet, Take 200 mcg by mouth Daily., Disp: , Rfl:   •  lisinopril (PRINIVIL,ZESTRIL) 20 MG tablet, Take 1 tablet by mouth Daily., Disp: 90 tablet, Rfl: 3  •  metoprolol succinate XL (Toprol XL) 100 MG 24 hr tablet, Take 1 tablet by mouth Daily., Disp: 90 tablet, Rfl: 3  •  montelukast (Singulair) 10 MG tablet, Take 1 tablet by mouth Every Night., Disp: 90 tablet, Rfl: 1  •  Narcan 4 MG/0.1ML nasal spray, 1 spray into the nostril(s) as directed by provider As Needed., Disp: , Rfl:   •  oxyCODONE (ROXICODONE) 15 MG immediate release tablet, Take 1 tablet by mouth Every 8 (Eight) Hours As Needed for Moderate Pain ., Disp: 3 tablet, Rfl: 0  •  spironolactone (ALDACTONE) 50 MG tablet, Take 1 tablet by mouth Daily., Disp: 30 tablet, Rfl: 0  •  zinc oxide (DESITIN) 40 % paste paste, Apply  topically to the appropriate area as directed Daily As Needed (break down)., Disp: 113 g, Rfl: 0  •  busPIRone (BUSPAR) 10 MG tablet, Take 10 mg by mouth 2 (Two) Times a Day., Disp: , Rfl:   •  Ca Alginate-Carboxymethylcell (Maxorb Extra 2\"x2\") pads, Apply 10 each topically 2 (two) times a day. Indications: Bed Sores, Disp: 10 " "each, Rfl: 2  •  cefTRIAXone (ROCEPHIN) 20 MG/ML IVPB, Infuse 50 mL into a venous catheter Daily for 13 doses. Indications: Infection of the Skin and/or Soft Tissue, Disp: 650 mL, Rfl: 0  •  fluconazole (Diflucan) 150 MG tablet, Take 1 tablet by mouth Daily., Disp: 3 tablet, Rfl: 2  •  promethazine (PHENERGAN) 25 MG tablet, Take 1 tablet by mouth Daily., Disp: 30 tablet, Rfl: 5  •  silver sulfadiazine (Silvadene) 1 % cream, Apply  topically to the appropriate area as directed Daily As Needed for Wound Care., Disp: 400 g, Rfl: 5  •  thiamine (thiamine) 100 MG tablet tablet, Take 1 tablet by mouth Daily., Disp: 30 tablet, Rfl: 0       Objective   Vital Signs:   Resp 20   Ht 165.1 cm (65\")   Wt 92.5 kg (204 lb)   BMI 33.95 kg/m²       Physical Exam  Vitals and nursing note reviewed.   Constitutional:       Appearance: She appears chronically ill.  Cardiovascular:      Rate and Rhythm: Normal rate and regular rhythm.   Pulmonary:      Effort: Pulmonary effort is normal.      Breath sounds: Normal air entry.   Abdominal:      General: Bowel sounds are normal.      Palpations: Abdomen is obese.      Skin:     General: Skin is warm and dry.   Neurological:      Mental Status: The patient is alert and oriented to person, place, and time.      Motor: Motor function is intact.   Psychiatric:         Mood and Affect: Mood normal.   Rectal: She has large prolapsing hemorrhoids    Result Review :         {Problem List  Visit Diagnosis   Encounters  Notes  Medications  Labs  Result Review Imaging  Media :23}      Assessment and Plan   Diagnoses and all orders for this visit:    1. Grade IV hemorrhoids (Primary)    Of told Amy that given her cirrhosis and portal hypertension and I am somewhat concerned about doing anything procedural for her hemorrhoids.  She is having a fair amount of bleeding as well as discomfort.  We will go ahead and prescribe some hydrocortisone ointment and I will see her back in 4 " weeks.    I  Reggie Vargas MD  10/18/2021

## 2021-10-19 NOTE — OUTREACH NOTE
Ambulatory Case Management Note    CCM Interim Update    Spoke with patient and her  on speaker phone and informed about program, they would like to talk to PCP first and want information sent to home. I told them dr. Tripp ordered the services but she wants to wait. Placed on hold for now.         There are no recently modified care plans to display for this patient.      Darcy Villarreal RN  Ambulatory Case Management    10/19/2021, 15:41 EDT

## 2021-10-21 NOTE — PROGRESS NOTES
"Chief Complaint  Follow-up (hospital visit, still no better) and hands are tingly    Subjective          Amy Miles presents to [facility]  History of Present Illness    Patient presents following for hospitalization, was seen  musculoskeletal feeling no better, has tried wearing a brace to put the strain on her neck in the gym restricted she still has a lot of tenderness and her neck muscles centimeter very tight U-turn can within the right like she was sleeping wrong for most of her hospital stay A. yudi brooks in her neck.    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     During hospitalization tolerated procedure to debride her wound and was sent home on IV antibiotics she still has these to finish 14 days should be done this week and then will resume physical therapy and finish.  The recommended continue to finish drainage follows up with general surgery in 2 weeks follow-up with wound care and continue management of sacral decubitus wound    She continues to complain of anxiety depression trouble sleeping has been held by Valium in the past and given her chronic conditions tried advised against this, she had labs done in the hospital where creatinine was 3 and other lab abnormalities of significance so recommendation repeat those labs today to make sure they are improving as well as her edema in her legs which she is taking Lasix 20 mg twice a day for advise increased to 40 twice a day follow-up next week      Objective   Vital Signs:   /90 (BP Location: Right arm, Patient Position: Sitting, Cuff Size: Adult)   Pulse 82   Temp 97.3 °F (36.3 °C) (Temporal)   Ht 165.1 cm (65\")   Wt 91.5 kg (201 lb 11.2 oz)   SpO2 91%  "  BMI 33.56 kg/m²     Physical Exam  Vitals reviewed.   Constitutional:       Appearance: Normal appearance.   HENT:      Nose: Nose normal.   Eyes:      Extraocular Movements: Extraocular movements intact.      Pupils: Pupils are equal, round, and reactive to light.   Cardiovascular:      Rate and Rhythm: Normal rate and regular rhythm.      Pulses: Normal pulses.   Pulmonary:      Effort: Pulmonary effort is normal.   Musculoskeletal:      Cervical back: Rigidity and tenderness present.   Skin:     General: Skin is warm and dry.   Neurological:      General: No focal deficit present.      Mental Status: She is alert and oriented to person, place, and time.   Psychiatric:         Mood and Affect: Mood normal.         Behavior: Behavior normal.        Result Review :   The following data was reviewed by: Demarcus Tripp DO on 10/21/2021:  Common labs    Common Labsle 10/28/21 10/28/21 10/29/21 10/29/21 10/29/21 10/30/21 10/30/21    2149 2149 0506 0913 0913 0409 0409   Glucose  197 (A)  152 (A)  195 (A)    BUN  7  7  9    Creatinine  0.75  0.60  0.82    eGFR Non  Am  80  103  72    Sodium  140  140  139    Potassium  3.3 (A)  3.7  3.8    Chloride  104  106  107    Calcium  8.2 (A)  7.7 (A)  8.5 (A)    Albumin  2.80 (A)  2.50 (A)  2.70 (A)    Total Bilirubin  0.8  0.8  0.5    Alkaline Phosphatase  170 (A)  161 (A)  158 (A)    AST (SGOT)  38 (A)  30  30    ALT (SGPT)  26  23  24    WBC 6.45  5.31  4.24  4.30   Hemoglobin 10.3 (A)  8.8 (A)  9.4 (A)  8.9 (A)   Hematocrit 33.1 (A)  28.5 (A)  30.7 (A)  30.0 (A)   Platelets 117 (A)  104 (A)  94 (A)  96 (A)   (A) Abnormal value       Comments are available for some flowsheets but are not being displayed.           Lipid Panel    Lipid Panel 7/2/21 7/3/21   Total Cholesterol 172 161   Triglycerides 151 (A) 158 (A)   HDL Cholesterol 36 (A) 32 (A)   VLDL Cholesterol 27 28   LDL Cholesterol  109 (A) 101 (A)   LDL/HDL Ratio 2.94 3.04   (A) Abnormal value            TSH     TSH 4/9/21 7/1/21 9/7/21   TSH 38.200 (A) 5.700 (A) 12.500 (A)   (A) Abnormal value            A1C Last 3 Results    HGBA1C Last 3 Results 7/2/21 9/7/21   Hemoglobin A1C 10.92 (A) 9.00 (A)   (A) Abnormal value                INR    Common Labsle 1/6/21 7/1/21 9/7/21 10/8/21 10/11/21   INR 1.18 (A) 2.50 1.07 (A) 1.76 (A) 1.35 (A)   (A) Abnormal value       Comments are available for some flowsheets but are not being displayed.                             Assessment and Plan    Diagnoses and all orders for this visit:    1. Cervicalgia (Primary)  -     Diclofenac Sodium (Voltaren) 1 % gel gel; Apply 4 g topically to the appropriate area as directed 4 (Four) Times a Day As Needed (neck pain).  Dispense: 50 g; Refill: 2    2. Grief  -     Discontinue: diazePAM (Valium) 5 MG tablet; Take 1 tablet by mouth 3 (Three) Times a Day As Needed for Anxiety or Muscle Spasms for up to 10 days.  Dispense: 30 tablet; Refill: 1    3. ZELALEM (generalized anxiety disorder)  -     Discontinue: diazePAM (Valium) 5 MG tablet; Take 1 tablet by mouth 3 (Three) Times a Day As Needed for Anxiety or Muscle Spasms for up to 10 days.  Dispense: 30 tablet; Refill: 1    4. Cirrhosis of liver with ascites, unspecified hepatic cirrhosis type (HCC)    5. Foot drop, right foot    6. Pressure injury of ankle, stage 4, unspecified laterality (HCC)    7. 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 Ischemic stroke (HCC)      Patient presents to continue complaining of neck pain, cervicalgia worse on the right stiffness in her neck down to her shoulder with difficulty turning her head left to right, she does have a PICC line on the right she is receiving IV antibiotics at home to finish a 7-day course of Rocephin have discussed therapy for her neck consider x-ray or significant further imaging such as CT or MRI if worsens or no improvement given her chronic comorbidities PICC line make sure no complications or other    History  of type 1 diabetes on insulin and followed by specialist continue to monitor and manage blood sugars as appropriate eating appropriate meals and monitoring blood sugars make sure no hypoglycemia or elevations denies any today    Continue to monitor and manage her cirrhosis long-term have counseled her on avoiding medications that may be more high risk given her condition, taking lactulose spironolactone no increased ascites and denies any concerns for acute encephalopathy or confusion, I recommended patient to repeat her labs to compare to previous to ensure no further decline or concerns such as more further anemia or worsening liver condition or other    Prescribed Lasix to improve some of the lower extremity edema diclofenac gel for neck consider therapy and above    Continue to follow-up with wound care for her wound which is complicated by her chronic comorbidities and conditions including diabetes for vascular blood relatives history of stroke    Her motivation and condition is further complicated currently due to her father's recent demise suffering from stress related to his estate and grief related to his passing more difficulty with managing her chronic conditions with frequent hospitalizations    Were detailed down some of her medicines for chronic pain and anxiety depression is able and consider placement to help with may be improving some of her current conditions until we are able to get a better handle on some of these conditions         Follow Up   Return in about 1 week (around 10/28/2021), or if symptoms worsen or fail to improve, for Recheck wound, neck pain xray if needed or more imaging, labs .  Patient was given instructions and counseling regarding her condition or for health maintenance advice. Please see specific information pulled into the AVS if appropriate.

## 2021-10-22 NOTE — OUTREACH NOTE
Sepsis Week 2 Survey      Responses   [facility] patient discharged from? [facility]   Does the patient have one of the following disease processes/diagnoses(primary or secondary)? Sepsis   Week 2 attempt successful? No   Unsuccessful attempts Attempt 1   Discharge diagnosis severe sepsis d/t right buttock necrotizing soft tissue infection, metabolic encephalopathy, sacral decub, cirrhosis, DM          Caroline Cordova RN

## 2021-10-25 NOTE — OUTREACH NOTE
Sepsis Week 2 Survey      Responses   [facility] patient discharged from? [facility]   Does the patient have one of the following disease processes/diagnoses(primary or secondary)? Sepsis   Week 2 attempt successful? Yes   Call start time 1207   Call end time 1212   Discharge diagnosis severe sepsis d/t right buttock necrotizing soft tissue infection, metabolic encephalopathy, sacral decub, cirrhosis, DM   Is patient permission given to speak with other caregiver? Yes   List who call center can speak with    Person spoke with today (if not patient) and relationship Bk, spouse   Has the patient kept scheduled appointments due by today? N/A   Comments has an appt tomorrow, wound care appt on Friday   What is the Home health agency?  [facility], IV abx from [facility]----PICC line   Psychosocial issues? No   What is the patient's perception of their health status since discharge? Improving   Nursing interventions Nurse provided patient education   Is the patient/caregiver able to teach back Sepsis? E - Extreme pain or generalized discomfort (worst ever,especially abdomen)   Nursing interventions Nurse provided patient education   Is patient/caregiver able to teach back steps to recovery at home? Rest and regain strength,  Eat a balanced diet   Is the patient/caregiver able to teach back the hierarchy of who to call/visit for symptoms/problems? PCP, Specialist, Home health nurse, Urgent Care, ED, 911 Yes   Additional teach back comments  states he thought BS was low--has been 110 and here normal is 180. I did tell him this was a good value but to watch if it drops very low and she has s/s of hypoglycemia.    Week 2 call completed? Yes   Wrap up additional comments Has only one more antibx today and  does most of her care.          Radha Segovia RN

## 2021-10-26 NOTE — PROGRESS NOTES
Chief Complaint  Follow-up (two week)    Subjective          Amy Miles presents to [facility]  History of Present Illness    Patient presents for follow-up on her neck and other chronic conditions including her cirrhosis diabetes longstanding depression anxiety and grief as well as chronic pain respiratory failure history COPD on oxygen    Labs ordered today to recheck inflammatory markers CRP is 38 with multiple get a daily CMP given her chronic complex comorbidities.  She states she has been unable to do therapy at home to try to do some things her self with her rolling walker because of the PICC line they have not been able to do therapy only giving her IV antibiotics at home.  She also has an unclear account of if they are doing wound care for her decubitus ulcer which she should be seeing wound care for given the extent of this wound and severity complicated further by her diabetes and other chronic conditions.    Blood pressure elevated today better on recheck denies any signs or symptoms of stroke which he has had a stroke recently with foot drop or weakness in her lower extremities she is supposed to be undergoing therapy at home for her recent stroke.     She follows with several specialist including endocrinology pain management and should be following with liver/GI specialist, pulmonary among others.       Again she is concerned about grief and upcoming issues with moving furniture and her father's home who passed away recently in the last month and request refill on her Valium which we have advised her strongly and multiple times that she should only be taking sparingly given her conditions.     Have counseled her extensively on need to get her other chronic conditions under control following up accordingly with other specialist and getting lab work today very soon to evaluate her condition go to the ED if worse or any further symptoms concerns      Objective  "  Vital Signs:   /86 (BP Location: Right arm, Patient Position: Sitting)   Pulse 96   Temp 97.4 °F (36.3 °C) (Temporal)   Resp 20   Ht 162.6 cm (64\")   Wt 91.4 kg (201 lb 8 oz)   SpO2 94%   BMI 34.59 kg/m²     Physical Exam   Constitutional:       Appearance: Normal appearance.   HENT:      Head: Normocephalic and atraumatic.      Nose: Nose normal.      Mouth/Throat:      Mouth: Mucous membranes are dry.   Cardiovascular:      Rate and Rhythm: Normal rate and regular rhythm.      Pulses: Normal pulses.      Heart sounds: Normal heart sounds.   Pulmonary:      Effort: Pulmonary effort is normal.      Breath sounds: Normal breath sounds.   Musculoskeletal:      Cervical back: Rigidity and tenderness present.   Skin:     General: Skin is warm and dry.   Neurological:      Mental Status: She is alert and oriented to person, place, and time.      Motor: Weakness present.     Result Review : With the same  The following data was reviewed by: Demarcus Tripp DO on 10/26/2021:  Common labs    Common Labsle 10/28/21 10/28/21 10/29/21 10/29/21 10/29/21 10/30/21 10/30/21    2149 2149 0506 0913 0913 0409 0409   Glucose  197 (A)  152 (A)  195 (A)    BUN  7  7  9    Creatinine  0.75  0.60  0.82    eGFR Non  Am  80  103  72    Sodium  140  140  139    Potassium  3.3 (A)  3.7  3.8    Chloride  104  106  107    Calcium  8.2 (A)  7.7 (A)  8.5 (A)    Albumin  2.80 (A)  2.50 (A)  2.70 (A)    Total Bilirubin  0.8  0.8  0.5    Alkaline Phosphatase  170 (A)  161 (A)  158 (A)    AST (SGOT)  38 (A)  30  30    ALT (SGPT)  26  23  24    WBC 6.45  5.31  4.24  4.30   Hemoglobin 10.3 (A)  8.8 (A)  9.4 (A)  8.9 (A)   Hematocrit 33.1 (A)  28.5 (A)  30.7 (A)  30.0 (A)   Platelets 117 (A)  104 (A)  94 (A)  96 (A)   (A) Abnormal value       Comments are available for some flowsheets but are not being displayed.                    Assessment and Plan    Diagnoses and all orders for this visit:    1. Polymyalgia (HCC) (Primary)  -    "  CBC (No Diff); Future  -     Comprehensive metabolic panel; Future  -     Iron Profile; Future  -     C-reactive protein; Future  -     Sedimentation rate, automated; Future  -     CK; Future    2. Cirrhosis of liver with ascites, unspecified hepatic cirrhosis type (HCC)  -     CBC (No Diff); Future  -     Comprehensive metabolic panel; Future  -     Iron Profile; Future  -     C-reactive protein; Future  -     Sedimentation rate, automated; Future  -     CK; Future  -     furosemide (Lasix) 40 MG tablet; Take 1 tablet by mouth 2 (Two) Times a Day As Needed (edema).  Dispense: 60 tablet; Refill: 5    3. Chronic pain syndrome  -     CBC (No Diff); Future  -     Comprehensive metabolic panel; Future  -     Iron Profile; Future  -     C-reactive protein; Future  -     Sedimentation rate, automated; Future  -     CK; Future    4. 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 Grief  -     Discontinue: diazePAM (Valium) 5 MG tablet; Take 1 tablet by mouth 3 (Three) Times a Day As Needed for Anxiety or Muscle Spasms for up to 10 days.  Dispense: 30 tablet; Refill: 1    6. ZELALEM (generalized anxiety disorder)  -     Discontinue: diazePAM (Valium) 5 MG tablet; Take 1 tablet by mouth 3 (Three) Times a Day As Needed for Anxiety or Muscle Spasms for up to 10 days.  Dispense: 30 tablet; Refill: 1      Strongly encourage patient to get lab work to see status of her chronic conditions including cirrhosis diabetes longstanding infection with decubitus ulcer to see if any  Acute worsening    Continue medicines as prescribed and inhalers insulins oxygen monitor blood sugar at home blood pressures ED if worse counseled extensively on continuing chronic pain medicine and anxiety medicine given her severe chronic  conditions comorbidities and difficulty with having them under control    We will follow up again in a week if her labs results or significant consider ED discussed with patient     Patient should be following up with several specialist regarding her chronic conditions may need to look further into if she is seeing specialists since I have seen her every week for the last several weeks        Follow Up   Return in about 1 week (around 11/2/2021).  Patient was given instructions and counseling regarding her condition or for health maintenance advice. Please see specific information pulled into the AVS if appropriate.

## 2021-10-26 NOTE — TELEPHONE ENCOUNTER
Caller: Amy Miles    Relationship to patient: Self    Best call back number: [phone]    Patient is needing: PATIENT CALLED IN AND SAID SHE WOULD LIKE A CALL BACK FROM DR. WADE. SHE SAID THAT SHE HAS A FEW QUESTIONS FOR HIM AND WOULD LIKE A CALL FROM HIM IN PARTICULAR.

## 2021-10-26 NOTE — TELEPHONE ENCOUNTER
Caller: Amy Miles    Relationship: Self    Best call back number: [phone]    What is the best time to reach you: ANYTIME    Who are you requesting to speak with (clinical staff, provider,  specific staff member): DR. MIKEY WADE     What was the call regarding: PATIENT WOULD LIKE FOR DR. WADE TO GIVE HER A CALL.  DID NOT DISCLOSE ANY OTHER INFORMATION.     Do you require a callback: YES

## 2021-10-28 NOTE — ASSESSMENT & PLAN NOTE
*acute  Likely developed crick in her neck from sleeping In exaggerated position  Had stroke and also likely involed, continue therapy at home

## 2021-10-28 NOTE — ASSESSMENT & PLAN NOTE
*acute in hospital, deficits with weakness,  Follow up with neurology, continue therapy with home health, PT/OT

## 2021-10-29 NOTE — OUTREACH NOTE
Sepsis Week 3 Survey      Responses   [facility] patient discharged from? [facility]   Does the patient have one of the following disease processes/diagnoses(primary or secondary)? Sepsis   Week 3 attempt successful? No   Unsuccessful attempts Attempt 1   Revoke Readmitted          Radha Maldonado RN

## 2021-10-30 PROBLEM — M35.3 POLYMYALGIA (HCC): Status: ACTIVE | Noted: 2021-01-01

## 2021-10-30 PROBLEM — M54.2 CERVICALGIA: Status: ACTIVE | Noted: 2021-01-01

## 2021-10-30 NOTE — OUTREACH NOTE
Prep Survey      Responses   [facility]? [facility]   Is LACE score < 7 ? No   Emergency Room discharge w/ pulse ox? No   Eligibility [facility]   Date of Admission 10/28/21   Date of Discharge 10/30/21   Discharge Disposition Home or Self Care   Discharge diagnosis Sepsis   Does the patient have one of the following disease processes/diagnoses(primary or secondary)? Sepsis   Does the patient have Home health ordered? Yes   What is the Home health agency?  [facility]   Is there a DME ordered? No   Prep survey completed? Yes          Wanda Barfield RN

## 2021-11-01 NOTE — OUTREACH NOTE
Call Center TCM Note      Responses   [facility] patient discharged from? [facility]   Does the patient have one of the following disease processes/diagnoses(primary or secondary)? Sepsis   TCM attempt successful? Yes   Call start time 1355   Call end time 1359   Discharge diagnosis Sepsis   Meds reviewed with patient/caregiver? N/A   Does the patient have all medications related to this admission filled (includes all antibiotics, inhalers, nebulizers,steroids,etc.) N/A   Is the patient taking all medications as directed (includes completed medication regime)? Yes   Does the patient have a primary care provider?  Yes   Comments regarding PCP appt with PCP tomorrow (11/2)   Does the patient have an appointment with their PCP within 7 days of discharge? Yes   Has the patient kept scheduled appointments due by today? N/A   What is the Home health agency?  [facility]   Has home health visited the patient within 72 hours of discharge? Call prior to 72 hours   Psychosocial issues? No   Did the patient receive a copy of their discharge instructions? Yes   Nursing interventions Reviewed instructions with patient   What is the patient's perception of their health status since discharge? Same   Nursing interventions Nurse provided patient education   Is the patient/caregiver able to teach back Sepsis? S - Shivering,fever or very cold,  E - Extreme pain or generalized discomfort (worst ever,especially abdomen),  P - Pale or discolored skin,  S - Sleepy, difficult to arouse,confused,  I -   I feel like I might die-a feeling of hopelessness,  S - Short of breath   Nursing interventions Nurse provided patient education   Is patient/caregiver able to teach back steps to recovery at home? Set small, achievable goals for return to baseline health,  Rest and regain strength   Is the patient/caregiver able to teach back signs and symptoms of worsening condition: Fever   Is the patient/caregiver able to teach back the hierarchy of  who to call/visit for symptoms/problems? PCP, Specialist, Home health nurse, Urgent Care, ED, 911 Yes   Additional teach back comments says she is on 2L of O2 but states her O2 sats are ok.     TCM call completed? Yes   Wrap up additional comments Says she is not feeling any better and wishes she would have not left the hospital, states she is wearing 2L of O2 and her O2 sats have been ok, states she thinks she may be running a fever but has not checked her temp, advised her to call PCP office with continuing of symptoms or go back to the ER to get checked out, she voiced understanding, confirmed appt with PCP for tomorrow (11/2).          Margot Molina RN    11/1/2021, 13:59 EDT

## 2021-11-02 NOTE — DISCHARGE INSTRUCTIONS
Avoid constant pressure for more than 2 hours.  Keep rolling from side to side.  Continue with home health and wound changes as instructed at time of discharge from the hospital stay.  Follow-up with the wound care center for wound evaluation and treatment.  Follow-up with your family doctor in 1 week.  Return to the ER for fever greater than 101 or any other changes or worsening conditions.

## 2021-11-02 NOTE — ED PROVIDER NOTES
Time: 15:29 EDT  Arrived by: EMS  Chief Complaint: wound  History provided by: pt  History is limited by: N/A    History of Present Illness:    Amy Miles is a 55 y.o. female with a hx of COPD who presents to the emergency department today with complaints of worsening sacral pressure wound over the past several months. Pt states the wound began six months ago, but just had surgery on it three weeks ago. Pt states she doesn't remember who preformed the surgery and also denies any follow up treatment for it thus far. In addition to the wound pt complains of shortness of breath. She claims to have home oxygen as well as a nebulizer machine. She follows with Dr. Qasim DO, for her primary care needs. She denies fever, diaphoresis, chills, nausea, emesis, cough, or any other pertinent sx or concerns.       History provided by:  Patient   used: No      Past Medical History:     Allergies   Allergen Reactions   • Adhesive Tape Unknown - Low Severity   • Amoxicillin Unknown - High Severity   • Amoxicillin-Pot Clavulanate Unknown - Low Severity   • Cephalexin Unknown - Low Severity   • Clavulanic Acid Unknown - Low Severity   • Doxycycline Hyclate Unknown - Low Severity   • Flonase [Fluticasone] Unknown - Low Severity   • Nsaids Unknown - Low Severity   • Penicillins Unknown - Low Severity   • Potassium Unknown - Low Severity     Past Medical History:   Diagnosis Date   • Anemia    • Anxiety    • Arm pain    • Arthritis    • Asthma    • Bladder disorder    • Bronchitis    • Chronic allergic rhinitis    • Chronic pain syndrome     Reflex Sympathetic Dystrophy, left arm.   • Cirrhosis of liver (HCC)    • COPD (chronic obstructive pulmonary disease) (HCC)    • CRPS (complex regional pain syndrome), type I, upper 12/27/2012   • Depression    • Diabetes mellitus type 1 (HCC)    • Elevated INR 7/1/2021   • Elevated LFTs 7/1/2021   • Encephalopathy, hepatic (HCC) 7/1/2021   • Essential hypertension, benign     • Fracture    • GERD (gastroesophageal reflux disease)    • Graves disease    • Hand pain    • Heel pain    • Hyperthyroidism    • Iatrogenic hypothyroidism    • Leg pain    • Leg swelling    • Limb swelling    • Mood disorder (HCC)    • Mood disorder ([facility])    • MRSA (methicillin resistant Staphylococcus aureus)    • Nausea    • RSD (reflex sympathetic dystrophy)    • Seasonal allergies    • Sepsis due to pneumonia (CMS/[facility]) 2021   • Shortness of Air    • Toe fracture          Past Surgical History:   Procedure Laterality Date   • ABDOMINAL HYSTERECTOMY     • CARPAL TUNNEL RELEASE     •  SECTION     • CHOLECYSTECTOMY     • COLONOSCOPY     • GALLBLADDER SURGERY     • HAND SURGERY     • HEMORRHOIDECTOMY     • HERNIA REPAIR     • HIATAL HERNIA REPAIR     • INCISION AND DRAINAGE ABSCESS     • INCISION AND DRAINAGE ABSCESS Bilateral 10/9/2021    Procedure: INCISION AND DRAINAGE ABSCESS buttocks;  Surgeon: Tom Colbert MD;  Location: [facility] MAIN OR;  Service: General;  Laterality: Bilateral;   • THYROIDECTOMY      by Dr. Anthony   • TONSILLECTOMY     • TUBAL ABDOMINAL LIGATION       Family History   Problem Relation Age of Onset   • Arthritis Mother    • Heart disease Father    • Arthritis Father    • Diabetes Sister         Unspecified   • Arthritis Sister    • Osteoporosis Sister    • Diabetes Brother         Unspecified   • Other Brother         Renal Calculus   • Diabetes Maternal Uncle         Unspecified       Home Medications:  Prior to Admission medications    Medication Sig Start Date End Date Taking? Authorizing Provider   albuterol (ACCUNEB) 1.25 MG/3ML nebulizer solution Take 3 mL by nebulization Every 6 (Six) Hours As Needed for Wheezing for up to 360 days. 21  Demarcus Tripp, DO   Alcohol Swabs (Pharmacist Choice Alcohol) pads  10/13/21   Provider, MD Kirby   aluminum sulfate-calcium acetate (DOMEBORO) topical packet Apply 1 packet topically to the appropriate  "area as directed Every 8 (Eight) Hours. 7/7/21   Mateo Nevarez DO   aspirin 81 MG EC tablet Take 1 tablet by mouth Daily. 7/8/21   Mateo Nevarez DO   busPIRone (BUSPAR) 10 MG tablet Take 10 mg by mouth 2 (Two) Times a Day. 7/15/21   Kirby Sandhu MD   Ca Alginate-Carboxymethylcell (Maxorb Extra 2\"x2\") pads Apply 10 each topically 2 (two) times a day. Indications: Bed Sores 10/5/21   Demarcus Tripp DO   cetirizine (zyrTEC) 10 MG tablet Take 1 tablet by mouth Daily. 7/2/21   Demarcus Tripp DO   Cholecalciferol (Vitamin D) 50 MCG (2000 UT) tablet Take 2,000 Units by mouth Daily.    Kirby Sandhu MD   cyclobenzaprine (FLEXERIL) 10 MG tablet Take 10 mg by mouth 3 (Three) Times a Day As Needed. 7/30/21   Kirby Sandhu MD   Diclofenac Sodium (Voltaren) 1 % gel gel Apply 4 g topically to the appropriate area as directed 4 (Four) Times a Day As Needed (neck pain). 10/21/21   Demarcus Tripp DO   EPINEPHrine (EPIPEN) 0.3 MG/0.3ML solution auto-injector injection Inject 0.3 mg into the appropriate muscle as directed by prescriber 1 (One) Time.    Kirby Sandhu MD   fluconazole (Diflucan) 150 MG tablet Take 1 tablet by mouth Daily. 9/23/21   Demarcus Tripp DO   furosemide (Lasix) 40 MG tablet Take 1 tablet by mouth 2 (Two) Times a Day As Needed (edema). 10/26/21   Demarcus Tripp DO   gabapentin (NEURONTIN) 800 MG tablet Take 800 mg by mouth 4 (Four) Times a Day. 7/29/21   Kirby Sandhu MD   hydrocortisone 2.5 % lotion Apply to affected area 2 times daily 10/18/21 10/18/22  Reggie Vargas MD   insulin patient supplied pump Inject  under the skin into the appropriate area as directed Continuous. Omnipod  Insulin: Novolog  Dr. Zelaya  Change every 72 hours, pts  said he was supposed to change it last night but did not have time (10/29)    Kirby Sandhu MD   lactulose (CHRONULAC) 10 GM/15ML solution Take 15 mL by mouth 3 (Three) Times a Day.  Patient taking differently: Take " 15 g by mouth 2 (Two) Times a Day As Needed.  stated that she takes 30 ml qam and 15 ml hs but only prn 7/7/21   Mateo Nevarez DO   levothyroxine (SYNTHROID, LEVOTHROID) 200 MCG tablet Take 200 mcg by mouth Daily. 10/1/21   Kirby Sandhu MD   lisinopril (PRINIVIL,ZESTRIL) 20 MG tablet Take 1 tablet by mouth Daily. 8/31/21   Demarcus Tripp DO   metoprolol succinate XL (Toprol XL) 100 MG 24 hr tablet Take 1 tablet by mouth Daily. 8/31/21   Demarcus Tripp DO   montelukast (Singulair) 10 MG tablet Take 1 tablet by mouth Every Night. 7/2/21   Demarcus Tripp DO   Narcan 4 MG/0.1ML nasal spray 1 spray into the nostril(s) as directed by provider As Needed. 9/7/21   Kirby Sandhu MD   oxyCODONE (ROXICODONE) 15 MG immediate release tablet Take 1 tablet by mouth Every 8 (Eight) Hours As Needed for Moderate Pain . 10/13/21   Mando Hernández MD   promethazine (PHENERGAN) 25 MG tablet Take 1 tablet by mouth Daily. 7/8/21   Demarcus Tripp DO   silver sulfadiazine (Silvadene) 1 % cream Apply  topically to the appropriate area as directed Daily As Needed for Wound Care. 7/20/21   Demarcus Tripp DO   spironolactone (ALDACTONE) 50 MG tablet Take 1 tablet by mouth Daily. 7/8/21   Mateo Nevarez DO   thiamine (thiamine) 100 MG tablet tablet Take 1 tablet by mouth Daily. 7/7/21   Mateo Nevarez DO   zinc oxide (DESITIN) 40 % paste paste Apply  topically to the appropriate area as directed Daily As Needed (break down). 7/7/21   Mateo Nevarez DO        Social History:   PT  reports that she has been smoking cigarettes. She started smoking about 39 years ago. She has a 39.00 pack-year smoking history. She has never used smokeless tobacco. She reports that she does not drink alcohol and does not use drugs.    Record Review:  I have reviewed the patient's records in [facility].     Review of Systems  Review of Systems   Constitutional: Negative for chills and fever.   HENT: Negative for congestion, rhinorrhea and sore  "throat.    Eyes: Negative for pain and visual disturbance.   Respiratory: Positive for shortness of breath. Negative for apnea, cough and chest tightness.    Cardiovascular: Negative for chest pain and palpitations.   Gastrointestinal: Negative for abdominal pain, diarrhea, nausea and vomiting.   Genitourinary: Negative for difficulty urinating and dysuria.   Musculoskeletal: Negative for joint swelling and myalgias.   Skin: Positive for wound. Negative for color change.   Neurological: Negative for seizures and headaches.   Psychiatric/Behavioral: Negative.    All other systems reviewed and are negative.       Physical Exam  /95   Pulse 107   Temp 98.7 °F (37.1 °C) (Oral)   Resp 18   Ht 162.6 cm (64\")   Wt 89 kg (196 lb 3.4 oz)   LMP  (LMP Unknown)   SpO2 91%   BMI 33.68 kg/m²     Physical Exam  Vitals and nursing note reviewed.   Constitutional:       General: She is not in acute distress.     Appearance: Normal appearance. She is not toxic-appearing.   HENT:      Head: Normocephalic and atraumatic.      Jaw: There is normal jaw occlusion.   Eyes:      General: Lids are normal.      Extraocular Movements: Extraocular movements intact.      Conjunctiva/sclera: Conjunctivae normal.      Pupils: Pupils are equal, round, and reactive to light.   Cardiovascular:      Rate and Rhythm: Normal rate and regular rhythm.      Pulses: Normal pulses.      Heart sounds: Normal heart sounds.   Pulmonary:      Effort: Pulmonary effort is normal. No respiratory distress.      Breath sounds: Normal breath sounds. No wheezing or rhonchi.   Abdominal:      General: Abdomen is flat.      Palpations: Abdomen is soft.      Tenderness: There is no abdominal tenderness. There is no guarding or rebound.   Musculoskeletal:         General: Normal range of motion.      Cervical back: Normal range of motion and neck supple.      Right lower leg: No edema.      Left lower leg: No edema.      Comments: Deep tunneled ulcer. Thick " "yellow exudate on dressing. No evidence of surrounding cellulitis. Developing stage one pressure wound on right lateral hip area.    Skin:     General: Skin is warm and dry.   Neurological:      Mental Status: She is alert and oriented to person, place, and time. Mental status is at baseline.   Psychiatric:         Mood and Affect: Mood normal.                  ED Course  /95   Pulse 107   Temp 98.7 °F (37.1 °C) (Oral)   Resp 18   Ht 162.6 cm (64\")   Wt 89 kg (196 lb 3.4 oz)   LMP  (LMP Unknown)   SpO2 91%   BMI 33.68 kg/m²   Results for orders placed or performed during the hospital encounter of 11/02/21   Comprehensive Metabolic Panel    Specimen: Arm, Right; Blood   Result Value Ref Range    Glucose 190 (H) 65 - 99 mg/dL    BUN 10 6 - 20 mg/dL    Creatinine 0.74 0.57 - 1.00 mg/dL    Sodium 140 136 - 145 mmol/L    Potassium 3.4 (L) 3.5 - 5.2 mmol/L    Chloride 104 98 - 107 mmol/L    CO2 25.1 22.0 - 29.0 mmol/L    Calcium 8.6 8.6 - 10.5 mg/dL    Total Protein 6.6 6.0 - 8.5 g/dL    Albumin 2.90 (L) 3.50 - 5.20 g/dL    ALT (SGPT) 19 1 - 33 U/L    AST (SGOT) 27 1 - 32 U/L    Alkaline Phosphatase 164 (H) 39 - 117 U/L    Total Bilirubin 0.7 0.0 - 1.2 mg/dL    eGFR Non African Amer 81 >60 mL/min/1.73    Globulin 3.7 gm/dL    A/G Ratio 0.8 g/dL    BUN/Creatinine Ratio 13.5 7.0 - 25.0    Anion Gap 10.9 5.0 - 15.0 mmol/L   BNP    Specimen: Arm, Right; Blood   Result Value Ref Range    proBNP 354.1 0.0 - 900.0 pg/mL   Troponin    Specimen: Arm, Right; Blood   Result Value Ref Range    Troponin T 0.050 (C) 0.000 - 0.030 ng/mL   CBC Auto Differential    Specimen: Arm, Right; Blood   Result Value Ref Range    WBC 5.72 3.40 - 10.80 10*3/mm3    RBC 3.87 3.77 - 5.28 10*6/mm3    Hemoglobin 10.6 (L) 12.0 - 15.9 g/dL    Hematocrit 35.1 34.0 - 46.6 %    MCV 90.7 79.0 - 97.0 fL    MCH 27.4 26.6 - 33.0 pg    MCHC 30.2 (L) 31.5 - 35.7 g/dL    RDW 17.0 (H) 12.3 - 15.4 %    RDW-SD 56.4 (H) 37.0 - 54.0 fl    MPV 12.1 (H) 6.0 - " 12.0 fL    Platelets 135 (L) 140 - 450 10*3/mm3    Neutrophil % 62.7 42.7 - 76.0 %    Lymphocyte % 21.3 19.6 - 45.3 %    Monocyte % 10.7 5.0 - 12.0 %    Eosinophil % 3.8 0.3 - 6.2 %    Basophil % 1.2 0.0 - 1.5 %    Immature Grans % 0.3 0.0 - 0.5 %    Neutrophils, Absolute 3.58 1.70 - 7.00 10*3/mm3    Lymphocytes, Absolute 1.22 0.70 - 3.10 10*3/mm3    Monocytes, Absolute 0.61 0.10 - 0.90 10*3/mm3    Eosinophils, Absolute 0.22 0.00 - 0.40 10*3/mm3    Basophils, Absolute 0.07 0.00 - 0.20 10*3/mm3    Immature Grans, Absolute 0.02 0.00 - 0.05 10*3/mm3    nRBC 0.0 0.0 - 0.2 /100 WBC   ECG 12 Lead   Result Value Ref Range    QT Interval 325 ms   Green Top (Gel)   Result Value Ref Range    Extra Tube Hold for add-ons.    Lavender Top   Result Value Ref Range    Extra Tube hold for add-on    Gold Top - SST   Result Value Ref Range    Extra Tube Hold for add-ons.    Light Blue Top   Result Value Ref Range    Extra Tube hold for add-on      Medications   sodium chloride 0.9 % flush 10 mL (has no administration in time range)   Sodium Hypochlorite (DAKIN'S) 0.125 % topical solution 0.125% (quarter strength) ( Topical Given 11/2/21 1211)   HYDROmorphone (DILAUDID) injection 0.5 mg (has no administration in time range)   HYDROmorphone (DILAUDID) injection 0.5 mg (0.5 mg Intravenous Given 11/2/21 1403)     CT Abdomen Pelvis Without Contrast    Result Date: 10/9/2021  Narrative: PROCEDURE: CT ABDOMEN PELVIS WO CONTRAST  COMPARISONS: [facility], CT, ABDOMEN/PELVIS W/ CONTRAST, 8/18/2018, 19:22.   [facility], CT, CHEST W/ CONTRAST, 11/05/2020, 11:22.  INDICATIONS: Sepsis; lactic acidosis.  TECHNIQUE: 553 CT images were created without intravenous contrast.   PROTOCOL:   Standard imaging protocol performed.    RADIATION:   Automated exposure control was utilized to minimize radiation dose.  FINDINGS: Motion artifact obscures detail on the study.  The liver has a cirrhotic contour.  There is splenomegaly.   Portal hypertension and portosystemic venous shunting may be present.  The patient has undergone cholecystectomy.  Atherosclerotic changes are present.  No aneurysmal dilatation of the aortoiliac arterial system is identified.  There is a small right pleural effusion.  No left pleural effusion is seen.  Atelectasis and/or infiltrate(s) is (are) seen in the right lung base.  No definite nonenhanced CT evidence of acute pancreatitis.  No renal stones or hydronephrosis or obstructive uropathy.  Surgical clips are seen in the anterior abdominal wall and suggest prior ventral hernia repair.  No pneumoperitoneum or pneumatosis.  There may be a small hiatal hernia.  The appendix is not clearly identified.  Please correlate with the surgical history.  The patient has undergone hysterectomy.   A urinary bladder catheter is in place.  The urinary bladder is underdistended.   There is subcutaneous emphysema and increased attenuation of the soft tissues involving the medial aspect of the right buttock, such as seen on axial images 95 through 120 of series 202.  Foci of gas may be within a central area of fluid (as with abscess).  An infectious/inflammatory phlegmon or inflammatory mass is possible.  The extent of The greatest measured extent of the findings is 12.5 x 7.7 x 7 cm in craniocaudal, transverse, and anteroposterior (AP) extent, respectively, as seen on image 94 of series 205, image 110 of series 202, and image 141 of series 203.  Again, the findings may represent an abscess.  Infectious/inflammatory myositis/cellulitis/fasciitis, including necrotizing.  Please correlate with physical exam findings in this region as well as the clinical history.  Adjacent osteomyelitis of the sacrum and/or coccyx cannot be excluded.  An associated cutaneous sinus tract cannot be excluded.  There is formed stool throughout the colon suggesting fecal stasis as with constipation.  No definite stercoral ulceration is seen.  No  pneumoperitoneum or pneumatosis.  No definite nonenhanced CT evidence of a sinus tract or fistula involving the rectosigmoid colon.  Sclerotic changes involve the sacroiliac joints.  There are degenerative changes of the imaged spine.  Degenerative changes also involve the bilateral hip joints.  No definite acute fracture or acute malalignment is seen.   CONCLUSION:   1. An age-indeterminate infectious/inflammatory process with associated subcutaneous gas is seen involving the medial aspect of the right buttock and medial posterior right pelvic wall (overlying the right sacrum and coccyx).  It involves the right gluteal musculature and the overlying superficial subcutaneous tissues.  It does not appear to extend into the right ischiorectal fossa.  Soft tissue gas may indicate an age-indeterminate anaerobic infectious process, including (but not necessarily limited to) necrotizing fasciitis, right gluteal myositis, and/or cellulitis.  An associated phlegmon or abscess is possible.  Underlying osteomyelitis of the sacrum and/or coccyx cannot be excluded.  No definite nonenhanced CT evidence of an associated sinus tract or fistula involving the rectosigmoid colon.  A cutaneous sinus tract is possible, however.  Again, an abscess or phlegmon involving the medial right buttock is possible.  It may measure as great is 12.5 cm in craniocaudal extent.  The findings may represent a complication of a sacral decubitus ulcer.  The findings are thought less likely to originate from infectious/inflammatory colitis or proctitis involving the rectosigmoid colon.  2. Please see above comments for further detail.     Pertinent findings were discussed with Selene Canales PA-C, ordering [facility] (Hospitalist's Physician Assistant, be at approximately 0201 hours on 10/9/2021.   ISATU BROWN JR, MD       Electronically Signed and Approved By: ISATU BROWN JR, MD on 10/09/2021 at 2:05             CT Head Without Contrast    Result Date:  10/12/2021  Narrative: PROCEDURE: CT HEAD WO CONTRAST  COMPARISON:  [facility], MR, MRI BRAIN WO CONTRAST, 7/02/2021, 6:52.  [facility], CT, CT HEAD WO CONTRAST, 7/01/2021, 14:12. INDICATIONS: Delirium  PROTOCOL:   Standard imaging protocol performed    RADIATION:   DLP: 1995 mGy*cm   MA and/or KV was adjusted to minimize radiation dose.     TECHNIQUE: After obtaining the patient's consent, CT images were obtained without non-ionic intravenous contrast material.  FINDINGS:  There is no intracranial hemorrhage.  No mass effect or midline shift.  There is encephalomalacia involving the left frontal lobe related to sequela of prior infarct on image 41 extending to the vertex, better assessed on prior MRI.  No mass effect or midline shift.  Posterior fossa without acute abnormality.  Globes symmetric.  No retro-orbital abnormality.  There are multiple nonspecific areas of subcortical white matter hypoattenuation which may relate to chronic microvascular disease other etiologies may have a similar appearance, better assessed on recent MRI from 7/2/2021.  Mastoid air cells are well-aerated.  Negative for sinus fluid level.  Calvarium intact.  CONCLUSION:  1. No intracranial hemorrhage. 2. Redemonstration of focal area of hypoattenuation involving the posterior left frontal lobe likely related to sequela of prior infarct seen on MRI from 7/2/2021. 3. Multiple white matter lesions better assessed on recent MRI. 4. Additional stable chronic findings above.     SHELBIE CARNES MD       Electronically Signed and Approved By: SHELBIE CARNES MD on 10/12/2021 at 21:46             CT Chest Without Contrast Diagnostic    Result Date: 10/29/2021  Narrative: PROCEDURE: CT CHEST WO CONTRAST DIAGNOSTIC  COMPARISON: [facility], CT, CT CHEST WO CONTRAST DIAGNOSTIC, 10/09/2021, 1:08.  INDICATIONS: Respiratory failure  TECHNIQUE: CT images were created without the administration of contrast material.    PROTOCOL:   Standard imaging protocol performed    RADIATION:   DLP: 479mGy*cm   Automated exposure control was utilized to minimize radiation dose.  FINDINGS:  There are trace bilateral pleural effusions.  The effusion on the right side is stable and the effusion on the left side is new.  There is new focal consolidation with air bronchograms in the inferior lingula suspicious for developing pneumonia.  Abnormal consolidation is also seen in the right middle lobe similar to previous study which also may represent pneumonia.  There are densities in the dependent portions of both lower lobes consistent with subsegmental atelectasis.  This is an interval change within the left lower lobe when compared to the previous study.  The right lower lobe appears similar.  There also is dependent atelectasis in both upper lobes, left greater than right side.  There are no enlarged mediastinal lymph nodes.  The hilar regions are difficult to evaluate without contrast.  There is chronic calcific granulomatous disease noted within the mediastinal and left hilar regions.  The heart is enlarged.  There are atherosclerotic vascular calcifications including involvement of the coronary arteries.  Below the hemidiaphragms, there has been development of moderate ascites.  The spleen is enlarged.  The liver is cirrhotic.  There are no suspicious osteolytic or sclerotic lesions in the bony thorax.  CONCLUSION:  1. Trace bilateral pleural effusions.  The left pleural effusion is new.  2. Focal consolidation in the inferior lingula suspicious for developing pneumonia.  There is unchanged consolidation in the right middle lobe which also may represent pneumonia. 3. Bilateral lower lobe atelectasis which is unchanged on the right side and new on the left.  There is also dependent atelectasis in both upper lobes. 4. Interval development of moderate ascites.  The patient has a cirrhotic liver with splenomegaly.     ISIDRA COLINDRES MD        Electronically Signed and Approved By: ISIDRA COLINDRES MD on 10/29/2021 at 15:30             CT Chest Without Contrast Diagnostic    Result Date: 10/9/2021  Narrative: PROCEDURE: CT CHEST WO CONTRAST DIAGNOSTIC  COMPARISONS: [facility], CR, XR CHEST 1 VW, 7/01/2021, 12:23.   [facility], CT, CT ABDOMEN PELVIS WO CONTRAST, 10/09/2021, 1:08.   [facility], CR, XR CHEST 1 VW, 10/08/2021, 19:19.   [facility], CT, CHEST W/ CONTRAST, 11/05/2020, 11:22.  INDICATIONS: Possible mass, pneumonia, and/or CHF.  Possible sepsis.  TECHNIQUE: 577 CT images were created without the administration of contrast material.   PROTOCOL:   Standard imaging protocol performed    RADIATION:   Automated exposure control was utilized to minimize radiation dose.  FINDINGS: The patient has undergone thyroidectomy.  There is motion artifact on the study.  Atherosclerotic change is noted, including involvement of the coronary arteries.  There is mild cardiomegaly.  There is a small right pleural effusion.  No definite left pleural effusion.  No pericardial effusion.  There are small to moderate sized mediastinal and hilar lymph nodes.  These findings are nonspecific.  They may be reactive in nature.  Similar findings were seen previously.  No pneumothorax.  No pneumomediastinum.  Atelectasis and/or infiltrate(s) is (are) seen on the right.  Subsegmental atelectasis is favored, especially in the right lung base.  There may be slight chronic asymmetric elevation of the right diaphragm.  Mild subsegmental atelectasis is suspected on the left.  Probably no acute infiltrate is appreciated on the left.  There is chronic calcified granulomatous disease of the chest, spleen, and liver.  Mild degenerative changes involve the imaged spine.  No acute fracture or aggressive osseous lesion is identified.  The increased density on the recent chest radiograph from 10/8/2021 involving the right superior mediastinum is thought to be  related to low lung volumes and vascular structures, such as the superior vena cava (SVC), azygos arch, and the thoracic aorta (especially the ascending aorta and the aortic arch).  No definite nonenhanced CT evidence of a mediastinal or right parahilar mass.  The patient has undergone cholecystectomy.  A small hiatal hernia is possible.  The patient's upper extremities in the scan field of view obscure detail.  CONCLUSION: Atelectasis and/or infiltrate(s) is (are) seen on the right with atelectasis favored.  The CT imaging features are atypical or uncommonly reported for COVID-19 pneumonia.  Alternative diagnoses should be considered.    There is mild cardiac enlargement.  Atherosclerotic change is seen, including involvement the coronary arteries.  There is a small right pleural effusion.  Please see above comments for further detail.    ISATU BROWN JR, MD       Electronically Signed and Approved By: ISATU BROWN JR, MD on 10/09/2021 at 1:40             XR Chest 1 View    Result Date: 11/2/2021  Narrative: PROCEDURE: XR CHEST 1 VW  COMPARISON: [facility], CT, CT CHEST WO CONTRAST DIAGNOSTIC, 10/29/2021, 14:58.  [facility], CR, XR CHEST 1 VW, 10/28/2021, 21:15.  INDICATIONS: SHORTNESS OF BREATH  FINDINGS:   The heart and pulmonary vasculature are within normal limits.  Trace bilateral pleural effusions are not well visualized on this examination.  No evidence of pneumothorax.  Mild patchy infiltrate in the lower lung fields appears stable.  IMPRESSION: Stable exam.  Mild patchy infiltrate in the lower lung anderson.   RADHA LEGER MD       Electronically Signed and Approved By: RADHA LEGER MD on 11/02/2021 at 11:48             XR Chest 1 View    Result Date: 10/28/2021  Narrative: PROCEDURE: XR CHEST 1 VW  COMPARISON: [facility], CR, XR CHEST 1 VW, 10/08/2021, 19:19.  INDICATIONS: SHORT OF BREATH.  FINDINGS: A single AP upright portable view of the chest is  provided for review.  Bilateral infiltrates persist.  The findings may represent infectious multifocal pneumonia.  No cardiomediastinal enlargement.  No pneumothorax.  No pneumomediastinum.  No pleural effusion.  There is slight pulmonary hypoinflation.  External artifacts obscure detail.  There are postoperative changes of the base of the neck, suggestive of prior thyroidectomy.  The thoracic aorta is atherosclerotic and ectatic.  There is decreased gaseous distension of stomach and splenic flexure of colon since the prior study.  The patient has undergone cholecystectomy.  CONCLUSION: Decreased bilateral infiltrates are suggested since 10/8/2021.  The findings may represent improving infectious multifocal pneumonia.       ISATU BROWN JR, MD       Electronically Signed and Approved By: ISATU BROWN JR, MD on 10/28/2021 at 21:56             XR Chest 1 View    Result Date: 10/8/2021  Narrative: PROCEDURE: XR CHEST 1 VW  COMPARISON: [facility], CR, XR ABDOMEN KUB, 7/02/2021, 1:46.  [facility], CR, XR CHEST 1 VW, 7/01/2021, 12:23.  INDICATIONS: Severe Sepsis triage protocol  FINDINGS:  Single frontal view chest AP upright portable reveals that the heart is enlarged.  There is questionable increased density in the right side of the superior mediastinum.  This might be caused by anatomic variation or tortuous blood vessels or mass.  There are bilateral diffuse increased markings consistent with chronic lung disease and bronchitis and mild congestive heart failure.  Surgical clips are seen in the right upper quadrant the abdomen consistent with cholecystectomy.  There are dilated loops of bowel in the left upper quadrant the abdomen consistent with moderate gaseous distension of the stomach and moderate gaseous distension of the splenic flexure of the colon.  CONCLUSION:  1. Cardiomegaly. 2. Suspicion of focal increased density in the right side of the superior mediastinum might be caused  by artifact or anatomic variation or tortuous dilated blood vessels or mass.  CT of the chest would be helpful to further evaluate this finding if clinically indicated. 3. Diffuse increased markings especially in the right lower lobe and left lower lobe lung consistent with chronic lung disease and bronchitis and mild congestive heart failure.       LAKSHMI BARNARD MD       Electronically Signed and Approved By: LAKSHMI BARNARD MD on 10/08/2021 at 19:35               Procedures/EKGs:  Procedures  EKG performed at 1051 was interpreted by me to show a sinus tachycardia with a ventricular rate of 106 bpm.  ND interval was 166 ms.  P waves are normal.  QRS interval was normal.  Axis was normal at 23 degrees.  There were no acute ischemic ST or T wave changes identified.  QT corrected was normal 441 ms.  This EKG was compared with prior dated 10/20/2021 and is unchanged.    Medical Decision Making:                     The patient was seen evaluated the ED by me.  The above history and physical examination was performed as stated.  Upon looking through her history.  Patient was just seen and discharged on the 30th of this month after a 2-day hospital stay.  Upon further evaluation patient has a follow-up appointment already scheduled with Dr. Vargas and she has home health set up as well.  The patient is persistently insisting upon being admitted to the hospital.  States that she needs to stay on the fourth floor until this is healed up.  She does not meet criteria for acute hospitalization.  We did offer to get her into a rehab facility and she declines.  Again upon looking through the records this was also discussed while she was in the hospital and she declined at that time as well.  Patient also repeatedly is asking for pain medicines.  Patient was given 2 doses over her 5-hour stay but they were low doses of Dilaudid.  Patient was not pleased with the amount that she was receiving as well.  At this time  point time patient has been referred to the wound center for wound evaluation and treatment as well as the home health that she has already arranged.  The dressing and packing was changed here in the ED.    MDM     Final diagnoses:   Sacral decubitus ulcer, stage IV (HCC)          Disposition:  ED Disposition     ED Disposition Condition Comment    Discharge Stable           Documentation assistance provided by Reggie Martin DO acting as scribe for Reggie Martin DO. Information recorded by the scribe was done at my direction and has been verified and validated by me.        Mayela Peralta  11/02/21 1150       Reggie Martin DO  11/02/21 1529       Reggie Martin DO  11/04/21 1022

## 2021-11-02 NOTE — TELEPHONE ENCOUNTER
Message form [facility] needing restart of care verbal orders for Skilled Nursing, PT, and OT since her discharge from hospital. Discussed via messaging with PCP and verbal orders given to Zahra with [facility] to restart care. Patient has f/u today with PCP for IPF.

## 2021-11-03 NOTE — TELEPHONE ENCOUNTER
She goes to pain management, I think she is due for refill this week. Id have her call their office and see what they can do

## 2021-11-05 PROBLEM — E63.9 POOR NUTRITION: Status: ACTIVE | Noted: 2021-01-01

## 2021-11-05 PROBLEM — L89.504: Status: RESOLVED | Noted: 2021-01-01 | Resolved: 2021-01-01

## 2021-11-05 NOTE — PROGRESS NOTES
"Chief Complaint  Follow-up (er follow up on 11/02/2021) and pressure sore    Subjective          Amy Miles presents to [facility]  History of Present Illness    Patient presents for hospital follow-up, was seen in the ED on 11 2 for pain on her sacral wound.  She had debridement surgery few weeks ago during hospitalization was sent home on IV antibiotics she is finished those and is supposed to be receiving wound care and home health.  She follows a pain management last had her medicine refilled on 10/7 reviewed notes regarding her chronic pain complaints and will recommend her to follow-up with pain management for those further needs.    She states she has a lot of pain when she has pressure changes, she is most of her days crying because of the pain and not feeling she is doing well barely get up weak in her legs.  Reviewed her labs his low protein healing well       Objective   Vital Signs:   /92   Pulse 111   Temp 97.3 °F (36.3 °C) (Temporal)   Ht 162.6 cm (64\")   Wt 87.6 kg (193 lb 3.2 oz)   SpO2 90%   BMI 33.16 kg/m²     Physical Exam  Vitals reviewed.   Constitutional:       Appearance: Normal appearance. She is well-developed.   HENT:      Head: Normocephalic and atraumatic.      Right Ear: External ear normal.      Left Ear: External ear normal.      Mouth/Throat:      Pharynx: No oropharyngeal exudate.   Eyes:      Conjunctiva/sclera: Conjunctivae normal.      Pupils: Pupils are equal, round, and reactive to light.   Cardiovascular:      Rate and Rhythm: Normal rate and regular rhythm.      Heart sounds: No murmur heard.  No friction rub. No gallop.    Pulmonary:      Effort: Pulmonary effort is normal.      Breath sounds: Normal breath sounds. No wheezing or rhonchi.   Abdominal:      General: Bowel sounds are normal. There is distension.      Palpations: Abdomen is soft.      Tenderness: There is no abdominal tenderness.   Skin:     General: Skin is warm " and dry.   Neurological:      Mental Status: She is alert and oriented to person, place, and time.      Cranial Nerves: No cranial nerve deficit.   Psychiatric:         Mood and Affect: Mood and affect normal.         Behavior: Behavior normal.        Result Review :   The following data was reviewed by: Demarcus Tripp DO on 11/05/2021:  Common labs    Common Labsle 10/29/21 10/29/21 10/29/21 10/30/21 10/30/21 11/2/21 11/2/21    0506 0913 0913 0409 0409 1032 1032   Glucose  152 (A)  195 (A)   190 (A)   BUN  7  9   10   Creatinine  0.60  0.82   0.74   eGFR Non  Am  103  72   81   Sodium  140  139   140   Potassium  3.7  3.8   3.4 (A)   Chloride  106  107   104   Calcium  7.7 (A)  8.5 (A)   8.6   Albumin  2.50 (A)  2.70 (A)   2.90 (A)   Total Bilirubin  0.8  0.5   0.7   Alkaline Phosphatase  161 (A)  158 (A)   164 (A)   AST (SGOT)  30  30   27   ALT (SGPT)  23  24   19   WBC 5.31  4.24  4.30 5.72    Hemoglobin 8.8 (A)  9.4 (A)  8.9 (A) 10.6 (A)    Hematocrit 28.5 (A)  30.7 (A)  30.0 (A) 35.1    Platelets 104 (A)  94 (A)  96 (A) 135 (A)    (A) Abnormal value            Reviewed labs from ED platelets stable 135 hemoglobin improved to 10, her protein is deficient recommend nutritional support to improve wound healing alkaline phosphatase related potassium was 3.4    Data reviewed: Radiologic studies Recent imaging of abdomen pelvis and Recent hospitalization notes ED note from 11 2 recommending wound care home health which she should already be receiving          Assessment and Plan    Diagnoses and all orders for this visit:    1. Cirrhosis of liver with ascites, unspecified hepatic cirrhosis type (HCC) (Primary)    2. Chronic pain syndrome    3. Thrombocytopenia (HCC)    4. Pulmonary emphysema, unspecified emphysema type (HCC)    5. Pressure injury of deep tissue of buttock, unspecified laterality    6. Pressure injury of ankle, stage 4, unspecified laterality (HCC)    Other orders  -     collagenase (Santyl)  250 UNIT/GM ointment; Apply 1 application topically to the appropriate area as directed Daily.  Dispense: 1 each; Refill: 2      Patient requires physicians of the bed that cannot be done with a normal bed requires certain elevations in degrees and positioning, will recommend in order a hospital bed probation additionally given her chronic decubitus ulcer on her buttocks or sacrum with problems healing recommend strongly patient received a low air loss mattress to help offload and heal    Recommend nutriton consutlt for help wound healing or high protein supplement      Patient states she talked to pain management by [facility] and they referred her to PCP    Akil reviewed  We will prescribe her breakthrough pain medicine as needed for dressing changes Norco 10 twice a day as needed, insisted on medicine for sleep additionally strong strong precautions as the patient given her chronic conditions    Can try Santyl again for her wound follow-up wound care    Continue follow-up with specialist regarding diabetes and cirrhosis    Hospital bed and low air loss mattress for pressure wounds         Follow Up   Return in about 4 weeks (around 12/3/2021), or if symptoms worsen or fail to improve.  Patient was given instructions and counseling regarding her condition or for health maintenance advice. Please see specific information pulled into the AVS if appropriate.

## 2021-11-09 NOTE — OUTREACH NOTE
Sepsis Week 2 Survey      Responses   [facility] patient discharged from? [facility]   Does the patient have one of the following disease processes/diagnoses(primary or secondary)? Sepsis   Week 2 attempt successful? Yes   Call start time 1631   Call end time 1636   Discharge diagnosis Sepsis   Person spoke with today (if not patient) and relationship SonGage Bee reviewed with patient/caregiver? Yes   Is the patient having any side effects they believe may be caused by any medication additions or changes? No   Does the patient have all medications related to this admission filled (includes all antibiotics, inhalers, nebulizers,steroids,etc.) Yes   Is the patient taking all medications as directed (includes completed medication regime)? Yes   Does the patient have a primary care provider?  Yes   Does the patient have an appointment with their PCP within 7 days of discharge? Yes   Has the patient kept scheduled appointments due by today? Yes   What is the Home health agency?  [facility]   Psychosocial issues? No   What is the patient's perception of their health status since discharge? Improving   Nursing interventions Nurse provided patient education   Is the patient/caregiver able to teach back Sepsis? S - Short of breath,  E - Extreme pain or generalized discomfort (worst ever,especially abdomen),  S - Shivering,fever or very cold   Nursing interventions Nurse provided patient education   Is patient/caregiver able to teach back steps to recovery at home? Set small, achievable goals for return to baseline health,  Rest and regain strength,  Eat a balanced diet   Is the patient/caregiver able to teach back signs and symptoms of worsening condition: Rapid heart rate (>90),  Altered mental status(confusion/coma),  Edema   If the patient is a current smoker, are they able to teach back resources for cessation? Smoking cessation medications   Is the patient/caregiver able to teach back the hierarchy of who to  call/visit for symptoms/problems? PCP, Specialist, Home health nurse, Urgent Care, ED, 911 Yes   Week 2 call completed? Yes   Wrap up additional comments S/W patient's Son who gave short answers, states patient is feeling better.          Eugenia Nassar, RN

## 2021-11-11 NOTE — TELEPHONE ENCOUNTER
Caller: GE SHEIKH    Relationship: Emergency Contact    Best call back number: [phone]    Who are you requesting to speak with (clinical staff, provider,  specific staff member): MEDICAL STAFF    What was the call regarding: PATIENTS  WOULD LIKE TO KNOW THE STATUS OF THE HOSPITAL BED FOR PATIENT. PLEASE CALL TO ADVISE.

## 2021-11-12 NOTE — SIGNIFICANT NOTE
Epithelial %: 25-50%    Exposed Bone: no    Exposed Tendon: no    Impression: improved    Short Term Goals: INCREASE GRANULATION, DECREASE SIZE

## 2021-11-12 NOTE — SIGNIFICANT NOTE
Epithelial %: 1-25%    Exposed Bone: yes    Exposed Tendon: no    Impression: improved    Short Term Goals: INCREASE GRANULATION, DECREASE SIZE

## 2021-11-12 NOTE — PROGRESS NOTES
Chief Complaint  Wound Check (COCCYX ULCER, DM,  THIS AM)    Subjective          History of Present Illness  The patient is a 55-year-old  female who has been referred to us for further evaluation and treatment of a large decubitus ulcer over sacral coccyx area.  The patient had evidently a stroke back in July of this year and according to the  laid in her own stool and urine at times which he feels led to the breakdown of the skin as it is today.  Patient has been seen by wound care nurse and treated with packing the wound with quarter percent Dakin solution.  Wound appears fairly clean today but it is very large wound which goes considerably further than the opening that is noted to the wound site.  The patient has had a previous CT scan which is stating that there is possibly evidence of osteomyelitis of the sacrum and coccyx.  I have asked the  to continue to pack the wound site with quarter percent Dakin solution soaked gauze.  I did take a culture of the wound site today and obviously if needed we will put her on the appropriate antibiotic.  I have also asked that she have a MRI of the area to be absolutely sure whether or not she has osteomyelitis.  She may be a candidate for the hyperbaric chamber.  She also after further treatment of the wound site I feel will be a candidate for wound VAC.  They have been asked to return to see me in 3 weeks.  It should be noted that the patient is a diabetic insulin-dependent.  She does have a pump placed.  Adhesive tape, Amoxicillin, Amoxicillin-pot clavulanate, Cephalexin, Clavulanic acid, Doxycycline hyclate, Flonase [fluticasone], Nsaids, Penicillins, and Potassium      Current Outpatient Medications:   •  albuterol (ACCUNEB) 1.25 MG/3ML nebulizer solution, Take 3 mL by nebulization Every 6 (Six) Hours As Needed for Wheezing for up to 360 days., Disp: 360 mL, Rfl: 11  •  Alcohol Swabs (Pharmacist Choice Alcohol) pads, , Disp: , Rfl:   •   "aluminum sulfate-calcium acetate (DOMEBORO) topical packet, Apply 1 packet topically to the appropriate area as directed Every 8 (Eight) Hours., Disp: 1 each, Rfl: 12  •  aspirin 81 MG EC tablet, Take 1 tablet by mouth Daily., Disp: 30 tablet, Rfl: 0  •  busPIRone (BUSPAR) 10 MG tablet, Take 10 mg by mouth 2 (Two) Times a Day., Disp: , Rfl:   •  Ca Alginate-Carboxymethylcell (Maxorb Extra 2\"x2\") pads, Apply 10 each topically 2 (two) times a day. Indications: Bed Sores, Disp: 10 each, Rfl: 2  •  cetirizine (zyrTEC) 10 MG tablet, Take 1 tablet by mouth Daily., Disp: 90 tablet, Rfl: 3  •  Cholecalciferol (Vitamin D) 50 MCG (2000 UT) tablet, Take 2,000 Units by mouth Daily., Disp: , Rfl:   •  collagenase (Santyl) 250 UNIT/GM ointment, Apply 1 application topically to the appropriate area as directed Daily., Disp: 1 each, Rfl: 2  •  cyclobenzaprine (FLEXERIL) 10 MG tablet, Take 10 mg by mouth 3 (Three) Times a Day As Needed., Disp: , Rfl:   •  diazePAM (Valium) 5 MG tablet, Take 1 tablet by mouth At Night As Needed for Anxiety., Disp: 15 tablet, Rfl: 1  •  Diclofenac Sodium (Voltaren) 1 % gel gel, Apply 4 g topically to the appropriate area as directed 4 (Four) Times a Day As Needed (neck pain)., Disp: 50 g, Rfl: 2  •  EPINEPHrine (EPIPEN) 0.3 MG/0.3ML solution auto-injector injection, Inject 0.3 mg into the appropriate muscle as directed by prescriber 1 (One) Time., Disp: , Rfl:   •  fluconazole (Diflucan) 150 MG tablet, Take 1 tablet by mouth Daily., Disp: 3 tablet, Rfl: 2  •  furosemide (Lasix) 40 MG tablet, Take 1 tablet by mouth 2 (Two) Times a Day As Needed (edema)., Disp: 60 tablet, Rfl: 5  •  gabapentin (NEURONTIN) 800 MG tablet, Take 800 mg by mouth 4 (Four) Times a Day., Disp: , Rfl:   •  hydrocortisone 2.5 % lotion, Apply to affected area 2 times daily, Disp: 60 mL, Rfl: 3  •  insulin patient supplied pump, Inject  under the skin into the appropriate area as directed Continuous. Omnipod Insulin: Novolog Dr. " Alrefai Change every 72 hours, pts  said he was supposed to change it last night but did not have time (10/29), Disp: , Rfl:   •  lactulose (CHRONULAC) 10 GM/15ML solution, Take 22.5 mL by mouth 2 (Two) Times a Day.  stated that she takes 30 ml qam and 15 ml hs but only prn, Disp: 236 mL, Rfl: 2  •  levoFLOXacin (LEVAQUIN) 750 MG tablet, Take 1 tablet by mouth Daily., Disp: 10 tablet, Rfl: 0  •  levothyroxine (SYNTHROID, LEVOTHROID) 200 MCG tablet, Take 200 mcg by mouth Daily., Disp: , Rfl:   •  lisinopril (PRINIVIL,ZESTRIL) 20 MG tablet, Take 1 tablet by mouth Daily., Disp: 90 tablet, Rfl: 3  •  metoprolol succinate XL (Toprol XL) 100 MG 24 hr tablet, Take 1 tablet by mouth Daily., Disp: 90 tablet, Rfl: 3  •  montelukast (Singulair) 10 MG tablet, Take 1 tablet by mouth Every Night., Disp: 90 tablet, Rfl: 1  •  Narcan 4 MG/0.1ML nasal spray, 1 spray into the nostril(s) as directed by provider As Needed., Disp: , Rfl:   •  oxyCODONE (ROXICODONE) 15 MG immediate release tablet, Take 1 tablet by mouth Every 8 (Eight) Hours As Needed for Moderate Pain ., Disp: 3 tablet, Rfl: 0  •  oxyCODONE (Roxicodone) 5 MG immediate release tablet, Take 1 tablet by mouth 2 (Two) Times a Day As Needed for Moderate Pain  (for dressing changes) for up to 15 days., Disp: 30 tablet, Rfl: 0  •  promethazine (PHENERGAN) 25 MG tablet, Take 1 tablet by mouth Daily., Disp: 30 tablet, Rfl: 5  •  silver sulfadiazine (Silvadene) 1 % cream, Apply  topically to the appropriate area as directed Daily As Needed for Wound Care., Disp: 400 g, Rfl: 5  •  sodium chloride 0.9 % solution, , Disp: , Rfl:   •  spironolactone (ALDACTONE) 50 MG tablet, Take 1 tablet by mouth Daily., Disp: 30 tablet, Rfl: 0  •  sulfamethoxazole-trimethoprim (BACTRIM DS,SEPTRA DS) 800-160 MG per tablet, Take 1 tablet by mouth 2 (Two) Times a Day., Disp: 20 tablet, Rfl: 0  •  thiamine (thiamine) 100 MG tablet tablet, Take 1 tablet by mouth Daily., Disp: 30 tablet,  Rfl: 0  •  zinc oxide (DESITIN) 40 % paste paste, Apply  topically to the appropriate area as directed Daily As Needed (break down)., Disp: 113 g, Rfl: 0    Past Medical History:   Diagnosis Date   • Anemia    • Anxiety    • Arm pain    • Arthritis    • Asthma    • Bladder disorder    • Bronchitis    • Chronic allergic rhinitis    • Chronic pain syndrome     Reflex Sympathetic Dystrophy, left arm.   • Cirrhosis of liver (HCC)    • COPD (chronic obstructive pulmonary disease) (HCC)    • CRPS (complex regional pain syndrome), type I, upper 2012   • Depression    • Diabetes mellitus type 1 (HCC)    • Elevated INR 2021   • Elevated LFTs 2021   • Encephalopathy, hepatic (HCC) 2021   • Essential hypertension, benign    • Fracture    • GERD (gastroesophageal reflux disease)    • Graves disease    • Hand pain    • Heel pain    • Hyperthyroidism    • Iatrogenic hypothyroidism    • Leg pain    • Leg swelling    • Limb swelling    • Mood disorder (HCC)    • Mood disorder (HCC)    • MRSA (methicillin resistant Staphylococcus aureus)    • Nausea    • RSD (reflex sympathetic dystrophy)    • Seasonal allergies    • Sepsis due to pneumonia (CMS/HCC) 2021   • Shortness of Air    • Stroke (HCC)    • Toe fracture          Past Surgical History:   Procedure Laterality Date   • ABDOMINAL HYSTERECTOMY     • CARPAL TUNNEL RELEASE     •  SECTION     • CHOLECYSTECTOMY     • COLONOSCOPY     • GALLBLADDER SURGERY     • HAND SURGERY     • HEMORRHOIDECTOMY     • HERNIA REPAIR     • HIATAL HERNIA REPAIR     • INCISION AND DRAINAGE ABSCESS     • INCISION AND DRAINAGE ABSCESS Bilateral 10/9/2021    Procedure: INCISION AND DRAINAGE ABSCESS buttocks;  Surgeon: Tom Colbert MD;  Location: [facility] MAIN OR;  Service: General;  Laterality: Bilateral;   • THYROIDECTOMY      by Dr. Anthony   • TONSILLECTOMY     • TUBAL ABDOMINAL LIGATION       Social History     Socioeconomic History   • Marital status:  "   Tobacco Use   • Smoking status: Current Every Day Smoker     Packs/day: 0.50     Types: Cigarettes     Start date: 1982   • Smokeless tobacco: Never Used   • Tobacco comment: 1/2 PACK PER DAY PER    Vaping Use   • Vaping Use: Never used   Substance and Sexual Activity   • Alcohol use: Never   • Drug use: Never   • Sexual activity: Defer     Family History   Problem Relation Age of Onset   • Arthritis Mother    • Heart disease Father    • Arthritis Father    • Diabetes Sister         Unspecified   • Arthritis Sister    • Osteoporosis Sister    • Diabetes Brother         Unspecified   • Other Brother         Renal Calculus   • Diabetes Maternal Uncle         Unspecified       Objective     Vitals:    11/12/21 0918 11/12/21 0927   BP: 120/82    BP Location: Right arm    Patient Position: Sitting    Pulse: 87    Resp: 18    Temp: 97.3 °F (36.3 °C)    TempSrc: Temporal    SpO2: (!) 87% 90%   Weight: 87.5 kg (193 lb)    Height: 162.6 cm (64\")      Body mass index is 33.13 kg/m².    STEADI Fall Risk Assessment has not been completed.     Review of Systems   Cardiovascular no complaints.  Patient denies any history of a myocardial infarction however r she has had a CVA.  Pulmonary.  She does not complain but she is being treated for COPD.  GI poor appetite patient has a history of cirrhosis.   poor bladder control.  I have reviewed the HPI and ROS as documented by MA/KERON. Javier Arcos MD  Physical exam the patient is a middle-aged  female in no apparent distress.  She appears to be oriented to time place and person but is quite lethargic at the present time.  HEENT unremarkable.  Neck is supple no palpable masses or bruits.  Lungs are essentially clear.  Heart normal sinus rhythm no murmurs.  Abdomen is soft obese but no palpable masses or tenderness at the present time.  Patient turned on her left side a large decubitus ulcer over the sacrum and coccyx area.  The wound appears to be clean " but this wound extends for considerable area underneath the opening into the ulcer area.  The remainder of the exam was deferred.  :   The following data was reviewed by: Javier Arcos MD on 11/12/2021:    Wound Avatar Documentation     Active Wound LDAs        Wound 10/09/21 0444 midline coccyx Pressure Injury    Placement date 10/09/21   -MW 10/09/21 0444      Placement time 0444   -MW 10/09/21 0444 Days 34    Present on Hospital Admission: Y   -MW 10/09/21 0444 Orientation: midline   -MW 10/09/21 0444    Location: coccyx   -MW 10/09/21 0444 Primary Wound Type: Pressure inj   -MW 10/09/21 0444    Assessments       11/12/21 1021 10/30/21 1252 10/30/21 0745 10/29/21 2051 10/29/21 1110           Wound Image  View All Images View Images   - 11/12/21 1025    View Images   - 10/30/21 1256    --  --  --    Dressing Appearance intact; moist drainage   - 11/12/21 1025 --  dry; intact   - 10/30/21 0942 moist drainage; intact   -AS 10/29/21 2247 moist drainage; intact   -FL 10/29/21 1232   Closure --  --  --  --  --    Base --  --  --  red; moist; slough   -AS 10/29/21 2247 red; moist; slough   -FL 10/29/21 1232   Black (%), Wound Tissue Color --  --  --  --  --    Red (%), Wound Tissue Color 50   - 11/12/21 1025 --  --  --  --    Yellow (%), Wound Tissue Color 50   - 11/12/21 1025 --  --  --  --    Periwound intact; blanchable   - 11/12/21 1025 --  --  intact; pink   -AS 10/29/21 2247 intact; pink   -FL 10/29/21 1232   Periwound Temperature warm   - 11/12/21 1025 --  --  --  --    Periwound Skin Turgor soft   - 11/12/21 1025 --  --  --  --    Edges open   - 11/12/21 1025 --  --  open   -AS 10/29/21 2247 open   -FL 10/29/21 1232   Wound Length (cm) 2.2 cm   - 11/12/21 1025 --  --  --  6.2 cm  2nd area 1.1cml more to left aspect   -FL 10/29/21 1232   Wound Width (cm) 5 cm   - 11/12/21 1025 --  --  --  2.2 cm  2nd area 1.1cmw more to left aspect   -FL 10/29/21 1232   Wound Depth (cm) 3 cm   -  11/12/21 1025 --  --  --  3.3 cm   -FL 10/29/21 1232   Tunneling [Depth (cm)/Location] 7.5 at 4 oclock   - 11/12/21 1025 --  --  --  max depth at 2 o'clock 4.2cm   -FL 10/29/21 1232   Undermining [Depth (cm)/Location] UNDERMINES FROM 12-3 OCLOCK, GREATEST AT 5   - 11/12/21 1025 --  --  --  around the clock   -FL 10/29/21 1232   Drainage Characteristics/Odor serosanguineous; serous; yellow   - 11/12/21 1025 --  --  serosanguineous   -AS 10/29/21 2247 serosanguineous   -FL 10/29/21 1232   Drainage Amount moderate   - 11/12/21 1025 --  --  small   -AS 10/29/21 2247 small   -FL 10/29/21 1232   Care, Wound cleansed with; sterile normal saline   - 11/12/21 1025 --  --  cleansed with; irrigated with; sterile normal saline   -AS 10/29/21 2247 cleansed with; irrigated with; sterile normal saline   -FL 10/29/21 1232   Dressing Care dressing applied; gauze, [id number]; gauze; border dressing; other (see comments)  NS MOIST KERLIX, GAUZE, MEPILEX   - 11/12/21 1025 --  --  dressing changed; gauze, [id number]; border dressing; silicone   -AS 10/29/21 2247 dressing applied; gauze, [id number]; border dressing; silicone   -FL 10/29/21 1232   Periwound Care barrier ointment applied   - 11/12/21 1029 --  --  --  --    Adhesive Closure Strips --  --  --  --  --    Number of Adhesive Closure Strips --  --  --  --  --    Sutures Removed Intact --  --  --  --  --    Number of Sutures Removed --  --  --  --  --    Staples Removed Intact --  --  --  --  --    Number of Staples Removed --  --  --  --  --    Wound Output (mL) --  --  --  --  --          10/29/21 0126 10/13/21 1130 10/10/21 2030 10/09/21 1910 10/09/21 0947           Wound Image  View All Images View Images   -ET 10/29/21 0553    --  --  --  --    Dressing Appearance open to air   -ET 10/29/21 0402 intact   -FL 10/13/21 1621 dry; intact   -RK 10/10/21 2251 dry; intact   -ASA 10/10/21 0138 dry; intact; no drainage   -PK 10/09/21 0948   Closure Open to air    -ET 10/29/21 0402 --  --  --  Adhesive bandage   -PK 10/09/21 0948   Base moist; red; subcutaneous; yellow; white   -ET 10/29/21 0602 red; moist; purple   -FL 10/13/21 1621 --  --  --    Black (%), Wound Tissue Color --  --  --  --  --    Red (%), Wound Tissue Color --  --  --  --  --    Yellow (%), Wound Tissue Color --  --  --  --  --    Periwound ecchymotic; non-blanchable; redness; warm   -ET 10/29/21 0402 redness; non-blanchable   -FL 10/13/21 1621 --  --  --    Periwound Temperature warm   -ET 10/29/21 0402 --  --  --  --    Periwound Skin Turgor firm   -ET 10/29/21 0402 --  --  --  --    Edges open   -ET 10/29/21 0402 irregular   -FL 10/13/21 1621 --  --  --    Wound Length (cm) --  1.7 cm  perirectal area 2.9cm l   -FL 10/13/21 1621 --  --  --    Wound Width (cm) --  2.3 cm  perirectal area 1.0cmw   -FL 10/13/21 1621 --  --  --    Wound Depth (cm) --  --  perirectal area 0.7cmd   -FL 10/13/21 1621 --  --  --    Tunneling [Depth (cm)/Location] --  --  --  --  --    Undermining [Depth (cm)/Location] --  --  --  --  --    Drainage Characteristics/Odor serosanguineous; bleeding controlled   -ET 10/29/21 0402 serosanguineous   -FL 10/13/21 1621 --  --  --    Drainage Amount small   -ET 10/29/21 0402 small   -FL 10/13/21 1621 --  --  none   -PK 10/09/21 0948   Care, Wound --  irrigated with; sterile normal saline   -FL 10/13/21 1621 --  --  --    Dressing Care dressing applied; silicone   -ET 10/29/21 0402 dressing applied; border dressing; silicone   -FL 10/13/21 1621 --  --  --    Periwound Care --  --  dry periwound area maintained   -RK 10/10/21 4260 --  --    Adhesive Closure Strips --  --  --  --  --    Number of Adhesive Closure Strips --  --  --  --  --    Sutures Removed Intact --  --  --  --  --    Number of Sutures Removed --  --  --  --  --    Staples Removed Intact --  --  --  --  --    Number of Staples Removed --  --  --  --  --    Wound Output (mL) --  --  --  --  --          10/09/21 0745 10/09/21  0558   Wound Image  View All Images --  View Images   - 10/09/21 0603      Dressing Appearance --  Patient to surgery for incision and drainage.   - 10/09/21 0823 open to air   - 10/09/21 0621   Closure --  --    Base --  bleeding; moist; red   - 10/09/21 0621   Black (%), Wound Tissue Color --  --    Red (%), Wound Tissue Color --  --    Yellow (%), Wound Tissue Color --  --    Periwound --  non-blanchable; redness; warm   - 10/09/21 0621   Periwound Temperature --  warm   - 10/09/21 0621   Periwound Skin Turgor --  --    Edges --  irregular; open   - 10/09/21 0621   Wound Length (cm) --  --    Wound Width (cm) --  --    Wound Depth (cm) --  --    Tunneling [Depth (cm)/Location] --  --    Undermining [Depth (cm)/Location] --  --    Drainage Characteristics/Odor --  serosanguineous; yellow   - 10/09/21 0621   Drainage Amount --  large   - 10/09/21 0621   Care, Wound --  cleansed with; sterile normal saline   - 10/09/21 0621   Dressing Care --  --    Periwound Care --  --    Adhesive Closure Strips --  --    Number of Adhesive Closure Strips --  --    Sutures Removed Intact --  --    Number of Sutures Removed --  --    Staples Removed Intact --  --    Number of Staples Removed --  --    Wound Output (mL) --  --                 Wound 10/09/21 0917 coccyx Incision    Placement date 10/09/21   -CF 10/09/21 0918      Placement time 0917   - 10/09/21 0918 Days 34    Location: coccyx   - 10/09/21 0918 Primary Wound Type: Incision  debridement   - 10/09/21 0918    Assessments       11/12/21 1017 10/30/21 0745 10/29/21 2051 10/29/21 1110 10/29/21 0126           Wound Image  View All Images View Images   -BA 11/12/21 1019    --  --  --  View Images   -ET 10/29/21 0554      Dressing Appearance intact; moist drainage   -BA 11/12/21 1019 intact; no drainage   -GN 10/30/21 0946 intact; moist drainage   -AS 10/29/21 2247 intact; moist drainage   -FL 10/29/21 1232 open to air   -ET 10/29/21 0402   Closure  --  --  --  --  Open to air   -ET 10/29/21 0402   Base --  --  pink   -AS 10/29/21 2247 pink   -FL 10/29/21 1232 yellow; white   -ET 10/29/21 0601   Black (%), Wound Tissue Color --  --  --  --  --    Red (%), Wound Tissue Color 100   - 11/12/21 1019 --  --  --  --    Yellow (%), Wound Tissue Color --  --  --  --  --    Periwound intact; blanchable   - 11/12/21 1019 --  intact; pink   -AS 10/29/21 2247 intact; pink   -FL 10/29/21 1232 ecchymotic; redness   -ET 10/29/21 0402   Periwound Temperature warm   - 11/12/21 1019 --  --  --  warm   -ET 10/29/21 0402   Periwound Skin Turgor soft   - 11/12/21 1019 --  --  --  firm   -ET 10/29/21 0402   Edges open   - 11/12/21 1019 --  open   -AS 10/29/21 2247 open   -FL 10/29/21 1232 --    Wound Length (cm) 0.5 cm   - 11/12/21 1019 --  --  1.3 cm   -FL 10/29/21 1232 --    Wound Width (cm) 1.5 cm   - 11/12/21 1019 --  --  0.2 cm   -FL 10/29/21 1232 --    Wound Depth (cm) 0.1 cm   - 11/12/21 1019 --  --  --  --    Tunneling [Depth (cm)/Location] --  --  --  --  --    Undermining [Depth (cm)/Location] --  --  --  --  --    Drainage Characteristics/Odor serous   - 11/12/21 1019 --  serosanguineous   -AS 10/29/21 2247 serosanguineous   -FL 10/29/21 1232 --    Drainage Amount small; moderate   - 11/12/21 1019 --  small   -AS 10/29/21 2247 small   -FL 10/29/21 1232 --    Care, Wound cleansed with; sterile normal saline   - 11/12/21 1019 --  cleansed with; irrigated with; sterile normal saline   -AS 10/29/21 2247 cleansed with; irrigated with; sterile normal saline   -FL 10/29/21 1232 --    Dressing Care dressing applied; border dressing; antimicrobial agent applied  SILVADENKAYLYNN, MEPILEX   - 11/12/21 1019 --  dressing changed; border dressing; silicone; silver impregnated; hydrofiber   -AS 10/29/21 2247 dressing applied; border dressing; silicone; silver impregnated; hydrofiber   -FL 10/29/21 1232 dressing applied; silicone   -ET 10/29/21 0402   Periwound Care  barrier ointment applied   - 11/12/21 1029 --  --  --  --    Adhesive Closure Strips --  --  --  --  --    Number of Adhesive Closure Strips --  --  --  --  --    Sutures Removed Intact --  --  --  --  --    Number of Sutures Removed --  --  --  --  --    Staples Removed Intact --  --  --  --  --    Number of Staples Removed --  --  --  --  --    Wound Output (mL) --  --  --  --  --          10/13/21 1130 10/13/21 0000 10/12/21 2200 10/12/21 1949 10/12/21 1051           Wound Image  View All Images --  --  --  --  --    Dressing Appearance intact   -FL 10/13/21 1621 dry; intact   - 10/13/21 0402 copious drainage   - 10/12/21 2246 intact; dry   - 10/12/21 1953 moist drainage   - 10/12/21 1053   Closure --  --  --  --  Adhesive bandage   - 10/12/21 1053   Base moist; red   -FL 10/13/21 1621 --  pink; slough   - 10/12/21 2246 --  clean; moist; pink   - 10/12/21 1053   Black (%), Wound Tissue Color --  --  --  --  --    Red (%), Wound Tissue Color --  --  --  --  --    Yellow (%), Wound Tissue Color --  --  --  --  --    Periwound redness; non-blanchable   -FL 10/13/21 1621 --  --  --  excoriated; pink; blanchable; moist   - 10/12/21 1053   Periwound Temperature --  --  --  --  cool   - 10/12/21 1053   Periwound Skin Turgor --  --  --  --  soft   - 10/12/21 1053   Edges open   -FL 10/13/21 1621 --  irregular; open   - 10/12/21 2246 --  --    Wound Length (cm) 2.4 cm   -FL 10/13/21 1621 --  --  --  --    Wound Width (cm) 4 cm   -FL 10/13/21 1621 --  --  --  --    Wound Depth (cm) 3.1 cm   -FL 10/13/21 1621 --  --  --  --    Tunneling [Depth (cm)/Location] 8cm at 5o'clock and 4.2 cm at 2 o'clock   -FL 10/13/21 1621 --  --  --  --    Undermining [Depth (cm)/Location] --  --  --  --  --    Drainage Characteristics/Odor serosanguineous   -FL 10/13/21 1621 --  --  --  serosanguineous   - 10/12/21 1053   Drainage Amount small   -FL 10/13/21 1621 --  --  --  scant   -AM 10/12/21 1053   Care, Wound  cleansed with; irrigated with; sterile normal saline; enzymatic agent applied   -FL 10/13/21 1621 --  cleansed with; sterile normal saline   - 10/12/21 2246 --  cleansed with; sterile normal saline   - 10/12/21 1053   Dressing Care dressing applied; dressing changed; gauze; gauze, wet-to-moist; border dressing; silicone   -FL 10/13/21 1621 --  dressing changed; gauze, wet-to-moist; packed with; gauze; silicone   -RK 10/12/21 2246 --  dressing changed; other (see comments); silicone  santyl applied to wound bed   - 10/12/21 1053   Periwound Care --  --  --  --  --    Adhesive Closure Strips --  --  --  --  --    Number of Adhesive Closure Strips --  --  --  --  --    Sutures Removed Intact --  --  --  --  --    Number of Sutures Removed --  --  --  --  --    Staples Removed Intact --  --  --  --  --    Number of Staples Removed --  --  --  --  --    Wound Output (mL) --  --  --  --  --          10/12/21 0316 10/12/21 0000 10/11/21 1917 10/11/21 1449 10/11/21 1325           Wound Image  View All Images --  --  --  --  View Images   -FL 10/11/21 1413      Dressing Appearance --  --  dry; intact   - 10/11/21 2140 dry; intact   - 10/11/21 1449 intact; moist drainage   -FL 10/11/21 1413   Closure --  --  --  Adhesive bandage   - 10/11/21 1449 --    Base --  --  --  --  red; slough; moist   -FL 10/11/21 1413   Black (%), Wound Tissue Color --  --  --  --  --    Red (%), Wound Tissue Color --  --  --  --  50   -FL 10/11/21 1413   Yellow (%), Wound Tissue Color --  --  --  --  50   -FL 10/11/21 1413   Periwound --  --  --  --  macular; pink; redness; blanchable; moist   -FL 10/11/21 [facility]   Periwound Temperature --  --  --  --  --    Periwound Skin Turgor --  --  --  --  --    Edges --  --  --  --  irregular; open   -FL 10/11/21 [facility]   Wound Length (cm) --  --  --  --  4 cm   -FL 10/11/21 [facility]   Wound Width (cm) --  --  --  --  8 cm   -FL 10/11/21 [facility]   Wound Depth (cm) --  --  --  --  3.5 cm   -FL 10/11/21 [facility]    Tunneling [Depth (cm)/Location] --  --  --  --  7.2cm at 5 o'clock   -FL 10/11/21 1413   Undermining [Depth (cm)/Location] --  --  --  --  from 12 o'clock to 6 o'clock   -FL 10/11/21 1413   Drainage Characteristics/Odor serosanguineous   - 10/12/21 0317 serosanguineous   - 10/12/21 0317 serosanguineous   - 10/11/21 1931 --  serosanguineous; yellow; purulent   -FL 10/11/21 1413   Drainage Amount moderate   - 10/12/21 0317 moderate   - 10/12/21 0317 moderate   - 10/11/21 1931 --  small   -FL 10/11/21 1413   Care, Wound --  cleansed with; sterile normal saline   - 10/12/21 0559 --  --  cleansed with; sterile normal saline   -FL 10/11/21 1413   Dressing Care --  dressing changed; gauze, wet-to-moist; gauze; foam   - 10/12/21 0559 --  dressing reinforced  Wound care nurse completed dressing change at 1300.   -AM 10/11/21 1449 dressing applied; border dressing; silicone   -FL 10/11/21 1413   Periwound Care --  --  --  --  topical treatment applied   -FL 10/11/21 1413   Adhesive Closure Strips --  --  --  --  --    Number of Adhesive Closure Strips --  --  --  --  --    Sutures Removed Intact --  --  --  --  --    Number of Sutures Removed --  --  --  --  --    Staples Removed Intact --  --  --  --  --    Number of Staples Removed --  --  --  --  --    Wound Output (mL) --  --  --  --  --          10/11/21 0311 10/10/21 2352 10/10/21 2030 10/10/21 1000 10/10/21 0739   Wound Image  View All Images --  --  --  --  --    Dressing Appearance --  dry; intact   -RK 10/10/21 2352 --  --  dry; intact   - 10/10/21 0739   Closure --  --  --  --  --    Base --  --  --  --  --    Black (%), Wound Tissue Color --  --  --  --  --    Red (%), Wound Tissue Color --  --  80   - 10/10/21 2160 --  --    Yellow (%), Wound Tissue Color --  --  --  --  --    Periwound --  --  --  --  --    Periwound Temperature --  --  --  --  --    Periwound Skin Turgor --  --  --  --  --    Edges --  --  --  --  --    Wound Length (cm)  --  --  --  --  --    Wound Width (cm) --  --  --  --  --    Wound Depth (cm) --  --  --  --  --    Tunneling [Depth (cm)/Location] --  --  --  --  --    Undermining [Depth (cm)/Location] --  --  --  --  --    Drainage Characteristics/Odor serosanguineous   - 10/11/21 0311 serosanguineous   - 10/10/21 2352 serosanguineous   - 10/10/21 2251 serosanguineous   - 10/10/21 1839 --    Drainage Amount moderate   - 10/11/21 0311 moderate   - 10/10/21 2352 moderate   - 10/10/21 2251 moderate   - 10/10/21 1839 --    Care, Wound --  --  --  cleansed with; sterile normal saline   - 10/10/21 1839 --    Dressing Care --  --  --  dressing applied; dressing changed; abdominal pad; mesh panty  Applied santyl to wound cavity   - 10/10/21 1839 --    Periwound Care --  --  --  --  --    Adhesive Closure Strips --  --  --  --  --    Number of Adhesive Closure Strips --  --  --  --  --    Sutures Removed Intact --  --  --  --  --    Number of Sutures Removed --  --  --  --  --    Staples Removed Intact --  --  --  --  --    Number of Staples Removed --  --  --  --  --    Wound Output (mL) --  --  --  --  --          10/09/21 1910 10/09/21 1639 10/09/21 0947 10/09/21 0919   Wound Image  View All Images --  --  --  --    Dressing Appearance dry; intact   -ASA 10/10/21 0138 moist drainage   -BF 10/09/21 1640 intact; dry   -PK 10/09/21 0947 dry; intact   - 10/09/21 0920   Closure Adhesive bandage   -ASA 10/10/21 0138 Adhesive bandage   -BF 10/09/21 1640 Adhesive bandage   -PK 10/09/21 0947 Open to air  penrose drain   - 10/09/21 0920   Base --  --  --  --    Black (%), Wound Tissue Color --  --  --  --    Red (%), Wound Tissue Color --  --  --  --    Yellow (%), Wound Tissue Color --  --  --  --    Periwound --  --  --  --    Periwound Temperature --  --  --  --    Periwound Skin Turgor --  --  --  --    Edges --  --  --  --    Wound Length (cm) --  --  --  --    Wound Width (cm) --  --  --  --    Wound Depth (cm) --   --  --  --    Tunneling [Depth (cm)/Location] --  --  --  --    Undermining [Depth (cm)/Location] --  --  --  --    Drainage Characteristics/Odor --  serosanguineous   - 10/09/21 1640 --  --    Drainage Amount --  moderate   - 10/09/21 1640 none   -PK 10/09/21 0947 --    Care, Wound --  --  --  --    Dressing Care --  --  abd pad and mesh underwear   - 10/09/21 1640 --  other (see comments)  abd pad, mesh underwear   - 10/09/21 0920   Periwound Care --  --  --  --    Adhesive Closure Strips --  --  --  --    Number of Adhesive Closure Strips --  --  --  --    Sutures Removed Intact --  --  --  --    Number of Sutures Removed --  --  --  --    Staples Removed Intact --  --  --  --    Number of Staples Removed --  --  --  --    Wound Output (mL) --  --  --  --                 Wound 11/12/21 1027 Left lateral coccyx    Placement date 11/12/21   - 11/12/21 1027      Placement time 1027   - 11/12/21 1027 Days less than 1    Present on Hospital Admission: Y   - 11/12/21 1027 Side: Left   - 11/12/21 1027    Orientation: lateral   - 11/12/21 1027 Location: coccyx   - 11/12/21 1027    [facility]       11/12/21 1027       Wound Image  View All Images View Images   - 11/12/21 1028      Dressing Appearance intact; moist drainage   - 11/12/21 1028   Closure --    Base --    Black (%), Wound Tissue Color --    Red (%), Wound Tissue Color 100   - 11/12/21 1028   Yellow (%), Wound Tissue Color --    Periwound intact; blanchable   - 11/12/21 1028   Periwound Temperature warm   - 11/12/21 1028   Periwound Skin Turgor soft   - 11/12/21 1028   Edges open   - 11/12/21 1028   Wound Length (cm) 1 cm   - 11/12/21 1028   Wound Width (cm) 1.1 cm   - 11/12/21 1028   Wound Depth (cm) 0.1 cm   - 11/12/21 1028   Tunneling [Depth (cm)/Location] --    Undermining [Depth (cm)/Location] --    Drainage Characteristics/Odor serous   - 11/12/21 1028   Drainage Amount small   - 11/12/21 1028   Care, Wound  cleansed with; sterile normal saline   - 11/12/21 1028   Dressing Care dressing applied; antimicrobial agent applied; border dressing   - 11/12/21 1028   Periwound Care barrier ointment applied   - 11/12/21 1029   Adhesive Closure Strips --    Number of Adhesive Closure Strips --    Sutures Removed Intact --    Number of Sutures Removed --    Staples Removed Intact --    Number of Staples Removed --    Wound Output (mL) --                 Wound 11/12/21 1029 Right lateral hip    Placement date 11/12/21   - 11/12/21 1029      Placement time 1029   - 11/12/21 1029 Days less than 1    Present on Hospital Admission: Y   - 11/12/21 1029 Side: Right   - 11/12/21 1029    Orientation: lateral   - 11/12/21 1029 Location: hip   - 11/12/21 1029    Stage, Pressure Injury : deep tissue injury   - 11/12/21 1029      Assessments       11/12/21 1030       Wound Image  View All Images View Images   - 11/12/21 1031      Dressing Appearance no drainage; open to air   - 11/12/21 1031   Closure --    Base --    Black (%), Wound Tissue Color --    Red (%), Wound Tissue Color --    Yellow (%), Wound Tissue Color --    Periwound ecchymotic; intact; warm   - 11/12/21 1031   Periwound Temperature hot   - 11/12/21 1031   Periwound Skin Turgor --    Edges --    Wound Length (cm) --    Wound Width (cm) --    Wound Depth (cm) --    Tunneling [Depth (cm)/Location] --    Undermining [Depth (cm)/Location] --    Drainage Characteristics/Odor --    Drainage Amount none   - 11/12/21 1031   Care, Wound cleansed with; sterile normal saline   - 11/12/21 1031   Dressing Care dressing applied; border dressing; other (see comments)  BARRIER OINTMENT APPLIED (CALMOSEPTINE)   - 11/12/21 1031   Periwound Care --    Adhesive Closure Strips --    Number of Adhesive Closure Strips --    Sutures Removed Intact --    Number of Sutures Removed --    Staples Removed Intact --    Number of Staples Removed --    Wound Output (mL) --                     User Key  (r) = Recorded By, (t) = Taken By, (c) = Cosigned By    Initials Name Effective Dates Provider Type Discipline    ET Thorn, Erinne, RN 08/23/21 -  Registered Nurse Nurse    Juana Parsons, RN 06/16/21 -  Registered Nurse Nurse    Lakesha Fountain, RN 06/16/21 -  Registered Nurse Nurse    Linda Prater, RN 06/16/21 -  Registered Nurse Nurse    Sharla Nicholson, RN 06/16/21 -  Registered Nurse Nurse    Carmela Wynne, RN 06/16/21 -  Registered Nurse Nurse    Casper Velazquez, RN 06/16/21 -  Registered Nurse Nurse    Maribell Rivera, RN 06/16/21 -  Registered Nurse Nurse    Cate Torres, RN 06/16/21 -  Registered Nurse Nurse    Linda Shannon, RN 06/16/21 -  Registered Nurse Nurse    Marisel Ochoa, RN 06/09/21 -  Registered Nurse Nurse    Fish Caputo, RN 09/15/21 -  Registered Nurse Nurse                         Assessment and Plan    Diagnoses and all orders for this visit:    1. Decubitus ulcer of back, stage 4 (HCC) (Primary)  -     Wound Culture - Wound, Coccyx  -     MRI pelvis w wo contrast; Future    2. Type 1 diabetes mellitus with other skin ulcer (HCC)  -     Wound Culture - Wound, Coccyx  -     MRI pelvis w wo contrast; Future          Follow Up {Instructions Charge Capture  Follow-up Communications :23}  Return in about 3 weeks (around 12/3/2021).  Patient was given instructions and counseling regarding her condition or for health maintenance advice. Please see specific information pulled into the AVS if appropriate.

## 2021-11-12 NOTE — SIGNIFICANT NOTE
Epithelial %: %    Exposed Bone: no    Exposed Tendon: no    Impression:  NEW AREA    Short Term Goals: MAINTAIN SKIN INTEGRITY

## 2021-11-16 NOTE — TELEPHONE ENCOUNTER
9346--CALLED PATIENT TO INFORM OF WOUND CULTURE AND ANTIBIOTICS SENT TO PHARMACY. PATIENT DID NOT ANSWER, LEFT GENERIC VOICEMAIL TO RETURN CALL TO WOUND CARE CENTER.      3755--PATIENT NOTIFIED OF POSITIVE CULTURE AND BACTRIM ANTIBIOTICS SENT TO PHARMACY

## 2021-11-16 NOTE — TELEPHONE ENCOUNTER
Provider: MIKEY WADE  Caller: HUSAM SHEIKH  Relationship to Patient: SELF  Phone Number: [phone]   Reason for Call: PATIENT WOULD LIKE TO TALK TO DR. WADE FOR THE SICKNESS SHE HAS HAD FOR 4 DAYS. BOTTOM HAS GAUZE IN IT AND IT NEEDS TO COME OUT. PATIENT STATES SHE NEEDS TO KNOW WHAT TO DO.

## 2021-11-17 NOTE — OUTREACH NOTE
Sepsis Week 3 Survey      Responses   [facility] patient discharged from? [facility]   Does the patient have one of the following disease processes/diagnoses(primary or secondary)? Sepsis   Week 3 attempt successful? Yes   Call start time 1153   Call end time 1155   Discharge diagnosis Sepsis   Meds reviewed with patient/caregiver? Yes   Is the patient taking all medications as directed (includes completed medication regime)? Yes   Comments regarding PCP 11/18/21   Has the patient kept scheduled appointments due by today? N/A   What is the patient's perception of their health status since discharge? Worsening   Is the patient/caregiver able to teach back Sepsis? S - Shivering,fever or very cold,  S - Sleepy, difficult to arouse,confused,  S - Short of breath   Nursing interventions Nurse provided patient education   Is patient/caregiver able to teach back steps to recovery at home? Rest and regain strength,  Eat a balanced diet   Is the patient/caregiver able to teach back signs and symptoms of worsening condition: Fever,  Hyperthermia,  Shortness of breath/rapid respiratory rate,  Altered mental status(confusion/coma)   Week 3 call completed? Yes          Eileen Floyd RN

## 2021-11-18 NOTE — PROGRESS NOTES
"Chief Complaint  Follow-up (pressure sore) and Urinary Tract Infection    Subjective          Amy Miles presents to [facility]  History of Present Illness    Patient presents to follow up on many issues, with her , in a rolling walker, getting around a little better but her pressure wound on her bottom is draining a lot, not really getting better any quicker and we had tried to order and have mechanical lift bed or similar hospital bed for patient and the low air loss  Mattress to relieve pressure and require less turning and things.    She is complaining of pain, worse with dressing changes, she is taking oxycodone from pain management for old injuries she states and they prefer I write her pain medicine for breakthough pain in between when she has a dressing change.  She addtionally requests I increase the dose; I counseled extensively about this behavior, increasing or following up for pain medicine at every visit    I advised patient and her  today if she continue to need more and more and complaining not getting better, she would [facility] from [facility] or similar nursing home or rehab facility.     She complains of falling at home due to the rolling walker, hit her right side, she complains many times how bad her pinky fingershurt, requests and xray    Objective   Vital Signs:   /90   Pulse 101   Temp 97.5 °F (36.4 °C) (Temporal)   Ht 162.6 cm (64\")   Wt 81.3 kg (179 lb 4.8 oz)   SpO2 96%   BMI 30.78 kg/m²     Physical Exam  Vitals reviewed.   Constitutional:       Appearance: Normal appearance. She is well-developed.   HENT:      Head: Normocephalic and atraumatic.      Right Ear: External ear normal.      Left Ear: External ear normal.      Mouth/Throat:      Pharynx: No oropharyngeal exudate.   Eyes:      Conjunctiva/sclera: Conjunctivae normal.      Pupils: Pupils are equal, round, and reactive to light.   Cardiovascular:      Rate and Rhythm: " Normal rate.   Pulmonary:      Effort: Pulmonary effort is normal.   Abdominal:      General: Abdomen is flat. There is no distension.      Palpations: Abdomen is soft.   Skin:     General: Skin is warm and dry.      Comments:  shows me pictures of patients sacral ulcer   Neurological:      General: No focal deficit present.      Mental Status: She is alert and oriented to person, place, and time.   Psychiatric:         Mood and Affect: Mood and affect normal.         Behavior: Behavior normal.         Thought Content: Thought content normal.         Judgment: Judgment normal.          Result Review :   The following data was reviewed by: Demarcus Tripp DO on 11/18/2021:  Common labs    Common Labsle 10/29/21 10/29/21 10/29/21 10/30/21 10/30/21 11/2/21 11/2/21    0506 0913 0913 0409 0409 1032 1032   Glucose  152 (A)  195 (A)   190 (A)   BUN  7  9   10   Creatinine  0.60  0.82   0.74   eGFR Non  Am  103  72   81   Sodium  140  139   140   Potassium  3.7  3.8   3.4 (A)   Chloride  106  107   104   Calcium  7.7 (A)  8.5 (A)   8.6   Albumin  2.50 (A)  2.70 (A)   2.90 (A)   Total Bilirubin  0.8  0.5   0.7   Alkaline Phosphatase  161 (A)  158 (A)   164 (A)   AST (SGOT)  30  30   27   ALT (SGPT)  23  24   19   WBC 5.31  4.24  4.30 5.72    Hemoglobin 8.8 (A)  9.4 (A)  8.9 (A) 10.6 (A)    Hematocrit 28.5 (A)  30.7 (A)  30.0 (A) 35.1    Platelets 104 (A)  94 (A)  96 (A) 135 (A)    (A) Abnormal value                      Assessment and Plan    Diagnoses and all orders for this visit:    1. Dysuria (Primary)  -     Urine Culture - Urine, Urine, Clean Catch; Future  -     nitrofurantoin, macrocrystal-monohydrate, (Macrobid) 100 MG capsule; Take 1 capsule by mouth 2 (Two) Times a Day.  Dispense: 10 capsule; Refill: 0  -     XR Finger 2+ View Left (In Office)  -     XR Finger 2+ View Right (In Office)    2. Neuropathic pain of finger, unspecified laterality    3. Poor nutrition  Assessment & Plan:  Lab Results    Component Value Date    ALBUMIN 2.90 (L) 11/02/2021     Low protein advised reviewing with diet or nutritionist proper nutrient s for       4. Recurrent major depressive disorder, in partial remission (HCC)  Assessment & Plan:  Difficult days, chronic disease and wound difficulty healing,  No SI/HI, has been asking for valium which further complicates use of pain medicine  Continue buspar consider increasing when we ne        Other orders  -     oxyCODONE HCl 7.5 MG tablet; Take 1 tablet by mouth Every 8 (Eight) Hours As Needed (breakthrough pain for sacral ulcer).  Dispense: 90 tablet; Refill: 0    I spent 53 minutes caring for Amy on this date of service. This time includes time spent by me in the following activities:preparing for the visit, obtaining and/or reviewing a separately obtained history, counseling and educating the patient/family/caregiver, ordering medications, tests, or procedures, referring and communicating with other health care professionals  and care coordination   Follow Up   Return 2-4 weeks or as needed, for Recheck, sooner if needed, notes to specialists and work on chronic care management with .  Patient was given instructions and counseling regarding her condition or for health maintenance advice. Please see specific information pulled into the AVS if appropriate.

## 2021-11-18 NOTE — PATIENT INSTRUCTIONS
Antibiotic 2 times daily for UTI, urine culture before she starts if able    Increased counseled on pain medicines for breakthrough pain, up to 3 times a day    Follow up wound care, home health to come out 2-3 times a week if able or closer follow with wound care, hyperbaric treatment wound vac if appropriate    Supplies if needed    Hospital bed and low air low mattress with hospital bed for chronic ulcerated wounds send to home health and/or DME to get for patient and any other needs, monkey bars

## 2021-11-19 NOTE — PROGRESS NOTES
Chief Complaint  Follow-up and Hemorrhoids    Subjective          Amy Miles presents to [facility] GENERAL SURGERY  History of Present Illness    Amy Miles is a 55 y.o. female  who presents today for a postoperative visit.     Patient is here for a follow-up for hemorrhoids.  I had originally seen her about a month ago when she was having increasing pain and a fair amount of bleeding from hemorrhoids.  She has known cirrhosis and portal hypertension and at that point I told her I did not think it was a great idea to proceed with a hemorrhoidectomy.  She is still having complaints and came in today to be seen.    Past History:  Medical History: has a past medical history of Anemia, Anxiety, Arm pain, Arthritis, Asthma, Bladder disorder, Bronchitis, Chronic allergic rhinitis, Chronic pain syndrome, Cirrhosis of liver (formerly [facility]), COPD (chronic obstructive pulmonary disease) (formerly [facility]), CRPS (complex regional pain syndrome), type I, upper (2012), Depression, Diabetes mellitus type 1 (HCC), Elevated INR (2021), Elevated LFTs (2021), Encephalopathy, hepatic (formerly [facility]) (2021), Essential hypertension, benign, Fracture, GERD (gastroesophageal reflux disease), Graves disease, Hand pain, Heel pain, Hyperthyroidism, Iatrogenic hypothyroidism, Leg pain, Leg swelling, Limb swelling, Mood disorder (formerly [facility]), Mood disorder (HCC), MRSA (methicillin resistant Staphylococcus aureus), Nausea, RSD (reflex sympathetic dystrophy), Seasonal allergies, Sepsis due to pneumonia (CMS/HCC) (2021), Shortness of Air, Stroke (formerly [facility]), and Toe fracture.   Surgical History: has a past surgical history that includes  section; Gallbladder surgery; Hand surgery; Hemorrhoid surgery; Hiatal hernia repair; Abdominal hysterectomy; Incision and Drainage Abscess; Tubal ligation; Carpal tunnel release; Cholecystectomy; Tonsillectomy; Hernia repair; Thyroidectomy (); Colonoscopy; and Incision and Drainage Abscess  "(Bilateral, 10/9/2021).   Family History: family history includes Arthritis in her father, mother, and sister; Diabetes in her brother, maternal uncle, and sister; Heart disease in her father; Osteoporosis in her sister; Other in her brother.   Social History: reports that she has been smoking cigarettes. She started smoking about 39 years ago. She has been smoking about 0.50 packs per day. She has never used smokeless tobacco. She reports that she does not drink alcohol and does not use drugs.  Allergies: Adhesive tape, Amoxicillin, Amoxicillin-pot clavulanate, Bactrim [sulfamethoxazole-trimethoprim], Cephalexin, Clavulanic acid, Doxycycline hyclate, Flonase [fluticasone], Nsaids, Penicillins, and Potassium       Current Outpatient Medications:   •  albuterol (ACCUNEB) 1.25 MG/3ML nebulizer solution, Take 3 mL by nebulization Every 6 (Six) Hours As Needed for Wheezing for up to 360 days., Disp: 360 mL, Rfl: 11  •  Alcohol Swabs (Pharmacist Choice Alcohol) pads, , Disp: , Rfl:   •  aluminum sulfate-calcium acetate (DOMEBORO) topical packet, Apply 1 packet topically to the appropriate area as directed Every 8 (Eight) Hours., Disp: 1 each, Rfl: 12  •  aspirin 81 MG EC tablet, Take 1 tablet by mouth Daily., Disp: 30 tablet, Rfl: 0  •  busPIRone (BUSPAR) 10 MG tablet, Take 10 mg by mouth 2 (Two) Times a Day., Disp: , Rfl:   •  Ca Alginate-Carboxymethylcell (Maxorb Extra 2\"x2\") pads, Apply 10 each topically 2 (two) times a day. Indications: Bed Sores, Disp: 10 each, Rfl: 2  •  cetirizine (zyrTEC) 10 MG tablet, Take 1 tablet by mouth Daily., Disp: 90 tablet, Rfl: 3  •  Cholecalciferol (Vitamin D) 50 MCG (2000 UT) tablet, Take 2,000 Units by mouth Daily., Disp: , Rfl:   •  collagenase (Santyl) 250 UNIT/GM ointment, Apply 1 application topically to the appropriate area as directed Daily., Disp: 1 each, Rfl: 2  •  cyclobenzaprine (FLEXERIL) 10 MG tablet, Take 10 mg by mouth 3 (Three) Times a Day As Needed., Disp: , Rfl:   •  " diazePAM (Valium) 5 MG tablet, Take 1 tablet by mouth At Night As Needed for Anxiety., Disp: 15 tablet, Rfl: 1  •  Diclofenac Sodium (Voltaren) 1 % gel gel, Apply 4 g topically to the appropriate area as directed 4 (Four) Times a Day As Needed (neck pain)., Disp: 50 g, Rfl: 2  •  Enulose 10 GM/15ML solution solution (encephalopathy), , Disp: , Rfl:   •  EPINEPHrine (EPIPEN) 0.3 MG/0.3ML solution auto-injector injection, Inject 0.3 mg into the appropriate muscle as directed by prescriber 1 (One) Time., Disp: , Rfl:   •  fluconazole (Diflucan) 150 MG tablet, Take 1 tablet by mouth Daily., Disp: 3 tablet, Rfl: 2  •  furosemide (Lasix) 40 MG tablet, Take 1 tablet by mouth 2 (Two) Times a Day As Needed (edema)., Disp: 60 tablet, Rfl: 5  •  gabapentin (NEURONTIN) 800 MG tablet, Take 800 mg by mouth 4 (Four) Times a Day., Disp: , Rfl:   •  hydrocortisone 2.5 % lotion, Apply to affected area 2 times daily, Disp: 60 mL, Rfl: 3  •  insulin patient supplied pump, Inject  under the skin into the appropriate area as directed Continuous. Omnipod Insulin: Novolog Dr. Zelaya Change every 72 hours, pts  said he was supposed to change it last night but did not have time (10/29), Disp: , Rfl:   •  lactulose (CHRONULAC) 10 GM/15ML solution, Take 22.5 mL by mouth 2 (Two) Times a Day.  stated that she takes 30 ml qam and 15 ml hs but only prn, Disp: 236 mL, Rfl: 2  •  levoFLOXacin (LEVAQUIN) 750 MG tablet, Take 1 tablet by mouth Daily., Disp: 10 tablet, Rfl: 0  •  levothyroxine (SYNTHROID, LEVOTHROID) 200 MCG tablet, Take 200 mcg by mouth Daily., Disp: , Rfl:   •  lisinopril (PRINIVIL,ZESTRIL) 20 MG tablet, Take 1 tablet by mouth Daily., Disp: 90 tablet, Rfl: 3  •  metoprolol succinate XL (Toprol XL) 100 MG 24 hr tablet, Take 1 tablet by mouth Daily., Disp: 90 tablet, Rfl: 3  •  montelukast (Singulair) 10 MG tablet, Take 1 tablet by mouth Every Night., Disp: 90 tablet, Rfl: 1  •  Narcan 4 MG/0.1ML nasal spray, 1 spray into  "the nostril(s) as directed by provider As Needed., Disp: , Rfl:   •  nitrofurantoin, macrocrystal-monohydrate, (Macrobid) 100 MG capsule, Take 1 capsule by mouth 2 (Two) Times a Day., Disp: 10 capsule, Rfl: 0  •  oxyCODONE (ROXICODONE) 15 MG immediate release tablet, Take 1 tablet by mouth Every 8 (Eight) Hours As Needed for Moderate Pain ., Disp: 3 tablet, Rfl: 0  •  oxyCODONE HCl 7.5 MG tablet, Take 1 tablet by mouth Every 8 (Eight) Hours As Needed (breakthrough pain for sacral ulcer)., Disp: 90 tablet, Rfl: 0  •  promethazine (PHENERGAN) 25 MG tablet, Take 1 tablet by mouth Daily., Disp: 30 tablet, Rfl: 5  •  silver sulfadiazine (Silvadene) 1 % cream, Apply  topically to the appropriate area as directed Daily As Needed for Wound Care., Disp: 400 g, Rfl: 5  •  sodium chloride 0.9 % solution, , Disp: , Rfl:   •  spironolactone (ALDACTONE) 50 MG tablet, Take 1 tablet by mouth Daily., Disp: 30 tablet, Rfl: 0  •  thiamine (thiamine) 100 MG tablet tablet, Take 1 tablet by mouth Daily., Disp: 30 tablet, Rfl: 0  •  zinc oxide (DESITIN) 40 % paste paste, Apply  topically to the appropriate area as directed Daily As Needed (break down)., Disp: 113 g, Rfl: 0       Physical Exam  She appears to be in very poor physical health.  Her abdomen is soft.  Objective     Vital Signs:   Ht 162.6 cm (64\")   Wt 82 kg (180 lb 12.8 oz)   BMI 31.03 kg/m²              Assessment and Plan    Diagnoses and all orders for this visit:    1. Cirrhosis of liver without ascites, unspecified hepatic cirrhosis type (HCC) (Primary)    2. Grade III hemorrhoids    I again reiterated to Ms. Miles that I do not think it is a great idea to do a hemorrhoidectomy in this setting.  She was not happy with that and I have offered for her to see my partner Dr. Colbert for a second opinion.  We will go ahead and make that appointment for her.      "

## 2021-11-22 PROBLEM — R30.0 DYSURIA: Status: ACTIVE | Noted: 2021-01-01

## 2021-11-22 PROBLEM — M79.2 NEUROPATHIC PAIN OF FINGER: Status: ACTIVE | Noted: 2021-01-01

## 2021-11-22 NOTE — ASSESSMENT & PLAN NOTE
Difficult days, chronic disease and wound difficulty healing,  No SI/HI, has been asking for valium which further complicates use of pain medicine  Continue buspar consider increasing when we ne

## 2021-11-22 NOTE — ASSESSMENT & PLAN NOTE
GABRIELE reviewed, counseled extensively on pain medicine, what to use how much, keep track, and also offered narcan shot and declined

## 2021-11-22 NOTE — ASSESSMENT & PLAN NOTE
*chronic, stable vs worse  Continues to have excess drainage  Going to wound care, has home health, ordered low air loss id appropriate  Consider rehab facility to help

## 2021-11-22 NOTE — ASSESSMENT & PLAN NOTE
Lab Results   Component Value Date    ALBUMIN 2.90 (L) 11/02/2021     Low protein advised reviewing with diet or nutritionist proper nutrient s for

## 2021-11-23 NOTE — TELEPHONE ENCOUNTER
Caller: GE SHEIKH     Relationship to patient: SPOUSE    Best call back number: [phone]    Patient  CALLING IN TO RESCHEDULE APPT THAT WAS FOR TODAY(11.23.21) @ 10AM W/ DR. MIRAMONTES. I ATTEMPTED TO WARM TRANSFER BUT NO ANSWER. I RESCHEDULED PT APPT FOR 12.07.21 @ 9:30AM

## 2021-11-24 NOTE — OUTREACH NOTE
Medical Week 4 Survey      Responses   [facility] patient discharged from? [facility]   Does the patient have one of the following disease processes/diagnoses(primary or secondary)? Sepsis   Call end time 1553   Discharge diagnosis Sepsis   Person spoke with today (if not patient) and relationship    Meds reviewed with patient/caregiver? Yes   Is the patient taking all medications as directed (includes completed medication regime)? Yes   Medication comments  states Pt is now out of ASA 81 mg and Vit D. Advised him to call PCP for refill, in the mean time he can get over OTC   Has the patient kept scheduled appointments due by today? Yes   Is the patient still receiving Home Health Services? Yes   Psychosocial issues? No   What is the patient's perception of their health status since discharge? Improving   Is the patient/caregiver able to teach back the hierarchy of who to call/visit for symptoms/problems? PCP, Specialist, Home health nurse, Urgent Care, ED, 911 Yes   If the patient is a current smoker, are they able to teach back resources for cessation? Smoking cessation medications  [Cut back]   Would the patient like one additional call? No   Graduated Yes   Is the patient interested in additional calls from an ambulatory ?  NOTE:  applies to high risk patients requiring additional follow-up. No   Did the patient feel the follow up calls were helpful during their recovery period? Yes          Caroline Cordova RN

## 2021-12-01 NOTE — TELEPHONE ENCOUNTER
Caller: Amy Miles    Relationship: Self    Best call back number: [phone]    What was the call regarding: PATIENT WOULD LIKE A CALL BACK AS SHE IS VOMITING AND HAS DIARRHEA AND NAUSEA.    Do you require a callback: YES PLEASE CALL AND ADVISE

## 2021-12-03 NOTE — SIGNIFICANT NOTE
Epithelial %: 0%    Exposed Bone: YES    Exposed Tendon: no    Impression: worsening    Short Term Goals: INCREASE GRANULATION AND DECREASE SIZE

## 2021-12-03 NOTE — PROGRESS NOTES
"Chief Complaint  Wound Check (DECUBITUS ULCER OF BUTTOCKS)    Subjective            Objective     Vitals:    12/03/21 0949   BP: 134/88   BP Location: Right arm   Patient Position: Sitting   Pulse: (!) 130   Resp: 20   Temp: 97 °F (36.1 °C)   TempSrc: Temporal   Weight: 81.6 kg (180 lb)   Height: 162.6 cm (64\")   PainSc:   6   PainLoc: Buttocks         I have reviewed the HPI and ROS as documented by MA/RN. Javier Arcos MD    Physical Exam     Wound Description:  The patient returns for further evaluation of the sacral coccygeal decubitus ulcer.  She did have an MRI of the pelvis yesterday which did show evidence of early osteomyelitis in the area of the coccyx.  This wound is clean but it extends for a considerable length onto the right buttock area.  The wound is being packed with quarter percent Dakin solution soaked gauze twice a day.  After further evaluation of the wound I think that she would be helped by using a wound VAC.  The  has agreed to that and so will order the appropriate materials to get started with that.  That should be changed 3 times a week and should be placed at a setting 125 mmHg.  Home health can change the wound VAC  once the materials are obtained..  We will continue to apply the wet-to-dry Dakin's solution soaked gauze to the wound until the wound VAC is started.  I feel at some time we need to get her evaluated by plastic surgery for their  evaluation of the wound and whether this area should require surgery at some point.  I would like to see the patient back in our clinic in 2 weeks.    Result Review :   The following data was reviewed by: Javier Arcos MD on 12/03/2021:               Assessment and Plan    Diagnoses and all orders for this visit:    1. Decubitus ulcer of back, stage 4 (HCC) (Primary)    2. Type 1 diabetes mellitus with other skin ulcer (HCC)            Follow Up   Return in about 2 weeks (around 12/17/2021).  Patient was given instructions and " counseling regarding her condition or for health maintenance advice. Please see specific information pulled into the AVS if appropriate.

## 2021-12-06 NOTE — TELEPHONE ENCOUNTER
CALLED PATIENT'S  TO FIND OUT THE STATUS OF PATIENT GETTING SPECIALTY MATTRESS.  STATES PCP HAS BEEN WORKING ON GETTING A MATTRESS FOR OVER 2 WEEKS.     CONTACTED PCP TO CHECK STATUS, DR. WADE'S NURSE STATES THEY FAXED ORDER AND NOTES TO [facility].     [facility] CONTACTED ([phone]) TO CHECK STATUS OF MATTRESS ORDER. THEY STATE THEY NEVER RECEIVED ANY REQUEST OR DOCUMENTATION.     I FOUND THE ORDER FOR HOSPITAL BED AND OFFICE NOTE FROM DR. WADE AND FAXED TO [facility] (FAX # [phone])

## 2021-12-06 NOTE — TELEPHONE ENCOUNTER
NOTIFIED PATIENT THAT HOSPITAL BED ORDER AND DR. WADE'S OFFICE NOTE FOR BED WAS REFAXED TO [facility].

## 2021-12-06 NOTE — TELEPHONE ENCOUNTER
Pt's  called requesting the status of bed and air flow mattress. Stated this has been ongoing and she really needs it.

## 2021-12-08 NOTE — TELEPHONE ENCOUNTER
NOTIFIED OF STATUS OF HOSPITAL BED. ONCE PATIENT SEES PCP ON Friday AND THEY ADDRESS HER OXYGEN ORDER, [facility] WILL BE ABLE TO PROCEED WITH THE HOSPITAL BED/MATTRESS.     SABRA AT [facility] (PHONE # [phone], PRESS NO BUTTONS/LET RING) STATED SHE WILL FORWARD CASE TO SUPERVISOR IN REGARDS TO URGENCY OF PATIENT NEEDING HOSPITAL BED SO WE CAN PROCEED WITH WOUND VAC.

## 2021-12-09 NOTE — TELEPHONE ENCOUNTER
Caller: ENZO OROZCO    Relationship: Other WITH WOUND VAC    Best call back number: [phone]    What is the best time to reach you: ANYTIME    Who are you requesting to speak with (clinical staff, provider,  specific staff member): DR WADE AND ASSISTANT      What was the call regarding: A SUPPORT SURFACE WAS ORDERED FOR THE PATIENT AND SHE NEEDS TO KNOW WHERE IT WAS ORDERED FROM    Do you require a callback: YES OR CAN TEXT INFORMATION

## 2021-12-10 PROBLEM — L89.504: Status: ACTIVE | Noted: 2021-01-01

## 2021-12-10 PROBLEM — F33.42 RECURRENT MAJOR DEPRESSIVE DISORDER, IN FULL REMISSION (HCC): Status: ACTIVE | Noted: 2021-01-01

## 2021-12-10 PROBLEM — Z99.81 OXYGEN DEPENDENT: Status: ACTIVE | Noted: 2021-01-01

## 2021-12-10 PROBLEM — J41.1 MUCOPURULENT CHRONIC BRONCHITIS (HCC): Status: ACTIVE | Noted: 2021-01-01

## 2021-12-10 NOTE — PROGRESS NOTES
Chief Complaint  Follow-up (3 week follow,paperwork for koid)    Subjective          Amy Miles presents to [facility]  History of Present Illness    Patient presents complaining of needing oxygen renewed, she takes her uses 2 L nasal cannula continuously if not more at times but for the most part is able to maintain her saturations above 90% with 2 L nasal cannula     She has a past medical history that is significant with multiple comorbidities including chronic pain degenerative disc cirrhosis mortality class B, insulin-dependent diabetes borderline control COPD CHF HTN polypharmacy.  She has managed by endocrinology at least for her diabetes and is seeing wound care for her severe decubitus ulcer on her sacrum chronic, last seen wound care 12/3 recommended follow-up 2 weeks patient and  state has not been having wound care at home other than what  provides who advises that home health or care tenders recommended he seek a waiver to help get reimbursed or paid to help care for his wife, mentions this multiple times and shows pictures of a left diabetic heel blister of significant size he has wrapped at home with nonadhesive bandaging and covered in iodine repeatedly to help care for him.  Is not currently open draining or seemingly infected and again asked about if wound care or someone is helping to manage this for them and states no one this week or maybe even last.     Have advised them repeatedly would be best to follow-up with other specialist regarding her oxygen requirements ADEEL COPD as well as gastroenterology or hepatology for her chronic cirrhosis.  Advised also repeatedly since last appointment and prior in our office low-pressure mattress in hospital bed to help with her chronic wound and has not been able to get these and states that the oxygen ordered today to help get continued was needed in order to continue with getting her supplies such as  "a hospital bed low pressure mattress and a wound VAC wound care has been suggesting recommending her get.    Patient is insistent on needing pain medicine for breakthrough pain on pain medicine chronically already for chronic degenerative disc and shoulder issues neuropathy which is chronic from her diabetes and arthritis among other chronic conditions, GABRIELE reviewed refilled her breakthrough Percocet 7.5 mg up to three times a day On 11/20.  Have discussed her chronic pain issues with pain management between now and last appointment and again today about appropriate use and not over using as well as complications and concerns about taking Valium as needed for her grief and other mental health issues concerning loss of her father in the last couple months or less.    She refilled her gabapentin last on 11/18 and long-acting oxycodone 15 mg three times a day on 11/5 who in Long discussion with pain management pushed back a refill for her to be filled when appropriate      Objective   Vital Signs:   /98   Pulse 114   Ht 162.6 cm (64\")   Wt 84.6 kg (186 lb 9.6 oz)   SpO2 (!) 87% Comment: Room air  BMI 32.03 kg/m²     Physical Exam  Vitals reviewed.   Constitutional:       Appearance: Normal appearance. She is well-developed.   HENT:      Head: Normocephalic and atraumatic.      Right Ear: External ear normal.      Left Ear: External ear normal.      Mouth/Throat:      Pharynx: No oropharyngeal exudate.   Eyes:      Conjunctiva/sclera: Conjunctivae normal.      Pupils: Pupils are equal, round, and reactive to light.   Cardiovascular:      Rate and Rhythm: Normal rate.   Pulmonary:      Effort: Pulmonary effort is normal.   Abdominal:      General: Abdomen is flat. There is no distension.      Palpations: Abdomen is soft.   Musculoskeletal:        Feet:    Feet:      Left foot:      Skin integrity: Blister present.      Comments: Large blister, not opened or any discharge as above  Skin:     General: Skin is " warm and dry.   Neurological:      General: No focal deficit present.      Mental Status: She is alert and oriented to person, place, and time.   Psychiatric:         Mood and Affect: Mood and affect normal.         Behavior: Behavior normal.         Thought Content: Thought content normal.         Judgment: Judgment normal.        Result Review :   The following data was reviewed by: Demarcus Tripp DO on 12/10/2021:  Common labs    Common Labsle 10/30/21 10/30/21 11/2/21 11/2/21 12/2/21    0409 0409 1032 1032    Glucose 195 (A)   190 (A)    BUN 9   10    Creatinine 0.82   0.74 0.80   eGFR Non African Am 72   81    Sodium 139   140    Potassium 3.8   3.4 (A)    Chloride 107   104    Calcium 8.5 (A)   8.6    Albumin 2.70 (A)   2.90 (A)    Total Bilirubin 0.5   0.7    Alkaline Phosphatase 158 (A)   164 (A)    AST (SGOT) 30   27    ALT (SGPT) 24   19    WBC  4.30 5.72     Hemoglobin  8.9 (A) 10.6 (A)     Hematocrit  30.0 (A) 35.1     Platelets  96 (A) 135 (A)     (A) Abnormal value       Comments are available for some flowsheets but are not being displayed.                      Assessment and Plan    Diagnoses and all orders for this visit:    1. Cirrhosis of liver with ascites, unspecified hepatic cirrhosis type (HCC) (Primary)    2. Essential hypertension, benign    3. Chronic pain syndrome    4. Type 1 diabetes mellitus with other circulatory complication (HCC)    5. Oxygen dependent  -     Oxygen Therapy    6. Mucopurulent chronic bronchitis (HCC)  -     Oxygen Therapy    7. Necrotizing soft tissue infection    8. Recurrent major depressive disorder, in full remission (HCC)    9. Pressure injury of ankle, stage 4, unspecified laterality (HCC)      Advised patient to go to the hospital if worsening signs or symptoms    Advised patient that she needed to have regular wound care at home or to contact home health if any issues with having regular care or assistance    Advised patient and  that need to have  supplies and needs met at home and if not to follow-up or go to the hospital such as low pressure mattress and wound VAC and if worsening would recommend and require hospitalization to fully treat improve    Discussed with pain management and will refer note today to specialist to help with managing patient's chronic conditions      **6 minute walk test failed, she was on no oxygen with SpO2 87% or less at rest, we unable to perform to much of a walk test oxygen went down to 80% on room air used oxygen NC to improve back up to 90% or greater with minimum 2L, needs oxygen NC at least 2L continuously    Patient has severe has severe grade 4 ulcer on sacrum not improving or healing now diabetic foot ulcer on heel on left complicating, needs wound vac, needs low air loss mattress and assistance from specialist IMMEDIATELY wound care and advised hospital rehab and extended care, will send to wound care and surg specialist      Follow Up   Return in about 4 weeks (around 1/7/2022), or if symptoms worsen or fail to improve, for or go to ED if worse.  Patient was given instructions and counseling regarding her condition or for health maintenance advice. Please see specific information pulled into the AVS if appropriate.

## 2021-12-15 NOTE — TELEPHONE ENCOUNTER
SPOKE TO THONG AT [facility] IN REGARDS TO PATIENT'S STATUS OF VAC. THONG STATES HE HAS BEEN IN CORRESPONDENCE WITH [facility] AND THEY ARE SUPPOSED TO BE DELIVERING THE BED AND SUPPORT SURFACE TODAY. ONCE HE HAS THE PROOF OF DELIVERY HE CAN SHIP THE WOUND VAC. HE STATES HE WILL EMAIL ME ONCE THE VAC HAS BEEN DELIVERED.    SPOKE TO  AT [facility], TO INFORM OF VAC DELIVERY PENDING, HOPEFULLY FOR THIS WEEK AND TO CLARIFY THEY HAD ORDERS TO PLACE THE VAC. THEY DID NOT HAVE THE ORDERS, REFAXED OFFICE NOTES FOR 12/3.

## 2021-12-17 NOTE — SIGNIFICANT NOTE
Epithelial %: 25-50%    Exposed Bone: no    Exposed Tendon: no    Impression:  NEW AREA, PATIENT'S  STATES IT STARTED 12/7/21 AS A BLISTER    Short Term Goals: INCREASE GRANULATION, DECREASE SIZE

## 2021-12-17 NOTE — SIGNIFICANT NOTE
Epithelial %: 1-25%    Exposed Bone: BONE IS COVERED WITH GRANULATION TISSUE    Exposed Tendon: no    Impression: improved    Short Term Goals: INCREASE GRANULATION, DECREASE SIZE

## 2021-12-17 NOTE — PROGRESS NOTES
Chief Complaint  Wound Check (COCCYX ULCER, DM,  THIS AM)    Subjective            Objective     Vitals:    12/17/21 1050   BP: 130/84   BP Location: Right arm   Patient Position: Sitting   Pulse: 71   Resp: 16   Temp: 96.6 °F (35.9 °C)   TempSrc: Temporal   SpO2: 95%   PainSc:   6   PainLoc: Buttocks         I have reviewed the HPI and ROS as documented by MA/RN. Javier Arcos MD    Physical Exam     Wound Description:  The decubitus ulcer over the coccyx and lower sacral area is essentially the same today.  The wound is  and I think the  is doing a good job caring for this lady's wound..  There were 2 or 3 areas of necrosis which I debrided today.  The wound VAC is here today and the nurse is applying the wound VAC which will be changed every Monday Wednesday and Friday.  The patient does have osteomyelitis of the coccyx and probably should be on IV antibiotics.  I will have the patient return next Wednesday to see Dr. Penn for her evaluation and for her ordering PICC line for the appropriate antibiotic.  The patient has also developed a blister on the left heel which I debrided today.  This is very superficial.  I have asked the patient's  to apply Betadine to that wound site on a daily basis.    Result Review :   The following data was reviewed by: Javier Arcos MD on 12/17/2021:      Wound debridement  Performed by: Javier Arcos MD  Authorized by: Javier Arcos MD     Correct patient: Identification verified by two methods:     Verbally and Date of birth  Correct procedure/consent    How many wounds are you performing a debridement on?:  2  Wound 1:     Provider Documentation    Debridement type:  Mechanical    Other:  Alcohol    Other:  Curette     None    Tissue debrided:  Small    Type tissue:  Eschar and Viable/Margins    Level removed:  Subcutaneous    Patient tolerance:  Good    Hemostasis achieved by:  Silver nitrate    Wound Bed Post Debridement: See Photo       Description:  Total surface area of coccyx decubitus wound debrided was 8.47 cm².  Wound 2:       Provider Documentation:     Debridement type:  Sharp/Excisional    Other:  Alcohol    Other:  15.  Scalpel     Lidocaine injectable    Type tissue:  Slough    Level removed:  Skin    Patient tolerance:  Good    Description:  The total surface area of the left heel wound which was debrided was 22.05 cm².            Assessment and Plan    Diagnoses and all orders for this visit:    1. Decubitus ulcer of back, stage 4 (HCC) (Primary)    Other orders  -     Wound debridement            Follow Up   Return in about 1 week (around 12/24/2021).  Patient was given instructions and counseling regarding her condition or for health maintenance advice. Please see specific information pulled into the AVS if appropriate.

## 2021-12-17 NOTE — PROCEDURES
Procedure   Wound Vac  Performed by: Marisel Kapadia RN  Authorized by: Javier Arcos MD   Associated Wounds:   Wound 10/09/21 0444 midline coccyx Pressure Injury    Wound Vac:     Type of Foam:  Black    mmH mmhg    Frequency of change:  Three times weekly    Patient tolerance:  Good       SIMPLACE SPIRAL FOAM APPLIED TO THE ENTIRETY OF THE ULCER. PERIWOUND WAS DRAPED WITH CLEAR DRAPE. BLACK FOAM TRACKED TO THE RIGHT LATERAL BACK. PATIENT TOLERATED PROCEDURE WELL.

## 2021-12-17 NOTE — SIGNIFICANT NOTE
Epithelial %: 50-75%    Exposed Bone: no    Exposed Tendon: no    Impression: improved    Short Term Goals: INCREASE GRANULATION, DECREASE SIZE

## 2021-12-17 NOTE — SIGNIFICANT NOTE
Epithelial %: %    Exposed Bone: no    Exposed Tendon: no    Impression: unchanged    Short Term Goals: MAINTAIN SKIN INTEGRITY

## 2021-12-20 NOTE — TELEPHONE ENCOUNTER
PATIENT'S  CALLED TO INFORM US THAT HE HAS BEEN HAVING TO CHANGE THE WOUND VAC CANISTER DAILY.    CALLED PATIENT'S HOME HEALTH NURSE TO CLARIFY DRAINAGE COLOR, SHE  STATES ITS BLOODY INITIALLY BUT THE FULL CANISTERS APPEAR A MIX OF BLOOD AND SEROUS FLUID.

## 2021-12-21 NOTE — TELEPHONE ENCOUNTER
GE SHEIKH CALLED ABOUT PATIENT'S WOUND VAC. HE STATES HE TOOK THE VAC OFF THIS MORNING AND DID A WET-TO-DRY DRESSING BECAUSE THE VAC CANISTER WAS FULL OF BLOODY DRAINAGE IN LESS THEN 14 HOURS. PATIENT STATES THERES NO OBVIOUS BLEEDING GOING ON OR COPIOUS BLOOD WHEN HE CHANGED THE DRESSING.    INFORMED  WE WOULD REASSESS TOMORROW AT WOUND CARE VISIT.

## 2021-12-22 NOTE — TELEPHONE ENCOUNTER
I sent percocet 7.5mg for breakthrough pain, can use with dressing changes as needed, I messaged pain management about it to see if they cared and didn't get a message back so. Let him know

## 2021-12-22 NOTE — TELEPHONE ENCOUNTER
Patients  called in wanting to know about patient's referral to pain management with [facility] for what he said was a new break through drug.  stated that he talked about this 12/17/2021 at his doctor appointment is there any way we could get this put in or did you want to do something else?

## 2021-12-27 NOTE — SIGNIFICANT NOTE
WOUND WAS CLEANSED WITH NORMAL SALINE. SKIN PREP APPLIED TO THE PERIWOUND AND COVERED WITH CLEAR DRAPE. BLACK FOAM APPLIED TO THE ENTIRETY OF THE ULCER, THEN COVERED WITH CLEAR DRAPE.  TRACK PAD WAS TRACKED TO THE SUPERIORLY AND LATERALLY POSITION FROM THE WOUND AND SECURED. VAC SUCTION WAS OBTAINED AND MAINTAINED  [facility]. PATIENT TOLERATED PROCEDURE WELL.

## 2021-12-27 NOTE — PROGRESS NOTES
"Chief Complaint  Wound Check (WOUND VAC DECUB ULCER (BS:165))    Subjective        History of Present Illness    Patient is a 56-year-old female with a very complicated and difficult history.  She has multiple medical problems including cirrhosis, COPD, diabetes, stroke, chronic pain, anxiety, etc.  Patient is extremely somnolent and her  says this is basically her baseline, and most of the information is obtained from him.    She has been followed in the [facility] for a very large sacral/buttock wound.  This has been present for over 6 months but they are not sure exactly when it started and I am not able to find that information in her records either.  She was admitted to the hospital in July as well as twice in October.  She underwent a operative I&D for necrotizing soft tissue infection with large abscess.  Recently she was started with a wound VAC but had a lot of bleeding into the canister so it was stopped and they have been doing Dakin's wet-to-dry dressing changes.  She had a positive culture last month that showed MRSA but has not been on antibiotics in the interim.    She has a number of medication allergies listed but it sounds like most of these are not actually true allergies.  She says penicillin medications make her \"joints pop.\"  She does not remember what happens when she gets Bactrim/sulfa meds.  She says doxycycline made her feel itchy but she did not have a rash.  I am not sure what her Keflex allergy has been, but she tolerated IV Rocephin in the hospital without any issues.  She has no history of anaphylactic reactions to medication in the past.    She had a large blister on her heel that was debrided by Dr. Arcos and has dried up and healing in nicely.  Her  reports that she has had several other small blisters pop up in different locations, 2 on her hand and one on her posterior rib cage.  These are all resolving now.  They are wondering if it is related to the MRSA.    No fevers " "or chills.  No specific complaints today.    Allergies:  Adhesive tape, Amoxicillin, Amoxicillin-pot clavulanate, Bactrim [sulfamethoxazole-trimethoprim], Cephalexin, Clavulanic acid, Doxycycline hyclate, Flonase [fluticasone], Nsaids, Penicillins, and Potassium      Current Outpatient Medications:   •  Accu-Chek Guide test strip, by Other route 4 (Four) Times a Day. Use to test blood sugar 4 times daily, Disp: , Rfl:   •  albuterol (ACCUNEB) 1.25 MG/3ML nebulizer solution, Take 3 mL by nebulization Every 6 (Six) Hours As Needed for Wheezing for up to 360 days., Disp: 360 mL, Rfl: 11  •  Alcohol Swabs (Pharmacist Choice Alcohol) pads, , Disp: , Rfl:   •  aluminum sulfate-calcium acetate (DOMEBORO) topical packet, Apply 1 packet topically to the appropriate area as directed Every 8 (Eight) Hours., Disp: 1 each, Rfl: 12  •  aspirin 81 MG EC tablet, Take 1 tablet by mouth Daily., Disp: 30 tablet, Rfl: 0  •  busPIRone (BUSPAR) 10 MG tablet, Take 10 mg by mouth 2 (Two) Times a Day., Disp: , Rfl:   •  Ca Alginate-Carboxymethylcell (Maxorb Extra 2\"x2\") pads, Apply 10 each topically 2 (two) times a day. Indications: Bed Sores, Disp: 10 each, Rfl: 2  •  cetirizine (zyrTEC) 10 MG tablet, Take 1 tablet by mouth Daily., Disp: 90 tablet, Rfl: 3  •  Cholecalciferol (Vitamin D) 50 MCG (2000 UT) tablet, Take 2,000 Units by mouth Daily., Disp: , Rfl:   •  collagenase (Santyl) 250 UNIT/GM ointment, Apply 1 application topically to the appropriate area as directed Daily., Disp: 1 each, Rfl: 2  •  cyclobenzaprine (FLEXERIL) 10 MG tablet, Take 10 mg by mouth 3 (Three) Times a Day As Needed., Disp: , Rfl:   •  diazePAM (Valium) 5 MG tablet, Take 1 tablet by mouth At Night As Needed for Anxiety., Disp: 15 tablet, Rfl: 1  •  Diclofenac Sodium (Voltaren) 1 % gel gel, Apply 4 g topically to the appropriate area as directed 4 (Four) Times a Day As Needed (neck pain)., Disp: 50 g, Rfl: 2  •  diphenhydrAMINE (Benadryl Allergy) 25 MG tablet, " Take 1 tablet by mouth 2 (Two) Times a Day. Take 30min prior to taking Doxycycline., Disp: 30 tablet, Rfl: 0  •  doxycycline (VIBRAMYCIN) 100 MG capsule, Take 1 capsule by mouth 2 (Two) Times a Day for 14 days. Take with Benadryl (diphenhydramine) 25mg., Disp: 28 capsule, Rfl: 0  •  Enulose 10 GM/15ML solution solution (encephalopathy), , Disp: , Rfl:   •  EPINEPHrine (EPIPEN) 0.3 MG/0.3ML solution auto-injector injection, Inject 0.3 mg into the appropriate muscle as directed by prescriber 1 (One) Time., Disp: , Rfl:   •  fluconazole (Diflucan) 150 MG tablet, Take 1 tablet by mouth Daily., Disp: 3 tablet, Rfl: 2  •  furosemide (Lasix) 40 MG tablet, Take 1 tablet by mouth 2 (Two) Times a Day As Needed (edema)., Disp: 60 tablet, Rfl: 5  •  gabapentin (NEURONTIN) 800 MG tablet, Take 800 mg by mouth 4 (Four) Times a Day., Disp: , Rfl:   •  HYDROcodone-acetaminophen (NORCO)  MG per tablet, hydrocodone 10 mg-acetaminophen 325 mg tablet  TAKE 1 TABLET BY MOUTH 2 (TWO) TIMES A DAY AS NEEDED FOR MODERATE PAIN (DRESSING CHANGES) FOR UP TO 15 DAYS., Disp: , Rfl:   •  hydrocortisone 2.5 % lotion, Apply to affected area 2 times daily, Disp: 60 mL, Rfl: 3  •  Insulin Disposable Pump (OmniPod Dash 5 Pack Pods) misc, CHANGE POD EVERY 72 HOURS AS DIRECTED, Disp: , Rfl:   •  insulin patient supplied pump, Inject  under the skin into the appropriate area as directed Continuous. Omnipod Insulin: Novolog Dr. Zelaya Change every 72 hours, pts  said he was supposed to change it last night but did not have time (10/29), Disp: , Rfl:   •  lactulose (CHRONULAC) 10 GM/15ML solution, Take 22.5 mL by mouth 2 (Two) Times a Day.  stated that she takes 30 ml qam and 15 ml hs but only prn, Disp: 236 mL, Rfl: 2  •  levoFLOXacin (LEVAQUIN) 750 MG tablet, Take 1 tablet by mouth Daily., Disp: 10 tablet, Rfl: 0  •  levothyroxine (SYNTHROID, LEVOTHROID) 200 MCG tablet, Take 200 mcg by mouth Daily., Disp: , Rfl:   •  lisinopril  (PRINIVIL,ZESTRIL) 20 MG tablet, Take 1 tablet by mouth Daily., Disp: 90 tablet, Rfl: 3  •  metoprolol succinate XL (Toprol XL) 100 MG 24 hr tablet, Take 1 tablet by mouth Daily., Disp: 90 tablet, Rfl: 3  •  metoprolol tartrate (LOPRESSOR) 100 MG tablet, , Disp: , Rfl:   •  montelukast (Singulair) 10 MG tablet, Take 1 tablet by mouth Every Night., Disp: 90 tablet, Rfl: 1  •  Narcan 4 MG/0.1ML nasal spray, 1 spray into the nostril(s) as directed by provider As Needed., Disp: , Rfl:   •  nitrofurantoin, macrocrystal-monohydrate, (Macrobid) 100 MG capsule, Take 1 capsule by mouth 2 (Two) Times a Day., Disp: 10 capsule, Rfl: 0  •  oxyCODONE (ROXICODONE) 15 MG immediate release tablet, Take 1 tablet by mouth Every 8 (Eight) Hours As Needed for Moderate Pain ., Disp: 3 tablet, Rfl: 0  •  oxyCODONE-acetaminophen (PERCOCET) 7.5-325 MG per tablet, Take 1 tablet by mouth Every 8 (Eight) Hours As Needed for Moderate Pain ., Disp: 45 tablet, Rfl: 0  •  promethazine (PHENERGAN) 25 MG tablet, Take 1 tablet by mouth Daily., Disp: 30 tablet, Rfl: 5  •  silver sulfadiazine (Silvadene) 1 % cream, Apply  topically to the appropriate area as directed Daily As Needed for Wound Care., Disp: 400 g, Rfl: 5  •  silver sulfadiazine (Silvadene) 1 % cream, Apply 1 application topically to the appropriate area as directed Daily., Disp: 400 g, Rfl: 3  •  sodium chloride 0.9 % solution, , Disp: , Rfl:   •  spironolactone (ALDACTONE) 50 MG tablet, Take 1 tablet by mouth Daily., Disp: 30 tablet, Rfl: 0  •  sulfamethoxazole-trimethoprim (BACTRIM DS,SEPTRA DS) 800-160 MG per tablet, sulfamethoxazole 800 mg-trimethoprim 160 mg tablet  TAKE 1 TABLET BY MOUTH 2 (TWO) TIMES A DAY FOR INFECTION, Disp: , Rfl:   •  thiamine (thiamine) 100 MG tablet tablet, Take 1 tablet by mouth Daily., Disp: 30 tablet, Rfl: 0  •  zinc oxide (DESITIN) 40 % paste paste, Apply  topically to the appropriate area as directed Daily As Needed (break down)., Disp: 113 g, Rfl:  0    Past Medical History:   Diagnosis Date   • Anemia    • Anxiety    • Arm pain    • Arthritis    • Asthma    • Bladder disorder    • Bronchitis    • Chronic allergic rhinitis    • Chronic pain syndrome     Reflex Sympathetic Dystrophy, left arm.   • Cirrhosis of liver (HCC)    • COPD (chronic obstructive pulmonary disease) (HCC)    • CRPS (complex regional pain syndrome), type I, upper 2012   • Depression    • Diabetes mellitus type 1 (HCC)    • Elevated INR 2021   • Elevated LFTs 2021   • Encephalopathy, hepatic (HCC) 2021   • Essential hypertension, benign    • Fracture    • GERD (gastroesophageal reflux disease)    • Graves disease    • Hand pain    • Heel pain    • Hyperthyroidism    • Iatrogenic hypothyroidism    • Leg pain    • Leg swelling    • Limb swelling    • Mood disorder (HCC)    • Mood disorder (HCC)    • MRSA (methicillin resistant Staphylococcus aureus)    • Nausea    • RSD (reflex sympathetic dystrophy)    • Seasonal allergies    • Sepsis due to pneumonia (CMS/HCC) 2021   • Shortness of Air    • Stroke (HCC)    • Toe fracture          Past Surgical History:   Procedure Laterality Date   • ABDOMINAL HYSTERECTOMY     • CARPAL TUNNEL RELEASE     •  SECTION     • CHOLECYSTECTOMY     • COLONOSCOPY     • GALLBLADDER SURGERY     • HAND SURGERY     • HEMORRHOIDECTOMY     • HERNIA REPAIR     • HIATAL HERNIA REPAIR     • INCISION AND DRAINAGE ABSCESS     • INCISION AND DRAINAGE ABSCESS Bilateral 10/9/2021    Procedure: INCISION AND DRAINAGE ABSCESS buttocks;  Surgeon: Tom Colbert MD;  Location: [facility] OR;  Service: General;  Laterality: Bilateral;   • THYROIDECTOMY      by Dr. Anthony   • TONSILLECTOMY     • TUBAL ABDOMINAL LIGATION       Social History     Socioeconomic History   • Marital status:    Tobacco Use   • Smoking status: Current Every Day Smoker     Packs/day: 0.50     Years: 37.00     Pack years: 18.50     Types: Cigarettes     Start  "date: 1982   • Smokeless tobacco: Never Used   • Tobacco comment: 1/2 PACK PER DAY PER    Vaping Use   • Vaping Use: Never used   Substance and Sexual Activity   • Alcohol use: Never   • Drug use: Never   • Sexual activity: Defer     Family History   Problem Relation Age of Onset   • Arthritis Mother    • Heart disease Father    • Arthritis Father    • Diabetes Sister         Unspecified   • Arthritis Sister    • Osteoporosis Sister    • Diabetes Brother         Unspecified   • Other Brother         Renal Calculus   • Diabetes Maternal Uncle         Unspecified         Objective     Vitals:    12/27/21 1109   BP: 118/76   BP Location: Left arm   Patient Position: Sitting   Pulse: 99   Resp: 18   Temp: 96.9 °F (36.1 °C)   TempSrc: Temporal   Weight: 84.4 kg (186 lb)   Height: 162.6 cm (64\")   PainSc:   6     Body mass index is 31.93 kg/m².    STEADI Fall Risk Assessment has not been completed.     Review of Systems     ROS:  No fevers, chills, sweats, or body aches.     I have reviewed the HPI and ROS as documented by MA/RN. Pastora Keenan MD    Physical Exam     NAD  Normocephalic, atraumatic  No scleral icterus  No respiratory distress, no cough  Patient is extremely somnolent but when roused she answers questions appropriately.  Extremely large sacral decubitus pressure injury with tunneling approximately 7 cm in the cranial direction.  Healthy granulation tissue throughout the majority of the wound but the central and left side of the wound exhibit moderate slough.  No drainage or evidence of abscess.  Small area of pressure ulcer right lateral hip, stage 2.  Large resolved blister left heel.  See photos for details.                      Result Review :  The following data was reviewed by: Pastora Keenan MD on 12/27/2021:    Multiple prior admission and discharge notes, op notes, PCP visits, labs, imaging.  Patient discussed with PCP.    Wound Avatar Documentation     Active Wound LDAs        Wound " 10/09/21 0444 midline coccyx Pressure Injury    Placement date 10/09/21   -MW 10/09/21 0444      Placement time 0444   -MW 10/09/21 0444 Days 79    Present on Hospital Admission: Y   -MW 10/09/21 0444 Orientation: midline   - 10/09/21 0444    Location: coccyx   - 10/09/21 0444 Primary Wound Type: Pressure inj   - 10/09/21 0444    Assessments       12/17/21 1234 12/03/21 1002 11/12/21 1021 10/30/21 1252 10/30/21 0745           Wound Image  View All Images View Images   - 12/17/21 1235    View Images   - 12/03/21 1004    View Images   - 11/12/21 1025    View Images   - 10/30/21 1256    --    Dressing Appearance moist drainage   - 12/17/21 1235 intact; moist drainage   - 12/03/21 1004 intact; moist drainage   - 11/12/21 1025 --  dry; intact   - 10/30/21 0942   Closure --  --  --  --  --    Base --  --  --  --  --    Black (%), Wound Tissue Color --  10   - 12/03/21 1004 --  --  --    Red (%), Wound Tissue Color 75   - 12/17/21 1235 15   - 12/03/21 1004 50   - 11/12/21 1025 --  --    Yellow (%), Wound Tissue Color 25   - 12/17/21 1235 75   - 12/03/21 1004 50   - 11/12/21 1025 --  --    Periwound intact; blanchable   - 12/17/21 1235 intact   - 12/03/21 1004 intact; blanchable   - 11/12/21 1025 --  --    Periwound Temperature warm   - 12/17/21 1235 warm   - 12/03/21 1004 warm   - 11/12/21 1025 --  --    Periwound Skin Turgor soft   - 12/17/21 1235 soft   - 12/03/21 1004 soft   - 11/12/21 1025 --  --    Edges open   - 12/17/21 1235 open   - 12/03/21 1004 open   - 11/12/21 1025 --  --    Wound Length (cm) 3.2 cm   - 12/17/21 1235 4 cm   - 12/03/21 1004 2.2 cm   - 11/12/21 1025 --  --    Wound Width (cm) 7 cm   - 12/17/21 1235 7.3 cm   - 12/03/21 1004 5 cm   - 11/12/21 1025 --  --    Wound Depth (cm) 2.6 cm   - 12/17/21 1235 3.3 cm   - 12/03/21 1004 3 cm   - 11/12/21 1025 --  --    Tunneling [Depth (cm)/Location] 6.3 AT 2 OCLOCK, 6.8 AT 4 OCLOCK    - 12/17/21 1235 8.8 TOWARDS 5:00 & 6.3 TOWARDS 2:00   - 12/03/21 1004 7.5 at 4 oclock   - 11/12/21 1025 --  --    Undermining [Depth (cm)/Location] --  0   - 12/03/21 1004 UNDERMINES FROM 12-3 OCLOCK, GREATEST AT 5   - 11/12/21 1025 --  --    Drainage Characteristics/Odor serosanguineous   - 12/17/21 1235 serosanguineous; serous   - 12/03/21 1004 serosanguineous; serous; yellow   - 11/12/21 1025 --  --    Drainage Amount moderate   - 12/17/21 1235 large   - 12/03/21 1004 moderate   - 11/12/21 1025 --  --    Care, Wound cleansed with; irrigated with; sterile normal saline   - 12/17/21 1235 cleansed with; sterile normal saline   - 12/03/21 1004 cleansed with; sterile normal saline   - 11/12/21 1025 --  --    Dressing Care dressing applied; other (see comments)  WOUND VAC, SEE PROCEDURE NOTE   - 12/17/21 1235 dressing applied; other (see comments)  NS MOIST WET-TO-DRY GAUZE, MEPILEX   - 12/03/21 1040 dressing applied; gauze, [id number]; gauze; border dressing; other (see comments)  NS MOIST KERLIX, GAUZE, MEPILEX   - 11/12/21 1025 --  --    Periwound Care barrier film applied   - 12/17/21 1235 dry periwound area maintained   - 12/03/21 1040 barrier ointment applied   - 11/12/21 1029 --  --    Adhesive Closure Strips --  --  --  --  --    Number of Adhesive Closure Strips --  --  --  --  --    Sutures Removed Intact --  --  --  --  --    Number of Sutures Removed --  --  --  --  --    Staples Removed Intact --  --  --  --  --    Number of Staples Removed --  --  --  --  --    Wound Output (mL) --  --  --  --  --          10/29/21 2051 10/29/21 1110 10/29/21 0126 10/13/21 1130 10/10/21 2030           Wound Image  View All Images --  --  View Images   -ET 10/29/21 0553    --  --    Dressing Appearance moist drainage; intact   -AS 10/29/21 2247 moist drainage; intact   -FL 10/29/21 1232 open to air   -ET 10/29/21 0402 intact   -FL 10/13/21 1621 dry; intact   -RK 10/10/21 4801    Closure --  --  Open to air   -ET 10/29/21 0402 --  --    Base red; moist; slough   -AS 10/29/21 2247 red; moist; slough   -FL 10/29/21 1232 moist; red; subcutaneous; yellow; white   -ET 10/29/21 0602 red; moist; purple   -FL 10/13/21 1621 --    Black (%), Wound Tissue Color --  --  --  --  --    Red (%), Wound Tissue Color --  --  --  --  --    Yellow (%), Wound Tissue Color --  --  --  --  --    Periwound intact; pink   -AS 10/29/21 2247 intact; pink   -FL 10/29/21 1232 ecchymotic; non-blanchable; redness; warm   -ET 10/29/21 0402 redness; non-blanchable   -FL 10/13/21 1621 --    Periwound Temperature --  --  warm   -ET 10/29/21 0402 --  --    Periwound Skin Turgor --  --  firm   -ET 10/29/21 0402 --  --    Edges open   -AS 10/29/21 2247 open   -FL 10/29/21 1232 open   -ET 10/29/21 0402 irregular   -FL 10/13/21 1621 --    Wound Length (cm) --  6.2 cm  2nd area 1.1cml more to left aspect   -FL 10/29/21 1232 --  1.7 cm  perirectal area 2.9cm l   -FL 10/13/21 1621 --    Wound Width (cm) --  2.2 cm  2nd area 1.1cmw more to left aspect   -FL 10/29/21 1232 --  2.3 cm  perirectal area 1.0cmw   -FL 10/13/21 1621 --    Wound Depth (cm) --  3.3 cm   -FL 10/29/21 1232 --  --  perirectal area 0.7cmd   -FL 10/13/21 1621 --    Tunneling [Depth (cm)/Location] --  max depth at 2 o'clock 4.2cm   -FL 10/29/21 1232 --  --  --    Undermining [Depth (cm)/Location] --  around the clock   -FL 10/29/21 1232 --  --  --    Drainage Characteristics/Odor serosanguineous   -AS 10/29/21 2247 serosanguineous   -FL 10/29/21 1232 serosanguineous; bleeding controlled   -ET 10/29/21 0402 serosanguineous   -FL 10/13/21 1621 --    Drainage Amount small   -AS 10/29/21 2247 small   -FL 10/29/21 1232 small   -ET 10/29/21 0402 small   -FL 10/13/21 1621 --    Care, Wound cleansed with; irrigated with; sterile normal saline   -AS 10/29/21 2247 cleansed with; irrigated with; sterile normal saline   -FL 10/29/21 1232 --  irrigated with; sterile normal  saline   -FL 10/13/21 1621 --    Dressing Care dressing changed; gauze, [id number]; border dressing; silicone   -AS 10/29/21 2247 dressing applied; gauze, [id number]; border dressing; silicone   -FL 10/29/21 1232 dressing applied; silicone   -ET 10/29/21 0402 dressing applied; border dressing; silicone   -FL 10/13/21 1621 --    Periwound Care --  --  --  --  dry periwound area maintained   -RK 10/10/21 2251   Adhesive Closure Strips --  --  --  --  --    Number of Adhesive Closure Strips --  --  --  --  --    Sutures Removed Intact --  --  --  --  --    Number of Sutures Removed --  --  --  --  --    Staples Removed Intact --  --  --  --  --    Number of Staples Removed --  --  --  --  --    Wound Output (mL) --  --  --  --  --          10/09/21 1910 10/09/21 0947 10/09/21 0745 10/09/21 0558   Wound Image  View All Images --  --  --  View Images   - 10/09/21 0603      Dressing Appearance dry; intact   -ASA 10/10/21 0138 dry; intact; no drainage   - 10/09/21 0948 --  Patient to surgery for incision and drainage.   -BF 10/09/21 0823 open to air   - 10/09/21 0621   Closure --  Adhesive bandage   -PK 10/09/21 0948 --  --    Base --  --  --  bleeding; moist; red   - 10/09/21 0621   Black (%), Wound Tissue Color --  --  --  --    Red (%), Wound Tissue Color --  --  --  --    Yellow (%), Wound Tissue Color --  --  --  --    Periwound --  --  --  non-blanchable; redness; warm   - 10/09/21 0621   Periwound Temperature --  --  --  warm   - 10/09/21 0621   Periwound Skin Turgor --  --  --  --    Edges --  --  --  irregular; open   - 10/09/21 0621   Wound Length (cm) --  --  --  --    Wound Width (cm) --  --  --  --    Wound Depth (cm) --  --  --  --    Tunneling [Depth (cm)/Location] --  --  --  --    Undermining [Depth (cm)/Location] --  --  --  --    Drainage Characteristics/Odor --  --  --  serosanguineous; yellow   - 10/09/21 0621   Drainage Amount --  none   - 10/09/21 0948 --  large   - 10/09/21  0621   Care, Wound --  --  --  cleansed with; sterile normal saline   - 10/09/21 0621   Dressing Care --  --  --  --    Periwound Care --  --  --  --    Adhesive Closure Strips --  --  --  --    Number of Adhesive Closure Strips --  --  --  --    Sutures Removed Intact --  --  --  --    Number of Sutures Removed --  --  --  --    Staples Removed Intact --  --  --  --    Number of Staples Removed --  --  --  --    Wound Output (mL) --  --  --  --                 Wound 11/12/21 1029 Right lateral hip    Placement date 11/12/21   - 11/12/21 1029      Placement time 1029   - 11/12/21 1029 Days 45    Present on Hospital Admission: Y   - 11/12/21 1029 Side: Right   - 11/12/21 1029    Orientation: lateral   - 11/12/21 1029 Location: hip   - 11/12/21 1029    Stage, Pressure Injury : deep tissue injury   - 11/12/21 1029      Assessments       12/17/21 1232 11/12/21 1030        Wound Image  View All Images View Images   - 12/17/21 1233    View Images   - 11/12/21 1031      Dressing Appearance no drainage; intact   - 12/17/21 1233 no drainage; open to air   - 11/12/21 1031   Closure --  --    Base --  --    Black (%), Wound Tissue Color --  --    Red (%), Wound Tissue Color --  --    Yellow (%), Wound Tissue Color --  --    Periwound --  ecchymotic; intact; warm   - 11/12/21 1031   Periwound Temperature warm   - 12/17/21 1233 hot   - 11/12/21 1031   Periwound Skin Turgor soft   - 12/17/21 1233 --    Edges --  --    Wound Length (cm) 0.9 cm  SCAB COVERING   - 12/17/21 1233 --    Wound Width (cm) 0.7 cm   - 12/17/21 1233 --    Wound Depth (cm) --  --    Tunneling [Depth (cm)/Location] --  --    Undermining [Depth (cm)/Location] --  --    Drainage Characteristics/Odor --  --    Drainage Amount none   - 12/17/21 1233 none   - 11/12/21 1031   Care, Wound cleansed with; sterile normal saline   - 12/17/21 1233 cleansed with; sterile normal saline   - 11/12/21 1031   Dressing Care dressing  applied; border dressing; other (see comments)  BETADINE, MEPILEX   - 12/17/21 1233 dressing applied; border dressing; other (see comments)  BARRIER OINTMENT APPLIED (CALMOSEPTINE)   - 11/12/21 1031   Periwound Care --  --    Adhesive Closure Strips --  --    Number of Adhesive Closure Strips --  --    Sutures Removed Intact --  --    Number of Sutures Removed --  --    Staples Removed Intact --  --    Number of Staples Removed --  --    Wound Output (mL) --  --                 Wound 12/17/21 1236 Left posterior heel    Placement date 12/17/21   - 12/17/21 1236      Placement time 1236   - 12/17/21 1236 Days 10    Present on Hospital Admission: Y   - 12/17/21 1236 Side: Left   - 12/17/21 1236    Orientation: posterior   - 12/17/21 1236 Location: heel   - 12/17/21 1236    Assessments       12/17/21 1236       Wound Image  View All Images View Images   - 12/17/21 1237      Dressing Appearance intact; moist drainage   - 12/17/21 1237   Closure --    Base --    Black (%), Wound Tissue Color --    Red (%), Wound Tissue Color 100   - 12/17/21 1237   Yellow (%), Wound Tissue Color --    Periwound other (see comments)  SEPERATED SKIN FROM RUPTURED BLISTER   - 12/17/21 1237   Periwound Temperature warm   - 12/17/21 1237   Periwound Skin Turgor soft   - 12/17/21 1237   Edges open   - 12/17/21 1237   Wound Length (cm) --  <25 CM   - 12/17/21 1237   Wound Width (cm) --    Wound Depth (cm) --    Tunneling [Depth (cm)/Location] --    Undermining [Depth (cm)/Location] --    Drainage Characteristics/Odor serous   - 12/17/21 1237   Drainage Amount small   - 12/17/21 1237   Care, Wound cleansed with; sterile normal saline   - 12/17/21 1237   Dressing Care --    Periwound Care --    Adhesive Closure Strips --    Number of Adhesive Closure Strips --    Sutures Removed Intact --    Number of Sutures Removed --    Staples Removed Intact --    Number of Staples Removed --    Wound Output (mL) --                  Wound 12/17/21 1238 midline perirectal    Placement date 12/17/21   - 12/17/21 1238      Placement time 1238   - 12/17/21 1238 Days 10    Present on Hospital Admission: Y   - 12/17/21 1238 Orientation: midline   - 12/17/21 1238    Location: perirectal   - 12/17/21 1238      Assessments       12/17/21 1238       Wound Image  View All Images View Images   - 12/17/21 1239      Dressing Appearance open to air; moist drainage   - 12/17/21 1239   Closure --    Base --    Black (%), Wound Tissue Color --    Red (%), Wound Tissue Color 50   - 12/17/21 1239   Yellow (%), Wound Tissue Color 50   - 12/17/21 1239   Periwound intact; blanchable   - 12/17/21 1239   Periwound Temperature warm   - 12/17/21 1239   Periwound Skin Turgor soft   - 12/17/21 1239   Edges open   - 12/17/21 1239   Wound Length (cm) --  <25 CM   - 12/17/21 1239   Wound Width (cm) --    Wound Depth (cm) --    Tunneling [Depth (cm)/Location] --    Undermining [Depth (cm)/Location] --    Drainage Characteristics/Odor serous   - 12/17/21 1239   Drainage Amount small   - 12/17/21 1239   Care, Wound cleansed with; sterile normal saline   - 12/17/21 1239   Dressing Care antimicrobial agent applied  BACITRACIN   - 12/17/21 1239   Periwound Care --    Adhesive Closure Strips --    Number of Adhesive Closure Strips --    Sutures Removed Intact --    Number of Sutures Removed --    Staples Removed Intact --    Number of Staples Removed --    Wound Output (mL) --                    User Key  (r) = Recorded By, (t) = Taken By, (c) = Cosigned By    Initials Name Effective Dates Provider Type Discipline    ET Thorn, Erinne, RN 08/23/21 -  Registered Nurse Nurse    Lakesha Fountain RN 06/16/21 -  Registered Nurse Nurse    Linda Prater, RN 06/16/21 -  Registered Nurse Nurse    Sharla Nicholson RN 06/16/21 -  Registered Nurse Nurse    Casper Velazquez, RN 06/16/21 -  Registered Nurse Nurse    SHIRA Hernandes,  Maribell, RN 06/16/21 -  Registered Nurse Nurse    Cate Torres, RN 06/16/21 -  Registered Nurse Nurse    Linda Shannon, RN 06/16/21 -  Registered Nurse Nurse    Marisel Ochoa, RN 06/09/21 -  Registered Nurse Nurse    Tracy Akbar, RN 06/09/21 -  Registered Nurse --    Fish Caputo, RN 09/15/21 -  Registered Nurse Nurse                         Assessment and Plan   Diagnoses and all orders for this visit:    1. Decubitus ulcer of back, stage 4 (HCC) (Primary)    2. Type 1 diabetes mellitus with other skin ulcer (HCC)    3. MRSA infection  -     doxycycline (VIBRAMYCIN) 100 MG capsule; Take 1 capsule by mouth 2 (Two) Times a Day for 14 days. Take with Benadryl (diphenhydramine) 25mg.  Dispense: 28 capsule; Refill: 0  -     diphenhydrAMINE (Benadryl Allergy) 25 MG tablet; Take 1 tablet by mouth 2 (Two) Times a Day. Take 30min prior to taking Doxycycline.  Dispense: 30 tablet; Refill: 0    4. Pressure injury of right hip, stage 2 (HCC)  -     silver sulfadiazine (Silvadene) 1 % cream; Apply 1 application topically to the appropriate area as directed Daily.  Dispense: 400 g; Refill: 3      Patient had a positive culture for MRSA that has not had any treatment performed.  We will see how she does with doxycycline and have her take Benadryl with it.  Resume wound VAC to be changed MWF.  Silvadene to right hip.  Discontinue Betadine to left heel.  Recheck 2 weeks.    Patient was given instructions and counseling regarding their condition or for health maintenance advice, as well as the wound care plan and recommendations. They understand and agree with the plan.  They will follow back up here in the clinic but are instructed to contact us in the interim should they have any new, returning, or worsening symptoms or concerns. Please see specific information pulled into the AVS if appropriate.     I spent 85 minutes in both face-to-face and nonface-to-face time for patient care today including, but  not limited to, review of old records, reviewing old images, history and physical exam, reviewing test results, speaking with patient's PCP, counseling patient and , and documenting.      Follow Up   Return in about 2 weeks (around 1/10/2022) for Recheck.      Pastora Keenan MD

## 2022-01-01 DIAGNOSIS — I10 ESSENTIAL HYPERTENSION, BENIGN: Chronic | ICD-10-CM

## 2022-01-01 RX ORDER — LISINOPRIL 20 MG/1
20 TABLET ORAL DAILY
Qty: 90 TABLET | Refills: 3 | Status: SHIPPED | OUTPATIENT
Start: 2022-01-01

## 2022-01-05 ENCOUNTER — TELEPHONE (OUTPATIENT)
Dept: FAMILY MEDICINE CLINIC | Facility: CLINIC | Age: 57
End: 2022-01-05

## 2023-09-06 NOTE — OUTREACH NOTE
Ambulatory Case Management Note    Never reached back out to consent discharged from program pending status. PCP aware.     There are no recently modified care plans to display for this patient.      Darcy Villarreal RN  Ambulatory Case Management    11/12/2021, 10:43 EST     1.78

## (undated) DEVICE — PENCL E/S SMOKEEVAC W/TELESCP CANN

## (undated) DEVICE — SLV SCD LEG COMFORT KENDALLSCD MD REPROC

## (undated) DEVICE — DRAPE,U/ SHT,SPLIT,PLAS,STERIL: Brand: MEDLINE

## (undated) DEVICE — 3M™ IOBAN™ 2 ANTIMICROBIAL INCISE DRAPE 6650EZ: Brand: IOBAN™ 2

## (undated) DEVICE — DRAPE,LAPAROTOMY,PCH,STERILE: Brand: MEDLINE

## (undated) DEVICE — MAJOR-LF: Brand: MEDLINE INDUSTRIES, INC.

## (undated) DEVICE — CONVERTORS STOCKINETTE: Brand: CONVERTORS

## (undated) DEVICE — GLV SURG SENSICARE SLT PF LF 7.5 STRL

## (undated) DEVICE — SYR LUERLOK 30CC

## (undated) DEVICE — GOWN,REINFRCE,POLY,SIRUS,BREATH SLV,XXLG: Brand: MEDLINE

## (undated) DEVICE — VAGINAL PREP TRAY: Brand: MEDLINE INDUSTRIES, INC.

## (undated) DEVICE — INTENDED FOR TISSUE SEPARATION, AND OTHER PROCEDURES THAT REQUIRE A SHARP SURGICAL BLADE TO PUNCTURE OR CUT.: Brand: BARD-PARKER ® CARBON RIB-BACK BLADES